# Patient Record
Sex: FEMALE | Race: WHITE | Employment: UNEMPLOYED | URBAN - METROPOLITAN AREA
[De-identification: names, ages, dates, MRNs, and addresses within clinical notes are randomized per-mention and may not be internally consistent; named-entity substitution may affect disease eponyms.]

---

## 2017-02-03 RX ORDER — FLUTICASONE PROPIONATE 50 MCG
SPRAY, SUSPENSION (ML) NASAL
Qty: 16 G | Refills: 2 | Status: SHIPPED | OUTPATIENT
Start: 2017-02-03 | End: 2017-05-05 | Stop reason: SDUPTHER

## 2017-02-03 RX ORDER — RANITIDINE 150 MG/1
TABLET ORAL
Qty: 60 TABLET | Refills: 2 | Status: SHIPPED | OUTPATIENT
Start: 2017-02-03 | End: 2017-05-07 | Stop reason: SDUPTHER

## 2017-02-03 RX ORDER — LOSARTAN POTASSIUM AND HYDROCHLOROTHIAZIDE 25; 100 MG/1; MG/1
TABLET ORAL
Qty: 30 TABLET | Refills: 2 | Status: SHIPPED | OUTPATIENT
Start: 2017-02-03 | End: 2017-05-07 | Stop reason: SDUPTHER

## 2017-02-03 RX ORDER — DULOXETIN HYDROCHLORIDE 60 MG/1
CAPSULE, DELAYED RELEASE ORAL
Qty: 30 CAPSULE | Refills: 2 | Status: SHIPPED | OUTPATIENT
Start: 2017-02-03 | End: 2017-05-07 | Stop reason: SDUPTHER

## 2017-02-06 RX ORDER — AMLODIPINE BESYLATE 5 MG/1
TABLET ORAL
Qty: 60 TABLET | Refills: 2 | Status: SHIPPED | OUTPATIENT
Start: 2017-02-06 | End: 2017-05-07 | Stop reason: SDUPTHER

## 2017-02-17 ENCOUNTER — TELEPHONE (OUTPATIENT)
Dept: FAMILY MEDICINE CLINIC | Age: 37
End: 2017-02-17

## 2017-03-23 ENCOUNTER — TELEPHONE (OUTPATIENT)
Dept: FAMILY MEDICINE CLINIC | Age: 37
End: 2017-03-23

## 2017-03-23 RX ORDER — VARENICLINE TARTRATE 25 MG
KIT ORAL
Qty: 42 TABLET | Refills: 0 | Status: SHIPPED | OUTPATIENT
Start: 2017-03-23 | End: 2017-07-07 | Stop reason: SDUPTHER

## 2017-04-06 ENCOUNTER — TELEPHONE (OUTPATIENT)
Dept: FAMILY MEDICINE CLINIC | Age: 37
End: 2017-04-06

## 2017-05-02 RX ORDER — BUPROPION HYDROCHLORIDE 150 MG/1
TABLET, EXTENDED RELEASE ORAL
Qty: 30 TABLET | Refills: 2 | Status: SHIPPED | OUTPATIENT
Start: 2017-05-02 | End: 2017-07-30 | Stop reason: SDUPTHER

## 2017-05-05 RX ORDER — FLUTICASONE PROPIONATE 50 MCG
SPRAY, SUSPENSION (ML) NASAL
Qty: 1 BOTTLE | Refills: 3 | Status: SHIPPED | OUTPATIENT
Start: 2017-05-05 | End: 2017-08-29 | Stop reason: SDUPTHER

## 2017-06-01 RX ORDER — NAPROXEN 500 MG/1
TABLET ORAL
Qty: 60 TABLET | Refills: 1 | Status: SHIPPED | OUTPATIENT
Start: 2017-06-01 | End: 2017-07-28 | Stop reason: SDUPTHER

## 2017-06-08 ENCOUNTER — TELEPHONE (OUTPATIENT)
Dept: FAMILY MEDICINE CLINIC | Age: 37
End: 2017-06-08

## 2017-07-07 RX ORDER — VARENICLINE TARTRATE 1 MG/1
1 TABLET, FILM COATED ORAL 2 TIMES DAILY
Qty: 60 TABLET | Refills: 3 | Status: SHIPPED | OUTPATIENT
Start: 2017-07-07 | End: 2017-08-01 | Stop reason: SDUPTHER

## 2017-07-07 RX ORDER — VARENICLINE TARTRATE 25 MG
KIT ORAL
Qty: 42 TABLET | Refills: 0 | Status: SHIPPED | OUTPATIENT
Start: 2017-07-07 | End: 2017-08-01

## 2017-07-31 RX ORDER — BUPROPION HYDROCHLORIDE 150 MG/1
TABLET, EXTENDED RELEASE ORAL
Qty: 30 TABLET | Refills: 1 | Status: SHIPPED | OUTPATIENT
Start: 2017-07-31 | End: 2017-09-24 | Stop reason: SDUPTHER

## 2017-08-01 RX ORDER — VARENICLINE TARTRATE 1 MG/1
1 TABLET, FILM COATED ORAL 2 TIMES DAILY
Qty: 60 TABLET | Refills: 0 | Status: SHIPPED | OUTPATIENT
Start: 2017-08-01 | End: 2017-09-18

## 2017-08-03 RX ORDER — NAPROXEN 500 MG/1
TABLET ORAL
Qty: 60 TABLET | Refills: 0 | Status: SHIPPED | OUTPATIENT
Start: 2017-08-03 | End: 2017-09-28 | Stop reason: SDUPTHER

## 2017-08-29 RX ORDER — FLUTICASONE PROPIONATE 50 MCG
SPRAY, SUSPENSION (ML) NASAL
Qty: 1 BOTTLE | Refills: 2 | Status: SHIPPED | OUTPATIENT
Start: 2017-08-29 | End: 2017-11-24 | Stop reason: SDUPTHER

## 2017-09-05 RX ORDER — RANITIDINE 150 MG/1
TABLET ORAL
Qty: 60 TABLET | Refills: 1 | Status: SHIPPED | OUTPATIENT
Start: 2017-09-05 | End: 2017-11-09 | Stop reason: SDUPTHER

## 2017-09-05 RX ORDER — AMLODIPINE BESYLATE 5 MG/1
TABLET ORAL
Qty: 60 TABLET | Refills: 1 | Status: SHIPPED | OUTPATIENT
Start: 2017-09-05 | End: 2017-11-09 | Stop reason: SDUPTHER

## 2017-09-05 RX ORDER — LOSARTAN POTASSIUM AND HYDROCHLOROTHIAZIDE 25; 100 MG/1; MG/1
TABLET ORAL
Qty: 30 TABLET | Refills: 1 | Status: SHIPPED | OUTPATIENT
Start: 2017-09-05 | End: 2017-11-09 | Stop reason: SDUPTHER

## 2017-09-05 RX ORDER — DULOXETIN HYDROCHLORIDE 60 MG/1
CAPSULE, DELAYED RELEASE ORAL
Qty: 30 CAPSULE | Refills: 1 | Status: SHIPPED | OUTPATIENT
Start: 2017-09-05 | End: 2017-11-09 | Stop reason: SDUPTHER

## 2017-09-18 ENCOUNTER — OFFICE VISIT (OUTPATIENT)
Dept: FAMILY MEDICINE CLINIC | Age: 37
End: 2017-09-18

## 2017-09-18 VITALS
WEIGHT: 233 LBS | SYSTOLIC BLOOD PRESSURE: 130 MMHG | OXYGEN SATURATION: 98 % | HEIGHT: 66 IN | BODY MASS INDEX: 37.45 KG/M2 | HEART RATE: 100 BPM | DIASTOLIC BLOOD PRESSURE: 76 MMHG

## 2017-09-18 DIAGNOSIS — E11.9 TYPE 2 DIABETES MELLITUS WITHOUT COMPLICATION, WITHOUT LONG-TERM CURRENT USE OF INSULIN (HCC): Primary | ICD-10-CM

## 2017-09-18 DIAGNOSIS — I10 ESSENTIAL HYPERTENSION: ICD-10-CM

## 2017-09-18 DIAGNOSIS — F17.200 TOBACCO DEPENDENCE: ICD-10-CM

## 2017-09-18 LAB
CREATININE URINE POCT: 200
HBA1C MFR BLD: 5.1 %
MICROALBUMIN/CREAT 24H UR: 80 MG/G{CREAT}
MICROALBUMIN/CREAT UR-RTO: <30

## 2017-09-18 PROCEDURE — 90471 IMMUNIZATION ADMIN: CPT | Performed by: FAMILY MEDICINE

## 2017-09-18 PROCEDURE — 90686 IIV4 VACC NO PRSV 0.5 ML IM: CPT | Performed by: FAMILY MEDICINE

## 2017-09-18 PROCEDURE — 99214 OFFICE O/P EST MOD 30 MIN: CPT | Performed by: FAMILY MEDICINE

## 2017-09-18 PROCEDURE — 82044 UR ALBUMIN SEMIQUANTITATIVE: CPT | Performed by: FAMILY MEDICINE

## 2017-09-18 PROCEDURE — 83036 HEMOGLOBIN GLYCOSYLATED A1C: CPT | Performed by: FAMILY MEDICINE

## 2017-09-18 ASSESSMENT — PATIENT HEALTH QUESTIONNAIRE - PHQ9
2. FEELING DOWN, DEPRESSED OR HOPELESS: 1
SUM OF ALL RESPONSES TO PHQ9 QUESTIONS 1 & 2: 2
SUM OF ALL RESPONSES TO PHQ QUESTIONS 1-9: 2
1. LITTLE INTEREST OR PLEASURE IN DOING THINGS: 1

## 2017-09-18 ASSESSMENT — ENCOUNTER SYMPTOMS
RESPIRATORY NEGATIVE: 1
GASTROINTESTINAL NEGATIVE: 1

## 2017-09-28 RX ORDER — NAPROXEN 500 MG/1
TABLET ORAL
Qty: 60 TABLET | Refills: 0 | Status: SHIPPED | OUTPATIENT
Start: 2017-09-28 | End: 2018-06-29

## 2017-11-02 ENCOUNTER — OFFICE VISIT (OUTPATIENT)
Dept: FAMILY MEDICINE CLINIC | Age: 37
End: 2017-11-02

## 2017-11-02 VITALS
HEART RATE: 83 BPM | DIASTOLIC BLOOD PRESSURE: 86 MMHG | SYSTOLIC BLOOD PRESSURE: 130 MMHG | OXYGEN SATURATION: 98 % | WEIGHT: 230 LBS | HEIGHT: 66 IN | BODY MASS INDEX: 36.96 KG/M2

## 2017-11-02 DIAGNOSIS — R10.9 RIGHT SIDED ABDOMINAL PAIN: Primary | ICD-10-CM

## 2017-11-02 LAB
BILIRUBIN, POC: NORMAL
BLOOD URINE, POC: NORMAL
CLARITY, POC: NORMAL
COLOR, POC: NORMAL
CONTROL: NORMAL
GLUCOSE URINE, POC: NORMAL
KETONES, POC: NORMAL
LEUKOCYTE EST, POC: NORMAL
NITRITE, POC: NORMAL
PH, POC: 6
PREGNANCY TEST URINE, POC: NORMAL
PROTEIN, POC: 30
SPECIFIC GRAVITY, POC: 1.02
UROBILINOGEN, POC: 0.2

## 2017-11-02 PROCEDURE — 99214 OFFICE O/P EST MOD 30 MIN: CPT | Performed by: NURSE PRACTITIONER

## 2017-11-02 PROCEDURE — G8484 FLU IMMUNIZE NO ADMIN: HCPCS | Performed by: NURSE PRACTITIONER

## 2017-11-02 PROCEDURE — 81002 URINALYSIS NONAUTO W/O SCOPE: CPT | Performed by: NURSE PRACTITIONER

## 2017-11-02 PROCEDURE — 4004F PT TOBACCO SCREEN RCVD TLK: CPT | Performed by: NURSE PRACTITIONER

## 2017-11-02 PROCEDURE — 81025 URINE PREGNANCY TEST: CPT | Performed by: NURSE PRACTITIONER

## 2017-11-02 PROCEDURE — G8417 CALC BMI ABV UP PARAM F/U: HCPCS | Performed by: NURSE PRACTITIONER

## 2017-11-02 PROCEDURE — G8427 DOCREV CUR MEDS BY ELIG CLIN: HCPCS | Performed by: NURSE PRACTITIONER

## 2017-11-02 ASSESSMENT — ENCOUNTER SYMPTOMS
NAUSEA: 1
VOMITING: 1
FLATUS: 1
DIARRHEA: 0
RESPIRATORY NEGATIVE: 1
ABDOMINAL PAIN: 1
ALLERGIC/IMMUNOLOGIC NEGATIVE: 1
CONSTIPATION: 0
BELCHING: 0
EYES NEGATIVE: 1
HEMATOCHEZIA: 0

## 2017-11-02 NOTE — PROGRESS NOTES
11/2/2017    This is a 40 y.o. female here for sudden onset right lower abdominal pain. Symptoms woke her up from sleep at 3 AM. It has been ongoing since that time. She denies fever but overall states she does not feel well. Chief Complaint   Patient presents with    Abdominal Pain     Right side pain; dizziness; nausea    . Abdominal Pain   This is a new problem. The current episode started today. The onset quality is sudden. The problem occurs constantly. The problem has been waxing and waning. The pain is located in the RLQ. The pain is at a severity of 8/10. The pain is moderate. The quality of the pain is colicky and sharp. The abdominal pain radiates to the suprapubic region. Associated symptoms include anorexia, flatus, nausea and vomiting. Pertinent negatives include no arthralgias, belching, constipation, diarrhea, dysuria, fever, frequency, headaches, hematochezia, hematuria, melena, myalgias or weight loss. The pain is aggravated by palpation and movement. The pain is relieved by nothing. She has tried nothing for the symptoms. The treatment provided no relief. Her past medical history is significant for gallstones (Status post cholecystectomy). There is no history of abdominal surgery, colon cancer, GERD, irritable bowel syndrome, pancreatitis, PUD or ulcerative colitis.         Patient Active Problem List   Diagnosis    PCOS (polycystic ovarian syndrome)    Allergic rhinitis    LAURO (obstructive sleep apnea)    Panic attacks    Acute left flank pain    Syncope    Hypertension    Migraine    Chronic back pain    Tobacco dependence    Hypokalemia    Psoriasis    ESTELA (generalized anxiety disorder)    DM (diabetes mellitus) (HCC)    Obesity (BMI 35.0-39.9 without comorbidity)    Recurrent acute tonsillitis       Current Outpatient Prescriptions   Medication Sig Dispense Refill    naproxen (NAPROSYN) 500 MG tablet TAKE ONE TABLET BY MOUTH TWICE A DAY WITH MEALS 60 tablet 0    buPROPion Cardiovascular: Negative. Gastrointestinal: Positive for abdominal pain, anorexia, flatus, nausea and vomiting. Negative for constipation, diarrhea, hematochezia and melena. Endocrine: Negative. Diabetic, stable   Genitourinary: Negative. Negative for decreased urine volume, dysuria, frequency, hematuria, urgency, vaginal bleeding, vaginal discharge and vaginal pain. History of polycystic ovarian disease, no history of ruptured cyst  History of kidney stones, patient states this does not feel the same   Musculoskeletal: Negative. Negative for arthralgias and myalgias. Skin: Negative. Allergic/Immunologic: Negative. Neurological: Negative. Negative for headaches. Hematological: Negative. Psychiatric/Behavioral: Negative. Physical Exam   Constitutional: She is oriented to person, place, and time. She appears well-developed and well-nourished. No distress. HENT:   Head: Normocephalic and atraumatic. Right Ear: External ear normal.   Left Ear: External ear normal.   Nose: Nose normal.   Mouth/Throat: Oropharynx is clear and moist. No oropharyngeal exudate. Eyes: Conjunctivae and EOM are normal. Right eye exhibits no discharge. Left eye exhibits no discharge. Neck: Normal range of motion. Neck supple. Cardiovascular: Normal rate, regular rhythm and normal heart sounds. Exam reveals no gallop and no friction rub. No murmur heard. Pulmonary/Chest: Effort normal and breath sounds normal. No respiratory distress. She has no wheezes. She has no rales. Abdominal: Soft. Normal appearance and bowel sounds are normal. She exhibits no distension. There is tenderness. There is tenderness at McBurney's point. There is no rigidity, no rebound, no guarding, no CVA tenderness and negative Bronson's sign. Positive heel strike  Mildly positive iliopsoas   Musculoskeletal: Normal range of motion. She exhibits no edema or tenderness.    Lymphadenopathy:     She has no cervical adenopathy. Neurological: She is alert and oriented to person, place, and time. She exhibits normal muscle tone. Coordination normal.   Skin: Skin is warm. No rash noted. She is diaphoretic (Flushed). No erythema. No pallor. Psychiatric: She has a normal mood and affect. Her behavior is normal. Judgment and thought content normal.   Nursing note and vitals reviewed. Diagnosis    ICD-10-CM ICD-9-CM    1. Right sided abdominal pain R10.9 789.09         Plan  Patient sent to the emergency room for evaluation of possible appendicitis  Also consider ovarian cyst, ovarian torsion  Urine pregnancy negative  UA shows no sign of infection, no blood    No orders of the defined types were placed in this encounter. No orders of the defined types were placed in this encounter. No Follow-up on file.     Follow-up with primary care after evaluation

## 2017-12-20 RX ORDER — BUPROPION HYDROCHLORIDE 150 MG/1
TABLET, EXTENDED RELEASE ORAL
Qty: 30 TABLET | Refills: 3 | Status: SHIPPED | OUTPATIENT
Start: 2017-12-20 | End: 2018-01-18 | Stop reason: SDUPTHER

## 2018-01-18 RX ORDER — DULOXETIN HYDROCHLORIDE 60 MG/1
CAPSULE, DELAYED RELEASE ORAL
Qty: 30 CAPSULE | Refills: 3 | Status: SHIPPED | OUTPATIENT
Start: 2018-01-18 | End: 2018-03-05 | Stop reason: SDUPTHER

## 2018-01-18 RX ORDER — BUPROPION HYDROCHLORIDE 150 MG/1
TABLET, EXTENDED RELEASE ORAL
Qty: 30 TABLET | Refills: 3 | Status: SHIPPED | OUTPATIENT
Start: 2018-01-18 | End: 2019-08-27

## 2018-01-18 RX ORDER — VARENICLINE TARTRATE 1 MG/1
1 TABLET, FILM COATED ORAL 2 TIMES DAILY
Qty: 60 TABLET | Refills: 0 | Status: SHIPPED | OUTPATIENT
Start: 2018-01-18 | End: 2018-03-05 | Stop reason: SDUPTHER

## 2018-01-18 RX ORDER — AMLODIPINE BESYLATE 5 MG/1
TABLET ORAL
Qty: 60 TABLET | Refills: 3 | Status: SHIPPED | OUTPATIENT
Start: 2018-01-18 | End: 2018-03-05 | Stop reason: SDUPTHER

## 2018-01-18 RX ORDER — LOSARTAN POTASSIUM AND HYDROCHLOROTHIAZIDE 25; 100 MG/1; MG/1
TABLET ORAL
Qty: 30 TABLET | Refills: 3 | Status: SHIPPED | OUTPATIENT
Start: 2018-01-18 | End: 2018-03-05 | Stop reason: SDUPTHER

## 2018-01-18 RX ORDER — METFORMIN HYDROCHLORIDE 750 MG/1
750 TABLET, EXTENDED RELEASE ORAL
Qty: 30 TABLET | Refills: 0 | Status: SHIPPED | OUTPATIENT
Start: 2018-01-18 | End: 2018-01-22 | Stop reason: SDUPTHER

## 2018-01-18 RX ORDER — RANITIDINE 150 MG/1
TABLET ORAL
Qty: 60 TABLET | Refills: 3 | Status: SHIPPED | OUTPATIENT
Start: 2018-01-18 | End: 2018-03-05 | Stop reason: SDUPTHER

## 2018-01-22 RX ORDER — METFORMIN HYDROCHLORIDE 750 MG/1
TABLET, EXTENDED RELEASE ORAL
Qty: 60 TABLET | Refills: 3 | Status: SHIPPED | OUTPATIENT
Start: 2018-01-22 | End: 2020-11-18 | Stop reason: SDUPTHER

## 2018-06-04 RX ORDER — FLUTICASONE PROPIONATE 50 MCG
SPRAY, SUSPENSION (ML) NASAL
Qty: 1 BOTTLE | Refills: 2 | Status: SHIPPED | OUTPATIENT
Start: 2018-06-04 | End: 2018-08-30

## 2018-06-04 RX ORDER — VARENICLINE TARTRATE 1 MG/1
1 TABLET, FILM COATED ORAL 2 TIMES DAILY
Qty: 60 TABLET | Refills: 0 | Status: SHIPPED | OUTPATIENT
Start: 2018-06-04 | End: 2019-12-18

## 2018-06-20 ENCOUNTER — TELEPHONE (OUTPATIENT)
Dept: FAMILY MEDICINE CLINIC | Age: 38
End: 2018-06-20

## 2018-06-21 ENCOUNTER — TELEPHONE (OUTPATIENT)
Dept: FAMILY MEDICINE CLINIC | Age: 38
End: 2018-06-21

## 2018-06-29 ENCOUNTER — OFFICE VISIT (OUTPATIENT)
Dept: FAMILY MEDICINE CLINIC | Age: 38
End: 2018-06-29

## 2018-06-29 VITALS
SYSTOLIC BLOOD PRESSURE: 150 MMHG | HEIGHT: 66 IN | BODY MASS INDEX: 40.5 KG/M2 | HEART RATE: 99 BPM | OXYGEN SATURATION: 98 % | WEIGHT: 252 LBS | DIASTOLIC BLOOD PRESSURE: 90 MMHG

## 2018-06-29 DIAGNOSIS — S16.1XXA ACUTE STRAIN OF NECK MUSCLE, INITIAL ENCOUNTER: ICD-10-CM

## 2018-06-29 DIAGNOSIS — F17.200 TOBACCO DEPENDENCE: Primary | ICD-10-CM

## 2018-06-29 PROCEDURE — 99214 OFFICE O/P EST MOD 30 MIN: CPT | Performed by: FAMILY MEDICINE

## 2018-06-29 PROCEDURE — 4004F PT TOBACCO SCREEN RCVD TLK: CPT | Performed by: FAMILY MEDICINE

## 2018-06-29 PROCEDURE — G8427 DOCREV CUR MEDS BY ELIG CLIN: HCPCS | Performed by: FAMILY MEDICINE

## 2018-06-29 PROCEDURE — G8417 CALC BMI ABV UP PARAM F/U: HCPCS | Performed by: FAMILY MEDICINE

## 2018-06-29 ASSESSMENT — ENCOUNTER SYMPTOMS
BLOOD IN STOOL: 0
SHORTNESS OF BREATH: 0
ABDOMINAL PAIN: 0
COUGH: 0

## 2018-07-02 ENCOUNTER — TELEPHONE (OUTPATIENT)
Dept: PRIMARY CARE CLINIC | Age: 38
End: 2018-07-02

## 2018-07-24 ENCOUNTER — TELEPHONE (OUTPATIENT)
Dept: FAMILY MEDICINE CLINIC | Age: 38
End: 2018-07-24

## 2018-08-02 ENCOUNTER — TELEPHONE (OUTPATIENT)
Dept: FAMILY MEDICINE CLINIC | Age: 38
End: 2018-08-02

## 2018-08-02 RX ORDER — ONDANSETRON 4 MG/1
TABLET, FILM COATED ORAL
Qty: 30 TABLET | Refills: 0 | Status: SHIPPED | OUTPATIENT
Start: 2018-08-02 | End: 2018-11-08 | Stop reason: ALTCHOICE

## 2018-11-08 ENCOUNTER — OFFICE VISIT (OUTPATIENT)
Dept: FAMILY MEDICINE CLINIC | Age: 38
End: 2018-11-08
Payer: COMMERCIAL

## 2018-11-08 DIAGNOSIS — M62.838 MUSCLE SPASM OF LEFT SHOULDER: ICD-10-CM

## 2018-11-08 DIAGNOSIS — I10 ESSENTIAL HYPERTENSION: Primary | ICD-10-CM

## 2018-11-08 DIAGNOSIS — Z13.31 POSITIVE DEPRESSION SCREENING: ICD-10-CM

## 2018-11-08 DIAGNOSIS — M77.12 LATERAL EPICONDYLITIS OF LEFT ELBOW: ICD-10-CM

## 2018-11-08 PROCEDURE — 4004F PT TOBACCO SCREEN RCVD TLK: CPT | Performed by: PHYSICIAN ASSISTANT

## 2018-11-08 PROCEDURE — 99213 OFFICE O/P EST LOW 20 MIN: CPT | Performed by: PHYSICIAN ASSISTANT

## 2018-11-08 PROCEDURE — 96160 PT-FOCUSED HLTH RISK ASSMT: CPT | Performed by: PHYSICIAN ASSISTANT

## 2018-11-08 PROCEDURE — G8431 POS CLIN DEPRES SCRN F/U DOC: HCPCS | Performed by: PHYSICIAN ASSISTANT

## 2018-11-08 PROCEDURE — G8417 CALC BMI ABV UP PARAM F/U: HCPCS | Performed by: PHYSICIAN ASSISTANT

## 2018-11-08 PROCEDURE — G8484 FLU IMMUNIZE NO ADMIN: HCPCS | Performed by: PHYSICIAN ASSISTANT

## 2018-11-08 PROCEDURE — G8427 DOCREV CUR MEDS BY ELIG CLIN: HCPCS | Performed by: PHYSICIAN ASSISTANT

## 2018-11-08 RX ORDER — CYCLOBENZAPRINE HCL 5 MG
5 TABLET ORAL 2 TIMES DAILY PRN
Qty: 30 TABLET | Refills: 0 | Status: SHIPPED | OUTPATIENT
Start: 2018-11-08 | End: 2018-11-18

## 2018-11-08 RX ORDER — METOPROLOL SUCCINATE 25 MG/1
25 TABLET, EXTENDED RELEASE ORAL DAILY
Qty: 30 TABLET | Refills: 0 | Status: SHIPPED | OUTPATIENT
Start: 2018-11-08 | End: 2018-12-13 | Stop reason: SDUPTHER

## 2018-11-08 ASSESSMENT — PATIENT HEALTH QUESTIONNAIRE - PHQ9
8. MOVING OR SPEAKING SO SLOWLY THAT OTHER PEOPLE COULD HAVE NOTICED. OR THE OPPOSITE, BEING SO FIGETY OR RESTLESS THAT YOU HAVE BEEN MOVING AROUND A LOT MORE THAN USUAL: 0
10. IF YOU CHECKED OFF ANY PROBLEMS, HOW DIFFICULT HAVE THESE PROBLEMS MADE IT FOR YOU TO DO YOUR WORK, TAKE CARE OF THINGS AT HOME, OR GET ALONG WITH OTHER PEOPLE: 2
2. FEELING DOWN, DEPRESSED OR HOPELESS: 3
9. THOUGHTS THAT YOU WOULD BE BETTER OFF DEAD, OR OF HURTING YOURSELF: 0
7. TROUBLE CONCENTRATING ON THINGS, SUCH AS READING THE NEWSPAPER OR WATCHING TELEVISION: 0
SUM OF ALL RESPONSES TO PHQ QUESTIONS 1-9: 12
3. TROUBLE FALLING OR STAYING ASLEEP: 3
6. FEELING BAD ABOUT YOURSELF - OR THAT YOU ARE A FAILURE OR HAVE LET YOURSELF OR YOUR FAMILY DOWN: 0
1. LITTLE INTEREST OR PLEASURE IN DOING THINGS: 0
SUM OF ALL RESPONSES TO PHQ9 QUESTIONS 1 & 2: 3
SUM OF ALL RESPONSES TO PHQ QUESTIONS 1-9: 12
5. POOR APPETITE OR OVEREATING: 3
4. FEELING TIRED OR HAVING LITTLE ENERGY: 3

## 2018-11-08 NOTE — PROGRESS NOTES
DAILY, Disp: 30 tablet, Rfl: 3    levonorgestrel-ethinyl estradiol (JOLESSA) 0.15-0.03 MG per tablet, Take 1 tablet by mouth, Disp: , Rfl:     triamcinolone (KENALOG) 0.1 % cream, Apply small amount 2 times daily, Disp: 15 g, Rfl: 0      Vitals:    11/08/18 1602   BP: (!) 140/100   Pulse: 94   SpO2: 99%       Review of Systems   Constitutional: Negative for activity change, appetite change, fatigue, fever and unexpected weight change. Eyes: Negative for visual disturbance. Respiratory: Negative for shortness of breath. Cardiovascular: Negative for chest pain, palpitations and leg swelling. Endocrine: Negative for cold intolerance, heat intolerance, polydipsia, polyphagia and polyuria. Musculoskeletal: Positive for myalgias and neck pain. Negative for arthralgias, back pain, gait problem, joint swelling and neck stiffness. Skin: Negative for color change, pallor and rash. Neurological: Negative for dizziness, tremors, weakness, numbness and headaches. Physical Exam   Constitutional: She is oriented to person, place, and time. She appears well-developed and well-nourished. HENT:   Head: Normocephalic and atraumatic. Mouth/Throat: Oropharynx is clear and moist.   Eyes: Pupils are equal, round, and reactive to light. Conjunctivae are normal.   Neck: Normal range of motion. Neck supple. No thyromegaly present. Cardiovascular: Normal rate, regular rhythm and normal heart sounds. Pulmonary/Chest: Effort normal and breath sounds normal.   Musculoskeletal:        Left shoulder: She exhibits spasm. She exhibits normal range of motion, no tenderness, no bony tenderness, no swelling and no effusion. Left elbow: She exhibits normal range of motion and no swelling. Tenderness found. Lateral epicondyle tenderness noted. Left wrist: She exhibits normal range of motion, no tenderness, no bony tenderness and no swelling. Cervical back: She exhibits spasm.  She exhibits normal range

## 2018-11-09 VITALS
HEART RATE: 94 BPM | DIASTOLIC BLOOD PRESSURE: 90 MMHG | WEIGHT: 268 LBS | SYSTOLIC BLOOD PRESSURE: 142 MMHG | BODY MASS INDEX: 43.26 KG/M2 | OXYGEN SATURATION: 99 %

## 2018-11-09 ASSESSMENT — ENCOUNTER SYMPTOMS
COLOR CHANGE: 0
BACK PAIN: 0
SHORTNESS OF BREATH: 0

## 2018-11-27 ENCOUNTER — TELEPHONE (OUTPATIENT)
Dept: FAMILY MEDICINE CLINIC | Age: 38
End: 2018-11-27

## 2018-11-27 RX ORDER — PREDNISONE 10 MG/1
TABLET ORAL
Qty: 30 TABLET | Refills: 0 | Status: SHIPPED | OUTPATIENT
Start: 2018-11-27 | End: 2018-12-07

## 2018-11-27 NOTE — TELEPHONE ENCOUNTER
I have sent in a steroid taper to her pharmacy. Please take as directed. If there's no improvement we could consider physical therapy at that time.  Thank you

## 2018-12-12 ENCOUNTER — TELEPHONE (OUTPATIENT)
Dept: FAMILY MEDICINE CLINIC | Age: 38
End: 2018-12-12

## 2018-12-12 DIAGNOSIS — I10 ESSENTIAL HYPERTENSION: ICD-10-CM

## 2018-12-13 RX ORDER — METOPROLOL SUCCINATE 25 MG/1
25 TABLET, EXTENDED RELEASE ORAL DAILY
Qty: 30 TABLET | Refills: 0 | Status: SHIPPED | OUTPATIENT
Start: 2018-12-13 | End: 2018-12-18 | Stop reason: SDUPTHER

## 2018-12-18 ENCOUNTER — OFFICE VISIT (OUTPATIENT)
Dept: FAMILY MEDICINE CLINIC | Age: 38
End: 2018-12-18
Payer: COMMERCIAL

## 2018-12-18 VITALS
HEART RATE: 102 BPM | DIASTOLIC BLOOD PRESSURE: 90 MMHG | WEIGHT: 274 LBS | BODY MASS INDEX: 44.22 KG/M2 | OXYGEN SATURATION: 98 % | SYSTOLIC BLOOD PRESSURE: 140 MMHG

## 2018-12-18 DIAGNOSIS — I10 ESSENTIAL HYPERTENSION: Primary | ICD-10-CM

## 2018-12-18 DIAGNOSIS — Z23 NEED FOR INFLUENZA VACCINATION: ICD-10-CM

## 2018-12-18 DIAGNOSIS — E11.9 TYPE 2 DIABETES MELLITUS WITHOUT COMPLICATION, WITHOUT LONG-TERM CURRENT USE OF INSULIN (HCC): ICD-10-CM

## 2018-12-18 LAB
A/G RATIO: 1.7 (ref 1.1–2.2)
ALBUMIN SERPL-MCNC: 4.3 G/DL (ref 3.4–5)
ALP BLD-CCNC: 81 U/L (ref 40–129)
ALT SERPL-CCNC: 26 U/L (ref 10–40)
ANION GAP SERPL CALCULATED.3IONS-SCNC: 16 MMOL/L (ref 3–16)
AST SERPL-CCNC: 18 U/L (ref 15–37)
BILIRUB SERPL-MCNC: <0.2 MG/DL (ref 0–1)
BUN BLDV-MCNC: 14 MG/DL (ref 7–20)
CALCIUM SERPL-MCNC: 9.5 MG/DL (ref 8.3–10.6)
CHLORIDE BLD-SCNC: 99 MMOL/L (ref 99–110)
CO2: 24 MMOL/L (ref 21–32)
CREAT SERPL-MCNC: 0.5 MG/DL (ref 0.6–1.1)
GFR AFRICAN AMERICAN: >60
GFR NON-AFRICAN AMERICAN: >60
GLOBULIN: 2.6 G/DL
GLUCOSE BLD-MCNC: 94 MG/DL (ref 70–99)
HBA1C MFR BLD: 5.8 %
POTASSIUM SERPL-SCNC: 4.2 MMOL/L (ref 3.5–5.1)
SODIUM BLD-SCNC: 139 MMOL/L (ref 136–145)
TOTAL PROTEIN: 6.9 G/DL (ref 6.4–8.2)
TSH REFLEX: 2.71 UIU/ML (ref 0.27–4.2)

## 2018-12-18 PROCEDURE — G8484 FLU IMMUNIZE NO ADMIN: HCPCS | Performed by: PHYSICIAN ASSISTANT

## 2018-12-18 PROCEDURE — 99213 OFFICE O/P EST LOW 20 MIN: CPT | Performed by: PHYSICIAN ASSISTANT

## 2018-12-18 PROCEDURE — 83036 HEMOGLOBIN GLYCOSYLATED A1C: CPT | Performed by: PHYSICIAN ASSISTANT

## 2018-12-18 PROCEDURE — G8417 CALC BMI ABV UP PARAM F/U: HCPCS | Performed by: PHYSICIAN ASSISTANT

## 2018-12-18 PROCEDURE — 4004F PT TOBACCO SCREEN RCVD TLK: CPT | Performed by: PHYSICIAN ASSISTANT

## 2018-12-18 PROCEDURE — 90471 IMMUNIZATION ADMIN: CPT | Performed by: PHYSICIAN ASSISTANT

## 2018-12-18 PROCEDURE — 2022F DILAT RTA XM EVC RTNOPTHY: CPT | Performed by: PHYSICIAN ASSISTANT

## 2018-12-18 PROCEDURE — 3046F HEMOGLOBIN A1C LEVEL >9.0%: CPT | Performed by: PHYSICIAN ASSISTANT

## 2018-12-18 PROCEDURE — 36415 COLL VENOUS BLD VENIPUNCTURE: CPT | Performed by: PHYSICIAN ASSISTANT

## 2018-12-18 PROCEDURE — G8427 DOCREV CUR MEDS BY ELIG CLIN: HCPCS | Performed by: PHYSICIAN ASSISTANT

## 2018-12-18 PROCEDURE — 90686 IIV4 VACC NO PRSV 0.5 ML IM: CPT | Performed by: PHYSICIAN ASSISTANT

## 2018-12-18 RX ORDER — METOPROLOL SUCCINATE 50 MG/1
50 TABLET, EXTENDED RELEASE ORAL DAILY
Qty: 30 TABLET | Refills: 3 | Status: SHIPPED | OUTPATIENT
Start: 2018-12-18 | End: 2019-01-11 | Stop reason: SDUPTHER

## 2018-12-18 ASSESSMENT — ENCOUNTER SYMPTOMS
COUGH: 0
CHEST TIGHTNESS: 0
SHORTNESS OF BREATH: 0
WHEEZING: 0

## 2018-12-19 LAB
HCT VFR BLD CALC: 42.2 % (ref 36–48)
HEMOGLOBIN: 14.4 G/DL (ref 12–16)
MCH RBC QN AUTO: 29.8 PG (ref 26–34)
MCHC RBC AUTO-ENTMCNC: 34.1 G/DL (ref 31–36)
MCV RBC AUTO: 87.4 FL (ref 80–100)
PDW BLD-RTO: 14 % (ref 12.4–15.4)
PLATELET # BLD: 367 K/UL (ref 135–450)
PMV BLD AUTO: 7.6 FL (ref 5–10.5)
RBC # BLD: 4.83 M/UL (ref 4–5.2)
WBC # BLD: 9.7 K/UL (ref 4–11)

## 2019-01-11 ENCOUNTER — TELEPHONE (OUTPATIENT)
Dept: FAMILY MEDICINE CLINIC | Age: 39
End: 2019-01-11

## 2019-01-11 DIAGNOSIS — I10 ESSENTIAL HYPERTENSION: ICD-10-CM

## 2019-01-11 RX ORDER — METOPROLOL SUCCINATE 50 MG/1
50 TABLET, EXTENDED RELEASE ORAL DAILY
Qty: 90 TABLET | Refills: 1 | Status: SHIPPED | OUTPATIENT
Start: 2019-01-11 | End: 2019-06-03

## 2019-01-11 RX ORDER — AZITHROMYCIN 250 MG/1
250 TABLET, FILM COATED ORAL SEE ADMIN INSTRUCTIONS
Qty: 6 TABLET | Refills: 0 | Status: SHIPPED | OUTPATIENT
Start: 2019-01-11 | End: 2019-01-16

## 2019-02-10 ENCOUNTER — HOSPITAL ENCOUNTER (EMERGENCY)
Age: 39
Discharge: HOME | End: 2019-02-10
Payer: COMMERCIAL

## 2019-02-10 VITALS
BODY MASS INDEX: 41.78 KG/M2 | HEART RATE: 87 BPM | WEIGHT: 260 LBS | RESPIRATION RATE: 14 BRPM | DIASTOLIC BLOOD PRESSURE: 68 MMHG | SYSTOLIC BLOOD PRESSURE: 125 MMHG | OXYGEN SATURATION: 97 % | TEMPERATURE: 98.8 F | HEIGHT: 66 IN

## 2019-02-10 DIAGNOSIS — G43.909 MIGRAINE WITHOUT STATUS MIGRAINOSUS, NOT INTRACTABLE, UNSPECIFIED MIGRAINE TYPE: Primary | ICD-10-CM

## 2019-02-10 LAB
A/G RATIO: 1.6 (ref 1.1–2.2)
ALBUMIN SERPL-MCNC: 4 G/DL (ref 3.4–5)
ALP BLD-CCNC: 52 U/L (ref 40–129)
ALT SERPL-CCNC: 22 U/L (ref 10–40)
ANION GAP SERPL CALCULATED.3IONS-SCNC: 13 MMOL/L (ref 3–16)
AST SERPL-CCNC: 15 U/L (ref 15–37)
BASOPHILS ABSOLUTE: 0.1 K/UL (ref 0–0.2)
BASOPHILS RELATIVE PERCENT: 1 %
BILIRUB SERPL-MCNC: <0.2 MG/DL (ref 0–1)
BUN BLDV-MCNC: 11 MG/DL (ref 7–20)
CALCIUM SERPL-MCNC: 9 MG/DL (ref 8.3–10.6)
CHLORIDE BLD-SCNC: 99 MMOL/L (ref 99–110)
CO2: 26 MMOL/L (ref 21–32)
CREAT SERPL-MCNC: <0.5 MG/DL (ref 0.6–1.1)
EOSINOPHILS ABSOLUTE: 0.2 K/UL (ref 0–0.6)
EOSINOPHILS RELATIVE PERCENT: 2.8 %
GFR AFRICAN AMERICAN: >60
GFR NON-AFRICAN AMERICAN: >60
GLOBULIN: 2.5 G/DL
GLUCOSE BLD-MCNC: 110 MG/DL (ref 70–99)
HCG QUALITATIVE: NEGATIVE
HCT VFR BLD CALC: 41.1 % (ref 36–48)
HEMOGLOBIN: 14.1 G/DL (ref 12–16)
LYMPHOCYTES ABSOLUTE: 2.1 K/UL (ref 1–5.1)
LYMPHOCYTES RELATIVE PERCENT: 30.2 %
MCH RBC QN AUTO: 29.9 PG (ref 26–34)
MCHC RBC AUTO-ENTMCNC: 34.2 G/DL (ref 31–36)
MCV RBC AUTO: 87.3 FL (ref 80–100)
MONOCYTES ABSOLUTE: 0.7 K/UL (ref 0–1.3)
MONOCYTES RELATIVE PERCENT: 10.3 %
NEUTROPHILS ABSOLUTE: 3.8 K/UL (ref 1.7–7.7)
NEUTROPHILS RELATIVE PERCENT: 55.7 %
PDW BLD-RTO: 14 % (ref 12.4–15.4)
PLATELET # BLD: 298 K/UL (ref 135–450)
PMV BLD AUTO: 7.2 FL (ref 5–10.5)
POTASSIUM REFLEX MAGNESIUM: 3.6 MMOL/L (ref 3.5–5.1)
RBC # BLD: 4.71 M/UL (ref 4–5.2)
SODIUM BLD-SCNC: 138 MMOL/L (ref 136–145)
TOTAL PROTEIN: 6.5 G/DL (ref 6.4–8.2)
WBC # BLD: 6.8 K/UL (ref 4–11)

## 2019-02-10 PROCEDURE — 2580000003 HC RX 258: Performed by: NURSE PRACTITIONER

## 2019-02-10 PROCEDURE — 85025 COMPLETE CBC W/AUTO DIFF WBC: CPT

## 2019-02-10 PROCEDURE — 84703 CHORIONIC GONADOTROPIN ASSAY: CPT

## 2019-02-10 PROCEDURE — 96375 TX/PRO/DX INJ NEW DRUG ADDON: CPT

## 2019-02-10 PROCEDURE — 99283 EMERGENCY DEPT VISIT LOW MDM: CPT

## 2019-02-10 PROCEDURE — 96374 THER/PROPH/DIAG INJ IV PUSH: CPT

## 2019-02-10 PROCEDURE — 96361 HYDRATE IV INFUSION ADD-ON: CPT

## 2019-02-10 PROCEDURE — 80053 COMPREHEN METABOLIC PANEL: CPT

## 2019-02-10 PROCEDURE — 6360000002 HC RX W HCPCS: Performed by: NURSE PRACTITIONER

## 2019-02-10 RX ORDER — METOCLOPRAMIDE HYDROCHLORIDE 5 MG/ML
10 INJECTION INTRAMUSCULAR; INTRAVENOUS ONCE
Status: COMPLETED | OUTPATIENT
Start: 2019-02-10 | End: 2019-02-10

## 2019-02-10 RX ORDER — DIPHENHYDRAMINE HYDROCHLORIDE 50 MG/ML
12.5 INJECTION INTRAMUSCULAR; INTRAVENOUS ONCE
Status: COMPLETED | OUTPATIENT
Start: 2019-02-10 | End: 2019-02-10

## 2019-02-10 RX ORDER — KETOROLAC TROMETHAMINE 30 MG/ML
30 INJECTION, SOLUTION INTRAMUSCULAR; INTRAVENOUS ONCE
Status: COMPLETED | OUTPATIENT
Start: 2019-02-10 | End: 2019-02-10

## 2019-02-10 RX ORDER — ONDANSETRON 4 MG/1
4 TABLET, ORALLY DISINTEGRATING ORAL EVERY 8 HOURS PRN
Qty: 20 TABLET | Refills: 0 | Status: SHIPPED | OUTPATIENT
Start: 2019-02-10 | End: 2019-06-03

## 2019-02-10 RX ORDER — 0.9 % SODIUM CHLORIDE 0.9 %
1000 INTRAVENOUS SOLUTION INTRAVENOUS ONCE
Status: COMPLETED | OUTPATIENT
Start: 2019-02-10 | End: 2019-02-10

## 2019-02-10 RX ORDER — DEXAMETHASONE SODIUM PHOSPHATE 4 MG/ML
10 INJECTION, SOLUTION INTRA-ARTICULAR; INTRALESIONAL; INTRAMUSCULAR; INTRAVENOUS; SOFT TISSUE ONCE
Status: COMPLETED | OUTPATIENT
Start: 2019-02-10 | End: 2019-02-10

## 2019-02-10 RX ORDER — BUTALBITAL, ACETAMINOPHEN AND CAFFEINE 300; 40; 50 MG/1; MG/1; MG/1
1 CAPSULE ORAL EVERY 4 HOURS PRN
Qty: 84 CAPSULE | Refills: 0 | Status: SHIPPED | OUTPATIENT
Start: 2019-02-10 | End: 2019-12-18

## 2019-02-10 RX ADMIN — DEXAMETHASONE SODIUM PHOSPHATE 10 MG: 4 INJECTION, SOLUTION INTRAMUSCULAR; INTRAVENOUS at 09:27

## 2019-02-10 RX ADMIN — DIPHENHYDRAMINE HYDROCHLORIDE 12.5 MG: 50 INJECTION INTRAMUSCULAR; INTRAVENOUS at 09:27

## 2019-02-10 RX ADMIN — METOCLOPRAMIDE 10 MG: 5 INJECTION, SOLUTION INTRAMUSCULAR; INTRAVENOUS at 09:27

## 2019-02-10 RX ADMIN — SODIUM CHLORIDE 1000 ML: 9 INJECTION, SOLUTION INTRAVENOUS at 09:26

## 2019-02-10 RX ADMIN — KETOROLAC TROMETHAMINE 30 MG: 30 INJECTION, SOLUTION INTRAMUSCULAR at 09:27

## 2019-02-10 ASSESSMENT — PAIN SCALES - GENERAL
PAINLEVEL_OUTOF10: 7
PAINLEVEL_OUTOF10: 2

## 2019-02-10 ASSESSMENT — PAIN DESCRIPTION - DESCRIPTORS: DESCRIPTORS: ACHING

## 2019-02-10 ASSESSMENT — PAIN DESCRIPTION - ONSET: ONSET: ON-GOING

## 2019-02-10 ASSESSMENT — PAIN DESCRIPTION - PROGRESSION: CLINICAL_PROGRESSION: NOT CHANGED

## 2019-02-10 ASSESSMENT — PAIN DESCRIPTION - LOCATION: LOCATION: HEAD

## 2019-02-10 ASSESSMENT — PAIN DESCRIPTION - FREQUENCY: FREQUENCY: CONTINUOUS

## 2019-02-10 ASSESSMENT — PAIN DESCRIPTION - PAIN TYPE: TYPE: ACUTE PAIN

## 2019-02-21 ENCOUNTER — OFFICE VISIT (OUTPATIENT)
Dept: FAMILY MEDICINE CLINIC | Age: 39
End: 2019-02-21
Payer: COMMERCIAL

## 2019-02-21 VITALS
TEMPERATURE: 98.5 F | SYSTOLIC BLOOD PRESSURE: 130 MMHG | WEIGHT: 279 LBS | DIASTOLIC BLOOD PRESSURE: 88 MMHG | OXYGEN SATURATION: 100 % | BODY MASS INDEX: 45.03 KG/M2 | HEART RATE: 103 BPM

## 2019-02-21 DIAGNOSIS — J06.9 VIRAL URI WITH COUGH: Primary | ICD-10-CM

## 2019-02-21 DIAGNOSIS — H60.501 ACUTE OTITIS EXTERNA OF RIGHT EAR, UNSPECIFIED TYPE: ICD-10-CM

## 2019-02-21 PROCEDURE — 99213 OFFICE O/P EST LOW 20 MIN: CPT | Performed by: PHYSICIAN ASSISTANT

## 2019-02-21 PROCEDURE — G8482 FLU IMMUNIZE ORDER/ADMIN: HCPCS | Performed by: PHYSICIAN ASSISTANT

## 2019-02-21 PROCEDURE — 4004F PT TOBACCO SCREEN RCVD TLK: CPT | Performed by: PHYSICIAN ASSISTANT

## 2019-02-21 PROCEDURE — G8417 CALC BMI ABV UP PARAM F/U: HCPCS | Performed by: PHYSICIAN ASSISTANT

## 2019-02-21 PROCEDURE — G8427 DOCREV CUR MEDS BY ELIG CLIN: HCPCS | Performed by: PHYSICIAN ASSISTANT

## 2019-02-21 PROCEDURE — 4130F TOPICAL PREP RX AOE: CPT | Performed by: PHYSICIAN ASSISTANT

## 2019-02-21 RX ORDER — BENZONATATE 100 MG/1
100 CAPSULE ORAL EVERY 6 HOURS PRN
Qty: 30 CAPSULE | Refills: 0 | Status: SHIPPED | OUTPATIENT
Start: 2019-02-21 | End: 2019-02-28

## 2019-02-21 RX ORDER — CIPROFLOXACIN AND DEXAMETHASONE 3; 1 MG/ML; MG/ML
4 SUSPENSION/ DROPS AURICULAR (OTIC) 2 TIMES DAILY
Qty: 1 BOTTLE | Refills: 0 | Status: SHIPPED | OUTPATIENT
Start: 2019-02-21 | End: 2019-02-28

## 2019-02-21 ASSESSMENT — ENCOUNTER SYMPTOMS
SORE THROAT: 0
COUGH: 1
WHEEZING: 0
SINUS PRESSURE: 1
SINUS PAIN: 1
RHINORRHEA: 1
NAUSEA: 0

## 2019-06-03 ENCOUNTER — HOSPITAL ENCOUNTER (EMERGENCY)
Age: 39
Discharge: HOME | End: 2019-06-03
Attending: EMERGENCY MEDICINE
Payer: COMMERCIAL

## 2019-06-03 ENCOUNTER — APPOINTMENT (OUTPATIENT)
Dept: GENERAL RADIOLOGY | Age: 39
End: 2019-06-03
Payer: COMMERCIAL

## 2019-06-03 VITALS
OXYGEN SATURATION: 96 % | HEART RATE: 104 BPM | BODY MASS INDEX: 42.59 KG/M2 | TEMPERATURE: 99 F | WEIGHT: 265 LBS | RESPIRATION RATE: 20 BRPM | SYSTOLIC BLOOD PRESSURE: 149 MMHG | HEIGHT: 66 IN | DIASTOLIC BLOOD PRESSURE: 84 MMHG

## 2019-06-03 DIAGNOSIS — H65.91 RIGHT NON-SUPPURATIVE OTITIS MEDIA: Primary | ICD-10-CM

## 2019-06-03 DIAGNOSIS — B34.9 VIRAL ILLNESS: ICD-10-CM

## 2019-06-03 DIAGNOSIS — R52 BODY ACHES: ICD-10-CM

## 2019-06-03 LAB
A/G RATIO: 1.5 (ref 1.1–2.2)
ALBUMIN SERPL-MCNC: 4 G/DL (ref 3.4–5)
ALP BLD-CCNC: 88 U/L (ref 40–129)
ALT SERPL-CCNC: 20 U/L (ref 10–40)
ANION GAP SERPL CALCULATED.3IONS-SCNC: 13 MMOL/L (ref 3–16)
AST SERPL-CCNC: 15 U/L (ref 15–37)
BASOPHILS ABSOLUTE: 0.1 K/UL (ref 0–0.2)
BASOPHILS RELATIVE PERCENT: 0.6 %
BILIRUB SERPL-MCNC: <0.2 MG/DL (ref 0–1)
BILIRUBIN URINE: NEGATIVE
BLOOD, URINE: NEGATIVE
BUN BLDV-MCNC: 8 MG/DL (ref 7–20)
CALCIUM SERPL-MCNC: 8.8 MG/DL (ref 8.3–10.6)
CHLORIDE BLD-SCNC: 99 MMOL/L (ref 99–110)
CLARITY: CLEAR
CO2: 25 MMOL/L (ref 21–32)
COLOR: YELLOW
CREAT SERPL-MCNC: <0.5 MG/DL (ref 0.6–1.1)
CRYSTALS, UA: ABNORMAL /HPF
EOSINOPHILS ABSOLUTE: 0 K/UL (ref 0–0.6)
EOSINOPHILS RELATIVE PERCENT: 0.4 %
EPITHELIAL CELLS, UA: ABNORMAL /HPF
GFR AFRICAN AMERICAN: >60
GFR NON-AFRICAN AMERICAN: >60
GLOBULIN: 2.7 G/DL
GLUCOSE BLD-MCNC: 137 MG/DL (ref 70–99)
GLUCOSE URINE: NEGATIVE MG/DL
HCG QUALITATIVE: NEGATIVE
HCT VFR BLD CALC: 39.3 % (ref 36–48)
HEMOGLOBIN: 13.3 G/DL (ref 12–16)
KETONES, URINE: NEGATIVE MG/DL
LACTIC ACID: 1.3 MMOL/L (ref 0.4–2)
LEUKOCYTE ESTERASE, URINE: NEGATIVE
LYMPHOCYTES ABSOLUTE: 2.1 K/UL (ref 1–5.1)
LYMPHOCYTES RELATIVE PERCENT: 17 %
MCH RBC QN AUTO: 29.4 PG (ref 26–34)
MCHC RBC AUTO-ENTMCNC: 33.9 G/DL (ref 31–36)
MCV RBC AUTO: 86.8 FL (ref 80–100)
MICROSCOPIC EXAMINATION: YES
MONOCYTES ABSOLUTE: 1.1 K/UL (ref 0–1.3)
MONOCYTES RELATIVE PERCENT: 8.6 %
NEUTROPHILS ABSOLUTE: 9.2 K/UL (ref 1.7–7.7)
NEUTROPHILS RELATIVE PERCENT: 73.4 %
NITRITE, URINE: NEGATIVE
PDW BLD-RTO: 13.5 % (ref 12.4–15.4)
PH UA: 6.5 (ref 5–8)
PLATELET # BLD: 285 K/UL (ref 135–450)
PMV BLD AUTO: 7.3 FL (ref 5–10.5)
POTASSIUM SERPL-SCNC: 3.1 MMOL/L (ref 3.5–5.1)
PROTEIN UA: ABNORMAL MG/DL
RAPID INFLUENZA  B AGN: NEGATIVE
RAPID INFLUENZA A AGN: NEGATIVE
RBC # BLD: 4.52 M/UL (ref 4–5.2)
RBC UA: ABNORMAL /HPF (ref 0–2)
S PYO AG THROAT QL: NEGATIVE
SODIUM BLD-SCNC: 137 MMOL/L (ref 136–145)
SPECIFIC GRAVITY UA: 1.02 (ref 1–1.03)
TOTAL PROTEIN: 6.7 G/DL (ref 6.4–8.2)
TROPONIN: <0.01 NG/ML
URINE TYPE: ABNORMAL
UROBILINOGEN, URINE: 0.2 E.U./DL
WBC # BLD: 12.5 K/UL (ref 4–11)
WBC UA: ABNORMAL /HPF (ref 0–5)

## 2019-06-03 PROCEDURE — 81001 URINALYSIS AUTO W/SCOPE: CPT

## 2019-06-03 PROCEDURE — 85025 COMPLETE CBC W/AUTO DIFF WBC: CPT

## 2019-06-03 PROCEDURE — 6360000002 HC RX W HCPCS: Performed by: EMERGENCY MEDICINE

## 2019-06-03 PROCEDURE — 83605 ASSAY OF LACTIC ACID: CPT

## 2019-06-03 PROCEDURE — 99283 EMERGENCY DEPT VISIT LOW MDM: CPT

## 2019-06-03 PROCEDURE — 96361 HYDRATE IV INFUSION ADD-ON: CPT

## 2019-06-03 PROCEDURE — 2580000003 HC RX 258: Performed by: EMERGENCY MEDICINE

## 2019-06-03 PROCEDURE — 87880 STREP A ASSAY W/OPTIC: CPT

## 2019-06-03 PROCEDURE — 96375 TX/PRO/DX INJ NEW DRUG ADDON: CPT

## 2019-06-03 PROCEDURE — 84484 ASSAY OF TROPONIN QUANT: CPT

## 2019-06-03 PROCEDURE — 87081 CULTURE SCREEN ONLY: CPT

## 2019-06-03 PROCEDURE — 6370000000 HC RX 637 (ALT 250 FOR IP): Performed by: EMERGENCY MEDICINE

## 2019-06-03 PROCEDURE — 71046 X-RAY EXAM CHEST 2 VIEWS: CPT

## 2019-06-03 PROCEDURE — 96374 THER/PROPH/DIAG INJ IV PUSH: CPT

## 2019-06-03 PROCEDURE — 36415 COLL VENOUS BLD VENIPUNCTURE: CPT

## 2019-06-03 PROCEDURE — 80053 COMPREHEN METABOLIC PANEL: CPT

## 2019-06-03 PROCEDURE — 84703 CHORIONIC GONADOTROPIN ASSAY: CPT

## 2019-06-03 PROCEDURE — 87804 INFLUENZA ASSAY W/OPTIC: CPT

## 2019-06-03 RX ORDER — METHOCARBAMOL 500 MG/1
750 TABLET, FILM COATED ORAL 3 TIMES DAILY PRN
Qty: 18 TABLET | Refills: 0 | Status: SHIPPED | OUTPATIENT
Start: 2019-06-03 | End: 2019-06-10

## 2019-06-03 RX ORDER — AMOXICILLIN 250 MG/1
500 CAPSULE ORAL ONCE
Status: COMPLETED | OUTPATIENT
Start: 2019-06-03 | End: 2019-06-03

## 2019-06-03 RX ORDER — POTASSIUM CHLORIDE 20 MEQ/1
40 TABLET, EXTENDED RELEASE ORAL ONCE
Status: COMPLETED | OUTPATIENT
Start: 2019-06-03 | End: 2019-06-03

## 2019-06-03 RX ORDER — ONDANSETRON 2 MG/ML
4 INJECTION INTRAMUSCULAR; INTRAVENOUS ONCE
Status: COMPLETED | OUTPATIENT
Start: 2019-06-03 | End: 2019-06-03

## 2019-06-03 RX ORDER — 0.9 % SODIUM CHLORIDE 0.9 %
1000 INTRAVENOUS SOLUTION INTRAVENOUS ONCE
Status: COMPLETED | OUTPATIENT
Start: 2019-06-03 | End: 2019-06-03

## 2019-06-03 RX ORDER — ONDANSETRON 2 MG/ML
INJECTION INTRAMUSCULAR; INTRAVENOUS
Status: DISCONTINUED
Start: 2019-06-03 | End: 2019-06-03 | Stop reason: HOSPADM

## 2019-06-03 RX ORDER — METHOCARBAMOL 750 MG/1
750 TABLET, FILM COATED ORAL ONCE
Status: COMPLETED | OUTPATIENT
Start: 2019-06-03 | End: 2019-06-03

## 2019-06-03 RX ORDER — AMOXICILLIN 500 MG/1
500 CAPSULE ORAL 2 TIMES DAILY
Qty: 14 CAPSULE | Refills: 0 | Status: SHIPPED | OUTPATIENT
Start: 2019-06-03 | End: 2019-06-10

## 2019-06-03 RX ORDER — KETOROLAC TROMETHAMINE 30 MG/ML
30 INJECTION, SOLUTION INTRAMUSCULAR; INTRAVENOUS ONCE
Status: COMPLETED | OUTPATIENT
Start: 2019-06-03 | End: 2019-06-03

## 2019-06-03 RX ADMIN — POTASSIUM CHLORIDE 40 MEQ: 20 TABLET, EXTENDED RELEASE ORAL at 05:32

## 2019-06-03 RX ADMIN — KETOROLAC TROMETHAMINE 30 MG: 30 INJECTION, SOLUTION INTRAMUSCULAR at 03:56

## 2019-06-03 RX ADMIN — METHOCARBAMOL TABLETS 750 MG: 750 TABLET, COATED ORAL at 05:19

## 2019-06-03 RX ADMIN — AMOXICILLIN 500 MG: 250 CAPSULE ORAL at 05:19

## 2019-06-03 RX ADMIN — SODIUM CHLORIDE 1000 ML: 9 INJECTION, SOLUTION INTRAVENOUS at 03:55

## 2019-06-03 RX ADMIN — ONDANSETRON 4 MG: 2 INJECTION INTRAMUSCULAR; INTRAVENOUS at 03:55

## 2019-06-03 ASSESSMENT — PAIN DESCRIPTION - LOCATION: LOCATION: GENERALIZED

## 2019-06-03 ASSESSMENT — PAIN SCALES - GENERAL
PAINLEVEL_OUTOF10: 7
PAINLEVEL_OUTOF10: 7

## 2019-06-04 ENCOUNTER — OFFICE VISIT (OUTPATIENT)
Dept: FAMILY MEDICINE CLINIC | Age: 39
End: 2019-06-04
Payer: COMMERCIAL

## 2019-06-04 VITALS
WEIGHT: 275 LBS | BODY MASS INDEX: 44.39 KG/M2 | DIASTOLIC BLOOD PRESSURE: 80 MMHG | TEMPERATURE: 98.3 F | OXYGEN SATURATION: 98 % | SYSTOLIC BLOOD PRESSURE: 130 MMHG | HEART RATE: 102 BPM

## 2019-06-04 DIAGNOSIS — M54.42 ACUTE BILATERAL LOW BACK PAIN WITH BILATERAL SCIATICA: Primary | ICD-10-CM

## 2019-06-04 DIAGNOSIS — M54.41 ACUTE BILATERAL LOW BACK PAIN WITH BILATERAL SCIATICA: Primary | ICD-10-CM

## 2019-06-04 DIAGNOSIS — F41.1 GAD (GENERALIZED ANXIETY DISORDER): ICD-10-CM

## 2019-06-04 DIAGNOSIS — H65.191 OTHER ACUTE NONSUPPURATIVE OTITIS MEDIA OF RIGHT EAR, RECURRENCE NOT SPECIFIED: ICD-10-CM

## 2019-06-04 PROCEDURE — G8427 DOCREV CUR MEDS BY ELIG CLIN: HCPCS | Performed by: PHYSICIAN ASSISTANT

## 2019-06-04 PROCEDURE — 4004F PT TOBACCO SCREEN RCVD TLK: CPT | Performed by: PHYSICIAN ASSISTANT

## 2019-06-04 PROCEDURE — 99214 OFFICE O/P EST MOD 30 MIN: CPT | Performed by: PHYSICIAN ASSISTANT

## 2019-06-04 PROCEDURE — 96160 PT-FOCUSED HLTH RISK ASSMT: CPT | Performed by: PHYSICIAN ASSISTANT

## 2019-06-04 PROCEDURE — G8417 CALC BMI ABV UP PARAM F/U: HCPCS | Performed by: PHYSICIAN ASSISTANT

## 2019-06-04 RX ORDER — CYCLOBENZAPRINE HCL 5 MG
5 TABLET ORAL 3 TIMES DAILY PRN
Qty: 30 TABLET | Refills: 0 | Status: SHIPPED | OUTPATIENT
Start: 2019-06-04 | End: 2019-06-14

## 2019-06-04 RX ORDER — METHYLPREDNISOLONE 4 MG/1
TABLET ORAL
Qty: 1 KIT | Refills: 0 | Status: SHIPPED | OUTPATIENT
Start: 2019-06-04 | End: 2019-06-10

## 2019-06-04 RX ORDER — HYDROXYZINE HYDROCHLORIDE 25 MG/1
25 TABLET, FILM COATED ORAL EVERY 8 HOURS PRN
Qty: 30 TABLET | Refills: 0 | Status: SHIPPED | OUTPATIENT
Start: 2019-06-04 | End: 2019-07-04

## 2019-06-04 RX ORDER — BUSPIRONE HYDROCHLORIDE 10 MG/1
10 TABLET ORAL 2 TIMES DAILY
Qty: 60 TABLET | Refills: 2 | Status: SHIPPED | OUTPATIENT
Start: 2019-06-04 | End: 2019-07-19 | Stop reason: SDUPTHER

## 2019-06-04 ASSESSMENT — PATIENT HEALTH QUESTIONNAIRE - PHQ9
3. TROUBLE FALLING OR STAYING ASLEEP: 2
7. TROUBLE CONCENTRATING ON THINGS, SUCH AS READING THE NEWSPAPER OR WATCHING TELEVISION: 2
2. FEELING DOWN, DEPRESSED OR HOPELESS: 2
4. FEELING TIRED OR HAVING LITTLE ENERGY: 3
9. THOUGHTS THAT YOU WOULD BE BETTER OFF DEAD, OR OF HURTING YOURSELF: 1
1. LITTLE INTEREST OR PLEASURE IN DOING THINGS: 2
SUM OF ALL RESPONSES TO PHQ9 QUESTIONS 1 & 2: 4
10. IF YOU CHECKED OFF ANY PROBLEMS, HOW DIFFICULT HAVE THESE PROBLEMS MADE IT FOR YOU TO DO YOUR WORK, TAKE CARE OF THINGS AT HOME, OR GET ALONG WITH OTHER PEOPLE: 2
SUM OF ALL RESPONSES TO PHQ QUESTIONS 1-9: 17
SUM OF ALL RESPONSES TO PHQ QUESTIONS 1-9: 17
8. MOVING OR SPEAKING SO SLOWLY THAT OTHER PEOPLE COULD HAVE NOTICED. OR THE OPPOSITE, BEING SO FIGETY OR RESTLESS THAT YOU HAVE BEEN MOVING AROUND A LOT MORE THAN USUAL: 0
5. POOR APPETITE OR OVEREATING: 3
6. FEELING BAD ABOUT YOURSELF - OR THAT YOU ARE A FAILURE OR HAVE LET YOURSELF OR YOUR FAMILY DOWN: 2

## 2019-06-04 ASSESSMENT — ENCOUNTER SYMPTOMS
BACK PAIN: 1
TROUBLE SWALLOWING: 0
SORE THROAT: 1
SINUS PRESSURE: 0
EYE ITCHING: 0
SHORTNESS OF BREATH: 0
RHINORRHEA: 1
SINUS PAIN: 0
EYE DISCHARGE: 0
WHEEZING: 0
COUGH: 0
ABDOMINAL PAIN: 0

## 2019-06-04 NOTE — PROGRESS NOTES
2019  Ashley Car (: 1980)  41 y.o. HPI  Back pain: Symptoms started on Saturday. Low back pain with occasional shooting pains in her legs, occurring hourly. Hurts bilaterally across lower back but does not extend into her abdomen. She denies any numbness between her legs, weakness or numbness in her legs, or incontinent episodes. Robaxin does help but makes her feel tired. She is also taking ibuprofen 800 mg every 6 hours while awake and resting as frequently as she is able. Right ear otitis media: symptoms started on Saturday. She was given amoxicillin 500 mg BID yesterday in the ER. Started antibiotic yesterday. No change yet to symptoms. Current symptoms include: sore throat, swollen lymph nodes, ear pain, decreased hearing. She denies any fevers, chills, difficulty swallowing, cough or trouble breathing. Anxiety: Having increased anxiety and subsequently panic attacks over the last three weeks. Currently triggered by her son's behavior. Can also be triggered by driving. Panic attack symptoms: hyperventilation, heavy breathing, chest tightness, feeling unable to move. She denies having any paranoia, SI or HI. Has been on wellbutrin and cymbalta since 2017, Wellbutrin more for cessation of smoking which she has still been unable to do. Review of Systems   Constitutional: Negative for activity change, appetite change, chills and fever. HENT: Positive for congestion, ear pain, hearing loss, postnasal drip, rhinorrhea and sore throat. Negative for ear discharge, sinus pressure, sinus pain, sneezing and trouble swallowing. Eyes: Negative for discharge and itching. Respiratory: Negative for cough, shortness of breath and wheezing. Cardiovascular: Negative for chest pain. Gastrointestinal: Negative for abdominal pain. Genitourinary: Negative for difficulty urinating and hematuria. Musculoskeletal: Positive for back pain. Skin: Negative for rash.    Neurological: Negative for weakness, numbness and headaches. Psychiatric/Behavioral: Positive for dysphoric mood and sleep disturbance. Negative for agitation, behavioral problems, confusion, decreased concentration, hallucinations, self-injury and suicidal ideas. The patient is nervous/anxious. The patient is not hyperactive. Allergies, past medical history, family history, and social history reviewed and unchanged from previous encounter. Current Outpatient Medications   Medication Sig Dispense Refill    methylPREDNISolone (MEDROL DOSEPACK) 4 MG tablet Take by mouth. 1 kit 0    cyclobenzaprine (FLEXERIL) 5 MG tablet Take 1 tablet by mouth 3 times daily as needed for Muscle spasms 30 tablet 0    busPIRone (BUSPAR) 10 MG tablet Take 1 tablet by mouth 2 times daily 60 tablet 2    hydrOXYzine (ATARAX) 25 MG tablet Take 1 tablet by mouth every 8 hours as needed for Anxiety 30 tablet 0    amoxicillin (AMOXIL) 500 MG capsule Take 1 capsule by mouth 2 times daily for 7 days 14 capsule 0    methocarbamol (ROBAXIN) 500 MG tablet Take 1.5 tablets by mouth 3 times daily as needed (muscle spasm) 18 tablet 0    fluticasone (FLONASE) 50 MCG/ACT nasal spray SPRAY 1 SPRAY IN EACH NOSTRIL DAILY 1 Bottle 2    buPROPion (ZYBAN) 150 MG extended release tablet Take 1 tablet by mouth daily. 90 tablet 1    DULoxetine (CYMBALTA) 60 MG extended release capsule Take 1 capsule by mouth daily. 30 capsule 3    butalbital-APAP-caffeine (FIORICET) -40 MG CAPS per capsule Take 1 capsule by mouth every 4 hours as needed for Headaches 84 capsule 0    amLODIPine (NORVASC) 5 MG tablet Take 2 tablets by mouth daily. 180 tablet 1    losartan-hydrochlorothiazide (HYZAAR) 100-25 MG per tablet Take 1 tablet by mouth daily for blood pressure. 90 tablet 1    ranitidine (ZANTAC) 150 MG tablet Take 1 tablet by mouth twice daily.  180 tablet 1    CHANTIX 1 MG tablet Take 1 tablet by mouth 2 times daily 60 tablet 0    metFORMIN (GLUCOPHAGE-XR) 750 MG extended release tablet 2 in AM 60 tablet 3    buPROPion (WELLBUTRIN SR) 150 MG extended release tablet TAKE ONE TABLET BY MOUTH DAILY 30 tablet 3    levonorgestrel-ethinyl estradiol (JOLESSA) 0.15-0.03 MG per tablet Take 1 tablet by mouth      triamcinolone (KENALOG) 0.1 % cream Apply small amount 2 times daily 15 g 0     No current facility-administered medications for this visit. Vitals:    06/04/19 1255   BP: 130/80   Pulse: 102   Temp: 98.3 °F (36.8 °C)   TempSrc: Oral   SpO2: 98%   Weight: 275 lb (124.7 kg)     Estimated body mass index is 44.39 kg/m² as calculated from the following:    Height as of 6/3/19: 5' 6\" (1.676 m). Weight as of this encounter: 275 lb (124.7 kg). Physical Exam   Constitutional: She is oriented to person, place, and time. She appears well-developed and well-nourished. HENT:   Head: Normocephalic and atraumatic. Right Ear: Ear canal normal. No drainage or swelling. Tympanic membrane is retracted. Tympanic membrane is not bulging. Tympanic membrane mobility is normal. A middle ear effusion is present. Decreased hearing is noted. Left Ear: Hearing, tympanic membrane and ear canal normal.   Nose: Nose normal. No mucosal edema or rhinorrhea. Mouth/Throat: Uvula is midline, oropharynx is clear and moist and mucous membranes are normal. No posterior oropharyngeal edema or posterior oropharyngeal erythema. Eyes: Conjunctivae are normal. Right eye exhibits no discharge. Left eye exhibits no discharge. Cardiovascular: Normal rate, regular rhythm and normal heart sounds. Pulmonary/Chest: Effort normal and breath sounds normal. She has no wheezes. She has no rales. Musculoskeletal:        Cervical back: She exhibits normal range of motion and no tenderness. Thoracic back: She exhibits normal range of motion and no tenderness. Lumbar back: She exhibits tenderness and spasm.  She exhibits normal range of motion, no bony tenderness, no swelling and no edema. Lymphadenopathy:        Head (right side): Submandibular adenopathy present. Head (left side): Submandibular adenopathy present. She has cervical adenopathy. Right cervical: Superficial cervical adenopathy present. Left cervical: Superficial cervical adenopathy present. Neurological: She is alert and oriented to person, place, and time. No cranial nerve deficit. Psychiatric: Her speech is normal and behavior is normal. Judgment and thought content normal. Her mood appears anxious. Cognition and memory are normal. She exhibits a depressed mood. Vitals reviewed. ASSESSMENT and PLAN:  Sae Francis was seen today for otalgia, pharyngitis, headache and generalized body aches. Diagnoses and all orders for this visit:    Acute bilateral low back pain with bilateral sciatica  -     methylPREDNISolone (MEDROL DOSEPACK) 4 MG tablet; Take by mouth. -     cyclobenzaprine (FLEXERIL) 5 MG tablet; Take 1 tablet by mouth 3 times daily as needed for Muscle spasms  -     Discussed stretches to help ease back pain. Other acute nonsuppurative otitis media of right ear, recurrence not specified        -     Ear still retracted. Continue medication. If no improvement at the end of the antibiotics please let me know. Thank you    ESTELA (generalized anxiety disorder)  -     busPIRone (BUSPAR) 10 MG tablet; Take 1 tablet by mouth 2 times daily  -     hydrOXYzine (ATARAX) 25 MG tablet; Take 1 tablet by mouth every 8 hours as needed for Anxiety  -     Discussed restarting therapy with Dr. aLtonia Causey. She will consider and call if she decides to restart. No follow-ups on file.

## 2019-06-05 LAB
ORGANISM: ABNORMAL
S PYO THROAT QL CULT: ABNORMAL
S PYO THROAT QL CULT: ABNORMAL

## 2019-06-07 NOTE — ED PROVIDER NOTES
CHIEF COMPLAINT  Generalized Body Aches (Pain everywhere, took tylenol and ibuprofen around 10 has been unable to sleep. feels miserable)      HISTORY OF PRESENT ILLNESS  Yesica Gloria is a 41 y.o. female who presents to the ED complaining of generalized body aches, took motrin and tylenol, doesn't feel better. No other complaints, modifying factors or associated symptoms. I have reviewed the following from the nursing documentation.     Past Medical History:   Diagnosis Date    Anxiety     Arthritis     Asthma     Cervical pain     Chronic back pain     Chronic kidney disease     kidney stone  March 2012    Hypertension     takes Aldomet    Infertility     took fertility medication    Mental disorder     anxiety, depression    Migraine     Miscarriage     Polycystic ovarian syndrome 1999    Psoriasis     Stomach ulcer     Unspecified sleep apnea      Past Surgical History:   Procedure Laterality Date    CHOLECYSTECTOMY  2011    COL[facility]OS[facility]  [facility]/7/2015    [facility]polyps    NE DILATION/CURETTAGE,DIAGNOSTIC  1998     [facility]/missed ab    SKIN BIOPSY      for psoriasis    TONSILLECTOMY  08/09/2016    tonsillectomy    UPPER GASTROINTESTINAL ENDOSCOPY  3/23/11    pyloretic    UPPER GASTROINTESTINAL ENDOSCOPY  10/7/2015    [facility]     Family History   Problem Relation Age of Onset    Arthritis Mother     Miscarriages / [place] Mother     High Blood Pressure Mother     Depression Mother     High Cholesterol Mother     Arthritis Father     High Blood Pressure Father     Cancer Father         colon    Hearing Loss Father     Heart Disease Father     High Cholesterol Father     High Blood Pressure Brother     High Cholesterol Brother     High Blood Pressure Maternal Aunt     High Cholesterol Maternal Aunt     Miscarriages / Stillbirths Maternal Aunt     Mental Retardation Maternal Uncle     High Blood Pressure Maternal Uncle     High Cholesterol Maternal Uncle     High Blood Pressure Paternal Aunt     High Cholesterol Paternal Aunt     Arthritis Paternal Uncle     Mental Illness Paternal Uncle     High Blood Pressure Paternal Uncle     Birth Defects Paternal Uncle     High Cholesterol Paternal Uncle     Learning Disabilities Paternal Uncle     Arthritis Maternal Grandmother     High Blood Pressure Maternal Grandmother     Diabetes Maternal Grandmother     High Cholesterol Maternal Grandmother     Miscarriages / Stillbirths Maternal Grandmother     Arthritis Maternal Grandfather     High Blood Pressure Maternal Grandfather     High Cholesterol Maternal Grandfather     Arthritis Paternal Grandmother     High Blood Pressure Paternal Grandmother     Heart Disease Paternal Grandmother     High Cholesterol Paternal Grandmother    [de-identified] / Stillbirths Paternal Grandmother     Stroke Paternal Grandmother     High Blood Pressure Paternal Grandfather     High Cholesterol Paternal Grandfather      Social History     Socioeconomic History    Marital status:      Spouse name: Not on file    Number of children: Not on file    Years of education: Not on file    Highest education level: Not on file   Occupational History    Not on file   Social Needs    Financial resource strain: Not on file    Food insecurity:     Worry: Not on file     Inability: Not on file    Transportation needs:     Medical: Not on file     Non-medical: Not on file   Tobacco Use    Smoking status: Current Some Day Smoker     Packs/day: 1.00     Years: 8.00     Pack years: 8.00     Types: Cigarettes    Smokeless tobacco: Never Used    Tobacco comment: -used chant-worked for 2 weeks   Substance and Sexual Activity    Alcohol use:  Yes     Alcohol/week: 0.0 oz     Comment: 1 drink a month    Drug use: No     Comment: hx of using marijuana    Sexual activity: Yes     Partners: Male   Lifestyle    Physical activity:     Days per week: Not on file     Minutes per session: Not on file    Stress: Not on file   Relationships    Social connections:     Talks on phone: Not on file     Gets together: Not on file     Attends Cheondoism service: Not on file     Active member of club or organization: Not on file     Attends meetings of clubs or organizations: Not on file     Relationship status: Not on file    Intimate partner violence:     Fear of current or ex partner: Not on file     Emotionally abused: Not on file     Physically abused: Not on file     Forced sexual activity: Not on file   Other Topics Concern    Not on file   Social History Narrative    Not on file     No current facility-administered medications for this encounter. Current Outpatient Medications   Medication Sig Dispense Refill    amoxicillin (AMOXIL) 500 MG capsule Take 1 capsule by mouth 2 times daily for 7 days 14 capsule 0    methocarbamol (ROBAXIN) 500 MG tablet Take 1.5 tablets by mouth 3 times daily as needed (muscle spasm) 18 tablet 0    methylPREDNISolone (MEDROL DOSEPACK) 4 MG tablet Take by mouth. 1 kit 0    cyclobenzaprine (FLEXERIL) 5 MG tablet Take 1 tablet by mouth 3 times daily as needed for Muscle spasms 30 tablet 0    busPIRone (BUSPAR) 10 MG tablet Take 1 tablet by mouth 2 times daily 60 tablet 2    hydrOXYzine (ATARAX) 25 MG tablet Take 1 tablet by mouth every 8 hours as needed for Anxiety 30 tablet 0    fluticasone (FLONASE) 50 MCG/ACT nasal spray SPRAY 1 SPRAY IN EACH NOSTRIL DAILY 1 Bottle 2    buPROPion (ZYBAN) 150 MG extended release tablet Take 1 tablet by mouth daily. 90 tablet 1    DULoxetine (CYMBALTA) 60 MG extended release capsule Take 1 capsule by mouth daily. 30 capsule 3    butalbital-APAP-caffeine (FIORICET) -40 MG CAPS per capsule Take 1 capsule by mouth every 4 hours as needed for Headaches 84 capsule 0    amLODIPine (NORVASC) 5 MG tablet Take 2 tablets by mouth daily.  180 tablet 1    losartan-hydrochlorothiazide (HYZAAR) Result Value Ref Range    Rapid Strep A Screen Negative Negative   Rapid influenza A/B antigens   Result Value Ref Range    Rapid Influenza A Ag Negative Negative    Rapid Influenza B Ag Negative Negative   Comprehensive Metabolic Panel   Result Value Ref Range    Sodium 137 136 - 145 mmol/L    Potassium 3.1 (L) 3.5 - 5.1 mmol/L    Chloride 99 99 - 110 mmol/L    CO2 25 21 - 32 mmol/L    Anion Gap 13 3 - 16    Glucose 137 (H) 70 - 99 mg/dL    BUN 8 7 - 20 mg/dL    CREATININE <0.5 (L) 0.6 - 1.1 mg/dL    GFR Non-African American >60 >60    GFR African American >60 >60    Calcium 8.8 8.3 - 10.6 mg/dL    Total Protein 6.7 6.4 - 8.2 g/dL    Alb 4.0 3.4 - 5.0 g/dL    Albumin/Globulin Ratio 1.5 1.1 - 2.2    Total Bilirubin <0.2 0.0 - 1.0 mg/dL    Alkaline Phosphatase 88 40 - 129 U/L    ALT 20 10 - 40 U/L    AST 15 15 - 37 U/L    Globulin 2.7 g/dL   CBC Auto Differential   Result Value Ref Range    WBC 12.5 (H) 4.0 - 11.0 K/uL    RBC 4.52 4.00 - 5.20 M/uL    Hemoglobin 13.3 12.0 - 16.0 g/dL    Hematocrit 39.3 36.0 - 48.0 %    MCV 86.8 80.0 - 100.0 fL    MCH 29.4 26.0 - 34.0 pg    MCHC 33.9 31.0 - 36.0 g/dL    RDW 13.5 12.4 - 15.4 %    Platelets 927 449 - 071 K/uL    MPV 7.3 5.0 - 10.5 fL    Neutrophils % 73.4 %    Lymphocytes % 17.0 %    Monocytes % 8.6 %    Eosinophils % 0.4 %    Basophils % 0.6 %    Neutrophils # 9.2 (H) 1.7 - 7.7 K/uL    Lymphocytes # 2.1 1.0 - 5.1 K/uL    Monocytes # 1.1 0.0 - 1.3 K/uL    Eosinophils # 0.0 0.0 - 0.6 K/uL    Basophils # 0.1 0.0 - 0.2 K/uL   Lactic Acid, Plasma   Result Value Ref Range    Lactic Acid 1.3 0.4 - 2.0 mmol/L   Troponin   Result Value Ref Range    Troponin <0.01 <0.01 ng/mL   Urinalysis   Result Value Ref Range    Color, UA Yellow Straw/Yellow    Clarity, UA Clear Clear    Glucose, Ur Negative Negative mg/dL    Bilirubin Urine Negative Negative    Ketones, Urine Negative Negative mg/dL    Specific Gravity, UA 1.020 1.005 - 1.030    Blood, Urine Negative Negative    pH, UA 6.5 5.0 - 8.0    Protein, UA TRACE (A) Negative mg/dL    Urobilinogen, Urine 0.2 <2.0 E.U./dL    Nitrite, Urine Negative Negative    Leukocyte Esterase, Urine Negative Negative    Microscopic Examination YES     Urine Type Not Specified    HCG Qualitative, Serum   Result Value Ref Range    hCG Qual Negative Detects HCG level >10 MIU/mL   Culture Beta Strep Confirm Plate   Result Value Ref Range    Strep A Culture (A)      No Beta Strep Group A isolated  Heavy growth normal oral kamille with      Organism Beta Strep Group G (A)     Strep A Culture       Light growth  Susceptibility testing of penicillin and other beta-lactams is  not necessary for beta hemolytic Streptococci since resistant  strains have not been identified. (CLSI M100)     Microscopic Urinalysis   Result Value Ref Range    WBC, UA 0-2 0 - 5 /HPF    RBC, UA 0-2 0 - 2 /HPF    Epi Cells 0-2 /HPF    Crystals Few Ca. Oxalate (A) /HPF       RADIOLOGY  X-RAYS:  I have reviewed radiologic plain film image(s). ALL OTHER NON-PLAIN FILM IMAGES SUCH AS CT, ULTRASOUND AND MRI HAVE BEEN READ BY THE RADIOLOGIST. XR CHEST STANDARD (2 VW)   Final Result   No acute findings. ED COURSE/MDM  Patient seen and evaluated. Strep negative ut appears to have OM, will give abx for dc home, f/u pcp    Discharge Medication List as of 6/3/2019  5:17 AM      START taking these medications    Details   amoxicillin (AMOXIL) 500 MG capsule Take 1 capsule by mouth 2 times daily for 7 days, Disp-14 capsule, R-0Print      methocarbamol (ROBAXIN) 500 MG tablet Take 1.5 tablets by mouth 3 times daily as needed (muscle spasm), Disp-18 tablet, R-0Print           CLINICAL IMPRESSION  1. Right non-suppurative otitis media    2. Body aches    3. Viral illness      Blood pressure (!) 149/84, pulse 104, temperature 99 °F (37.2 °C), temperature source Oral, resp. rate 20, height 5' 6\" (1.676 m), weight 265 lb (120.2 kg), SpO2 96 %, not currently breastfeeding.     Ross Gallagher Cristy Marquez was discharged to home in stable condition. This chart was generated in part by using Dragon Dictation system and may contain errors related to that system including errors in grammar, punctuation, and spelling, as well as words and phrases that may be inappropriate. When dictating, effort is made to correct spelling/grammar errors. If there are any questions or concerns please feel free to contact the dictating provider for clarification.      [facility], DO  EMERGENCY MEDICINE        Jackelyn Mathews DO  06/07/19 [id number]

## 2019-06-17 DIAGNOSIS — I10 ESSENTIAL HYPERTENSION: ICD-10-CM

## 2019-06-17 RX ORDER — METOPROLOL SUCCINATE 50 MG/1
50 TABLET, EXTENDED RELEASE ORAL DAILY
Qty: 90 TABLET | Refills: 1 | Status: SHIPPED | OUTPATIENT
Start: 2019-06-17 | End: 2019-08-27

## 2019-07-17 ENCOUNTER — OFFICE VISIT (OUTPATIENT)
Dept: FAMILY MEDICINE CLINIC | Age: 39
End: 2019-07-17
Payer: COMMERCIAL

## 2019-07-17 VITALS
TEMPERATURE: 100.2 F | HEIGHT: 66 IN | SYSTOLIC BLOOD PRESSURE: 138 MMHG | OXYGEN SATURATION: 98 % | DIASTOLIC BLOOD PRESSURE: 94 MMHG | WEIGHT: 273 LBS | BODY MASS INDEX: 43.87 KG/M2 | HEART RATE: 124 BPM

## 2019-07-17 DIAGNOSIS — Z91.09 ENVIRONMENTAL ALLERGIES: ICD-10-CM

## 2019-07-17 DIAGNOSIS — J02.9 ACUTE PHARYNGITIS, UNSPECIFIED ETIOLOGY: ICD-10-CM

## 2019-07-17 DIAGNOSIS — J32.9 RECURRENT SINUSITIS: Primary | ICD-10-CM

## 2019-07-17 LAB — S PYO AG THROAT QL: NORMAL

## 2019-07-17 PROCEDURE — G8427 DOCREV CUR MEDS BY ELIG CLIN: HCPCS | Performed by: FAMILY MEDICINE

## 2019-07-17 PROCEDURE — 1036F TOBACCO NON-USER: CPT | Performed by: FAMILY MEDICINE

## 2019-07-17 PROCEDURE — 87880 STREP A ASSAY W/OPTIC: CPT | Performed by: FAMILY MEDICINE

## 2019-07-17 PROCEDURE — 99213 OFFICE O/P EST LOW 20 MIN: CPT | Performed by: FAMILY MEDICINE

## 2019-07-17 PROCEDURE — G8417 CALC BMI ABV UP PARAM F/U: HCPCS | Performed by: FAMILY MEDICINE

## 2019-07-17 RX ORDER — AMOXICILLIN AND CLAVULANATE POTASSIUM 875; 125 MG/1; MG/1
1 TABLET, FILM COATED ORAL 2 TIMES DAILY
Qty: 20 TABLET | Refills: 0 | Status: SHIPPED | OUTPATIENT
Start: 2019-07-17 | End: 2019-07-27

## 2019-07-19 ENCOUNTER — TELEPHONE (OUTPATIENT)
Dept: FAMILY MEDICINE CLINIC | Age: 39
End: 2019-07-19

## 2019-07-19 DIAGNOSIS — F41.1 GAD (GENERALIZED ANXIETY DISORDER): ICD-10-CM

## 2019-07-19 RX ORDER — CIMETIDINE 400 MG/1
400 TABLET, FILM COATED ORAL 2 TIMES DAILY
Qty: 180 TABLET | Refills: 1 | Status: SHIPPED | OUTPATIENT
Start: 2019-07-19 | End: 2019-07-19 | Stop reason: SDUPTHER

## 2019-07-19 RX ORDER — CIMETIDINE 400 MG/1
400 TABLET, FILM COATED ORAL 2 TIMES DAILY
Qty: 180 TABLET | Refills: 1 | Status: SHIPPED | OUTPATIENT
Start: 2019-07-19 | End: 2019-12-30 | Stop reason: SDUPTHER

## 2019-07-19 RX ORDER — BUSPIRONE HYDROCHLORIDE 10 MG/1
10 TABLET ORAL 2 TIMES DAILY
Qty: 60 TABLET | Refills: 5 | Status: SHIPPED | OUTPATIENT
Start: 2019-07-19 | End: 2019-08-27

## 2019-07-22 RX ORDER — DULOXETIN HYDROCHLORIDE 60 MG/1
CAPSULE, DELAYED RELEASE ORAL
Qty: 30 CAPSULE | Refills: 2 | Status: SHIPPED | OUTPATIENT
Start: 2019-07-22 | End: 2019-10-13 | Stop reason: SDUPTHER

## 2019-08-14 RX ORDER — FLUTICASONE PROPIONATE 50 MCG
SPRAY, SUSPENSION (ML) NASAL
Qty: 1 BOTTLE | Refills: 1 | Status: SHIPPED | OUTPATIENT
Start: 2019-08-14 | End: 2019-11-12 | Stop reason: SDUPTHER

## 2019-08-27 ENCOUNTER — OFFICE VISIT (OUTPATIENT)
Dept: FAMILY MEDICINE CLINIC | Age: 39
End: 2019-08-27
Payer: COMMERCIAL

## 2019-08-27 VITALS
BODY MASS INDEX: 45.35 KG/M2 | DIASTOLIC BLOOD PRESSURE: 84 MMHG | SYSTOLIC BLOOD PRESSURE: 144 MMHG | RESPIRATION RATE: 16 BRPM | WEIGHT: 281 LBS | TEMPERATURE: 99 F | HEART RATE: 98 BPM | OXYGEN SATURATION: 99 %

## 2019-08-27 DIAGNOSIS — R53.83 FATIGUE, UNSPECIFIED TYPE: ICD-10-CM

## 2019-08-27 DIAGNOSIS — I10 ESSENTIAL HYPERTENSION: ICD-10-CM

## 2019-08-27 DIAGNOSIS — N93.9 ABNORMAL UTERINE BLEEDING: ICD-10-CM

## 2019-08-27 DIAGNOSIS — E55.9 VITAMIN D DEFICIENCY: ICD-10-CM

## 2019-08-27 DIAGNOSIS — E11.9 TYPE 2 DIABETES MELLITUS WITHOUT COMPLICATION, WITHOUT LONG-TERM CURRENT USE OF INSULIN (HCC): ICD-10-CM

## 2019-08-27 DIAGNOSIS — R42 DIZZINESS: ICD-10-CM

## 2019-08-27 DIAGNOSIS — F41.9 ANXIETY: ICD-10-CM

## 2019-08-27 DIAGNOSIS — J32.9 RECURRENT SINUSITIS: ICD-10-CM

## 2019-08-27 DIAGNOSIS — R51.9 NONINTRACTABLE HEADACHE, UNSPECIFIED CHRONICITY PATTERN, UNSPECIFIED HEADACHE TYPE: ICD-10-CM

## 2019-08-27 LAB
BASOPHILS ABSOLUTE: 0.1 K/UL (ref 0–0.2)
BASOPHILS RELATIVE PERCENT: 1.1 %
EOSINOPHILS ABSOLUTE: 0.3 K/UL (ref 0–0.6)
EOSINOPHILS RELATIVE PERCENT: 2.4 %
HCT VFR BLD CALC: 40.7 % (ref 36–48)
HEMOGLOBIN: 13.9 G/DL (ref 12–16)
LYMPHOCYTES ABSOLUTE: 2.7 K/UL (ref 1–5.1)
LYMPHOCYTES RELATIVE PERCENT: 26.2 %
MCH RBC QN AUTO: 30.3 PG (ref 26–34)
MCHC RBC AUTO-ENTMCNC: 34.1 G/DL (ref 31–36)
MCV RBC AUTO: 88.8 FL (ref 80–100)
MONOCYTES ABSOLUTE: 0.6 K/UL (ref 0–1.3)
MONOCYTES RELATIVE PERCENT: 5.6 %
NEUTROPHILS ABSOLUTE: 6.7 K/UL (ref 1.7–7.7)
NEUTROPHILS RELATIVE PERCENT: 64.7 %
PDW BLD-RTO: 14 % (ref 12.4–15.4)
PLATELET # BLD: 379 K/UL (ref 135–450)
PMV BLD AUTO: 7.1 FL (ref 5–10.5)
RBC # BLD: 4.58 M/UL (ref 4–5.2)
WBC # BLD: 10.4 K/UL (ref 4–11)

## 2019-08-27 PROCEDURE — 3044F HG A1C LEVEL LT 7.0%: CPT | Performed by: NURSE PRACTITIONER

## 2019-08-27 PROCEDURE — 1036F TOBACCO NON-USER: CPT | Performed by: NURSE PRACTITIONER

## 2019-08-27 PROCEDURE — 99214 OFFICE O/P EST MOD 30 MIN: CPT | Performed by: NURSE PRACTITIONER

## 2019-08-27 PROCEDURE — G8427 DOCREV CUR MEDS BY ELIG CLIN: HCPCS | Performed by: NURSE PRACTITIONER

## 2019-08-27 PROCEDURE — 2022F DILAT RTA XM EVC RTNOPTHY: CPT | Performed by: NURSE PRACTITIONER

## 2019-08-27 PROCEDURE — G8417 CALC BMI ABV UP PARAM F/U: HCPCS | Performed by: NURSE PRACTITIONER

## 2019-08-27 RX ORDER — METOPROLOL SUCCINATE 25 MG/1
25 TABLET, EXTENDED RELEASE ORAL NIGHTLY
Qty: 30 TABLET | Refills: 1 | Status: SHIPPED | OUTPATIENT
Start: 2019-08-27 | End: 2020-01-07

## 2019-08-27 RX ORDER — BUSPIRONE HYDROCHLORIDE 15 MG/1
15 TABLET ORAL 2 TIMES DAILY
Qty: 60 TABLET | Refills: 1 | Status: SHIPPED | OUTPATIENT
Start: 2019-08-27 | End: 2020-01-07

## 2019-08-28 LAB
ANION GAP SERPL CALCULATED.3IONS-SCNC: 18 MMOL/L (ref 3–16)
BUN BLDV-MCNC: 11 MG/DL (ref 7–20)
CALCIUM SERPL-MCNC: 9.7 MG/DL (ref 8.3–10.6)
CHLORIDE BLD-SCNC: 96 MMOL/L (ref 99–110)
CO2: 24 MMOL/L (ref 21–32)
CREAT SERPL-MCNC: <0.5 MG/DL (ref 0.6–1.1)
ESTIMATED AVERAGE GLUCOSE: 125.5 MG/DL
FERRITIN: 90.6 NG/ML (ref 15–150)
FOLATE: >20 NG/ML (ref 4.78–24.2)
GFR AFRICAN AMERICAN: >60
GFR NON-AFRICAN AMERICAN: >60
GLUCOSE BLD-MCNC: 157 MG/DL (ref 70–99)
HBA1C MFR BLD: 6 %
IRON SATURATION: 15 % (ref 15–50)
IRON: 71 UG/DL (ref 37–145)
POTASSIUM SERPL-SCNC: 4.1 MMOL/L (ref 3.5–5.1)
SODIUM BLD-SCNC: 138 MMOL/L (ref 136–145)
TOTAL IRON BINDING CAPACITY: 460 UG/DL (ref 260–445)
VITAMIN B-12: 803 PG/ML (ref 211–911)
VITAMIN D 25-HYDROXY: 25.8 NG/ML

## 2019-09-04 ENCOUNTER — TELEPHONE (OUTPATIENT)
Dept: FAMILY MEDICINE CLINIC | Age: 39
End: 2019-09-04

## 2019-09-05 ENCOUNTER — OFFICE VISIT (OUTPATIENT)
Dept: FAMILY MEDICINE CLINIC | Age: 39
End: 2019-09-05
Payer: COMMERCIAL

## 2019-09-05 VITALS
HEIGHT: 66 IN | HEART RATE: 99 BPM | DIASTOLIC BLOOD PRESSURE: 85 MMHG | OXYGEN SATURATION: 98 % | BODY MASS INDEX: 44.9 KG/M2 | WEIGHT: 279.4 LBS | SYSTOLIC BLOOD PRESSURE: 145 MMHG | TEMPERATURE: 98.4 F

## 2019-09-05 DIAGNOSIS — E66.01 CLASS 3 SEVERE OBESITY DUE TO EXCESS CALORIES WITH SERIOUS COMORBIDITY AND BODY MASS INDEX (BMI) OF 45.0 TO 49.9 IN ADULT (HCC): ICD-10-CM

## 2019-09-05 DIAGNOSIS — R42 VERTIGO: Primary | ICD-10-CM

## 2019-09-05 DIAGNOSIS — E11.9 TYPE 2 DIABETES MELLITUS WITHOUT COMPLICATION, WITHOUT LONG-TERM CURRENT USE OF INSULIN (HCC): ICD-10-CM

## 2019-09-05 DIAGNOSIS — I10 ESSENTIAL HYPERTENSION: ICD-10-CM

## 2019-09-05 PROCEDURE — 3044F HG A1C LEVEL LT 7.0%: CPT | Performed by: FAMILY MEDICINE

## 2019-09-05 PROCEDURE — G8417 CALC BMI ABV UP PARAM F/U: HCPCS | Performed by: FAMILY MEDICINE

## 2019-09-05 PROCEDURE — 90471 IMMUNIZATION ADMIN: CPT | Performed by: FAMILY MEDICINE

## 2019-09-05 PROCEDURE — 1036F TOBACCO NON-USER: CPT | Performed by: FAMILY MEDICINE

## 2019-09-05 PROCEDURE — 90686 IIV4 VACC NO PRSV 0.5 ML IM: CPT | Performed by: FAMILY MEDICINE

## 2019-09-05 PROCEDURE — 99214 OFFICE O/P EST MOD 30 MIN: CPT | Performed by: FAMILY MEDICINE

## 2019-09-05 PROCEDURE — G8427 DOCREV CUR MEDS BY ELIG CLIN: HCPCS | Performed by: FAMILY MEDICINE

## 2019-09-05 PROCEDURE — 2022F DILAT RTA XM EVC RTNOPTHY: CPT | Performed by: FAMILY MEDICINE

## 2019-09-05 RX ORDER — MECLIZINE HCL 12.5 MG/1
TABLET ORAL
Qty: 50 TABLET | Refills: 0 | Status: SHIPPED | OUTPATIENT
Start: 2019-09-05 | End: 2019-12-18

## 2019-09-05 ASSESSMENT — ENCOUNTER SYMPTOMS
SHORTNESS OF BREATH: 0
COUGH: 0
VOMITING: 1

## 2019-09-05 NOTE — PATIENT INSTRUCTIONS
Vertigo  Prescription for meclizine as directed. Set up an appointment with Dr. Yash Cooper in our office for next week. Essential hypertension  Continue home medications for blood pressure and we will work on weight loss and increased activity  Class 3 severe obesity due to excess calories with serious comorbidity and body mass index (BMI) of 45.0 to 49.9 in [facility])  As above. Will refer to nutrition classes.   Other orders  -     INFLUENZA, QUADV, 3 YRS AND OLDER, IM PF, PREFILL SYR OR SDV, 0.5ML (AFLURIA QUADV, PF)  -     meclizine (ANTIVERT) 12.5 MG tablet; 1 or 2 tid prn vertigo  See me 2 mos

## 2019-09-07 ASSESSMENT — ENCOUNTER SYMPTOMS
COUGH: 0
SHORTNESS OF BREATH: 0
SORE THROAT: 0
ABDOMINAL PAIN: 0

## 2019-09-11 ENCOUNTER — OFFICE VISIT (OUTPATIENT)
Dept: ENT CLINIC | Age: 39
End: 2019-09-11
Payer: COMMERCIAL

## 2019-09-11 VITALS
TEMPERATURE: 98.1 F | SYSTOLIC BLOOD PRESSURE: 142 MMHG | HEIGHT: 66 IN | DIASTOLIC BLOOD PRESSURE: 100 MMHG | WEIGHT: 274 LBS | BODY MASS INDEX: 44.03 KG/M2 | HEART RATE: 102 BPM

## 2019-09-11 DIAGNOSIS — G50.1 ATYPICAL FACIAL PAIN: Primary | ICD-10-CM

## 2019-09-11 DIAGNOSIS — G43.909 MIGRAINE WITHOUT STATUS MIGRAINOSUS, NOT INTRACTABLE, UNSPECIFIED MIGRAINE TYPE: ICD-10-CM

## 2019-09-11 DIAGNOSIS — R42 VERTIGO: ICD-10-CM

## 2019-09-11 DIAGNOSIS — J30.89 NON-SEASONAL ALLERGIC RHINITIS, UNSPECIFIED TRIGGER: ICD-10-CM

## 2019-09-11 PROCEDURE — 99204 OFFICE O/P NEW MOD 45 MIN: CPT | Performed by: OTOLARYNGOLOGY

## 2019-09-11 ASSESSMENT — ENCOUNTER SYMPTOMS
FACIAL SWELLING: 0
TROUBLE SWALLOWING: 0
APNEA: 0
VOICE CHANGE: 0
SORE THROAT: 0
SHORTNESS OF BREATH: 0
EYE ITCHING: 0
COUGH: 0
SINUS PRESSURE: 1

## 2019-09-11 NOTE — PATIENT INSTRUCTIONS
Dr. Jacques Sandoval MD - [facility] Dr. Azeb Mann MD - [address] Neck Surgery Updated May 14, 2014               Migraine - More than a Headache         by Sharon Sandoval and Azeb Mann   Introduction   Migraine is a common clinical problem characterized by episodic attacks of head pain and associated symptoms such as nausea, sensitivity to light, sound, or head movement. It is generally thought of as a headache problem, but it has become apparent in recent years that many patients suffer symptoms from migraine who do not have severe headaches as a dominant symptom. These patients may have a primary complaint of dizziness, of ear pain, of ear or head fullness, \"sinus\" pressure, and even fluctuating hearing loss. Fortunately, treatment regimens long established for the treatment of \"classic\" migraine headaches are generally effective against these \"atypical\" symptoms of migraine. How Common is Migraine? There are currently 28 million Americans with \"classic\" migraine headaches. In a room with 100 people, 13 are likely to have migraine. This is as common as diabetes and asthma combined. The number of people suffering with atypical forms of migraine is unknown. Females are 3 times more likely to have migraine than males. Although any person can have migraine at any age, migraine is most common between ages 27 and 48. The peak incidence of migraine in females occurs at 28years of age--at this age, 28% of all females have migraine headaches. The peak incidence of migraine in men occurs at 27years of age--at this age, about 10% of all males have migraine headaches. Migraine is a lifelong problem. It may start in childhood and disappear and reappear in new forms throughout an individual's life.  In general, there is a decrease in headache intensity and an increase in the incidence of atypical symptoms of migraine (vertigo, ear pain, bowel symptoms, etc) as patients vertigo along with their other migraine symptoms. In many patients seen at our balance clinics, vertigo is the predominant feature of their migraine. We typically find that they have had more classic migraine headaches at some time in the past, or have a family history of migraine. Migraine symptoms of new onset in a patient with no personal or family history of migraine can also occur. This is particularly common after head injury or whiplash with chronic neck symptoms. Neck symptoms and spasm tend to increase weeks to months after an initial whiplash injury, causing headache and associated episodes of vertigo. These symptoms are generally not associated with pressure in the ear or hearing changes and may originate in the brainstem from faulty central processing of balance information from the inner ears. These patients are often best treated with physical therapy to decrease neck muscle stiffness and pain, medications to decrease neck muscle stiffness and pain, as well as traditional migraine therapy. Migraine and Otalgia (Ear pain)   Up to 40% of migraineurs report ear pain that lasts anywhere from seconds to months on end. Ear pain has many causes, including infection and Eustachian tube problems in the ear, TMJ, and referred pain from the extensive lining of the throat. Migraineurs who present to the doctor with ear pains frequently complain that their ears are hypersensitive to touch, to wind, and to cold. When an otolaryngologist has ruled out all of these other causes of ear pain in a patient with a history of migraine, migraine treatment is often effective in eliminating the pain. Migraine and Sinus Pressure   A great deal of confusion exists among patients and their physicians regarding the source of symptoms of facial pressure.  While facial pressure is indeed a cardinal symptom of \"true\" sinusitis, up to 45% of migraine patients report attack-related \"sinus\" symptoms, including tearing, runny nose, and nasal congestion. In migraine, these symptoms may be caused by a release of peptides by the trigeminal nerve branches going to the mucous membranes in the nasal cavity and sinuses. These symptoms may last only a few minutes or hours during the migraine episode. Sinus symptoms caused by colds or sinus infections tend to last for days. Sinus pain, which feels like pressure, is also commonly associated with migraine, and may be the only \"headache\" experienced in a migraine. In migraine, symptoms tend to last minutes to hours rather than for days, as in sinus infections. Fifty percent of migraine patients report that their headaches are influenced by weather. Where can I Learn More about Migraine? Several websites provide valuable information. Dr. Edgar Rouse maintains an excellent website on vertigo and imbalance disorders at www.dizziness-and-balance.com. A website specifically devoted to migraine-associated vertigo can be found at www.mvertigo. org. In addition, we typically recommend that my patients read the book, Heal your Headache the 1-2-3 Program by Nazia Self MD. This book provides a comprehensive diet plan composed completely of foods that do not trigger migraine. It is much easier to follow this diet than to be suspicious of every food of every food you have in your cabinet at home or that you see in the supermarket. It also teaches and emphasizes the concepts of rebound and the additive character of migraine triggers. Patients who have severe migraine-related vertigo may not be able to read a whole book because of their condition. They will benefit greatly from reading the book together with a family member who can help them to stay on track and to understand all the concepts in the book. Those patients who do love to read and who have very atypical manifestations of migraine often find great comfort in the experiences of Rocky Bhatt MD, in his book, Migraine.  Dr. Michele Mason is an extremely insightful neurologist with a gift for writing and who himself had migraines beginning at age 4. He has collected an astonishing series of patient stories with both common and extremely unusual symptoms, all attributable to migraine mechanisms. Treatment Guidelines for Physicians   For treatment we first encourage a strict migraine control diet, eliminating common migraine culprits including chocolate, clarisa, caffeine, certain cheeses, monosodium glutamate (MSG) as well as less frequently recognized problem foods containing yeast (yoghurt, sourdough, freshly made bread), nuts, and nut products. Glutamate can occur in foods not only through the addition of MSG, but also by hydrolyzing (breaking down) proteins. So labels that include \"hydrolyzed casein,\" \"hydrolyzed yeast extract,\" etc., are likely to include glutamate. We also encourage a regular sleep schedule and aerobic exercise program. Patients are also counseled to avoid vasoconstrictive medications such as psuedoephedrine, and to minimize the use of triptans, which may cause rebound symptoms. When patients follow these guidelines and still have migraine-associated symptoms, we emphasize prophylactic medications in preference to the \"quick fix\" agents such as fiorinal, triptans, narcotics, or steroids. Effective prophylactic medications are chosen based on the patient's other medical problems and tolerance of side effects. Some suggested regimens follow:   Calcium channel blockers: Diltiazem  mg/d increasing as tolerated to 240-480 mg total/d, often in two divided doses. Constipation and hypotension are the most common side effects, but this is often the best-tolerated regimen. Antidepressants: Nortriptyline starting at LOW doses (10 mg/d) and slowly increasing to  mg at night. Higher doses (100-200 mg) may occasionally be needed. Levels can help guide therapy.  Dry mouth, weight gain, and sedation are the most common side monitored. Gabapentin is usually well tolerated. It is started at a low dose of 300 mg a day, with weekly escalating doses to a first target dose of 300 mg three times a day (900 mg total). Then it can be increased gradually to another target dose of 1800 mg total a day (in 3 divided doses), or until side effects (usually sedation) appear. It has the inconvenience of frequent dosing, but with a low adverse effect profile. Dosing adjustments are necessary for renal insufficiency, and the medication should not be used in children under 15years old. All patients are cautioned that migraine symptoms often do not respond quickly to these interventions. Great patience is required of the patient and physician as 6-8 weeks of diet changes or the full dose of any new medication may be needed before benefits are seen. Anxiety, depression, and even panic attacks are frequent accompanying diagnoses in these patients. These diagnoses should be recognized and discussed. The choice of a prophylactic medication may also be influenced by these other conditions. One of the best resources for migraine therapeutics currently available is Mendel Medina' Management of Headache and Headache Medications. It very clearly outlines strategies for first line, second line, and combination therapy for migraine and other headache types in an easy-to-use handbook format. BIBLIOGRAPHY   1. Fransico Li, Slim VOGT, Rony Delvalle, et al. Prevalence and burden of migraine in the [facility]: data from the American Migraine Study II. Headache. 2001;41(7):646-657. 2. [email or web address].no   3. ReportNation.uy. org   4. [email or web address] gov   5. Luanne VOGT. Migraine in the [facility]: a review of epidemiology and health care use. Neurology. 7489;08 (suppl 3):S6-S10.   6. Fany VOGT, Rachael MS, Greg DD, et al. Age- and sex-specific incidence rates of migraine with and without visual aura. Am J Epidemiology. 19.Chely KM, Jones Six, Tepley N, et al. The concept of migraine as a state of central neuronal hyperexcitability. Neurol Clin. 1990;8(4):817-828. 20. Noemi ZAMORANO, Jodie Esquivel, et al. Platelet excitatory amino acids in migraine. Cephalalgia. 1989;9 (Suppl 10):105-106. [Poster Presentation]   21. Shruti Macedo MD, Stephanie Santoyo, Renny KD, et al. Neuroexcitatory plasma amino acids are elevated in migraine. Neurology. 1990;40(10):8461-4258.   22.Saji W, Schoenen J. Interictal potentiation of passive \"oddball\" auditory event-related potentials in migraine. Cephalalgia. 1998;18(5):261-265.   23.Kathryn SK, Liliya Carver, Jessy Mckenna. The occipital cortex is hyperexcitable in migraine: experimental evidence. Headache. 1999;39(7):469-476.   24.Leonila SH, Clara D, Melany DAVILA. Visual processing in migraineurs. Brain. 7961;248 (Pt 1):25-35.   25.Jeimy SK, Finn MS, Satnam L et al. A PET study exploring the laterality of brainstem activation in migraine using glyceryl trinitrate. Brain. 6163;188:658-518. 26. Joyce KS. Patterns of cerebral integration indicated by the scotomas of migraine. Arch Neurol Psych. 1941;46:331-339. [facility] Pathophysiology of the migraine aura. The spreading depression theory. Brain. 1994;117(Pt 1):199-210.   28.Rey J, Margarito Polanco TS, et al. Timing and topography of cerebral blood flow, aura, and headache during migraine attacks. Vera Neurol. 1990;28(6):791-798.   29.Ruma Guevara RP Brief report: bilateral spreading cerebral hypoperfusion during spontaneous migraine headache. N Engl J Med. 1994;331(25):1117-0608.   30.Natacha N, Charles Randolph, Rai O, et al. Mechanisms of migraine aura revealed by functional MRI in human visual cortex. [address] [place] [address]. 2001;98(4):8655-8899. Dr. Amadeo Rinne, MD - [facility] Dr. Catarina Rodriguez MD - [address] [place] [address] Neck Surgery Updated May 14, 2014     31. Jessica ACEVES, [employer],

## 2019-09-11 NOTE — PROGRESS NOTES
Grandmother     Stroke Paternal Grandmother     High Blood Pressure Paternal Grandfather     High Cholesterol Paternal Grandfather        Social History     Social History     Tobacco Use    Smoking status: Former Smoker     Packs/day: 1.00     Years: 8.00     Pack years: 8.00     Types: Cigarettes     Last attempt to quit: 2019     Years since quittin.2    Smokeless tobacco: Never Used    Tobacco comment: -used chanhyaqu-worked for 2 weeks   Substance Use Topics    Alcohol use: Yes     Alcohol/week: 0.0 standard drinks     Comment: 1 drink a month    Drug use: No     Comment: hx of using marijuana        Allergies     Allergies   Allergen Reactions    Lisinopril Other (See Comments)     COUGH    Tramadol Other (See Comments)     severe  SEVERE HEADACHE       Medications     Current Outpatient Medications   Medication Sig Dispense Refill    meclizine (ANTIVERT) 12.5 MG tablet 1 or 2 tid prn vertigo 50 tablet 0    metoprolol succinate (TOPROL XL) 25 MG extended release tablet Take 1 tablet by mouth nightly 30 tablet 1    busPIRone (BUSPAR) 15 MG tablet Take 15 mg by mouth 2 times daily 60 tablet 1    fluticasone (FLONASE) 50 MCG/ACT nasal spray SPRAY 1 SPRAY IN EACH NOSTRIL DAILY 1 Bottle 1    DULoxetine (CYMBALTA) 60 MG extended release capsule Take 1 capsule by mouth daily. 30 capsule 2    cimetidine (TAGAMET) 400 MG tablet Take 1 tablet by mouth 2 times daily 180 tablet 1    amLODIPine (NORVASC) 5 MG tablet Take 2 tablets by mouth daily. 180 tablet 1    losartan-hydrochlorothiazide (HYZAAR) 100-25 MG per tablet Take 1 tablet by mouth daily for blood pressure. 90 tablet 1    buPROPion (ZYBAN) 150 MG extended release tablet Take 1 tablet by mouth daily.  90 tablet 1    butalbital-APAP-caffeine (FIORICET) -40 MG CAPS per capsule Take 1 capsule by mouth every 4 hours as needed for Headaches 84 capsule 0    CHANTIX 1 MG tablet Take 1 tablet by mouth 2 times daily 60 tablet 0    metFORMIN (GLUCOPHAGE-XR) 750 MG extended release tablet 2 in AM 60 tablet 3    levonorgestrel-ethinyl estradiol (JOLESSA) 0.15-0.03 MG per tablet Take 1 tablet by mouth      triamcinolone (KENALOG) 0.1 % cream Apply small amount 2 times daily 15 g 0     No current facility-administered medications for this visit. Review of Systems     Review of Systems   Constitutional: Negative for appetite change, chills, fatigue, fever and unexpected weight change. HENT: Positive for sinus pressure. Negative for congestion, ear discharge, ear pain, facial swelling, hearing loss, nosebleeds, postnasal drip, sneezing, sore throat, tinnitus, trouble swallowing and voice change. Eyes: Negative for itching. Respiratory: Negative for apnea, cough and shortness of breath. Gastrointestinal:        Negative for dysphasia   Endocrine: Negative for cold intolerance and heat intolerance. Musculoskeletal: Negative for myalgias and neck pain. Skin: Negative for rash. Allergic/Immunologic: Negative for environmental allergies. Neurological: Positive for dizziness and headaches. Psychiatric/Behavioral: Negative for confusion, decreased concentration and sleep disturbance. PhysicalExam     Vitals:    09/11/19 1054   BP: (!) 142/100   Pulse: 102   Temp: 98.1 °F (36.7 °C)   Weight: 274 lb (124.3 kg)   Height: 5' 6\" (1.676 m)       Physical Exam   Constitutional: She is oriented to person, place, and time. She appears well-developed and well-nourished. No distress. HENT:   Head: Normocephalic and atraumatic. Right Ear: Tympanic membrane, external ear and ear canal normal. No drainage. No middle ear effusion. Left Ear: Tympanic membrane, external ear and ear canal normal. No drainage. No middle ear effusion. Nose: No mucosal edema or rhinorrhea. Mouth/Throat: Uvula is midline, oropharynx is clear and moist and mucous membranes are normal.   Tuning [place] Examination: Louise is Midline at [employer].  Rinne: AC> BC bilaterally at 512 Hz     Eyes: Pupils are equal, round, and reactive to light. EOM are normal. Right eye exhibits no nystagmus. Left eye exhibits no nystagmus. Neck: Trachea normal, full passive range of motion without pain and phonation normal. No thyroid mass and no thyromegaly present. Cardiovascular: Normal pulses. Pulmonary/Chest: Effort normal. No accessory muscle usage or stridor. No apnea. No respiratory distress. Lymphadenopathy:        Head (right side): No submental and no submandibular adenopathy present. Head (left side): No submental and no submandibular adenopathy present. She has no cervical adenopathy. Right cervical: No superficial cervical, no deep cervical and no posterior cervical adenopathy present. Left cervical: No superficial cervical, no deep cervical and no posterior cervical adenopathy present. Neurological: She is alert and oriented to person, place, and time. She has normal strength. No cranial nerve deficit. She displays a negative Romberg sign. Coordination and gait normal.   Borderline positive Hathaway-Halpike right   Skin: Skin is warm and dry. Psychiatric: She has a normal mood and affect. Thought content normal.             Assessment and Plan     1. Atypical facial pain  -Relates bilateral facial pressure that does not respond to Flonase or antibiotic treatment  -Facial pain/pressure may be related to atypical migraine  -Informational handout provided to patient she will review this we will discuss this when she calls in 1 week    2. Vertigo  -Keaton-Hallpike was weakly positive on the right, discussed possibility of BPV  -Treated with Epley maneuver  -Discussed that vertigo may also be related to migraine condition  -She will call in 1 week-if she is improved we will not do any further intervention, if condition unchanged she will decide whether she wishes to proceed to migraine treatment or formal balance testing    3.  Non-seasonal allergic rhinitis, unspecified trigger  -Chronic  -Continue Flonase    4. Migraine without status migrainosus, not intractable, unspecified migraine type  -Currently using Motrin and/or Excedrin Migraine with minimal improvement  -Migraine handout provided to patient  -We will discuss with her in 1 week      Mandi Parry DO  9/11/19      Portions of this note were dictated using Dragon.  There may be linguistic errors secondary to the use of this program.

## 2019-12-18 ENCOUNTER — OFFICE VISIT (OUTPATIENT)
Dept: FAMILY MEDICINE CLINIC | Age: 39
End: 2019-12-18
Payer: COMMERCIAL

## 2019-12-18 VITALS
RESPIRATION RATE: 14 BRPM | WEIGHT: 285.6 LBS | DIASTOLIC BLOOD PRESSURE: 88 MMHG | HEIGHT: 67 IN | BODY MASS INDEX: 44.83 KG/M2 | HEART RATE: 101 BPM | SYSTOLIC BLOOD PRESSURE: 138 MMHG | TEMPERATURE: 97.8 F | OXYGEN SATURATION: 98 %

## 2019-12-18 DIAGNOSIS — R11.0 NAUSEA: ICD-10-CM

## 2019-12-18 DIAGNOSIS — B96.89 ACUTE BACTERIAL SINUSITIS: Primary | ICD-10-CM

## 2019-12-18 DIAGNOSIS — J02.9 ACUTE PHARYNGITIS, UNSPECIFIED ETIOLOGY: ICD-10-CM

## 2019-12-18 DIAGNOSIS — J01.90 ACUTE BACTERIAL SINUSITIS: Primary | ICD-10-CM

## 2019-12-18 PROCEDURE — G8417 CALC BMI ABV UP PARAM F/U: HCPCS | Performed by: NURSE PRACTITIONER

## 2019-12-18 PROCEDURE — 1036F TOBACCO NON-USER: CPT | Performed by: NURSE PRACTITIONER

## 2019-12-18 PROCEDURE — 99213 OFFICE O/P EST LOW 20 MIN: CPT | Performed by: NURSE PRACTITIONER

## 2019-12-18 PROCEDURE — G8482 FLU IMMUNIZE ORDER/ADMIN: HCPCS | Performed by: NURSE PRACTITIONER

## 2019-12-18 PROCEDURE — G8427 DOCREV CUR MEDS BY ELIG CLIN: HCPCS | Performed by: NURSE PRACTITIONER

## 2019-12-18 RX ORDER — CEFDINIR 300 MG/1
300 CAPSULE ORAL 2 TIMES DAILY
Qty: 14 CAPSULE | Refills: 0 | Status: SHIPPED | OUTPATIENT
Start: 2019-12-18 | End: 2019-12-25

## 2019-12-18 RX ORDER — ONDANSETRON HYDROCHLORIDE 8 MG/1
8 TABLET, FILM COATED ORAL EVERY 8 HOURS PRN
Qty: 10 TABLET | Refills: 0 | Status: SHIPPED | OUTPATIENT
Start: 2019-12-18 | End: 2020-01-07

## 2019-12-18 SDOH — HEALTH STABILITY: MENTAL HEALTH: HOW OFTEN DO YOU HAVE A DRINK CONTAINING ALCOHOL?: MONTHLY OR LESS

## 2019-12-18 ASSESSMENT — ENCOUNTER SYMPTOMS
SORE THROAT: 1
SINUS PRESSURE: 1
NAUSEA: 0
CHEST TIGHTNESS: 0
DIARRHEA: 0
COUGH: 0

## 2019-12-30 RX ORDER — CIMETIDINE 400 MG/1
400 TABLET, FILM COATED ORAL 2 TIMES DAILY
Qty: 180 TABLET | Refills: 1 | Status: SHIPPED | OUTPATIENT
Start: 2019-12-30 | End: 2020-07-23 | Stop reason: SDUPTHER

## 2020-01-07 ENCOUNTER — OFFICE VISIT (OUTPATIENT)
Dept: FAMILY MEDICINE CLINIC | Age: 40
End: 2020-01-07
Payer: COMMERCIAL

## 2020-01-07 VITALS
HEART RATE: 103 BPM | BODY MASS INDEX: 44.53 KG/M2 | WEIGHT: 286 LBS | DIASTOLIC BLOOD PRESSURE: 90 MMHG | OXYGEN SATURATION: 97 % | SYSTOLIC BLOOD PRESSURE: 142 MMHG

## 2020-01-07 PROCEDURE — 1036F TOBACCO NON-USER: CPT | Performed by: PHYSICIAN ASSISTANT

## 2020-01-07 PROCEDURE — G8482 FLU IMMUNIZE ORDER/ADMIN: HCPCS | Performed by: PHYSICIAN ASSISTANT

## 2020-01-07 PROCEDURE — G8417 CALC BMI ABV UP PARAM F/U: HCPCS | Performed by: PHYSICIAN ASSISTANT

## 2020-01-07 PROCEDURE — 99214 OFFICE O/P EST MOD 30 MIN: CPT | Performed by: PHYSICIAN ASSISTANT

## 2020-01-07 PROCEDURE — G0444 DEPRESSION SCREEN ANNUAL: HCPCS | Performed by: PHYSICIAN ASSISTANT

## 2020-01-07 PROCEDURE — G8427 DOCREV CUR MEDS BY ELIG CLIN: HCPCS | Performed by: PHYSICIAN ASSISTANT

## 2020-01-07 RX ORDER — BUSPIRONE HYDROCHLORIDE 15 MG/1
15 TABLET ORAL 3 TIMES DAILY
Qty: 90 TABLET | Refills: 0 | Status: SHIPPED | OUTPATIENT
Start: 2020-01-07 | End: 2020-02-06

## 2020-01-07 ASSESSMENT — PATIENT HEALTH QUESTIONNAIRE - PHQ9
1. LITTLE INTEREST OR PLEASURE IN DOING THINGS: 3
SUM OF ALL RESPONSES TO PHQ QUESTIONS 1-9: 23
SUM OF ALL RESPONSES TO PHQ QUESTIONS 1-9: 23
10. IF YOU CHECKED OFF ANY PROBLEMS, HOW DIFFICULT HAVE THESE PROBLEMS MADE IT FOR YOU TO DO YOUR WORK, TAKE CARE OF THINGS AT HOME, OR GET ALONG WITH OTHER PEOPLE: 3
7. TROUBLE CONCENTRATING ON THINGS, SUCH AS READING THE NEWSPAPER OR WATCHING TELEVISION: 3
2. FEELING DOWN, DEPRESSED OR HOPELESS: 3
4. FEELING TIRED OR HAVING LITTLE ENERGY: 3
9. THOUGHTS THAT YOU WOULD BE BETTER OFF DEAD, OR OF HURTING YOURSELF: 3
6. FEELING BAD ABOUT YOURSELF - OR THAT YOU ARE A FAILURE OR HAVE LET YOURSELF OR YOUR FAMILY DOWN: 2
3. TROUBLE FALLING OR STAYING ASLEEP: 3
8. MOVING OR SPEAKING SO SLOWLY THAT OTHER PEOPLE COULD HAVE NOTICED. OR THE OPPOSITE, BEING SO FIGETY OR RESTLESS THAT YOU HAVE BEEN MOVING AROUND A LOT MORE THAN USUAL: 0
SUM OF ALL RESPONSES TO PHQ9 QUESTIONS 1 & 2: 6
5. POOR APPETITE OR OVEREATING: 3

## 2020-01-07 ASSESSMENT — COLUMBIA-SUICIDE SEVERITY RATING SCALE - C-SSRS
1. WITHIN THE PAST MONTH, HAVE YOU WISHED YOU WERE DEAD OR WISHED YOU COULD GO TO SLEEP AND NOT WAKE UP?: YES
6. HAVE YOU EVER DONE ANYTHING, STARTED TO DO ANYTHING, OR PREPARED TO DO ANYTHING TO END YOUR LIFE?: NO
2. HAVE YOU ACTUALLY HAD ANY THOUGHTS OF KILLING YOURSELF?: NO

## 2020-01-07 NOTE — PROGRESS NOTES
tablet Take 1 tablet by mouth daily for blood pressure. Yes Sorin Santillan, DO   metFORMIN (GLUCOPHAGE-XR) 750 MG extended release tablet 2 in AM Yes Sorin Santillan, DO   levonorgestrel-ethinyl estradiol (Birda Shells) 0.15-0.03 MG per tablet Take 1 tablet by mouth Yes Historical Provider, MD        Social History     Tobacco Use    Smoking status: Former Smoker     Packs/day: 1.00     Years: 8.00     Pack years: 8.00     Types: Cigarettes     Last attempt to quit: 2019     Years since quittin.5    Smokeless tobacco: Never Used    Tobacco comment: -used chant-worked for 2 weeks   Substance Use Topics    Alcohol use: Yes     Alcohol/week: 0.0 standard drinks     Frequency: Monthly or less     Comment: 1 drink a month        Vitals:    20 1842 20 1849 20 1904   BP: (!) 142/102 (!) 142/100 (!) 142/90   Site: Right Upper Arm     Position: Sitting     Cuff Size: Large Adult     Pulse: 103     SpO2: 97%     Weight: 286 lb (129.7 kg)       Estimated body mass index is 44.53 kg/m² as calculated from the following:    Height as of 19: 5' 7.2\" (1.707 m). Weight as of this encounter: 286 lb (129.7 kg). Physical Exam  Vitals signs reviewed. Constitutional:       Appearance: Normal appearance. Cardiovascular:      Rate and Rhythm: Normal rate and regular rhythm. Heart sounds: Normal heart sounds. Pulmonary:      Effort: Pulmonary effort is normal.      Breath sounds: Normal breath sounds. Neurological:      Mental Status: She is alert and oriented to person, place, and time. Cranial Nerves: No cranial nerve deficit. Psychiatric:         Attention and Perception: Attention and perception normal.         Mood and Affect: Mood is depressed. Affect is flat. Speech: Speech is delayed. Behavior: Behavior is withdrawn. Behavior is cooperative. Thought Content:  Thought content normal.         Cognition and Memory: Cognition and memory normal. Judgment: Judgment normal.         ASSESSMENT/PLAN:  1. ESTELA (generalized anxiety disorder)  -  Increase buspar to TID.   [facility] PHP/IOP Group Therapy Program  - External Referral to Psychiatry  - busPIRone (BUSPAR) 15 MG tablet; Take 15 mg by mouth 3 times daily  Dispense: 90 tablet; Refill: 0    2. Severe episode of recurrent major depressive disorder, without psychotic features ([address]  -  I feel that this patient would be a great fit for the [facility] program. Referral for ambulatory psychiatry given in the event she is unable to tolerate group therapy, however patient is very interested in the treatment program.  [facility] PHP/IOP Group Therapy Program  - External Referral to Psychiatry    3. Elevated blood pressure reading with diagnosis of hypertension  -  Blood pressure improved with time spent sitting in the office. She has no symptoms of end organ damage. She is already on three anti-hypertensive medications, maxed on hyzaar. I believe that anxiety and stress is causing this increase in her blood pressure. May consider increasing norvasc, she did not tolerate beta blocker when trialed earlier this year. If amlodipine not effective would consider referral to cardiology. No follow-ups on file. An electronic signature was used to authenticate this note.     --ANT Walker on 1/7/2020 at 7:13 PM

## 2020-01-14 ENCOUNTER — HOSPITAL ENCOUNTER (OUTPATIENT)
Dept: PSYCHIATRY | Age: 40
Setting detail: THERAPIES SERIES
Discharge: HOME | End: 2020-01-14
Payer: COMMERCIAL

## 2020-01-14 ASSESSMENT — ANXIETY QUESTIONNAIRES
1. FEELING NERVOUS, ANXIOUS, OR ON EDGE: 3-NEARLY EVERY DAY
GAD7 TOTAL SCORE: 16
5. BEING SO RESTLESS THAT IT IS HARD TO SIT STILL: 0-NOT AT ALL
4. TROUBLE RELAXING: 3-NEARLY EVERY DAY
7. FEELING AFRAID AS IF SOMETHING AWFUL MIGHT HAPPEN: 3-NEARLY EVERY DAY
2. NOT BEING ABLE TO STOP OR CONTROL WORRYING: 3-NEARLY EVERY DAY
3. WORRYING TOO MUCH ABOUT DIFFERENT THINGS: 3-NEARLY EVERY DAY
6. BECOMING EASILY ANNOYED OR IRRITABLE: 1-SEVERAL DAYS

## 2020-01-14 ASSESSMENT — SLEEP AND FATIGUE QUESTIONNAIRES
DO YOU HAVE DIFFICULTY SLEEPING: NO
DO YOU USE A SLEEP AID: NO
AVERAGE NUMBER OF SLEEP HOURS: 8

## 2020-01-14 ASSESSMENT — LIFESTYLE VARIABLES: HISTORY_ALCOHOL_USE: NO

## 2020-01-14 NOTE — BH NOTE
Patient was referred to outpatient by her PCP. She reports being anxious most of her life and depression for at least a decade. But the past 6 months, she has had an increase in both. She believes that her medications are not working well anymore. She is unable to identify anything specific in her life that has made this worse. She reports financial stress but states \"it's no worse than it normally is. \" She also states that her  has a history of substance abuse, but he has been in recovery for years. Patient states that for years she felt that she had to [place] [the drug use] for him. \"     She has gained about 25 pounds in the last 3 months and about 40 pounds in the last 6 months. She reports that she is sleeping all the time and only gets out of bed to make sure her kids have meals and are ready for school. She has not been able to work and this has put an additional stress on her. She is tearful at times during the interview and at one point stated that her son told her \"I want my fun mom back. \"     Patient will start PHP on Friday, 1/17/2020.

## 2020-01-17 ENCOUNTER — HOSPITAL ENCOUNTER (OUTPATIENT)
Dept: PSYCHIATRY | Age: 40
Setting detail: THERAPIES SERIES
Discharge: HOME | End: 2020-01-17
Payer: COMMERCIAL

## 2020-01-17 ENCOUNTER — APPOINTMENT (OUTPATIENT)
Dept: PSYCHIATRY | Age: 40
End: 2020-01-17
Payer: COMMERCIAL

## 2020-01-17 VITALS
SYSTOLIC BLOOD PRESSURE: 148 MMHG | TEMPERATURE: 98 F | HEART RATE: 90 BPM | RESPIRATION RATE: 16 BRPM | DIASTOLIC BLOOD PRESSURE: 96 MMHG

## 2020-01-17 PROBLEM — F33.1 MODERATE EPISODE OF RECURRENT MAJOR DEPRESSIVE DISORDER (HCC): Status: ACTIVE | Noted: 2020-01-17

## 2020-01-17 PROCEDURE — G0410 GRP PSYCH PARTIAL HOSP 45-50: HCPCS | Performed by: COUNSELOR

## 2020-01-17 PROCEDURE — G0410 GRP PSYCH PARTIAL HOSP 45-50: HCPCS

## 2020-01-17 NOTE — GROUP NOTE
Group Therapy Note    Date: 1/17/2020    Group Start Time: 10:00 AM  Group End Time: 11:00 AM  Group Topic: Cognitive Skills    KAVYAZ OP ABIOLA Lujan, ATR        Group Therapy Note    Attendees: 7         Patient's Goal:  Patients were invited to participate in a Cognitive Skills / Art Therapy group on identifying and examining patient's most intrinsic values as a way of building and/or maintaining stability and a sense of self. Patients were encourage to write down their five most central values and then were asked to eliminate all but one of them to identify their core value. Patients were then encouraged to create an art piece reflecting on how they practice said value and how it may shape their life and/or identity. At the end of session, patients were encouraged to share and process their artwork with the group. Notes:  Lamberto Urrutia engaged in writing down personal values, selecting one and creating an art pieced that reflected on her central value of \"renaldo. That impacts everything I do and how I interact with my family and others. \"    Status After Intervention:  Improved    Participation Level:  Active Listener and Interactive    Participation Quality: Appropriate, Attentive, Sharing and Supportive      Speech:  normal      Thought Process/Content: Logical      Affective Functioning: Congruent      Mood: anxious      Level of consciousness:  Alert, Oriented x4 and Attentive      Response to Learning: Able to verbalize current knowledge/experience, Able to verbalize/acknowledge new learning, Able to retain information, Capable of insight, Able to change behavior and Progressing to goal      Endings: None Reported    Modes of Intervention: Education, Support, Socialization and Exploration      Discipline Responsible: Psychoeducational Specialist      Signature:  Maria Elena Walters

## 2020-01-17 NOTE — GROUP NOTE
Group Therapy Note    Date: 1/17/2020    Group Start Time:  1:00 PM  Group End Time:  2:30 PM  Group Topic: Psychoeducation    [facility]    Heidi Lujan, ATR        Group Therapy Note    Attendees: 2         Patient's Goal:  Patients were invited to participate in an Art Therapy / Psycho-Education group on art making for stress reduction and creative self-expression. Patients were encouraged to use a three dimensional art material, either rasheeda or plaster paper, as a source of grounding. At the end of session, patients were encouraged to share and process their work. Notes:  Kassy Higgins engaged actively in art making and group discussion, sharing artwork with group and stating, \"I made a small puppy out of rasheeda. My dog is my little rodo and he helps me with my anxiety. I'd like to train him as a therapy pet. \"    Status After Intervention:  Improved    Participation Level:  Active Listener and Interactive    Participation Quality: Appropriate, Attentive, Sharing and Supportive      Speech:  normal      Thought Process/Content: Logical      Affective Functioning: Congruent      Mood: anxious      Level of consciousness:  Alert, Oriented x4 and Attentive      Response to Learning: Able to verbalize current knowledge/experience, Able to verbalize/acknowledge new learning, Able to retain information, Capable of insight and Able to change behavior      Endings: None Reported    Modes of Intervention: Education, Support, Socialization and Exploration      Discipline Responsible: Psychoeducational Specialist      Signature:  Sultana Poe, [id number] [facility]

## 2020-01-18 NOTE — BH NOTE
for  people to sense it because she shows minimal affective change. PRIOR PSYCHIATRIC HISTORY:  Inpatient, none. Outpatient, saw two  therapists, one in 2019 and one in 2010 for depression and anxiety. FAMILY PSYCHIATRIC HISTORY:  Grandmother with severe anxiety and  agoraphobia. Mother with Christianity preoccupation, is on medications. LEGAL ISSUES:  None. TRAUMA HISTORY:  She denied. DRUGS AND ALCOHOL:  She uses marijuana on occasion because she stated  this is helpful for the anxiety. MEDICAL HISTORY:  She has hypertension, polycystic ovarian syndrome, and  obesity. CURRENT MEDICATIONS:  She is on BuSpar 15 mg three times a day, which  she describes not helpful; Tagamet 400 mg b.i.d.; Flonase 50 mcg daily; Cymbalta 60 mg daily for several years; bupropion 150 mg daily,  initially for smoking cessation; Norvasc 10 mg daily; Hyzaar 100/25 mg  daily; metformin 750 mg two in the morning; and birth control. SOCIAL HISTORY:  She currently is  for 19 years.  has  been in drug recovery for many years for cocaine and Percocet. She has  three kids, ages 9, 5, and 6. She was born and raised in [place] and  lives in a sheltered home. Went to high school in 2000. Works at  [employer] and has a college degree and states that she has not really  _____ using her college degree. REVIEW OF SYSTEMS:  Pertinent positives on the HPI, otherwise negative. PHYSICAL EXAMINATION:  Not performed. VITAL SIGNS:  Temperature 98.3, pulse 90, respirations 16, blood  pressure 148/96. MENTAL STATUS EXAMINATION:  The patient is a 76-year-old female, who was  very pleasant and cooperative. She appeared to be somewhat anxious. She was sweating during the interview. She denied any suicidal or  homicidal ideation, denied any auditory or visual hallucinations. Insight and judgment was impaired. She was oriented to person, place,  and time. Speech was normal rate and tone.   Thoughts were coherent and  logical.  Attention and concentration was good. Fund of knowledge and  language was intact. Able to recall three objects immediately. She  states her mood is down. Affect was anxious. Normal movements. Normal  gait. LABORATORY DATA:  Laboratories from 08/2019, glucose was 157 at that  time. No drug screen or alcohol. Most recent EKG was in 2016. QTc was  457. DIAGNOSES:  AXIS I:  1. Major depressive episode, moderate type. 2.  Generalized anxiety disorder. AXIS II:  Deferred. AXIS III:  Hypertension, obesity, polycystic ovarian syndrome. AXIS IV:  Moderate. Axis V:  60. PLAN:  1. We will increase Cymbalta to 90 mg daily. 2.  Increase Wellbutrin to 300 mg XL daily. 3.  Discontinue BuSpar. 4.  Continue with PHP for two weeks followed by step down to IOP for  three to four weeks. 5.  We will follow weekly. 6.  _____ in full program.  7.  Set up outpatient services with discharge. Spent approximately 70 minutes on this evaluation with more than 50% of  the time discussing the patient care and treatment options.         Kelechi Polanco MD    D: 01/17/2020 14:30:08       T: 01/17/2020 20:20:04     [id number]  Job#: [id number]     Doc#: [id number]    CC:

## 2020-01-20 ENCOUNTER — HOSPITAL ENCOUNTER (OUTPATIENT)
Dept: PSYCHIATRY | Age: 40
Setting detail: THERAPIES SERIES
Discharge: HOME | End: 2020-01-20
Payer: COMMERCIAL

## 2020-01-20 NOTE — BH NOTE
Patient called and stated that she forgot that it was [place] day and her kids are off school. She is not able to find childcare on such short notice so she will not be in today.

## 2020-01-21 ENCOUNTER — HOSPITAL ENCOUNTER (OUTPATIENT)
Dept: PSYCHIATRY | Age: 40
Setting detail: THERAPIES SERIES
Discharge: HOME | End: 2020-01-21
Payer: COMMERCIAL

## 2020-01-21 ENCOUNTER — TELEPHONE (OUTPATIENT)
Dept: FAMILY MEDICINE CLINIC | Age: 40
End: 2020-01-21

## 2020-01-21 VITALS
TEMPERATURE: 98.2 F | HEART RATE: 85 BPM | DIASTOLIC BLOOD PRESSURE: 95 MMHG | RESPIRATION RATE: 16 BRPM | SYSTOLIC BLOOD PRESSURE: 160 MMHG

## 2020-01-21 PROCEDURE — G0410 GRP PSYCH PARTIAL HOSP 45-50: HCPCS | Performed by: COUNSELOR

## 2020-01-21 PROCEDURE — G0410 GRP PSYCH PARTIAL HOSP 45-50: HCPCS

## 2020-01-21 NOTE — GROUP NOTE
Group Therapy Note    Date: 1/21/2020    Group Start Time: 10:00 AM  Group End Time: 11:00 AM  Group Topic: Psychotherapy    [facility]    Tiffany Lew, [facility]        Group Therapy Note    Attendees: 10         Patient's Goal:  Pt's goal was to be in the moment. Notes:  Pt was engaged in group. Pt met her goal.     Status After Intervention:  Improved    Participation Level:  Active Listener and Interactive    Participation Quality: Appropriate, Attentive, Sharing and Supportive      Speech:  normal      Thought Process/Content: Logical  Linear      Affective Functioning: Congruent      Mood: anxious      Level of consciousness:  Alert, Oriented x4 and Attentive      Response to Learning: Able to verbalize current knowledge/experience and Progressing to goal      Endings: None Reported    Modes of Intervention: Education, Support, Socialization, Exploration, Clarifying, Problem-solving, Activity, Movement, Confrontation, Limit-setting and Reality-testing      Discipline Responsible: /Counselor      Signature:  Tiffany Lew, [facility]

## 2020-01-21 NOTE — TELEPHONE ENCOUNTER
[facility] asking about Duloxetine, they have an RX for the 60 mg, but an RX from Dr. Mikey Mcmahon, who is a hospitalist psychiatrist, for 30 mg. Which is patient supposed to be on.

## 2020-01-21 NOTE — GROUP NOTE
Group Therapy Note    Date: 1/21/2020    Group Start Time: 11:15 AM  Group End Time: 12:15 PM  Group Topic: Cognitive Skills    KAVYAZ OP ABIOLA Lujan, ATR        Group Therapy Note    Attendees: 9         Patient's Goal:  Patients were invited to participate in a Cognitive Skills group creating a coping calender. Patients received a handout on copings strategies defined by category, such as grounding, self-love, thought challenging, etc. and the pros and cons of each type of coping strategy. Patients were then asked to create a calendar of coping strategies, including the when and the where that they could practice the coping techniques. Notes:  Lali Bravo appeared to listen attentively to information giving on coping strategies and engaged in creating her own coping skills calendar. Status After Intervention:  Improved    Participation Level:  Active Listener and Interactive    Participation Quality: Appropriate, Attentive, Sharing and Supportive      Speech:  normal      Thought Process/Content: Logical      Affective Functioning: Congruent      Mood: anxious      Level of consciousness:  Alert, Oriented x4 and Attentive      Response to Learning: Able to verbalize current knowledge/experience, Able to verbalize/acknowledge new learning, Able to retain information, Capable of insight and Able to change behavior      Endings: None Reported    Modes of Intervention: Education, Support, Socialization and Exploration      Discipline Responsible: Psychoeducational Specialist      Signature:  Maria Elena Pillai

## 2020-01-21 NOTE — GROUP NOTE
Group Therapy Note    Date: 1/21/2020    Group Start Time:  8:30 AM  Group End Time:  9:45 AM  Group Topic: Relapse Prevention    [facility]    Yuliya Arnold, [facility]        Group Therapy Note    Attendees: 10         Patient's Goal:  Patient will complete worksheet on acceptance. Will discuss how acceptance of others, instead of trying to change them, improves mood. Notes:  Patient engaged well in group. Completed the worksheet and discussed. She talked about her struggle to accept people as they are instead of trying to change them. Processed first day jitters. Status After Intervention:  Improved    Participation Level:  Active Listener and Interactive    Participation Quality: Appropriate, Attentive, Sharing and Supportive    Speech:  normal    Thought Process/Content: Logical Linear    Affective Functioning: Congruent    Mood: anxious and depressed    Level of consciousness:  Oriented x4    Response to Learning: Able to verbalize current knowledge/experience    Endings: None Reported    Modes of Intervention: Education, Support, Socialization and Exploration    Discipline Responsible: /Counselor    Signature:  Yuliya Arnold, [facility]

## 2020-01-21 NOTE — BH NOTE
This writer, who serves as Rachel's primary therapist, has observed that Valerie Cruz is motivated in her treatment and has been actively participating in groups. Valerie Cruz is working on advocating for herself and setting limits / establishing better boundaries with her family as she will often put the needs of others before herself. Valerie Cruz reports she continues to struggle with her anxiety and has been experiencing severe panic attacks, frequently when driving to unfamiliar places. This is a barrier to [employer] job as she works as a manager at [employer], where she has to drive to different company locations across [id number] [place] [address] for work. Rachel's attendance in group treatment is of benefit to help prevent hospitalization and to assist her in developing coping strategies for anxiety and depression and to practice better self-care techniques.

## 2020-01-21 NOTE — GROUP NOTE
Group Therapy Note    Date: 1/21/2020    Group Start Time:  1:05 PM  Group End Time:  2:15 PM  Group Topic: Psychoeducation    [facility] B[facility] Humphreys, [facility]    Group Therapy Note    Attendees: 6    Patients were engaged in completing art projects and their self care project from previous groups. At end of group, patients reviewed what they learned today from group and identified their self care activity for the afternoon/ evening. Notes:  Pt was engaged in group activity and utilized the rasheeda to complete a sculpture. Pt reported she plans on engaging in family time this evening. Status After Intervention:  Improved    Participation Level:  Active Listener and Interactive    Participation Quality: Appropriate, Attentive and Sharing      Speech:  normal      Thought Process/Content: Logical      Affective Functioning: Constricted/Restricted      Mood: anxious      Level of consciousness:  Alert and Oriented x4      Response to Learning: Able to verbalize current knowledge/experience, Able to verbalize/acknowledge new learning and Able to retain information      Endings: None Reported    Modes of Intervention: Education, Support, Socialization, Exploration, Clarifying, Problem-solving and Activity      Discipline Responsible: /Counselor      Signature:  BRAULIO Lazar, MAIDA

## 2020-01-21 NOTE — BH NOTE
This writer, who serves as Rachel's primary therapist, completed [place] and [address], Attending [phone], and a Return to Work Release which were signed by [facility] psychiatrist, Roderick Briseno MD, and faxed to [employer] at [phone] and [phone]. Therapist received a fax confirmation and filed away Rachel's completed forms to medical records and provided Vaelrie Cruz with a copy of the forms for her records.

## 2020-01-22 ENCOUNTER — HOSPITAL ENCOUNTER (OUTPATIENT)
Dept: PSYCHIATRY | Age: 40
Setting detail: THERAPIES SERIES
Discharge: HOME | End: 2020-01-22
Payer: COMMERCIAL

## 2020-01-22 VITALS
HEART RATE: 95 BPM | DIASTOLIC BLOOD PRESSURE: 102 MMHG | TEMPERATURE: 98 F | SYSTOLIC BLOOD PRESSURE: 160 MMHG | RESPIRATION RATE: 16 BRPM

## 2020-01-22 PROCEDURE — G0410 GRP PSYCH PARTIAL HOSP 45-50: HCPCS | Performed by: COUNSELOR

## 2020-01-22 PROCEDURE — G0410 GRP PSYCH PARTIAL HOSP 45-50: HCPCS

## 2020-01-22 NOTE — GROUP NOTE
Group Therapy Note    Date: 1/22/2020    Group Start Time: 11:15 AM  Group End Time: 12:15 PM  Group Topic: Music Therapy    [facility]    Cb Cheek        Group Therapy Note    Attendees: 8    Patient's Goal: to participate in gonzalez analysis and activity to identify current aspects of self, values and strengths and identify future goals. Notes: Siria Juarez actively participated in group gonzalez analysis and activity. Pt completed a worksheet identifying current aspects of self and goals for the future. Siria Juarez identified her goal is to work on getting more free time. Received feedback from peers regarding small-steps toward that goal while she is working on taking time off from work. Pt verbalized learning at conclusion of group. Status After Intervention:  Improved    Participation Level:  Active Listener and Interactive    Participation Quality: Appropriate, Attentive and Sharing      Speech:  normal      Thought Process/Content: Logical  Linear      Affective Functioning: Congruent      Mood: anxious and depressed      Level of consciousness:  Alert, Oriented x4 and Attentive      Response to Learning: Able to verbalize current knowledge/experience, Able to verbalize/acknowledge new learning, Capable of insight and Progressing to goal      Endings: None Reported    Modes of Intervention: Education, Support, Exploration, Problem-solving, Activity and Media      Discipline Responsible: Psychoeducational Specialist      Signature:  DALTON Leal

## 2020-01-22 NOTE — GROUP NOTE
Group Therapy Note    Date: 1/22/2020    Group Start Time:  1:05 PM  Group End Time:  2:25 PM  Group Topic: Psychoeducation    [facility]    Yissel Carter, [facility]    Group Therapy Note    Attendees: 4    Patients engaged in group activity identifying their healthy coping skills and choosing which coping skill to use with stressors and triggers that therapist and patients reviewed. Discussed barriers to applying coping skills and ways to cope with barriers. Patients discussed coping with anxiety, the costs of avoiding due to anxiety, and ways to cope ahead with anxiety. At the end of group patients identified self care activities that they can engage in this afternoon/ evening. Notes:  Pt was engaged in group and identified listening to music, prayer, and being in nature as her coping skills during group activity. Pt shared how she struggles with anxiety and panic attacks in the car which has impacted her daily life; discussed ways pt can cope with her anxiety and work through this with deep breathing exercises. Pt left group early reported this was due to needing to get her child to an appointment. Status After Intervention:  Improved    Participation Level:  Active Listener and Interactive    Participation Quality: Appropriate and Attentive      Speech:  normal      Thought Process/Content: Logical      Affective Functioning: Flat and Constricted/Restricted      Mood: anxious and depressed      Level of consciousness:  Alert and Oriented x4      Response to Learning: Able to verbalize current knowledge/experience, Able to verbalize/acknowledge new learning and Able to retain information      Endings: None Reported    Modes of Intervention: Education, Support, Socialization, Exploration, Clarifying, Problem-solving, Activity and Movement      Discipline Responsible: /Counselor      Signature:  BRAULIO Aguilar, MAIDA

## 2020-01-22 NOTE — GROUP NOTE
Group Therapy Note    Date: 1/22/2020    Group Start Time:  8:30 AM  Group End Time:  9:45 AM  Group Topic: Relapse Prevention    [facility]    Kelly Theodore, [facility]        Group Therapy Note    Attendees: 9            Patient's Goal:  Patient will complete worksheet on acceptance. Will discuss how acceptance in life contributes to positive mental health. Notes:  Patient attended group. Completed the worksheet and discussed. Verbalized an understanding of how acceptance of self/others leads to improved mood and outlook upon life. Status After Intervention:  Improved    Participation Level:  Active Listener and Interactive    Participation Quality: Appropriate, Attentive, Sharing and Supportive    Speech:  normal    Thought Process/Content: Logical Linear    Affective Functioning: Congruent    Mood: anxious and depressed    Level of consciousness:  Oriented x4    Response to Learning: Able to verbalize current knowledge/experience    Endings: None Reported    Modes of Intervention: Education, Support, Socialization and Exploration    Discipline Responsible: /Counselor    Signature:  Kelly Theodore, [facility]

## 2020-01-22 NOTE — BH NOTE
Treatment team met to discuss Pt. Pt is to step down to IOP on 1-31-20 but may need to step down sooner due to problems with . Pt is struggling with 's addiction.

## 2020-01-22 NOTE — GROUP NOTE
Group Therapy Note    Date: 1/22/2020    Group Start Time: 10:00 AM  Group End Time: 11:00 AM  Group Topic: Psychotherapy    [facility] OP ABIOLA Cordero, [facility]    Group Therapy Note    Attendees: 9    Patient shared and set a goal at the beginning of group to practice a new way of being in group today. Notes:  Pt set goal to \"be in the moment and actively listen\". Pt appeared to meet goal in group as she listened, provided feedback, and shared with group. Pt reported feeling distracted today by her children and things she needs to do. Status After Intervention:  Improved    Participation Level:  Active Listener and Interactive    Participation Quality: Appropriate, Attentive and Sharing      Speech:  normal      Thought Process/Content: Logical  Linear      Affective Functioning: Constricted/Restricted      Mood: anxious and depressed      Level of consciousness:  Alert and Oriented x4      Response to Learning: Able to verbalize current knowledge/experience, Able to verbalize/acknowledge new learning and Able to retain information      Endings: None Reported    Modes of Intervention: Education, Support, Socialization, Exploration and Clarifying      Discipline Responsible: /Counselor      Signature:  CLIFF Odell-S, R-CRISPIN

## 2020-01-23 ENCOUNTER — APPOINTMENT (OUTPATIENT)
Dept: PSYCHIATRY | Age: 40
End: 2020-01-23
Payer: COMMERCIAL

## 2020-01-23 ENCOUNTER — HOSPITAL ENCOUNTER (OUTPATIENT)
Dept: PSYCHIATRY | Age: 40
Setting detail: THERAPIES SERIES
End: 2020-01-23
Payer: COMMERCIAL

## 2020-01-24 ENCOUNTER — HOSPITAL ENCOUNTER (OUTPATIENT)
Dept: PSYCHIATRY | Age: 40
Setting detail: THERAPIES SERIES
Discharge: HOME | End: 2020-01-24
Payer: COMMERCIAL

## 2020-01-24 ENCOUNTER — APPOINTMENT (OUTPATIENT)
Dept: PSYCHIATRY | Age: 40
End: 2020-01-24
Payer: COMMERCIAL

## 2020-01-24 VITALS
HEART RATE: 80 BPM | RESPIRATION RATE: 16 BRPM | SYSTOLIC BLOOD PRESSURE: 164 MMHG | DIASTOLIC BLOOD PRESSURE: 98 MMHG | TEMPERATURE: 98 F

## 2020-01-24 PROCEDURE — 90853 GROUP PSYCHOTHERAPY: CPT

## 2020-01-24 PROCEDURE — 90853 GROUP PSYCHOTHERAPY: CPT | Performed by: COUNSELOR

## 2020-01-24 NOTE — GROUP NOTE
Group Therapy Note    Date: 1/24/2020    Group Start Time: 11:15 AM  Group End Time: 12:15 PM  Group Topic: Cognitive Skills    NARCISO Horner, [facility]    Group Therapy Note    Attendees: 8    Patients were provided handouts and learned about DBT [employer], discussed setting healthy boundaries in relationships, and reviewed importance of assertive communication in relationships. Notes:  Pt was actively engaged in group discussion with peers. Status After Intervention:  Improved    Participation Level:  Active Listener and Interactive    Participation Quality: Appropriate      Speech:  normal      Thought Process/Content: Logical      Affective Functioning: Constricted/Restricted      Mood: anxious      Level of consciousness:  Alert and Oriented x4      Response to Learning: Able to verbalize current knowledge/experience, Able to verbalize/acknowledge new learning and Able to retain information      Endings: None Reported    Modes of Intervention: Education, Support, Socialization, Exploration, Clarifying, Problem-solving and Activity      Discipline Responsible: /Counselor      Signature:  BRAULIO Mendez R-DMT

## 2020-01-27 ENCOUNTER — APPOINTMENT (OUTPATIENT)
Dept: PSYCHIATRY | Age: 40
End: 2020-01-27
Payer: COMMERCIAL

## 2020-01-27 ENCOUNTER — TELEPHONE (OUTPATIENT)
Dept: FAMILY MEDICINE CLINIC | Age: 40
End: 2020-01-27

## 2020-01-27 RX ORDER — AMLODIPINE BESYLATE 5 MG/1
TABLET ORAL
Qty: 180 TABLET | Refills: 1 | Status: SHIPPED | OUTPATIENT
Start: 2020-01-27 | End: 2020-07-28 | Stop reason: SDUPTHER

## 2020-01-27 RX ORDER — DULOXETIN HYDROCHLORIDE 60 MG/1
90 CAPSULE, DELAYED RELEASE ORAL DAILY
Qty: 30 CAPSULE | Refills: 3 | Status: SHIPPED | OUTPATIENT
Start: 2020-01-27 | End: 2020-05-19

## 2020-01-27 RX ORDER — LOSARTAN POTASSIUM AND HYDROCHLOROTHIAZIDE 25; 100 MG/1; MG/1
TABLET ORAL
Qty: 90 TABLET | Refills: 1 | Status: SHIPPED | OUTPATIENT
Start: 2020-01-27 | End: 2020-07-27 | Stop reason: SDUPTHER

## 2020-01-27 RX ORDER — BUPROPION HYDROCHLORIDE 300 MG/1
300 TABLET ORAL EVERY MORNING
Qty: 30 TABLET | Refills: 3 | Status: SHIPPED | OUTPATIENT
Start: 2020-01-27 | End: 2020-05-26

## 2020-01-27 NOTE — TELEPHONE ENCOUNTER
Last office visit 1/7/2020     Last written     Next office visit scheduled     Requested Prescriptions     Pending Prescriptions Disp Refills    amLODIPine (NORVASC) 5 MG tablet 180 tablet 1     Sig: Take 2 tablets by mouth daily.  losartan-hydrochlorothiazide (HYZAAR) 100-25 MG per tablet 90 tablet 1     Sig: Take 1 tablet by mouth daily for blood pressure.     DULoxetine (CYMBALTA) 60 MG extended release capsule 30 capsule 3     Sig: Take 2 capsules by mouth daily    buPROPion (WELLBUTRIN XL) 300 MG extended release tablet 30 tablet 0     Sig: Take 1 tablet by mouth every morning

## 2020-01-28 ENCOUNTER — APPOINTMENT (OUTPATIENT)
Dept: PSYCHIATRY | Age: 40
End: 2020-01-28
Payer: COMMERCIAL

## 2020-01-28 ENCOUNTER — HOSPITAL ENCOUNTER (OUTPATIENT)
Dept: PSYCHIATRY | Age: 40
Setting detail: THERAPIES SERIES
Discharge: HOME | End: 2020-01-28
Payer: COMMERCIAL

## 2020-01-28 PROCEDURE — G0410 GRP PSYCH PARTIAL HOSP 45-50: HCPCS

## 2020-01-28 PROCEDURE — 90853 GROUP PSYCHOTHERAPY: CPT

## 2020-01-28 PROCEDURE — G0410 GRP PSYCH PARTIAL HOSP 45-50: HCPCS | Performed by: COUNSELOR

## 2020-01-28 NOTE — GROUP NOTE
Group Therapy Note    Date: 1/28/2020    Group Start Time:  8:30 AM  Group End Time:  9:45 AM  Group Topic: Relapse Prevention    [facility]    Salma King [facility]        Group Therapy Note    Attendees: 9         Patient's Goal:  Patient will complete worksheet on Humility and explore the connection between humility and assertiveness/self esteem. Notes:  Patient attended group. Completed the worksheet and discussed in group. Talked about the importance of developing humility for improved assertiveness and self esteem. Status After Intervention:  Improved    Participation Level:  Active Listener    Participation Quality: Appropriate, Attentive and Sharing      Speech:  normal      Thought Process/Content: Logical      Affective Functioning: Congruent      Mood: anxious and depressed      Level of consciousness:  Alert      Response to Learning: Able to verbalize current knowledge/experience      Endings: None Reported    Modes of Intervention: Education, Support, Socialization and Exploration      Discipline Responsible: /Counselor      Signature:  Salma King [facility]

## 2020-01-28 NOTE — GROUP NOTE
Group Therapy Note    Date: 1/28/2020    Group Start Time: 11:15 AM  Group End Time: 12:15 PM  Group Topic: Cognitive Skills    [facility]    Heidi Lujan, [facility]        Group Therapy Note    Attendees: 9         Patient's Goal:  Patients were invited to participate in a Cognitive Skills / Art Therapy activity. Patients were asked to represent in rasheeda, a visual reminder of the reason they began attending outpatient program as a reminder of what they were working towards. Patients were encouraged to share and process their work with the group. Notes:  Chrissy Holden engaged in art making and shared and processed work with the group. Status After Intervention:  Improved    Participation Level:  Active Listener and Interactive    Participation Quality: Appropriate, Attentive, Sharing and Supportive      Speech:  normal      Thought Process/Content: Logical      Affective Functioning: Congruent      Mood: euthymic      Level of consciousness:  Alert, Oriented x4 and Attentive      Response to Learning: Able to verbalize current knowledge/experience, Able to verbalize/acknowledge new learning, Able to retain information, Capable of insight and Able to change behavior      Endings: None Reported    Modes of Intervention: Education, Support, Socialization and Exploration      Discipline Responsible: Psychoeducational Specialist      Signature:  Maria Elena Duque

## 2020-01-28 NOTE — GROUP NOTE
Group Therapy Note    Date: 1/28/2020    Group Start Time:  1:00 PM  Group End Time:  2:00 PM  Group Topic: Relapse Prevention    [facility]    Nael Cowart, [facility]        Group Therapy Note    Attendees: 4         Patient's Goal:  Patient will complete worksheet on Things I Cannot Control. Will discuss things one can and cannot control. Notes:  Patient engaged well in group. Completed the worksheet and discussed. Appeared to understand the topic and how it related to her life. Talked about how this understanding contributes to improved mood. Status After Intervention:  Improved    Participation Level:  Active Listener and Interactive    Participation Quality: Appropriate, Attentive, Sharing and Supportive    Speech:  normal    Thought Process/Content: Logical  Linear    Affective Functioning: Congruent    Mood: anxious and depressed    Level of consciousness:  Oriented x4    Response to Learning: Able to verbalize current knowledge/experience and Capable of insight    Endings: None Reported    Modes of Intervention: Education, Support, Socialization and Exploration    Discipline Responsible: /Counselor     Signature:  Nael Cowart, [facility]

## 2020-01-28 NOTE — GROUP NOTE
Group Therapy Note    Date: 1/28/2020    Group Start Time: 10:05 AM  Group End Time: 11:00 AM  Group Topic: Psychotherapy    [facility]    Heri Batista, [facility]    Group Therapy Note    Attendees: 9    Patient shared and set a goal at the beginning of group to practice a new way of being in group today. Notes:  Pt set goal to [place] and practice being in the moment\". Pt was engaged in group and appeared to meet goal as she engaged with peers and offered supportive feedback to peers. Status After Intervention:  Improved    Participation Level:  Active Listener and Interactive    Participation Quality: Appropriate and Attentive      Speech:  normal      Thought Process/Content: Logical  Linear      Affective Functioning: Constricted/Restricted      Mood: depressed      Level of consciousness:  Alert and Oriented x4      Response to Learning: Able to verbalize current knowledge/experience, Able to verbalize/acknowledge new learning and Able to retain information      Endings: None Reported    Modes of Intervention: Education, Support, Socialization, Exploration and Clarifying      Discipline Responsible: /Counselor      Signature:  BRAULIO Zamora, MAIDA

## 2020-01-29 ENCOUNTER — APPOINTMENT (OUTPATIENT)
Dept: PSYCHIATRY | Age: 40
End: 2020-01-29
Payer: COMMERCIAL

## 2020-01-30 ENCOUNTER — APPOINTMENT (OUTPATIENT)
Dept: PSYCHIATRY | Age: 40
End: 2020-01-30
Payer: COMMERCIAL

## 2020-01-31 ENCOUNTER — HOSPITAL ENCOUNTER (OUTPATIENT)
Dept: PSYCHIATRY | Age: 40
Setting detail: THERAPIES SERIES
Discharge: HOME | End: 2020-01-31
Payer: COMMERCIAL

## 2020-01-31 PROCEDURE — 90853 GROUP PSYCHOTHERAPY: CPT | Performed by: COUNSELOR

## 2020-01-31 PROCEDURE — 90853 GROUP PSYCHOTHERAPY: CPT

## 2020-02-03 ENCOUNTER — HOSPITAL ENCOUNTER (OUTPATIENT)
Dept: PSYCHIATRY | Age: 40
Setting detail: THERAPIES SERIES
Discharge: HOME | End: 2020-02-03
Payer: COMMERCIAL

## 2020-02-03 PROCEDURE — 90853 GROUP PSYCHOTHERAPY: CPT | Performed by: COUNSELOR

## 2020-02-03 PROCEDURE — 90853 GROUP PSYCHOTHERAPY: CPT

## 2020-02-03 NOTE — GROUP NOTE
Group Therapy Note    Date: 2/3/2020    Group Start Time: 10:05 AM  Group End Time: 11:00 AM  Group Topic: Psychotherapy    [facility]    Randell Peres, [facility]    Group Therapy Note    Attendees: 9    Patient shared and set a goal at the beginning of group to practice a new way of being in group today. Notes:  Pt was engaged in group and set goal to \"stay in the moment\". Pt was actively engaged in group discussion with peers and appeared to meet her goal.     Status After Intervention:  Improved    Participation Level:  Active Listener and Interactive    Participation Quality: Appropriate and Sharing      Speech:  normal      Thought Process/Content: Logical  Linear      Affective Functioning: Congruent      Mood: anxious      Level of consciousness:  Alert and Oriented x4      Response to Learning: Able to verbalize current knowledge/experience, Able to verbalize/acknowledge new learning and Able to retain information      Endings: None Reported    Modes of Intervention: Education, Support, Socialization, Exploration and Clarifying      Discipline Responsible: /Counselor      Signature:  ADRIÁN ArizaS, MAIDA

## 2020-02-03 NOTE — GROUP NOTE
Group Therapy Note    Date: 2/3/2020    Group Start Time:  1:00 PM  Group End Time:  2:10 PM  Group Topic: Psychoeducation    [facility] OP BHRAGHU Hall, [facility]    Group Therapy Note    Attendees: 4    Patients discussed coping strategies for managing and coping with anger; group was provided handouts and reviewed these as group. Patient were led on a mindfulness walk at the end of group and identified self care activities that they can engage in today after programming. Notes:  Pt was engaged in group discussion and activities. Status After Intervention:  Improved    Participation Level:  Active Listener and Interactive    Participation Quality: Appropriate and Attentive      Speech:  normal      Thought Process/Content: Logical  Linear      Affective Functioning: Constricted/Restricted      Mood: anxious and depressed      Level of consciousness:  Alert and Oriented x4      Response to Learning: Able to verbalize current knowledge/experience, Able to verbalize/acknowledge new learning and Able to retain information      Endings: None Reported    Modes of Intervention: Education, Support, Socialization, Exploration, Clarifying, Problem-solving, Activity and Movement      Discipline Responsible: /Counselor      Signature:  BRAULIO Eric, MAIDA

## 2020-02-03 NOTE — GROUP NOTE
Group Therapy Note    Date: 2/3/2020    Group Start Time: 11:15 AM  Group End Time: 12:15 PM  Group Topic: Psychoeducation    [facility]    HAYDEE Lai        Group Therapy Note    Attendees: 9         Patient's Goal: Pt were invited to participate in gratitude activity discussing what they were grateful for. Pt were invited to make art reflecting on what they are great ful for. Notes: Pt participated in discussion about gratitude and participated in art making activity. Status After Intervention:  Improved    Participation Level:  Active Listener and Interactive    Participation Quality: Appropriate, Attentive, Sharing and Supportive      Speech:  normal      Thought Process/Content: Logical      Affective Functioning: Congruent      Mood: euthymic      Level of consciousness:  Alert and Oriented x4      Response to Learning: Able to verbalize current knowledge/experience, Able to verbalize/acknowledge new learning and Progressing to goal      Endings: None Reported    Modes of Intervention: Education, Support, Socialization and Activity      Discipline Responsible: /Counselor      Signature:  HAYDEE Lai

## 2020-02-04 ENCOUNTER — HOSPITAL ENCOUNTER (OUTPATIENT)
Dept: PSYCHIATRY | Age: 40
Setting detail: THERAPIES SERIES
Discharge: HOME | End: 2020-02-04
Payer: COMMERCIAL

## 2020-02-04 PROCEDURE — 90853 GROUP PSYCHOTHERAPY: CPT | Performed by: COUNSELOR

## 2020-02-04 PROCEDURE — 90853 GROUP PSYCHOTHERAPY: CPT

## 2020-02-04 NOTE — GROUP NOTE
Group Therapy Note    Date: 2/4/2020    Group Start Time:  8:30 AM  Group End Time:  9:45 AM  Group Topic: Relapse Prevention    [facility]    Ryan Hooper [facility]        Group Therapy Note    Attendees: 6         Patient's Goal:  Patient will complete worksheet on People Pleasing. Will discuss how people pleasing behavior negatively impacts mental health. Notes:  Patient engaged well in group. Completed the worksheet and discussed. Processed ways to avoid taking responsibility for other people's feelings. Status After Intervention:  Improved    Participation Level:  Active Listener and Interactive    Participation Quality: Appropriate, Attentive, Sharing and Supportive    Speech:  normal    Thought Process/Content: Logical  Linear    Affective Functioning: Congruent    Mood: anxious and depressed    Level of consciousness:  Oriented x4    Response to Learning: Able to verbalize current knowledge/experience and Capable of insight    Endings: None Reported    Modes of Intervention: Education, Support, Socialization and Exploration    Discipline Responsible: /Counselor     Signature:  Ryan Hooper [facility]

## 2020-02-04 NOTE — GROUP NOTE
Group Therapy Note    Date: 2/4/2020    Group Start Time:  1:00 PM  Group End Time:  2:10 PM  Group Topic: Cognitive Skills    [facility]    Tiffany Lew, [facility]        Group Therapy Note    Attendees: 4         Patient's Goal:  Pt's goal was to learn 14 ways to add farzaneh to life by reading an article and to identify people, thinks, places and activities that bring farzaneh to Pt's life. Notes:  Pt participated in group activity and discussion. Pt met goal.     Status After Intervention:  Improved    Participation Level:  Active Listener and Interactive    Participation Quality: Appropriate, Attentive and Sharing      Speech:  normal      Thought Process/Content: Logical  Linear      Affective Functioning: Congruent      Mood: anxious and depressed      Level of consciousness:  Alert, Oriented x4 and Attentive      Response to Learning: Able to verbalize current knowledge/experience and Progressing to goal      Endings: None Reported    Modes of Intervention: Education, Support, Socialization, Exploration, Clarifying, Problem-solving, Activity, Movement, Confrontation, Limit-setting and Reality-testing      Discipline Responsible: /Counselor      Signature:  Tiffany Lew, [facility]

## 2020-02-04 NOTE — GROUP NOTE
Group Therapy Note    Date: 2/4/2020    Group Start Time: 11:15 AM  Group End Time: 12:15 PM  Group Topic: Cognitive Skills    [facility]    Heidi Lujan, [facility]        Group Therapy Note    Attendees: 5         Patient's Goal:  Patients were invited to participate in a Cognitive Skills / Art Therapy group on empathy. Patients were asked to listen to a video by Mary Garrido, entitled Empathy, invited to discuss the topic of empathy, and then to create an art piece using rasheeda to reflect on how they can give and / or receive empathy in their lives. Lastly, patients were asked to share and process their artwork with the group. Notes:  Mekhi Lawler appeared to listen to the video on empathy, engaged in discussion and appropriate conversation with peers, and participated in art making with rasheeda. Status After Intervention:  Improved    Participation Level:  Active Listener and Interactive    Participation Quality: Appropriate, Attentive, Sharing and Supportive      Speech:  normal      Thought Process/Content: Logical      Affective Functioning: Blunted      Mood: anxious      Level of consciousness:  Alert, Oriented x4 and Attentive      Response to Learning: Able to verbalize current knowledge/experience, Able to verbalize/acknowledge new learning, Able to retain information, Capable of insight and Able to change behavior      Endings: None Reported    Modes of Intervention: Education, Support, Socialization and Exploration      Discipline Responsible: Psychoeducational Specialist      Signature:  Maria Elena Leyva

## 2020-02-04 NOTE — GROUP NOTE
Group Therapy Note    Date: 2/4/2020    Group Start Time: 10:00 AM  Group End Time: 11:00 AM  Group Topic: Psychotherapy    [facility]    Tiffany Lew, [facility]        Group Therapy Note    Attendees: 5         Patient's Goal:  Pt's goal was to allow herself to feel. Notes:  Pt was engaged in group. Pt shared what she is afraid will happen if she expresses her anger. Pt met her goal.     Status After Intervention:  Improved    Participation Level:  Active Listener and Interactive    Participation Quality: Appropriate, Attentive, Sharing and Supportive      Speech:  normal      Thought Process/Content: Logical  Linear      Affective Functioning: Congruent      Mood: anxious and depressed      Level of consciousness:  Alert, Oriented x4 and Attentive      Response to Learning: Able to verbalize current knowledge/experience and Progressing to goal      Endings: None Reported    Modes of Intervention: Education, Support, Socialization, Exploration, Clarifying, Problem-solving, Activity, Movement, Confrontation, Limit-setting and Reality-testing      Discipline Responsible: /Counselor      Signature:  Tiffany Lew, [facility]

## 2020-02-06 RX ORDER — DULOXETIN HYDROCHLORIDE 30 MG/1
30 CAPSULE, DELAYED RELEASE ORAL DAILY
Qty: 30 CAPSULE | Refills: 2 | Status: SHIPPED | OUTPATIENT
Start: 2020-02-06 | End: 2020-05-06

## 2020-02-06 NOTE — TELEPHONE ENCOUNTER
Patient said the IOP program was suppose to send in script for 30 mg of the cymbalta so she would be taking 90 mg. She has never received the script for 30 mg so still trying to straighten that out. Patient said the cymbalta that was sent in was for 60 mg 2 x a day. Said she was only taking 60 mg once a day when IOP wanted to add another 30 mg a day. Has a good amount of the 60 mg left and is requesting a script for 30 mg to take with the 60 mg she has.

## 2020-02-10 ENCOUNTER — TELEPHONE (OUTPATIENT)
Dept: FAMILY MEDICINE CLINIC | Age: 40
End: 2020-02-10

## 2020-02-11 ENCOUNTER — E-VISIT (OUTPATIENT)
Dept: FAMILY MEDICINE CLINIC | Age: 40
End: 2020-02-11
Payer: COMMERCIAL

## 2020-02-11 ENCOUNTER — HOSPITAL ENCOUNTER (OUTPATIENT)
Dept: PSYCHIATRY | Age: 40
Setting detail: THERAPIES SERIES
Discharge: HOME | End: 2020-02-11
Payer: COMMERCIAL

## 2020-02-11 PROCEDURE — 90853 GROUP PSYCHOTHERAPY: CPT | Performed by: COUNSELOR

## 2020-02-11 PROCEDURE — 99421 OL DIG E/M SVC 5-10 MIN: CPT | Performed by: FAMILY MEDICINE

## 2020-02-11 PROCEDURE — 90853 GROUP PSYCHOTHERAPY: CPT

## 2020-02-11 NOTE — GROUP NOTE
Group Therapy Note    Date: 2/11/2020    Group Start Time: 11:15 AM  Group End Time: 12:15 PM  Group Topic: Cognitive Skills    [facility] ABIOLA Lujan, ATR        Group Therapy Note    Attendees: 10         Patient's Goal:  Patients were invited to participate in a Cognitive Skills / Art Therapy group. Patients were asked to utilize art making to reflect on the topic of rebirth and / or regrowth to reflect the changes they have experienced or were experiencing in their lives. Patients were encourage to share and process their work with the group at the end of session. Notes:  Zhanelesa Guardado engaged in art making, selecting to work with a planter and potted soil to reflect on the topic of rebirth/regrowth, participated in appropriate and supportive conversation with group, and processed artwork at the end of session. Status After Intervention:  Improved    Participation Level:  Active Listener and Interactive    Participation Quality: Appropriate, Attentive, Sharing and Supportive      Speech:  normal      Thought Process/Content: Logical      Affective Functioning: Congruent      Mood: euthymic      Level of consciousness:  Alert, Oriented x4 and Attentive      Response to Learning: Able to verbalize current knowledge/experience, Able to verbalize/acknowledge new learning, Able to retain information, Capable of insight and Able to change behavior      Endings: None Reported    Modes of Intervention: Education, Support, Socialization and Exploration      Discipline Responsible: Psychoeducational Specialist      Signature:  Maria Elena Ortiz

## 2020-02-11 NOTE — GROUP NOTE
Group Therapy Note    Date: 2/11/2020    Group Start Time:  1:05 PM  Group End Time:  2:20 PM  Group Topic: Psychoeducation    NARCISO Hoskins, [facility]    Dance/Movement Therapy Group Note    Attendees: 7    Patients explored therapeutic themes of acceptance, letting go, and holding on using movement props to create expressive movements. Group discussed the therapeutic themes and processed how they felt after engaging in the movement activities. Overall themes that emerged from group discussion were being vulnerable, opening up, expressing emotions, and trust. At end of group, patients identified self care activities that they were going to engage in today after programming. Notes:  Pt was engaged in group activities and shared feeling light hearted and goofy today with peers. Pt had bright affect as she engaged with peers. Status After Intervention:  Improved    Participation Level:  Active Listener and Interactive    Participation Quality: Appropriate and Attentive      Speech:  normal      Thought Process/Content: Logical  Linear      Affective Functioning: Congruent      Mood: euthymic      Level of consciousness:  Alert and Oriented x4      Response to Learning: Able to verbalize current knowledge/experience, Able to verbalize/acknowledge new learning and Able to retain information      Endings: None Reported    Modes of Intervention: Education, Support, Socialization, Exploration and Clarifying      Discipline Responsible: /Counselor      Signature:  Trey Hoskins, ADRIÁNS, MAIDA

## 2020-02-11 NOTE — GROUP NOTE
Group Therapy Note    Date: 2/11/2020    Group Start Time:  8:30 AM  Group End Time:  9:45 AM  Group Topic: Relapse Prevention    [facility]    Godwin Ellis Zango        Group Therapy Note    Attendees: 10           Patient's Goal:  Patient will complete worksheet on People Pleasing. Will discuss how people pleasing behavior negatively impacts mental health. Notes:  Patient attended group. Completed the worksheet and discussed. Gave examples of how these behaviors effected mood and relationships. She could relate to the topic in general.    Status After Intervention:  Improved    Participation Level:  Active Listener and Interactive    Participation Quality: Appropriate, Attentive, Sharing and Supportive    Speech:  normal    Thought Process/Content: Logical  Linear    Affective Functioning: Congruent    Mood: anxious and depressed    Level of consciousness:  Oriented x4    Response to Learning: Able to verbalize current knowledge/experience and Capable of insight    Endings: None Reported    Modes of Intervention: Education, Support, Socialization and Exploration    Discipline Responsible: /Counselor      Signature:  BOWEN Jiang IMVU

## 2020-02-14 ENCOUNTER — HOSPITAL ENCOUNTER (OUTPATIENT)
Dept: PSYCHIATRY | Age: 40
Setting detail: THERAPIES SERIES
Discharge: HOME | End: 2020-02-14
Payer: COMMERCIAL

## 2020-02-14 PROCEDURE — 90853 GROUP PSYCHOTHERAPY: CPT | Performed by: COUNSELOR

## 2020-02-14 PROCEDURE — 90853 GROUP PSYCHOTHERAPY: CPT

## 2020-02-14 NOTE — GROUP NOTE
Group Therapy Note    Date: 2/14/2020    Group Start Time: 11:15 AM  Group End Time: 12:15 PM  Group Topic: Relapse Prevention    [facility]    Yuliya Arnold, [facility]        Group Therapy Note    Attendees: 6          Patient's Goal:  Patient will complete worksheet on Unresolved Dependency and will discuss how symptoms effect mood and mental health. Notes:  Patient engaged well in group. Completed the worksheet and discussed in group. Gave examples of how she felt responsible for others feelings. Looked at ways in which she holds others accountable as well. Received feedback from peers. Status After Intervention:  Improved    Participation Level:  Active Listener and Interactive    Participation Quality: Appropriate and Attentive    Speech:  normal    Thought Process/Content: Logical    Affective Functioning: Congruent    Mood: anxious, depressed     Level of consciousness:  Oriented x4    Response to Learning: Able to verbalize current knowledge/experience and Able to verbalize/acknowledge new learning    Endings: None Reported    Modes of Intervention: Education, Support, Socialization and Exploration    Discipline Responsible: /Counselor    Signature:  Yuliya Arnold, [facility]

## 2020-02-20 ENCOUNTER — OFFICE VISIT (OUTPATIENT)
Dept: FAMILY MEDICINE CLINIC | Age: 40
End: 2020-02-20
Payer: COMMERCIAL

## 2020-02-20 VITALS
TEMPERATURE: 98 F | HEIGHT: 66 IN | HEART RATE: 93 BPM | WEIGHT: 290.4 LBS | SYSTOLIC BLOOD PRESSURE: 170 MMHG | DIASTOLIC BLOOD PRESSURE: 95 MMHG | OXYGEN SATURATION: 99 % | BODY MASS INDEX: 46.67 KG/M2

## 2020-02-20 LAB — TSH SERPL DL<=0.05 MIU/L-ACNC: 2.11 UIU/ML (ref 0.27–4.2)

## 2020-02-20 PROCEDURE — 99214 OFFICE O/P EST MOD 30 MIN: CPT | Performed by: FAMILY MEDICINE

## 2020-02-20 PROCEDURE — G8482 FLU IMMUNIZE ORDER/ADMIN: HCPCS | Performed by: FAMILY MEDICINE

## 2020-02-20 PROCEDURE — G8417 CALC BMI ABV UP PARAM F/U: HCPCS | Performed by: FAMILY MEDICINE

## 2020-02-20 PROCEDURE — G8427 DOCREV CUR MEDS BY ELIG CLIN: HCPCS | Performed by: FAMILY MEDICINE

## 2020-02-20 PROCEDURE — 1036F TOBACCO NON-USER: CPT | Performed by: FAMILY MEDICINE

## 2020-02-20 RX ORDER — HYDRALAZINE HYDROCHLORIDE 10 MG/1
TABLET, FILM COATED ORAL
Qty: 60 TABLET | Refills: 0 | Status: SHIPPED | OUTPATIENT
Start: 2020-02-20 | End: 2020-05-04

## 2020-02-20 ASSESSMENT — ENCOUNTER SYMPTOMS
SHORTNESS OF BREATH: 0
BLOOD IN STOOL: 0
COUGH: 0
CHEST TIGHTNESS: 0
ABDOMINAL PAIN: 0

## 2020-02-20 NOTE — PROGRESS NOTES
tablet, Sig 2 in AM, Taking? Yes, Authorizing Provider Maday Has Heindl, DO    Medication levonorgestrel-ethinyl estradiol (Janice Camel) 0.15-0.03 MG per tablet, Sig Take 1 tablet by mouth, Taking? Yes, Authorizing Provider Historical Provider, MD      Past Medical History:  No date: Anxiety  No date: Arthritis  No date: Cervical pain  No date: Chronic back pain  No date: Chronic kidney disease      Comment:  kidney stone  March 2012  No date: Hypertension      Comment:  takes Aldomet  No date: Infertility      Comment:  took fertility medication  No date: Mental disorder      Comment:  anxiety, depression  No date: Migraine  No date: Miscarriage  1999: Polycystic ovarian syndrome  No date: Psoriasis  No date: Stomach ulcer  No date: Unspecified sleep apnea        Review of Systems    Review of Systems   Constitutional: Positive for diaphoresis. Negative for chills and fever. HENT: Negative for congestion and postnasal drip. Eyes: Negative for visual disturbance. Respiratory: Negative for cough, chest tightness and shortness of breath. Cardiovascular: Negative for palpitations and leg swelling. Gastrointestinal: Negative for abdominal pain and blood in stool. Genitourinary: Positive for frequency. Negative for dysuria and hematuria. Musculoskeletal: Positive for arthralgias. Neurological: Negative for tremors and headaches. Psychiatric/Behavioral: Positive for dysphoric mood. Negative for sleep disturbance. The patient is nervous/anxious. Objective:   Physical Exam      Physical Exam  Constitutional:       Appearance: Normal appearance. She is well-developed. She is obese. HENT:      Head: Normocephalic. Mouth/Throat:      Mouth: Mucous membranes are moist.      Pharynx: Oropharynx is clear. Eyes:      General: No scleral icterus. Conjunctiva/sclera: Conjunctivae normal.   Neck:      Musculoskeletal: Neck supple. Thyroid: No thyromegaly. Vascular: No carotid bruit.

## 2020-02-21 ENCOUNTER — HOSPITAL ENCOUNTER (OUTPATIENT)
Dept: PSYCHIATRY | Age: 40
Setting detail: THERAPIES SERIES
Discharge: HOME | End: 2020-02-21
Payer: COMMERCIAL

## 2020-02-21 PROCEDURE — 90853 GROUP PSYCHOTHERAPY: CPT | Performed by: COUNSELOR

## 2020-02-21 NOTE — GROUP NOTE
Group Therapy Note    Date: 2/21/2020    Group Start Time:  1:00 PM  Group End Time:  2:00 PM  Group Topic: Relapse Prevention    [facility]    Yceenia Reyes [employer]        Group Therapy Note    Attendees: 4         Patient's Goal:  Patient will complete worksheet on gratitude. Will discuss how and why gratitude creates happiness in life. Notes:  Patient engaged well in group. Completed the worksheet and discussed. She talked about the things she was grateful for and committed to finding more things this weekend to be happy about. Status After Intervention:  Improved    Participation Level:  Active Listener and Interactive    Participation Quality: Appropriate, Attentive, Sharing and Supportive    Speech:  normal    Thought Process/Content: Logical Linear    Affective Functioning: Congruent    Mood: anxious and depressed    Level of consciousness:  Oriented x4    Response to Learning: Able to verbalize current knowledge/experience    Endings: None Reported    Modes of Intervention: Education, Support, Socialization and Exploration    Discipline Responsible: /Counselor    Signature:  BOWEN King Mems-ID

## 2020-02-21 NOTE — GROUP NOTE
Group Therapy Note    Date: 2/21/2020    Group Start Time: 11:15 AM  Group End Time: 12:15 PM  Group Topic: Cognitive Skills    [facility]    Tiffany Lew, [facility]        Group Therapy Note    Attendees: 8         Patient's Goal: Pt's goal was to learn the benefits of gratitude and make a list of things Pt is grateful for. Notes:  Pt participated in group discussion and activity. Pt met goal.     Status After Intervention:  Unchanged    Participation Level:  Active Listener and Interactive    Participation Quality: Appropriate, Attentive, Sharing and Supportive      Speech:  normal      Thought Process/Content: Logical  Linear      Affective Functioning: Congruent      Mood: anxious and depressed      Level of consciousness:  Alert, Oriented x4 and Attentive      Response to Learning: Able to verbalize current knowledge/experience and Progressing to goal      Endings: None Reported    Modes of Intervention: Education, Support, Socialization, Exploration, Clarifying, Problem-solving, Activity, Movement, Confrontation, Limit-setting and Reality-testing      Discipline Responsible: /Counselor      Signature:  Tiffany Lew, [facility]

## 2020-02-25 ENCOUNTER — HOSPITAL ENCOUNTER (OUTPATIENT)
Dept: PSYCHIATRY | Age: 40
Setting detail: THERAPIES SERIES
Discharge: HOME | End: 2020-02-25
Payer: COMMERCIAL

## 2020-02-25 PROCEDURE — 90853 GROUP PSYCHOTHERAPY: CPT

## 2020-02-25 PROCEDURE — 90853 GROUP PSYCHOTHERAPY: CPT | Performed by: COUNSELOR

## 2020-02-25 NOTE — GROUP NOTE
Group Therapy Note    Date: 2/25/2020    Group Start Time:  1:05 PM  Group End Time:  2:30 PM  Group Topic: Psychoeducation    [facility], [facility]    Dance/Movement Therapy Group Note    Attendees: 5    Patients discussed the mind body connection and using body as a way to experience and release emotions through movement. Each patient chose one song that was uplifting for the group to listen to mindfully and patients created expressive movements and used art supplies to express emotions that they noticed in the songs. At end of group, patients were guided through a progressive muscle relaxation. Notes:  Pt was engaged in group and participated in group activities. Status After Intervention:  Improved    Participation Level:  Active Listener and Interactive    Participation Quality: Appropriate and Attentive      Speech:  normal      Thought Process/Content: Logical  Linear      Affective Functioning: Constricted/Restricted      Mood: anxious      Level of consciousness:  Alert and Oriented x4      Response to Learning: Able to verbalize current knowledge/experience, Able to verbalize/acknowledge new learning and Able to retain information      Endings: None Reported    Modes of Intervention: Education, Support, Socialization, Exploration, Clarifying, Problem-solving, Activity and Movement      Discipline Responsible: /Counselor      Signature:  Hanh Lloyd, CLIFF-S, R-CRISPIN

## 2020-02-25 NOTE — GROUP NOTE
Group Therapy Note    Date: 2/25/2020    Group Start Time: 10:00 AM  Group End Time: 11:00 AM  Group Topic: Cognitive Skills    [facility] KRISTOPHERI    Heidi Lujan, ATR        Group Therapy Note    Attendees: 6         Patient's Goal:  Patients were invited to participate in a Cognitive Skills group on grounding techniques. Patients received a handout on 30 grounding techniques for anxiety and stress management. Patients were asked to share, demonstrate, and practice grounding techniques that worked for them, as well as sharing community resources to help tap into for additional support and mental health wellness. At the end of group, patients were asked to share a grounding or self-care technique they were going to use in the evening. Notes:  Caryl Halsted appeared receptive to the information given on grounding techniques and actively listened to and participated in group discussion, giving supportive feedback to peers and asking for suggestion from others to help manage the stress of \"having children with ADHD whom I love dearly, but who take a lot out of me. \"    Status After Intervention:  Improved    Participation Level:  Active Listener and Interactive    Participation Quality: Appropriate, Attentive, Sharing and Supportive      Speech:  normal      Thought Process/Content: Logical      Affective Functioning: Incongruent      Mood: euthymic      Level of consciousness:  Alert, Oriented x4 and Attentive      Response to Learning: Able to verbalize current knowledge/experience, Able to verbalize/acknowledge new learning, Able to retain information, Capable of insight, Able to change behavior and Progressing to goal      Endings: None Reported    Modes of Intervention: Education, Support, Socialization, Exploration, Problem-solving and Activity      Discipline Responsible: Psychoeducational Specialist      Signature:  Maria Elena Tamez

## 2020-02-28 ENCOUNTER — HOSPITAL ENCOUNTER (OUTPATIENT)
Dept: PSYCHIATRY | Age: 40
Setting detail: THERAPIES SERIES
Discharge: HOME | End: 2020-02-28
Payer: COMMERCIAL

## 2020-02-28 VITALS
HEART RATE: 85 BPM | SYSTOLIC BLOOD PRESSURE: 140 MMHG | RESPIRATION RATE: 16 BRPM | DIASTOLIC BLOOD PRESSURE: 92 MMHG | TEMPERATURE: 98 F

## 2020-02-28 PROCEDURE — 90853 GROUP PSYCHOTHERAPY: CPT

## 2020-02-28 NOTE — GROUP NOTE
Group Therapy Note    Date: 2/28/2020    Group Start Time:  8:35 AM  Group End Time:  9:45 AM  Group Topic: Psychotherapy    [facility] ABIOLA Aguilar [facility]    Group Therapy Note    Attendees: 6    Patient shared and set a goal at the beginning of group to practice a new way of being in group today. Notes:  Pt set goal to \"stay in the moment\". Pt was engaged in group with peers offering supportive feedback, relating, and connecting with peers. Status After Intervention:  Improved    Participation Level:  Active Listener and Interactive    Participation Quality: Appropriate, Attentive and Sharing      Speech:  normal      Thought Process/Content: Logical  Linear      Affective Functioning: Constricted/Restricted      Mood: anxious and depressed      Level of consciousness:  Alert and Oriented x4      Response to Learning: Able to verbalize current knowledge/experience, Able to verbalize/acknowledge new learning and Able to retain information      Endings: None Reported    Modes of Intervention: Education, Support, Socialization, Exploration and Clarifying      Discipline Responsible: /Counselor      Signature:  CLIFF Urbina-S, MAIDA

## 2020-02-28 NOTE — GROUP NOTE
Group Therapy Note    Date: 2/28/2020    Group Start Time: 10:00 AM  Group End Time: 11:00 AM  Group Topic: Cognitive Skills    [facility] ABIOLA Lujan, ATR        Group Therapy Note    Attendees: 6         Patient's Goal:  Patients were invited to participate in a Cognitive Skills / Art Therapy group. Patients were encouraged to reflect on a topic they had discussed in psycho-therapy - such as boundaries, anger management, replacing unhealthy habits, etc. - and explore the topic further through art making. At the end of group, patients were encouraged to process and share their artwork with the group. Notes:  Gabe Guillermo engaged in art making, participated in appropriate conversation with peers, and processed artwork with the group, demonstrating personal insight and awareness. Status After Intervention:  Improved    Participation Level:  Active Listener and Interactive    Participation Quality: Appropriate, Attentive, Sharing and Supportive      Speech:  normal      Thought Process/Content: Logical      Affective Functioning: Congruent      Mood: euthymic      Level of consciousness:  Alert, Oriented x4 and Attentive      Response to Learning: Able to verbalize current knowledge/experience, Able to verbalize/acknowledge new learning, Able to retain information, Capable of insight and Able to change behavior      Endings: None Reported    Modes of Intervention: Education, Support, Socialization and Exploration      Discipline Responsible: Psychoeducational Specialist      Signature:  Maria Elena Gordon

## 2020-04-02 RX ORDER — FLUTICASONE PROPIONATE 50 MCG
SPRAY, SUSPENSION (ML) NASAL
Qty: 1 BOTTLE | Refills: 2 | Status: SHIPPED | OUTPATIENT
Start: 2020-04-02 | End: 2020-07-07

## 2020-04-07 ENCOUNTER — TELEPHONE (OUTPATIENT)
Dept: FAMILY MEDICINE CLINIC | Age: 40
End: 2020-04-07

## 2020-04-28 RX ORDER — FLUTICASONE PROPIONATE 50 MCG
SPRAY, SUSPENSION (ML) NASAL
Qty: 1 BOTTLE | Refills: 1 | OUTPATIENT
Start: 2020-04-28

## 2020-05-05 ENCOUNTER — VIRTUAL VISIT (OUTPATIENT)
Dept: FAMILY MEDICINE CLINIC | Age: 40
End: 2020-05-05
Payer: COMMERCIAL

## 2020-05-05 VITALS — WEIGHT: 280 LBS | HEIGHT: 66 IN | BODY MASS INDEX: 45 KG/M2

## 2020-05-05 PROCEDURE — 99214 OFFICE O/P EST MOD 30 MIN: CPT | Performed by: NURSE PRACTITIONER

## 2020-05-05 RX ORDER — ONDANSETRON 4 MG/1
4 TABLET, ORALLY DISINTEGRATING ORAL 3 TIMES DAILY PRN
Qty: 21 TABLET | Refills: 0 | Status: ON HOLD | OUTPATIENT
Start: 2020-05-05 | End: 2020-06-18 | Stop reason: SDUPTHER

## 2020-05-05 RX ORDER — CLINDAMYCIN HYDROCHLORIDE 300 MG/1
300 CAPSULE ORAL 2 TIMES DAILY
Qty: 14 CAPSULE | Refills: 0 | Status: SHIPPED | OUTPATIENT
Start: 2020-05-05 | End: 2020-05-12

## 2020-05-05 ASSESSMENT — ENCOUNTER SYMPTOMS
RECTAL PAIN: 0
ABDOMINAL PAIN: 0
BLOOD IN STOOL: 0
RESPIRATORY NEGATIVE: 1
ABDOMINAL DISTENTION: 0
VOMITING: 0
ANAL BLEEDING: 0
NAUSEA: 1
DIARRHEA: 1
CONSTIPATION: 0

## 2020-05-05 NOTE — PATIENT INSTRUCTIONS
have symptoms of dehydration, such as:  ? Dry eyes and a dry mouth. ? Passing only a little dark urine. ? Feeling thirstier than usual.     · You have a new or higher fever.    Watch closely for changes in your health, and be sure to contact your doctor if:    · Your diarrhea is getting worse.     · You see pus in the diarrhea.     · You are not getting better after 2 days (48 hours). Where can you learn more? Go to https://[employer] Internationalpepiceweb.[email or web address] org and sign in to your [employer] account. Enter Z613 in the B[employer]HQ box to learn more about \"Diarrhea: Care Instructions. \"     If you do not have an account, please click on the \"Sign Up Now\" link. Current as of: June 26, 2019Content Version: 12.4  © 9458-8340 Healthwise, Incorporated. Care instructions adapted under license by [facility] ([facility]). If you have questions about a medical condition or this instruction, always ask your healthcare professional. [place] [address] any warranty or liability for your use of this information.

## 2020-05-05 NOTE — PROGRESS NOTES
for Nausea or Vomiting Yes DIANA Atkins CNP   clindamycin (CLEOCIN) 300 MG capsule Take 1 capsule by mouth 2 times daily for 7 days Yes DIANA Atkisn CNP   hydrALAZINE (APRESOLINE) 10 MG tablet TAKE 1 TABLET BY MOUTH EVERY DAY FOR 5 DAYS THEN INCREASE TO TAKE 1 TABLET BY MOUTH TWICE A DAY Yes Sorin Santillan, DO   fluticasone (FLONASE) 50 MCG/ACT nasal spray SPRAY 1 SPRAY IN EACH NOSTRIL DAILY Yes Sorin Santillan, DO   HYDROXYZINE HCL PO Take 50 mg by mouth Yes Historical Provider, MD   amLODIPine (NORVASC) 5 MG tablet Take 2 tablets by mouth daily. Yes Sorin Santillan, DO   losartan-hydrochlorothiazide (HYZAAR) 100-25 MG per tablet Take 1 tablet by mouth daily for blood pressure.  Yes Sorin Santillan, DO   DULoxetine (CYMBALTA) 60 MG extended release capsule Take 2 capsules by mouth daily Yes Sorin Santillan DO   buPROPion (WELLBUTRIN XL) 300 MG extended release tablet Take 1 tablet by mouth every morning Yes Sorin Santillan DO   cimetidine (TAGAMET) 400 MG tablet Take 1 tablet by mouth 2 times daily Yes Sorin Santillan DO   metFORMIN (GLUCOPHAGE-XR) 750 MG extended release tablet 2 in AM Yes Sorin Santillan DO   levonorgestrel-ethinyl estradiol (JOLESSA) 0.15-0.03 MG per tablet Take 1 tablet by mouth Yes Historical Provider, MD   DULoxetine (CYMBALTA) 30 MG extended release capsule Take 3 capsules by mouth daily  Patient not taking: Reported on 5/5/2020  Eric Vázquez MD   buPROPion (WELLBUTRIN XL) 300 MG extended release tablet Take 1 tablet by mouth every morning  Patient not taking: Reported on 5/5/2020  Eric Vázquez MD   nystatin (MYCOSTATIN) 958919 UNIT/ML suspension Take 5 mLs by mouth 4 times daily Swish & swallow  Patient not taking: Reported on 5/5/2020  Sorin Santillan DO   DULoxetine (CYMBALTA) 30 MG extended release capsule Take 1 capsule by mouth daily  Patient not taking: Reported on 5/5/2020  Sorin Santillan DO   buPROPion (ZYBAN) 150 MG extended release tablet Take 1 tablet by mouth the diarrhea, she will have about 5 episodes, only in the morning. Recommended that she take OTC antidiarrheal until she f/u with GI. States that her father  of colon cancer. She had a colonoscopy 5 years ago, but I am not able to view the results. 3. Boil, breast  Pt's  attempted to help show me the two boils under the pt's right breast.  We used the program, Doxy for this VV, and the picture quality is very poor. I am able to visualize 2 lumps, the pt states that they are erythematous and painful to touch. D/t the pt's h/o cellulitis and her stating that it started the same way, I have agreed to treat her with Clindamycin. I advised the pt to also apply warm, wet compresses several times a day. Monitor the area for increased swelling and redness and f/u as needed. - clindamycin (CLEOCIN) 300 MG capsule; Take 1 capsule by mouth 2 times daily for 7 days  Dispense: 14 capsule; Refill: 0      No follow-ups on file. Dilip Boone is a 46 y.o. female being evaluated by a Virtual Visit (video visit) encounter to address concerns as mentioned above. A caregiver was present when appropriate. Due to this being a TeleHealth encounter (During - public health emergency), evaluation of the following organ systems was limited: Vitals/Constitutional/EENT/Resp/CV/GI//MS/Neuro/Skin/Heme-Lymph-Imm. Pursuant to the emergency declaration under the [address] [place] [address], [address] [place] [address] authority and the [employer] Act, this Virtual Visit was conducted with patient's (and/or legal guardian's) consent, to reduce the patient's risk of exposure to COVID-19 and provide necessary medical care. The patient (and/or legal guardian) has also been advised to contact this office for worsening conditions or problems, and seek emergency medical treatment and/or call 911 if deemed necessary.      Patient identification was verified at the start of the visit: Yes    Total time spent on this encounter: 28    Services were provided through a video synchronous discussion virtually to substitute for in-person clinic visit. Patient and provider were located at their individual homes. --DIANA Stinson CNP on 5/5/2020 at 4:17 PM    An electronic signature was used to authenticate this note.

## 2020-05-06 ENCOUNTER — HOSPITAL ENCOUNTER (OUTPATIENT)
Age: 40
Discharge: HOME | End: 2020-05-06
Payer: COMMERCIAL

## 2020-05-06 ENCOUNTER — VIRTUAL VISIT (OUTPATIENT)
Dept: ENDOCRINOLOGY | Age: 40
End: 2020-05-06
Payer: COMMERCIAL

## 2020-05-06 LAB
T3 FREE: 3.6 PG/ML (ref 2.3–4.2)
T4 FREE: 1.1 NG/DL (ref 0.9–1.8)
TSH REFLEX: 1.36 UIU/ML (ref 0.27–4.2)

## 2020-05-06 PROCEDURE — 82627 DEHYDROEPIANDROSTERONE: CPT

## 2020-05-06 PROCEDURE — 84443 ASSAY THYROID STIM HORMONE: CPT

## 2020-05-06 PROCEDURE — 84439 ASSAY OF FREE THYROXINE: CPT

## 2020-05-06 PROCEDURE — 83036 HEMOGLOBIN GLYCOSYLATED A1C: CPT

## 2020-05-06 PROCEDURE — 84244 ASSAY OF RENIN: CPT

## 2020-05-06 PROCEDURE — 83835 ASSAY OF METANEPHRINES: CPT

## 2020-05-06 PROCEDURE — 82088 ASSAY OF ALDOSTERONE: CPT

## 2020-05-06 PROCEDURE — 84270 ASSAY OF SEX HORMONE GLOBUL: CPT

## 2020-05-06 PROCEDURE — 84403 ASSAY OF TOTAL TESTOSTERONE: CPT

## 2020-05-06 PROCEDURE — 36415 COLL VENOUS BLD VENIPUNCTURE: CPT

## 2020-05-06 PROCEDURE — 99204 OFFICE O/P NEW MOD 45 MIN: CPT | Performed by: INTERNAL MEDICINE

## 2020-05-06 PROCEDURE — 84481 FREE ASSAY (FT-3): CPT

## 2020-05-06 NOTE — PROGRESS NOTES
Seen as new patient for sweats, hot temperature        Pursuant to the emergency declaration under the [facility] waiver authority and the [employer] Act, this Virtual  Visit was conducted, with patient's consent, to reduce the patient's risk of exposure to COVID-19 and provide continuity of care for an established patient. Patient was at home. Provider was at home. No one else was involved  Services were provided through a video synchronous discussion virtually to substitute for in-person clinic visit. Reports heat intolerance and sweats for years. Severe, uncontrolled, no worsening factors. More in the face, head. No ppt factors    Persistent heat intolerance  Sweats are in episodes. Last for an hour  No palpitations or tremors during the episode  Occ diarrhea or wheezing    Diagnosed with Type 2 diabetes mellitus in  2015  Controlled    Current diabetic medications     Metformin    Last A1c  6% <--- 5.8 <--- 5.1    She has a h/o PCOS. Dx by Lauri Mercado. Reports h/o high testosterone, cysts in ovaries and fertility issues. Cycles are irregular , on Jolessa per Gyn  Follows at [place] [address]. She has three children    Hypertension for years  Uncontrolled per patient.   Norvasc 5mg  2 tab  Losartan HCTZ 100/25  Hydralazine 10mg BID    2/20 TSH 2.11    FH: Dad heat intolerance    SH:No smoking    Quit a year ago    Past Medical History:   Diagnosis Date    Anxiety     Arthritis     Cervical pain     Chronic back pain     Chronic kidney disease     kidney stone  March 2012    Hypertension     takes Aldomet    Infertility     took fertility medication    Mental disorder     anxiety, depression    Migraine     Miscarriage     Polycystic ovarian syndrome 1999    Psoriasis     Stomach ulcer     Unspecified sleep apnea      Past Surgical History:   Procedure Laterality Date    CHOLECYSTECTOMY  2011    COLONOSCOPY  10/7/2015    [facility]-colitis/polyps    MN DILATION/CURETTAGE,DIAGNOSTIC  1998     D & C/missed ab    SKIN BIOPSY      for psoriasis    TONSILLECTOMY  08/09/2016    tonsillectomy    UPPER GASTROINTESTINAL ENDOSCOPY  3/23/11    pyloretic    UPPER GASTROINTESTINAL ENDOSCOPY  10/7/2015    [facility]ast[facility]     Current Outpatient Medications   Medication Sig Dispense Refill    ondansetron (ZOFRAN-ODT) 4 MG disintegrating tablet Take 1 tablet by mouth 3 times daily as needed for Nausea or Vomiting 21 tablet 0    clindamycin (CLEOCIN) 300 MG capsule Take 1 capsule by mouth 2 times daily for 7 days 14 capsule 0    hydrALAZINE (APRESOLINE) 10 MG tablet TAKE 1 TABLET BY MOUTH EVERY DAY FOR 5 DAYS THEN INCREASE TO TAKE 1 TABLET BY MOUTH TWICE A DAY 60 tablet 5    fluticasone (FLONASE) 50 MCG/ACT nasal spray SPRAY 1 SPRAY IN EACH NOSTRIL DAILY 1 Bottle 2    HYDROXYZINE HCL PO Take 50 mg by mouth      amLODIPine (NORVASC) 5 MG tablet Take 2 tablets by mouth daily. 180 tablet 1    losartan-hydrochlorothiazide (HYZAAR) 100-25 MG per tablet Take 1 tablet by mouth daily for blood pressure. 90 tablet 1    DULoxetine (CYMBALTA) 60 MG extended release capsule Take 2 capsules by mouth daily 30 capsule 3    buPROPion (WELLBUTRIN XL) 300 MG extended release tablet Take 1 tablet by mouth every morning 30 tablet 3    cimetidine (TAGAMET) 400 MG tablet Take 1 tablet by mouth 2 times daily 180 tablet 1    metFORMIN (GLUCOPHAGE-XR) 750 MG extended release tablet 2 in AM 60 tablet 3    levonorgestrel-ethinyl estradiol (JOLESSA) 0.15-0.03 MG per tablet Take 1 tablet by mouth       No current facility-administered medications for this visit. Review of Systems  Constitutional: Negative for weight loss and malaise/fatigue. Negative for fever and chills. +60 lb gain     Eyes: Negative for blurred vision. Negative for double vision, photophobia and pain.    Respiratory: Negative for cough and sputum

## 2020-05-07 LAB
ESTIMATED AVERAGE GLUCOSE: 139.9 MG/DL
HBA1C MFR BLD: 6.5 %

## 2020-05-08 ENCOUNTER — HOSPITAL ENCOUNTER (OUTPATIENT)
Age: 40
Setting detail: SPECIMEN
Discharge: HOME | End: 2020-05-08
Payer: COMMERCIAL

## 2020-05-08 LAB
ALDOSTERONE: 8.8 NG/DL
DHEAS (DHEA SULFATE): 111 UG/DL (ref 45–270)
SEX HORMONE BINDING GLOBULIN: 41 NMOL/L (ref 30–135)
TESTOSTERONE FREE-NONMALE: 7.9 PG/ML (ref 1.3–9.2)
TESTOSTERONE TOTAL: 50 NG/DL (ref 20–70)

## 2020-05-08 PROCEDURE — 83497 ASSAY OF 5-HIAA: CPT

## 2020-05-08 PROCEDURE — 83835 ASSAY OF METANEPHRINES: CPT

## 2020-05-09 LAB
METANEPH/PLASMA INTERP: NORMAL
METANEPHRINE FREE PLASMA: <0.1 NMOL/L (ref 0–0.49)
NORMETANEPHRINE FREE PLASMA: 0.47 NMOL/L (ref 0–0.89)
RENIN ACTIVITY: 4.5 NG/ML/HR

## 2020-05-11 LAB
5 HIAA URINE (PER GM CREAT): 3 MG/GCR (ref 0–14)
5-HIAA 24 HOUR URINE: NORMAL MG/D (ref 0–15)
5-HIAA INTERPRETATION: NORMAL
5-HIAA URINE: 5.8 MG/L

## 2020-05-12 ENCOUNTER — PATIENT MESSAGE (OUTPATIENT)
Dept: ENDOCRINOLOGY | Age: 40
End: 2020-05-12

## 2020-05-13 LAB
CREATININE 24 HOUR URINE: ABNORMAL MG/D (ref 700–1600)
CREATININE URINE: 209 MG/DL
HOURS COLLECTED: 24
METANEPHRINE INTREP URINE: ABNORMAL
METANEPHRINE UG/G CRE: 63 UG/G CRT (ref 0–300)
METANEPHRINE, UR-PER VOL: 132 UG/L
METANEPHRINES URINE: ABNORMAL UG/D (ref 36–229)
NORMETANEPHRINE 24 HOUR URINE: ABNORMAL UG/D (ref 95–650)
NORMETANEPHRINE, (G/CRT): 465 UG/G CRT (ref 0–400)
NORMETANEPHRINES, NMOL/L: 972 UG/L
URINE TOTAL VOLUME: 375

## 2020-05-19 RX ORDER — DULOXETIN HYDROCHLORIDE 30 MG/1
CAPSULE, DELAYED RELEASE ORAL
Qty: 30 CAPSULE | Refills: 2 | Status: SHIPPED | OUTPATIENT
Start: 2020-05-19 | End: 2020-06-08

## 2020-05-19 NOTE — TELEPHONE ENCOUNTER
.  Last office visit 2/20/2020     Last written 1/27/2020 30 with 3      Next office visit scheduled Visit date not found    Requested Prescriptions     Pending Prescriptions Disp Refills    DULoxetine (CYMBALTA) 30 MG extended release capsule [Pharmacy Med Name: DULOXETINE HCL DR 30 MG CAP] 30 capsule 2     Sig: TAKE 1 CAPSULE BY MOUTH EVERY DAY

## 2020-05-26 ENCOUNTER — NURSE TRIAGE (OUTPATIENT)
Dept: OTHER | Facility: CLINIC | Age: 40
End: 2020-05-26

## 2020-05-27 ENCOUNTER — VIRTUAL VISIT (OUTPATIENT)
Dept: FAMILY MEDICINE CLINIC | Age: 40
End: 2020-05-27
Payer: COMMERCIAL

## 2020-05-27 PROBLEM — R68.89 INTOLERANT OF HEAT: Status: ACTIVE | Noted: 2020-05-27

## 2020-05-27 PROBLEM — R03.0 FINDING OF ABOVE NORMAL BLOOD PRESSURE: Status: ACTIVE | Noted: 2020-05-27

## 2020-05-27 PROBLEM — R20.8: Status: ACTIVE | Noted: 2020-05-27

## 2020-05-27 PROBLEM — M79.642 PAIN IN BOTH HANDS: Status: ACTIVE | Noted: 2020-05-27

## 2020-05-27 PROBLEM — F17.200 TOBACCO USE DISORDER: Status: ACTIVE | Noted: 2020-05-27

## 2020-05-27 PROBLEM — E78.6 LOW HDL (UNDER 40): Status: ACTIVE | Noted: 2020-05-27

## 2020-05-27 PROBLEM — R61 EXCESSIVE SWEATING: Status: ACTIVE | Noted: 2020-05-27

## 2020-05-27 PROBLEM — G62.9 POLYNEUROPATHY: Status: ACTIVE | Noted: 2020-05-27

## 2020-05-27 PROBLEM — M79.641 PAIN IN BOTH HANDS: Status: ACTIVE | Noted: 2020-05-27

## 2020-05-27 PROBLEM — K21.00 GASTRO-ESOPHAGEAL REFLUX DISEASE WITH ESOPHAGITIS: Status: ACTIVE | Noted: 2020-05-27

## 2020-05-27 PROBLEM — L74.9 DISORDER OF SWEAT GLANDS: Status: ACTIVE | Noted: 2020-05-27

## 2020-05-27 PROBLEM — F33.2 SEVERE RECURRENT MAJOR DEPRESSION WITHOUT PSYCHOTIC FEATURES (HCC): Status: ACTIVE | Noted: 2020-01-17

## 2020-05-27 PROBLEM — N20.0 KIDNEY STONE ON LEFT SIDE: Status: ACTIVE | Noted: 2020-05-27

## 2020-05-27 PROBLEM — M19.90 ARTHRITIS: Status: ACTIVE | Noted: 2020-05-27

## 2020-05-27 PROCEDURE — 99214 OFFICE O/P EST MOD 30 MIN: CPT | Performed by: FAMILY MEDICINE

## 2020-05-27 PROCEDURE — 1036F TOBACCO NON-USER: CPT | Performed by: FAMILY MEDICINE

## 2020-05-27 PROCEDURE — G8427 DOCREV CUR MEDS BY ELIG CLIN: HCPCS | Performed by: FAMILY MEDICINE

## 2020-05-27 PROCEDURE — G8417 CALC BMI ABV UP PARAM F/U: HCPCS | Performed by: FAMILY MEDICINE

## 2020-05-27 RX ORDER — OXYBUTYNIN CHLORIDE 5 MG/1
5 TABLET, EXTENDED RELEASE ORAL DAILY
Qty: 30 TABLET | Refills: 3 | Status: SHIPPED | OUTPATIENT
Start: 2020-05-27 | End: 2020-12-17

## 2020-05-27 RX ORDER — DOXYCYCLINE HYCLATE 100 MG
100 TABLET ORAL 2 TIMES DAILY
Qty: 14 TABLET | Refills: 0 | Status: SHIPPED | OUTPATIENT
Start: 2020-05-27 | End: 2020-06-03

## 2020-05-27 RX ORDER — CEPHALEXIN 500 MG/1
500 CAPSULE ORAL 3 TIMES DAILY
Qty: 21 CAPSULE | Refills: 0 | Status: SHIPPED | OUTPATIENT
Start: 2020-05-27 | End: 2020-06-03

## 2020-05-27 ASSESSMENT — ENCOUNTER SYMPTOMS
BACK PAIN: 1
NAUSEA: 0
BLOOD IN STOOL: 0
DIARRHEA: 0
COLOR CHANGE: 1
CONSTIPATION: 0
CHEST TIGHTNESS: 0
VOMITING: 0
ABDOMINAL PAIN: 0
SHORTNESS OF BREATH: 0

## 2020-05-27 NOTE — PROGRESS NOTES
fever and unexpected weight change. Eyes: Negative for visual disturbance. Respiratory: Negative for chest tightness and shortness of breath. Cardiovascular: Negative for chest pain, palpitations and leg swelling. Gastrointestinal: Negative for abdominal pain, blood in stool, constipation, diarrhea, nausea and vomiting. Genitourinary: Negative for decreased urine volume and difficulty urinating. Musculoskeletal: Positive for arthralgias, back pain, joint swelling and myalgias. Negative for gait problem, neck pain and neck stiffness. Skin: Positive for color change and wound. Negative for pallor and rash. Neurological: Positive for numbness. Negative for dizziness, tremors, seizures, syncope, speech difficulty, weakness, light-headedness and headaches. Hematological: Negative for adenopathy. Psychiatric/Behavioral: Positive for sleep disturbance. Negative for dysphoric mood. The patient is not nervous/anxious. Prior to Visit Medications    Medication Sig Taking? Authorizing Provider   doxycycline hyclate (VIBRA-TABS) 100 MG tablet Take 1 tablet by mouth 2 times daily for 7 days Yes Yosef Bernard MD   cephALEXin (KEFLEX) 500 MG capsule Take 1 capsule by mouth 3 times daily for 7 days Yes Yosef Bernard MD   mupirocin (BACTROBAN) 2 % ointment Apply 3 times daily.  Yes Yosef Bernard MD   DULoxetine (CYMBALTA) 30 MG extended release capsule TAKE 1 CAPSULE BY MOUTH EVERY DAY Yes Sorin Santillan,    ondansetron (ZOFRAN-ODT) 4 MG disintegrating tablet Take 1 tablet by mouth 3 times daily as needed for Nausea or Vomiting Yes DIANA Allison CNP   hydrALAZINE (APRESOLINE) 10 MG tablet TAKE 1 TABLET BY MOUTH EVERY DAY FOR 5 DAYS THEN INCREASE TO TAKE 1 TABLET BY MOUTH TWICE A DAY Yes Sorin Santillan DO   fluticasone (FLONASE) 50 MCG/ACT nasal spray SPRAY 1 SPRAY IN EACH NOSTRIL DAILY Yes Sorin Santillan DO   HYDROXYZINE HCL PO Take 50 mg by mouth Yes Historical Provider, MD   amLODIPine Blood Pressure Maternal Grandfather     High Cholesterol Maternal Grandfather     Arthritis Paternal Grandmother     High Blood Pressure Paternal Grandmother     Heart Disease Paternal Grandmother     High Cholesterol Paternal Grandmother     Miscarriages / Stillbirths Paternal Grandmother     Stroke Paternal Grandmother     High Blood Pressure Paternal Grandfather     High Cholesterol Paternal Grandfather    ,   Immunization History   Administered Date(s) Administered    DTP 1981, 02/20/1982, 04/24/1982, 11/23/1984    Influenza 11/29/2012    Influenza Virus Vaccine 10/28/2014, 10/14/2015    Influenza, Quadv, IM, PF (6 mo and older Fluzone, Flulaval, Fluarix, and 3 yrs and older Afluria) 10/28/2014, 09/18/2017, 12/18/2018, 09/05/2019    MMR 06/18/1983, 05/09/1994    Pneumococcal Conjugate 13-valent ([facility]) 10/14/2015    Polio OPV 1981, 02/20/1982, 04/24/1982, 11/23/1984    Rho (D) Immune Globulin 07/10/2012, 09/09/2012    Td (Adult), 5 Lf Tetanus Toxoid, Pf (Tenivac, Decavac) 05/09/1994    Td, unspecified formulation 06/24/2004    Tdap (Boostrix, Adacel) 1981, 02/20/1982, 04/24/1982, 11/23/1984, 11/29/2012   ,   Health Maintenance   Topic Date Due    Varicella vaccine (1 of 2 - 2-dose childhood series) 10/19/1982    Diabetic retinal exam  10/19/1991    Hepatitis B vaccine (1 of 3 - Risk 3-dose series) 10/19/2000    Pneumococcal 0-64 years Vaccine (1 of 1 - PPSV23) 12/09/2015    Diabetic foot exam  09/02/2016    Lipid screen  07/20/2017    Cervical cancer screen  05/13/2018    Diabetic microalbuminuria test  09/18/2018    Potassium monitoring  08/27/2020    Creatinine monitoring  08/27/2020    A1C test (Diabetic or Prediabetic)  05/06/2021    DTaP/Tdap/Td vaccine (7 - Td) 11/29/2022    Flu vaccine  Completed    HIV screen  Completed    Hepatitis A vaccine  Aged Out    Hib vaccine  Aged Out    Meningococcal (ACWY) vaccine  Aged Out       PHYSICAL Comprehensive Metabolic Panel; Future  - Sedimentation Rate; Future  - JEN; Future  - RHEUMATOID FACTOR; Future  3. Lower extremity edema  Discussed edema reduction techniques in detail:  Elevated legs above the level of the heart, daily compression socks/hose daily, and increased muscle contraction of calf muscles. - C-Reactive Protein; Future  - Comprehensive Metabolic Panel; Future  - Sedimentation Rate; Future  - JEN; Future  - RHEUMATOID FACTOR; Future    4. Arthritis  ? RA, autoimmune vs OA. - XR HAND LEFT (MIN 3 VIEWS); Future  - [facility]  - XR HAND RIGHT (MIN 3 VIEWS); Future  - C-Reactive Protein; Future  - Sedimentation Rate; Future  - JEN; Future  - RHEUMATOID FACTOR; Future    5. Polyneuropathy  Strong family Hx of what appears to be idiopathic polyneuropathy. We discussed the different influencing factors. If labs/XRs reassuring, would consider EMG. This was discussed. - C-Reactive Protein; Future  - Comprehensive Metabolic Panel; Future  - Sedimentation Rate; Future  - Vitamin D 25 Hydroxy; Future  - Vitamin B12; Future  - Folate; Future  - JEN; Future  - RHEUMATOID FACTOR; Future    6. Class 3 severe obesity due to excess calories with serious comorbidity and body mass index (BMI) of 45.0 to 49.9 in [facility])  Pt requested a referral to  Cammie Martinez [facility]    7. Cellulitis of trunk, unspecified site of trunk  Improved on a 10 day course of Clindamycin but did not resolve and is starting to worsen again. Will cover for MRSA (given her family's hx) and add Keflex. Mupirocin given for treatment and prevention of future infections. ?hydradenitis suppurativa?  - doxycycline hyclate (VIBRA-TABS) 100 MG tablet; Take 1 tablet by mouth 2 times daily for 7 days  Dispense: 14 tablet; Refill: 0  - cephALEXin (KEFLEX) 500 MG capsule;  Take 1 capsule by mouth 3 times daily for 7 days  Dispense: 21

## 2020-05-27 NOTE — Clinical Note
Just FARIDEH. They put her on my schedule per RN triage, but she discussed her chronic recurrent cellulitis and chronic arthritic pain/neuropathy/ edema. Thanks.

## 2020-05-27 NOTE — TELEPHONE ENCOUNTER
Medication:   Requested Prescriptions     Pending Prescriptions Disp Refills    oxybutynin (DITROPAN XL) 5 MG extended release tablet 30 tablet 3     Sig: Take 1 tablet by mouth daily     Spoke with patient and informed of labwork results. Patient would like to do trial of ditropan. Please check dosing in pended medication. Last appt: 05/06/2020  Next appt: Visit date not found    Last OARRS: No flowsheet data found.

## 2020-05-29 ENCOUNTER — HOSPITAL ENCOUNTER (OUTPATIENT)
Age: 40
Discharge: HOME | End: 2020-05-29
Payer: COMMERCIAL

## 2020-05-29 ENCOUNTER — HOSPITAL ENCOUNTER (OUTPATIENT)
Dept: GENERAL RADIOLOGY | Age: 40
Discharge: HOME | End: 2020-05-29
Payer: COMMERCIAL

## 2020-05-29 ENCOUNTER — INITIAL CONSULT (OUTPATIENT)
Dept: GASTROENTEROLOGY | Age: 40
End: 2020-05-29
Payer: COMMERCIAL

## 2020-05-29 VITALS
HEIGHT: 66 IN | BODY MASS INDEX: 45.19 KG/M2 | DIASTOLIC BLOOD PRESSURE: 88 MMHG | SYSTOLIC BLOOD PRESSURE: 148 MMHG | TEMPERATURE: 98 F

## 2020-05-29 DIAGNOSIS — R60.0 LOWER EXTREMITY EDEMA: ICD-10-CM

## 2020-05-29 DIAGNOSIS — G62.9 POLYNEUROPATHY: ICD-10-CM

## 2020-05-29 DIAGNOSIS — M19.90 ARTHRITIS: ICD-10-CM

## 2020-05-29 DIAGNOSIS — R60.0 HAND EDEMA: ICD-10-CM

## 2020-05-29 LAB
A/G RATIO: 1.9 (ref 1.1–2.2)
ALBUMIN SERPL-MCNC: 4.4 G/DL (ref 3.4–5)
ALP BLD-CCNC: 81 U/L (ref 40–129)
ALT SERPL-CCNC: 34 U/L (ref 10–40)
ANION GAP SERPL CALCULATED.3IONS-SCNC: 14 MMOL/L (ref 3–16)
AST SERPL-CCNC: 32 U/L (ref 15–37)
BILIRUB SERPL-MCNC: <0.2 MG/DL (ref 0–1)
BUN BLDV-MCNC: 12 MG/DL (ref 7–20)
C-REACTIVE PROTEIN: 6.2 MG/L (ref 0–5.1)
CALCIUM SERPL-MCNC: 10 MG/DL (ref 8.3–10.6)
CHLORIDE BLD-SCNC: 99 MMOL/L (ref 99–110)
CO2: 24 MMOL/L (ref 21–32)
CREAT SERPL-MCNC: 0.5 MG/DL (ref 0.6–1.1)
FOLATE: >20 NG/ML (ref 4.78–24.2)
GFR AFRICAN AMERICAN: >60
GFR NON-AFRICAN AMERICAN: >60
GLOBULIN: 2.3 G/DL
GLUCOSE BLD-MCNC: 148 MG/DL (ref 70–99)
POTASSIUM SERPL-SCNC: 3.8 MMOL/L (ref 3.5–5.1)
RHEUMATOID FACTOR: <10 IU/ML
SEDIMENTATION RATE, ERYTHROCYTE: 8 MM/HR (ref 0–20)
SODIUM BLD-SCNC: 137 MMOL/L (ref 136–145)
TOTAL PROTEIN: 6.7 G/DL (ref 6.4–8.2)
VITAMIN B-12: 927 PG/ML (ref 211–911)
VITAMIN D 25-HYDROXY: 33.8 NG/ML

## 2020-05-29 PROCEDURE — 99204 OFFICE O/P NEW MOD 45 MIN: CPT | Performed by: INTERNAL MEDICINE

## 2020-05-29 PROCEDURE — G8417 CALC BMI ABV UP PARAM F/U: HCPCS | Performed by: INTERNAL MEDICINE

## 2020-05-29 PROCEDURE — 1036F TOBACCO NON-USER: CPT | Performed by: INTERNAL MEDICINE

## 2020-05-29 PROCEDURE — 73130 X-RAY EXAM OF HAND: CPT

## 2020-05-29 PROCEDURE — G8427 DOCREV CUR MEDS BY ELIG CLIN: HCPCS | Performed by: INTERNAL MEDICINE

## 2020-05-29 RX ORDER — BISACODYL 5 MG
TABLET, DELAYED RELEASE (ENTERIC COATED) ORAL
Qty: 4 TABLET | Refills: 0 | Status: ON HOLD | OUTPATIENT
Start: 2020-05-29 | End: 2020-06-18 | Stop reason: HOSPADM

## 2020-05-29 RX ORDER — POLYETHYLENE GLYCOL 3350 17 G/17G
238 POWDER ORAL ONCE
Qty: 238 G | Refills: 0 | Status: SHIPPED | OUTPATIENT
Start: 2020-05-29 | End: 2020-05-29

## 2020-05-29 NOTE — PROGRESS NOTES
[place] [address] ,  [address] [facility]  Phone: [phone]    CHIEF COMPLAINT     Chief Complaint   Patient presents with    New Patient     Nausea and diarrhea along with GERD. Pt states nausea has gotten better since being seen by pcp. States both issues come and go. Pt states she is due for another colonoscopy, stated her father passed of colon cancer and she is requried to get them every 5 years. Has had one in the past with [facility]        HPI     Mando Thomason is a 46 y.o. female who presents for nausea, diarrhea. She has had 'GI issues all her life'. The main issue recently was nausea, would get nauseated frequently for a few weeks, like 'morning sickness'. No emesis but would have severe nausea. Sometimes this would be in the AM and other times after eating. She has 3-4 loose daily bms, generally 'has to have a bm right after eating'. Denies blood in the stools. Last few days her nausea has improved. Yesterday night she ate some broccoli and felt more nauseated today AM. Zofran helps but she uses it rarely. Denies alcohol except infrequently. Smokes a little THC per patient. At this time she does not report any abdominal pain. She has had a prior cholecystectomy and notes her diarrhea has been present since then. She has seen multiple GI physicians for GI issues in the past - including Dr Kelyb Chavarria and Dr Scott Martines at [place]. She has had an extensive prior workup. She says her father had colon cancer and ulcerative colitis and  of this at age 64. Patient gets colonoscopies every 5 years and last one was  and is now due for this. She is obese with BMI 45 and is looking into weight loss surgery. EGD 10/14/2015 with Dr Eric Alberto of [facility]   Findings:   1.  Esophagus: Mucosa was normal. GEJ was normal at 40 cm.  2. Stomach: Mucosa in fundus was normal; Mucosa in the body was normal; In the Antrum mild erythema seen; Biopsy

## 2020-05-29 NOTE — LETTER
DATE HOUR PHYSICIAN:  RECORD DATE, HOUR AND SIGN EACH ENTRY   6/18/20 8:45 1)  Admit for:   [x]Colonoscopy  [x]EGD     [x]Anesthesia/MAC        []ERCP     []Upper EUS     []Lower EUS                             2)  Diagnosis: Z80.0; R11.0; R19.7     3)  Establish IV access     Solution:  []0.9 Normal Saline   [x]Lactated Ringers    []Other:                            Rate:   [x]KVO      []Other:     4)  Check blood sugar on all diabetic patients       Urine Pregnancy test on all menstruating females     5)  Labs:       []PT/INR         []Platelet       []Other:____________     6)  Procedure Medications:     []Fentanyl ______micrograms IV   []Demerol ________mg IV     []Versed _______mg IV                          []Other:     7) [x]Dinemap      [x]Pulse Oximetry    [x]Cardiac Monitor     8)  Post Procedure:       [x]Titrate O2 to keep O2 Sat.  > 90%     [x]Discharge instructions per                                                                     Endoscopy Protocol     [x]Discharge home when awake and vital signs stable                                                                          Signature:          Date:5/29/20               Time: 2:20 PM   PHYSICIANS ORDERS

## 2020-05-30 LAB — ANTI-NUCLEAR ANTIBODY (ANA): NEGATIVE

## 2020-06-03 ENCOUNTER — TELEPHONE (OUTPATIENT)
Dept: BARIATRICS/WEIGHT MGMT | Age: 40
End: 2020-06-03

## 2020-06-03 ENCOUNTER — TELEPHONE (OUTPATIENT)
Dept: FAMILY MEDICINE CLINIC | Age: 40
End: 2020-06-03

## 2020-06-03 RX ORDER — PREDNISONE 20 MG/1
20 TABLET ORAL DAILY
Qty: 4 TABLET | Refills: 0 | Status: SHIPPED | OUTPATIENT
Start: 2020-06-03 | End: 2020-06-07

## 2020-06-03 RX ORDER — PREDNISONE 1 MG/1
5 TABLET ORAL DAILY
Qty: 4 TABLET | Refills: 0 | Status: SHIPPED | OUTPATIENT
Start: 2020-06-03 | End: 2020-06-07

## 2020-06-03 RX ORDER — PREDNISONE 10 MG/1
10 TABLET ORAL DAILY
Qty: 4 TABLET | Refills: 0 | Status: SHIPPED | OUTPATIENT
Start: 2020-06-03 | End: 2020-06-07

## 2020-06-03 RX ORDER — PREDNISONE 10 MG/1
15 TABLET ORAL DAILY
Qty: 6 TABLET | Refills: 0 | Status: SHIPPED | OUTPATIENT
Start: 2020-06-03 | End: 2020-06-07

## 2020-06-03 NOTE — TELEPHONE ENCOUNTER
Pt is wondering if there's anything else to do about the swelling. I gave her the results in her chart. Is there further testing or something she can do about the swelling? She is still taking the abx.

## 2020-06-08 RX ORDER — DULOXETIN HYDROCHLORIDE 60 MG/1
CAPSULE, DELAYED RELEASE ORAL
Qty: 90 CAPSULE | Refills: 1 | Status: SHIPPED | OUTPATIENT
Start: 2020-06-08 | End: 2020-12-17

## 2020-06-10 ENCOUNTER — PATIENT MESSAGE (OUTPATIENT)
Dept: FAMILY MEDICINE CLINIC | Age: 40
End: 2020-06-10

## 2020-06-11 ENCOUNTER — OFFICE VISIT (OUTPATIENT)
Dept: RHEUMATOLOGY | Age: 40
End: 2020-06-11
Payer: COMMERCIAL

## 2020-06-11 ENCOUNTER — OFFICE VISIT (OUTPATIENT)
Dept: BARIATRICS/WEIGHT MGMT | Age: 40
End: 2020-06-11
Payer: COMMERCIAL

## 2020-06-11 VITALS
HEIGHT: 66 IN | DIASTOLIC BLOOD PRESSURE: 80 MMHG | BODY MASS INDEX: 45 KG/M2 | OXYGEN SATURATION: 97 % | WEIGHT: 280 LBS | TEMPERATURE: 96.7 F | HEART RATE: 68 BPM | SYSTOLIC BLOOD PRESSURE: 120 MMHG

## 2020-06-11 VITALS
DIASTOLIC BLOOD PRESSURE: 57 MMHG | HEART RATE: 87 BPM | RESPIRATION RATE: 18 BRPM | BODY MASS INDEX: 44.45 KG/M2 | SYSTOLIC BLOOD PRESSURE: 90 MMHG | HEIGHT: 67 IN | OXYGEN SATURATION: 100 % | WEIGHT: 283.2 LBS

## 2020-06-11 PROBLEM — K21.9 CHRONIC GERD: Status: ACTIVE | Noted: 2020-06-11

## 2020-06-11 PROBLEM — E11.69 DIABETES MELLITUS TYPE 2 IN OBESE (HCC): Status: ACTIVE | Noted: 2020-06-11

## 2020-06-11 PROBLEM — E66.9 DIABETES MELLITUS TYPE 2 IN OBESE (HCC): Status: ACTIVE | Noted: 2020-06-11

## 2020-06-11 PROBLEM — E66.01 MORBID OBESITY WITH BMI OF 45.0-49.9, ADULT (HCC): Status: ACTIVE | Noted: 2020-06-11

## 2020-06-11 PROCEDURE — G8427 DOCREV CUR MEDS BY ELIG CLIN: HCPCS | Performed by: SURGERY

## 2020-06-11 PROCEDURE — G8427 DOCREV CUR MEDS BY ELIG CLIN: HCPCS | Performed by: INTERNAL MEDICINE

## 2020-06-11 PROCEDURE — 2022F DILAT RTA XM EVC RTNOPTHY: CPT | Performed by: SURGERY

## 2020-06-11 PROCEDURE — 99205 OFFICE O/P NEW HI 60 MIN: CPT | Performed by: SURGERY

## 2020-06-11 PROCEDURE — 1036F TOBACCO NON-USER: CPT | Performed by: SURGERY

## 2020-06-11 PROCEDURE — 99244 OFF/OP CNSLTJ NEW/EST MOD 40: CPT | Performed by: INTERNAL MEDICINE

## 2020-06-11 PROCEDURE — G8417 CALC BMI ABV UP PARAM F/U: HCPCS | Performed by: SURGERY

## 2020-06-11 PROCEDURE — G8417 CALC BMI ABV UP PARAM F/U: HCPCS | Performed by: INTERNAL MEDICINE

## 2020-06-11 PROCEDURE — 3044F HG A1C LEVEL LT 7.0%: CPT | Performed by: SURGERY

## 2020-06-11 RX ORDER — FLUCONAZOLE 150 MG/1
150 TABLET ORAL ONCE
Qty: 1 TABLET | Refills: 0 | Status: SHIPPED | OUTPATIENT
Start: 2020-06-11 | End: 2020-06-11

## 2020-06-11 NOTE — PROGRESS NOTES
Douglas Alberto is a 46 y.o. female with a date of birth of 1980. Vitals:    06/11/20 1102   BP: (!) 90/57   Pulse: 87   Resp: 18   SpO2: 100%    BMI: Body mass index is 45.02 kg/m². Obesity Classification: Class III    Weight History: Wt Readings from Last 3 Encounters:   06/11/20 283 lb 3.2 oz (128.5 kg)   06/11/20 280 lb (127 kg)   05/05/20 280 lb (127 kg)       Patient's lowest adult weight was 200 lbs at age 22. Patient's highest adult weight was 283.2 lbs at age 46. Patient has participated in the following weight loss programs: Atkins Diet, Weight Watcher Anonymous, Cabbage Soup Diet, [place] Weight Loss, diet workshop, low fat and calorie restriction and low carb. Patient has participated in meal replacement/liquid diets. Patient has participated in weight loss medications - Adipex 1 year ago. Patient is not lactose intolerant. Patient does not have Rastafarian/cultural food concerns. Patient does not have food allergies. Patient does tolerate artificial sweeteners. Patient does have a regular sleep/wake schedule; she does not sleep well - states she can't handle cpap  24 hour recall/food frequency chart:  Breakfast: yes. Nauseous in the morning  - 2 eggs, 2 toast, coffee - 1 cup stevia, sf creamer  Snack: no.  Lunch: no.   Snack: no.   Dinner: yes. Lasagna, salad OR grilled chix or pork chops, starch, veg  Snack: yes. A lot of snacking - cheese and crax OR popcorn OR ice cream sundae OR popsicle  Drinks throughout the day: water, koolaid, occasional soda  Do you drink alcohol? Yes. How often/how much alcohol do you drink: 1 Mixed drinks per month. Patient does not meet the criteria for binge eating disorder. Patient does have grazing. Patient does not have night eating. Patient does have a history of emotional eating or eating out of boredom. Surgery  Patient does feel confident in her ability to make these changes.   The patient's expectations of post-surgical eating habits are realistic. Patient states she does understand the consequences of not complying with post-op food guidelines. Patient states she does understands the long term changes in food intake that will be necessary for all occasions after surgery for the rest of her life. Patient is deemed nutritionally appropriate to proceed. Goals  Weight: 175  Health Improvement: PCOS, HTN, diabetes, sleep apnea, gastroparesis, acid reflux    Assessment  Nutritional Needs: RMR=(9.99 x 128.5) + (6.25 x 169) - (4.92 x 39 y.o.) -161  = 1992 kcal x 1.4 (sedentary activity factor)= 2789 kcal - 1000 (for 2 lb weight loss/week)= 1789 kcal.    Plan  Plan/Recommendations: Start presurgical guidelines. Goals:   -Eat 4-5 times daily  -Avoid high fat and high sugar foods  -Include protein with all meals and snacks  -Avoid carbonation and caffeine  -Avoid calorie containing beverages  -Increase physical activity as tolerated    PES Statement:  Overweight/Obesity related to lack of exercise, sedentary lifestyle, unhealthy eating habits, and unsuccessful diet attempts as evidenced by BMI. Body mass index is 45.02 kg/m². Will follow up as necessary.     Mikhail Castrejon

## 2020-06-11 NOTE — PATIENT INSTRUCTIONS
[email or web address] and sign in to your PEAK-IT account. Enter U676 in the [place] box to learn more about \"Carpal Tunnel Syndrome: Exercises. \"     If you do not have an account, please click on the \"Sign Up Now\" link. Current as of: March 2, 2020               Content Version: 12.5  © 2006-2020 [employer]. Care instructions adapted under license by [facility] ([facility]). If you have questions about a medical condition or this instruction, always ask your healthcare professional. [address] any warranty or liability for your use of this information. Patient Education        Fibromyalgia: Care Instructions  Overview     Fibromyalgia is a painful condition that is not completely understood by medical experts. The cause of fibromyalgia is not known. It can make you feel tired and ache all over. It causes tender spots at specific points of the body that hurt only when you press on them. You may have trouble sleeping, as well as other symptoms. These problems can upset your work and home life. Symptoms tend to come and go, although they may never go away completely. Fibromyalgia does not harm your muscles, joints, or organs. Follow-up care is a key part of your treatment and safety. Be sure to make and go to all appointments, and call your doctor if you are having problems. It's also a good idea to know your test results and keep a list of the medicines you take. How can you care for yourself at home? · Exercise often. Walk, swim, or bike to help with pain and sleep problems and to make you feel better. · Try to get a good night's sleep. Go to bed and get up at the same time each day, whether you feel rested or not. Make sure you have a good mattress and pillow. · Reduce stress. Avoid things that cause you stress, if you can. If not, work at making them less stressful.  Learn to use biofeedback, guided imagery, meditation, or other methods to relax. · Make healthy changes. Eat a balanced diet, quit smoking, and limit alcohol and caffeine. · Use a heating pad set on low or take warm baths or showers for pain. Using cold packs for up to 20 minutes at a time can also relieve pain. Put a thin cloth between the cold pack and your skin. A gentle massage might help too. · Be safe with medicines. Take your medicines exactly as prescribed. Call your doctor if you think you are having a problem with your medicine. Your doctor may talk to you about taking antidepressant medicines. These medicines may improve sleep, relieve pain, and in some cases treat depression. · Learn about fibromyalgia. This makes coping easier. Then, take an active role in your treatment. · Think about joining a support group with others who have fibromyalgia to learn more and get support. When should you call for help? Watch closely for changes in your health, and be sure to contact your doctor if:  · You feel sad, helpless, or hopeless; lose interest in things you used to enjoy; or have other symptoms of depression. · Your fibromyalgia symptoms get worse. Where can you learn more? Go to [email or web address] and sign in to your M[employer] account. Enter V003 in the FriendFit box to learn more about \"Fibromyalgia: Care Instructions. \"     If you do not have an account, please click on the \"Sign Up Now\" link. Current as of: November 20, 2019               Content Version: 12.5  © 1951-7276 Healthwise[employer] Incorporated. Care instructions adapted under license by [facility] ([facility]). If you have questions about a medical condition or this instruction, always ask your healthcare professional. [address] any warranty or liability for your use of this information.

## 2020-06-11 NOTE — PROGRESS NOTES
of education: 12    Highest education level: Not on file   Occupational History    Not on file   Social Needs    Financial resource strain: Not on file    Food insecurity     Worry: Not on file     Inability: Not on file    Transportation needs     Medical: Not on file     Non-medical: Not on file   Tobacco Use    Smoking status: Former Smoker     Packs/day: 1.00     Years: 8.00     Pack years: 8.00     Types: Cigarettes     Last attempt to quit: 2019     Years since quittin.9    Smokeless tobacco: Never Used    Tobacco comment: -used chant-worked for 2 weeks   Substance and Sexual Activity    Alcohol use:  Yes     Alcohol/week: 0.0 standard drinks     Frequency: Monthly or less     Comment: 1 drink a month    Drug use: Yes     Frequency: 4.0 times per week     Types: Marijuana     Comment: marijuana    Sexual activity: Yes     Partners: Male   Lifestyle    Physical activity     Days per week: Not on file     Minutes per session: Not on file    Stress: Not on file   Relationships    Social connections     Talks on phone: Not on file     Gets together: Not on file     Attends Islam service: Not on file     Active member of club or organization: Not on file     Attends meetings of clubs or organizations: Not on file     Relationship status: Not on file    Intimate partner violence     Fear of current or ex partner: Not on file     Emotionally abused: Not on file     Physically abused: Not on file     Forced sexual activity: Not on file   Other Topics Concern    Not on file   Social History Narrative    Not on file        Family History   Problem Relation Age of Onset    Arthritis Mother    Alveda Leer / [place] Mother     High Blood Pressure Mother     Depression Mother     High Cholesterol Mother     Arthritis Father     High Blood Pressure Father     Cancer Father         colon    Hearing Loss Father     Heart Disease Father     High Cholesterol Father     High Blood Rfl: 3    ondansetron (ZOFRAN-ODT) 4 MG disintegrating tablet, Take 1 tablet by mouth 3 times daily as needed for Nausea or Vomiting, Disp: 21 tablet, Rfl: 0    hydrALAZINE (APRESOLINE) 10 MG tablet, TAKE 1 TABLET BY MOUTH EVERY DAY FOR 5 DAYS THEN INCREASE TO TAKE 1 TABLET BY MOUTH TWICE A DAY (Patient taking differently: Take by mouth daily TAKE 1 TABLET BY MOUTH EVERY DAY FOR 5 DAYS THEN INCREASE TO TAKE 1 TABLET BY MOUTH TWICE A DAY), Disp: 60 tablet, Rfl: 5    fluticasone (FLONASE) 50 MCG/ACT nasal spray, SPRAY 1 SPRAY IN EACH NOSTRIL DAILY, Disp: 1 Bottle, Rfl: 2    amLODIPine (NORVASC) 5 MG tablet, Take 2 tablets by mouth daily. , Disp: 180 tablet, Rfl: 1    losartan-hydrochlorothiazide (HYZAAR) 100-25 MG per tablet, Take 1 tablet by mouth daily for blood pressure., Disp: 90 tablet, Rfl: 1    cimetidine (TAGAMET) 400 MG tablet, Take 1 tablet by mouth 2 times daily, Disp: 180 tablet, Rfl: 1    metFORMIN (GLUCOPHAGE-XR) 750 MG extended release tablet, 2 in AM, Disp: 60 tablet, Rfl: 3    levonorgestrel-ethinyl estradiol (JOLESSA) 0.15-0.03 MG per tablet, Take 1 tablet by mouth, Disp: , Rfl:     ALLERGIES:  Lisinopril and Tramadol    PHYSICAL EXAM:    Vitals:    /80 (Site: Right Upper Arm, Position: Sitting, Cuff Size: Large Adult)   Pulse 68   Temp 96.7 °F (35.9 °C)   Ht 5' 6\" (1.676 m)   Wt 280 lb (127 kg)   LMP 06/09/2020   SpO2 97%   BMI 45.19 kg/m²     GEN: AAOx3, in NAD, well-appearing  HEAD: normocephalic, atraumatic  EYES: EOMI, PERRLA, no injection or icterus  NOSE: no nasal ulcers or nasal drainage  ORAL CAVITY: moist oral mucosa w/ good saliva pooling, no oral lesions, no evidence of thrush, no evidence of parotid gland enlargement  NECK: supple w/ FROM, of evidence of lymphadenopathy  CVS: RRR, no murmurs rubs or gallops, no JVD, 2+ distal pulses b/l  LUNGS: in no acute respiratory distress, CTAB  ABDOMEN: +BS, soft, NT/ND, no evidence of organomegaly  LYMPH: no cervical, supraclavicular or axillary LAD  MSK:  Spine: no kyphosis or lordosis, axial spine w/ FROM, no paraspinal muscle or vertebral tenderness, SI joints NTTP  Upper extremities:   Hands: There is pain to palpation on the MCP as well as PIP and wrist with some swelling of the MCP joints bilaterally but there is no tin synovitis. Positive Tinel maneuver especially on the left wrist.   Shoulders: no pain or swelling or warmth on palpation, FROM  Lower extremities:   Hip: normal log roll, negative PETE test b/l, trochanteric bursa NTTP b/l   Knees: no warmth or effusion present, FROM   Pain to palpation in the MCP joints bilaterally but there is no tin synovitis. 16 out of 18 tender points of fibromyalgia. NEURO: CN II-XII grossly intact intact, face appears symmetrical, normal DTR, no evidence of muscle atrophy, 5/5 strength proximally and distally throughout in both upper and lower extremities, touch sensation grossly intact  INTEGUMENTARY: no rash or psoriatic lesions, no petechiae, bruises, or palpable purpura, no patchy alopecia, no nail or periungual changes, no clubbing or digital ulcers  PSYCH: normal mood    Labs:   I personally reviewed prior labs including:    Lab Results   Component Value Date    WBC 10.4 08/27/2019    HGB 13.9 08/27/2019    HCT 40.7 08/27/2019    MCV 88.8 08/27/2019     08/27/2019    LYMPHOPCT 26.2 08/27/2019    RBC 4.58 08/27/2019    MCH 30.3 08/27/2019    MCHC 34.1 08/27/2019    RDW 14.0 08/27/2019     Lab Results   Component Value Date     05/29/2020    K 3.8 05/29/2020    CL 99 05/29/2020    CO2 24 05/29/2020    BUN 12 05/29/2020    CREATININE 0.5 (L) 05/29/2020    GLUCOSE 148 (H) 05/29/2020    CALCIUM 10.0 05/29/2020    PROT 6.7 05/29/2020    LABALBU 4.4 05/29/2020    BILITOT <0.2 05/29/2020    ALKPHOS 81 05/29/2020    AST 32 05/29/2020    ALT 34 05/29/2020    LABGLOM >60 05/29/2020    GFRAA >60 05/29/2020    AGRATIO 1.9 05/29/2020    GLOB 2.3 05/29/2020 ASSESSMENT/PLAN:       1. Fibromyalgia  Physical exam compatible with fibromyalgia  Reevaluate in the next appointment  Continue with Cymbalta 60 mg daily  At this time, patient would not like to try any further therapy  We spoke about healthy diet such as gluten-free/vegan as well as a low impact exercise program  Information about this condition was given    2. Multiple joint pain  Physical exam with no tin synovitis  Pain may be related to bilateral carpal tunnel syndrome and fibromyalgia  Unlikely to have seronegative rheumatoid arthritis  Recommended to try a short course of prednisone  Requested the following labs  - C-Reactive Protein; Future  - Sedimentation Rate; Future  - Miscellaneous lab test #2; Future    3. Elevated C-reactive protein (CRP)  Unlikely to be related to an inflammatory arthritis (minimal increased)  Elevation may be related to increase of the body mass index  Repeat new CRP    4.  Bilateral carpal tunnel syndrome  Symptomatic  Bilateral cock-up splints were recommended  Information about exercises was provided  If there is no significant response, steroid injection?or nerve conduction studies     Orders Placed This Encounter   Procedures    C-Reactive Protein     Standing Status:   Future     Standing Expiration Date:   6/11/2021    Sedimentation Rate     Standing Status:   Future     Standing Expiration Date:   6/11/2021    Miscellaneous lab test #2     AVISE CTD     Standing Status:   Future     Standing Expiration Date:   6/11/2021     Outpatient Encounter Medications as of 6/11/2020   Medication Sig Dispense Refill    Cholecalciferol (VITAMIN D3) 25 MCG (1000 UT) CAPS Take by mouth daily      BIOTIN PO Take by mouth daily      COLLAGEN PO Take by mouth daily      Prenatal Vit-Fe Fumarate-FA (PRENATAL VITAMIN PO) Take by mouth daily      DULoxetine (CYMBALTA) 60 MG extended release capsule TAKE 1 CAPSULE BY MOUTH EVERY DAY 90 capsule 1    bisacodyl (BISACODYL) 5 MG EC

## 2020-06-11 NOTE — PROGRESS NOTES
Relation Age of Onset    Arthritis Mother    [de-identified] / [place] Mother     High Blood Pressure Mother     Depression Mother     High Cholesterol Mother     Arthritis Father     High Blood Pressure Father     Cancer Father         colon    Hearing Loss Father     Heart Disease Father     High Cholesterol Father     High Blood Pressure Brother     High Cholesterol Brother     High Blood Pressure Maternal Aunt     High Cholesterol Maternal Aunt     Miscarriages / Stillbirths Maternal Aunt     Mental Retardation Maternal Uncle     High Blood Pressure Maternal Uncle     High Cholesterol Maternal Uncle     High Blood Pressure Paternal Aunt     High Cholesterol Paternal Aunt     Arthritis Paternal Uncle     Mental Illness Paternal Uncle     High Blood Pressure Paternal Uncle     Birth Defects Paternal Uncle     High Cholesterol Paternal Uncle     Learning Disabilities Paternal Uncle     Arthritis Maternal Grandmother     High Blood Pressure Maternal Grandmother     Diabetes Maternal Grandmother     High Cholesterol Maternal Grandmother     Miscarriages / Stillbirths Maternal Grandmother     Arthritis Maternal Grandfather     High Blood Pressure Maternal Grandfather     High Cholesterol Maternal Grandfather     Arthritis Paternal Grandmother     High Blood Pressure Paternal Grandmother     Heart Disease Paternal Grandmother     High Cholesterol Paternal Grandmother    [de-identified] / Stillbirths Paternal Grandmother     Stroke Paternal Grandmother     High Blood Pressure Paternal Grandfather     High Cholesterol Paternal Grandfather      Social History     Tobacco Use    Smoking status: Former Smoker     Packs/day: 1.00     Years: 8.00     Pack years: 8.00     Types: Cigarettes     Last attempt to quit: 2019     Years since quittin.9    Smokeless tobacco: Never Used    Tobacco comment: -used chant-worked for 2 weeks   Substance Use Topics    Alcohol use:  Yes Alcohol/week: 0.0 standard drinks     Frequency: Monthly or less     Comment: 1 drink a month     I counseled the patient on the importance of not smoking and risks of ETOH. Allergies   Allergen Reactions    Lisinopril Other (See Comments)     COUGH    Tramadol Other (See Comments)     severe  SEVERE HEADACHE     Vitals:    06/11/20 1102   BP: (!) 90/57   Pulse: 87   Resp: 18   SpO2: 100%   Weight: 283 lb 3.2 oz (128.5 kg)   Height: 5' 6.5\" (1.689 m)       Body mass index is 45.02 kg/m². Current Outpatient Medications:     Cholecalciferol (VITAMIN D3) 25 MCG (1000 UT) CAPS, Take by mouth daily, Disp: , Rfl:     BIOTIN PO, Take by mouth daily, Disp: , Rfl:     COLLAGEN PO, Take by mouth daily, Disp: , Rfl:     Prenatal Vit-Fe Fumarate-FA (PRENATAL VITAMIN PO), Take by mouth daily, Disp: , Rfl:     DULoxetine (CYMBALTA) 60 MG extended release capsule, TAKE 1 CAPSULE BY MOUTH EVERY DAY, Disp: 90 capsule, Rfl: 1    ondansetron (ZOFRAN-ODT) 4 MG disintegrating tablet, Take 1 tablet by mouth 3 times daily as needed for Nausea or Vomiting, Disp: 21 tablet, Rfl: 0    fluticasone (FLONASE) 50 MCG/ACT nasal spray, SPRAY 1 SPRAY IN EACH NOSTRIL DAILY, Disp: 1 Bottle, Rfl: 2    amLODIPine (NORVASC) 5 MG tablet, Take 2 tablets by mouth daily. , Disp: 180 tablet, Rfl: 1    losartan-hydrochlorothiazide (HYZAAR) 100-25 MG per tablet, Take 1 tablet by mouth daily for blood pressure., Disp: 90 tablet, Rfl: 1    cimetidine (TAGAMET) 400 MG tablet, Take 1 tablet by mouth 2 times daily, Disp: 180 tablet, Rfl: 1    metFORMIN (GLUCOPHAGE-XR) 750 MG extended release tablet, 2 in AM, Disp: 60 tablet, Rfl: 3    levonorgestrel-ethinyl estradiol (JOLESSA) 0.15-0.03 MG per tablet, Take 1 tablet by mouth, Disp: , Rfl:     bisacodyl (BISACODYL) 5 MG EC tablet, Take 4 tablets of Bisacodyl for colonoscopy prep as directed.  (Patient not taking: Reported on 6/11/2020), Disp: 4 tablet, Rfl: 0    oxybutynin (DITROPAN XL) 5 MG Lipid Panel; Future  -     Vitamin A; Future  -     Vitamin B1, Whole Blood; Future  -     Vitamin E; Future  -     Protime-INR; Future    Chronic GERD  -     Ambulatory referral to Cardiology  -     Ambulatory referral to Sleep Medicine  -     CBC Auto Differential; Future  -     Iron and TIBC; Future  -     Lipid Panel; Future  -     Vitamin A; Future  -     Vitamin B1, Whole Blood; Future  -     Vitamin E; Future  -     Protime-INR; Future    Diabetes mellitus type 2 in obese [facility])  -     Ambulatory referral to Cardiology  -     Ambulatory referral to Sleep Medicine  -     CBC Auto Differential; Future  -     Iron and TIBC; Future  -     Lipid Panel; Future  -     Vitamin A; Future  -     Vitamin B1, Whole Blood; Future  -     Vitamin E; Future  -     Protime-INR; Future    Obstructive sleep apnea  -     Ambulatory referral to Cardiology  -     Ambulatory referral to Sleep Medicine  -     CBC Auto Differential; Future  -     Iron and TIBC; Future  -     Lipid Panel; Future  -     Vitamin A; Future  -     Vitamin B1, Whole Blood; Future  -     Vitamin E; Future  -     Protime-INR; Future    Essential hypertension  -     Ambulatory referral to Cardiology  -     Ambulatory referral to Sleep Medicine  -     CBC Auto Differential; Future  -     Iron and TIBC; Future  -     Lipid Panel; Future  -     Vitamin A; Future  -     Vitamin B1, Whole Blood; Future  -     Vitamin E; Future  -     Protime-INR; Future          A/P  Austin Cartwright is a very pleasant 46 y.o. female with Obesity,  Body mass index is 45.02 kg/m². and multiple obesity related co-morbidities. Austin Cartwright is very motivated to lose weight and being more healthy.    We discussed how her weight affects her overall health including:  Patient Active Problem List   Diagnosis    PCOS (polycystic ovarian syndrome)    Allergic rhinitis    Obstructive sleep apnea    Panic attacks    Acute left flank pain    Syncope    Essential hypertension   

## 2020-06-11 NOTE — Clinical Note
Greatly appreciate the referral.  Excellent candidate for weight loss. We will keep you posted on Deatra Dadds progress.

## 2020-06-12 ENCOUNTER — TELEPHONE (OUTPATIENT)
Dept: PULMONOLOGY | Age: 40
End: 2020-06-12

## 2020-06-12 ENCOUNTER — OFFICE VISIT (OUTPATIENT)
Dept: PRIMARY CARE CLINIC | Age: 40
End: 2020-06-12

## 2020-06-14 LAB
SARS-COV-2: NOT DETECTED
SOURCE: NORMAL

## 2020-06-16 ENCOUNTER — ANESTHESIA EVENT (OUTPATIENT)
Dept: ENDOSCOPY | Age: 40
End: 2020-06-16
Payer: COMMERCIAL

## 2020-06-16 ENCOUNTER — HOSPITAL ENCOUNTER (OUTPATIENT)
Dept: SLEEP CENTER | Age: 40
Discharge: HOME | End: 2020-06-18
Payer: COMMERCIAL

## 2020-06-16 ENCOUNTER — TELEPHONE (OUTPATIENT)
Dept: FAMILY MEDICINE CLINIC | Age: 40
End: 2020-06-16

## 2020-06-16 ENCOUNTER — VIRTUAL VISIT (OUTPATIENT)
Dept: PULMONOLOGY | Age: 40
End: 2020-06-16
Payer: COMMERCIAL

## 2020-06-16 DIAGNOSIS — M25.50 MULTIPLE JOINT PAIN: ICD-10-CM

## 2020-06-16 LAB
C-REACTIVE PROTEIN: 6.9 MG/L (ref 0–5.1)
SEDIMENTATION RATE, ERYTHROCYTE: 10 MM/HR (ref 0–20)

## 2020-06-16 PROCEDURE — 99204 OFFICE O/P NEW MOD 45 MIN: CPT | Performed by: INTERNAL MEDICINE

## 2020-06-16 PROCEDURE — 95806 SLEEP STUDY UNATT&RESP EFFT: CPT

## 2020-06-16 ASSESSMENT — SLEEP AND FATIGUE QUESTIONNAIRES
ESS TOTAL SCORE: 8
HOW LIKELY ARE YOU TO NOD OFF OR FALL ASLEEP IN A CAR, WHILE STOPPED FOR A FEW MINUTES IN TRAFFIC: 0
HOW LIKELY ARE YOU TO NOD OFF OR FALL ASLEEP WHILE SITTING AND READING: 3
HOW LIKELY ARE YOU TO NOD OFF OR FALL ASLEEP WHEN YOU ARE A PASSENGER IN A CAR FOR AN HOUR WITHOUT A BREAK: 0
HOW LIKELY ARE YOU TO NOD OFF OR FALL ASLEEP WHILE LYING DOWN TO REST IN THE AFTERNOON WHEN CIRCUMSTANCES PERMIT: 2
HOW LIKELY ARE YOU TO NOD OFF OR FALL ASLEEP WHILE SITTING AND TALKING TO SOMEONE: 0
HOW LIKELY ARE YOU TO NOD OFF OR FALL ASLEEP WHILE WATCHING TV: 2
HOW LIKELY ARE YOU TO NOD OFF OR FALL ASLEEP WHILE SITTING QUIETLY AFTER LUNCH WITHOUT ALCOHOL: 1
HOW LIKELY ARE YOU TO NOD OFF OR FALL ASLEEP WHILE SITTING INACTIVE IN A PUBLIC PLACE: 0

## 2020-06-16 NOTE — TELEPHONE ENCOUNTER
Pt needs a letter for Suhail Mendez stating the pt needs a letter of necessity for the gastric sleeve surgery. Pt dropped off a sample of the letter she needs and the requirements. Notify pt when the letter has been faxed to Dr. Óscar Friend.

## 2020-06-16 NOTE — PROGRESS NOTES
10/7/2015    [facility]       Allergies:  is allergic to lisinopril and tramadol. Social History:    TOBACCO:   reports that she quit smoking about 14 months ago. Her smoking use included cigarettes. She has a 20.00 pack-year smoking history. She has never used smokeless tobacco.  ETOH:   reports current alcohol use.       Family History:       Problem Relation Age of Onset    Arthritis Mother    [de-identified] / [place] Mother     High Blood Pressure Mother     Depression Mother     High Cholesterol Mother     Arthritis Father     High Blood Pressure Father     Cancer Father         colon    Hearing Loss Father     Heart Disease Father     High Cholesterol Father     High Blood Pressure Brother     High Cholesterol Brother     High Blood Pressure Maternal Aunt     High Cholesterol Maternal Aunt     Miscarriages / Stillbirths Maternal Aunt     Mental Retardation Maternal Uncle     High Blood Pressure Maternal Uncle     High Cholesterol Maternal Uncle     High Blood Pressure Paternal Aunt     High Cholesterol Paternal Aunt     Arthritis Paternal Uncle     Mental Illness Paternal Uncle     High Blood Pressure Paternal Uncle     Birth Defects Paternal Uncle     High Cholesterol Paternal Uncle     Learning Disabilities Paternal Uncle     Arthritis Maternal Grandmother     High Blood Pressure Maternal Grandmother     Diabetes Maternal Grandmother     High Cholesterol Maternal Grandmother     Miscarriages / Stillbirths Maternal Grandmother     Arthritis Maternal Grandfather     High Blood Pressure Maternal Grandfather     High Cholesterol Maternal Grandfather     Arthritis Paternal Grandmother     High Blood Pressure Paternal Grandmother     Heart Disease Paternal Grandmother     High Cholesterol Paternal Grandmother    [facility] Miscarriages / Stillbirths Paternal Grandmother     Stroke Paternal Grandmother     High Blood Pressure Paternal Grandfather     High

## 2020-06-17 ENCOUNTER — OFFICE VISIT (OUTPATIENT)
Dept: FAMILY MEDICINE CLINIC | Age: 40
End: 2020-06-17
Payer: COMMERCIAL

## 2020-06-17 VITALS
SYSTOLIC BLOOD PRESSURE: 140 MMHG | HEART RATE: 90 BPM | TEMPERATURE: 98 F | DIASTOLIC BLOOD PRESSURE: 90 MMHG | WEIGHT: 281.8 LBS | BODY MASS INDEX: 44.8 KG/M2 | OXYGEN SATURATION: 98 %

## 2020-06-17 PROBLEM — L73.2 HIDRADENITIS SUPPURATIVA: Status: ACTIVE | Noted: 2020-06-17

## 2020-06-17 PROCEDURE — 1036F TOBACCO NON-USER: CPT | Performed by: FAMILY MEDICINE

## 2020-06-17 PROCEDURE — G8417 CALC BMI ABV UP PARAM F/U: HCPCS | Performed by: FAMILY MEDICINE

## 2020-06-17 PROCEDURE — G8427 DOCREV CUR MEDS BY ELIG CLIN: HCPCS | Performed by: FAMILY MEDICINE

## 2020-06-17 PROCEDURE — 99214 OFFICE O/P EST MOD 30 MIN: CPT | Performed by: FAMILY MEDICINE

## 2020-06-17 ASSESSMENT — ENCOUNTER SYMPTOMS
COLOR CHANGE: 1
SHORTNESS OF BREATH: 0
NAUSEA: 0

## 2020-06-17 NOTE — PROGRESS NOTES
6/17/2020    This is a 46 y.o. female who presents for  Chief Complaint   Patient presents with    Breast Mass     right two lumps       HPI:     Hx of cellulitis and abscess of her breast  Improved but did not resolve with 10 day course of Clindamycin  Was given Bactrim/keflex at her last visit with me via VV  There is still mild erythema of the area that hasn't resolved and she is worried  No pain or drainage  No fevers  No other skin changes    ESTELA: on Cymbalta, 60 mg  Has tried 90 mg in the past  Does not want to add medications  Smokes marijuana to cope, but wants to stop and has tried  She inquires about a weaning medication  She feels the WL surgery will help with multiple problems, including anxiety, but she needs to stop using marijuana to go through with the bariatric surgery      Past Medical History:   Diagnosis Date    Anxiety     Arthritis     Cervical pain     Chronic back pain     Depression     Diabetes mellitus ([address]     Hypertension     Infertility     took fertility medication    Kidney stone 03/2012    Migraine     Miscarriage     Polycystic ovarian syndrome 1999    Psoriasis     Stomach ulcer     Unspecified sleep apnea     can't tolerate CPAP       Past Surgical History:   Procedure Laterality Date    BREAST SURGERY Right     for cellulitis     CHOLECYSTECTOMY  2011    [facility]7/[facility]ps    TN DILATION/CURETTAGE,DIAGNOSTIC  1998     [facility]/missed ab    SKIN BIOPSY      for psoriasis    TONSILLECTOMY  08/09/2016    tonsillectomy    UPPER GASTROINTESTINAL ENDOSCOPY  3/23/11    pyloretic    UPPER GASTROINTESTINAL ENDOSCOPY  10/7/2015    [facility]       Social History     Socioeconomic History    Marital status:      Spouse name: eNva Odom    Number of children: 3    Years of education: 12    Highest education level: Not on file   Occupational History    Not on file   Social Needs    Financial resource Miscarriages / Stillbirths Maternal Aunt     Mental Retardation Maternal Uncle     High Blood Pressure Maternal Uncle     High Cholesterol Maternal Uncle     High Blood Pressure Paternal Aunt     High Cholesterol Paternal Aunt     Arthritis Paternal Uncle     Mental Illness Paternal Uncle     High Blood Pressure Paternal Uncle     Birth Defects Paternal Uncle     High Cholesterol Paternal Uncle     Learning Disabilities Paternal Uncle     Arthritis Maternal Grandmother     High Blood Pressure Maternal Grandmother     Diabetes Maternal Grandmother     High Cholesterol Maternal Grandmother     Miscarriages / Stillbirths Maternal Grandmother     Arthritis Maternal Grandfather     High Blood Pressure Maternal Grandfather     High Cholesterol Maternal Grandfather     Arthritis Paternal Grandmother     High Blood Pressure Paternal Grandmother     Heart Disease Paternal Grandmother     High Cholesterol Paternal Grandmother    Leeanna Dials / Stillbirths Paternal Grandmother     Stroke Paternal Grandmother     High Blood Pressure Paternal Grandfather     High Cholesterol Paternal Grandfather        Current Outpatient Medications   Medication Sig Dispense Refill    Cholecalciferol (VITAMIN D3) 25 MCG (1000 UT) CAPS Take by mouth daily      BIOTIN PO Take by mouth daily      COLLAGEN PO Take by mouth daily      Prenatal Vit-Fe Fumarate-FA (PRENATAL VITAMIN PO) Take by mouth daily      miconazole (MICOTIN) 2 % cream Apply topically 2 times daily for 7 days Apply topically 2 times daily.  30 g 1    DULoxetine (CYMBALTA) 60 MG extended release capsule TAKE 1 CAPSULE BY MOUTH EVERY DAY 90 capsule 1    oxybutynin (DITROPAN XL) 5 MG extended release tablet Take 1 tablet by mouth daily 30 tablet 3    ondansetron (ZOFRAN-ODT) 4 MG disintegrating tablet Take 1 tablet by mouth 3 times daily as needed for Nausea or Vomiting 21 tablet 0    hydrALAZINE (APRESOLINE) 10 MG tablet TAKE 1 TABLET BY MOUTH EVERY DAY FOR 5 DAYS THEN INCREASE TO TAKE 1 TABLET BY MOUTH TWICE A DAY 60 tablet 5    fluticasone (FLONASE) 50 MCG/ACT nasal spray SPRAY 1 SPRAY IN EACH NOSTRIL DAILY 1 Bottle 2    amLODIPine (NORVASC) 5 MG tablet Take 2 tablets by mouth daily. 180 tablet 1    losartan-hydrochlorothiazide (HYZAAR) 100-25 MG per tablet Take 1 tablet by mouth daily for blood pressure. 90 tablet 1    cimetidine (TAGAMET) 400 MG tablet Take 1 tablet by mouth 2 times daily 180 tablet 1    metFORMIN (GLUCOPHAGE-XR) 750 MG extended release tablet 2 in AM 60 tablet 3    levonorgestrel-ethinyl estradiol (JOLESSA) 0.15-0.03 MG per tablet Take 1 tablet by mouth      bisacodyl (BISACODYL) 5 MG EC tablet Take 4 tablets of Bisacodyl for colonoscopy prep as directed. 4 tablet 0     No current facility-administered medications for this visit. Immunization History   Administered Date(s) Administered    DTP 1981, 02/20/1982, 04/24/1982, 11/23/1984    Influenza 11/29/2012    Influenza Virus Vaccine 10/28/2014, 10/14/2015    Influenza, Quadv, IM, PF (6 mo and older Fluzone, Flulaval, Fluarix, and 3 yrs and older Afluria) 10/28/2014, 09/18/2017, 12/18/2018, 09/05/2019    MMR 06/18/1983, 05/09/1994    Pneumococcal Conjugate 13-valent (Meera Forman) 10/14/2015    Polio OPV 1981, 02/20/1982, 04/24/1982, 11/23/1984    Rho (D) Immune Globulin 07/10/2012, 09/09/2012    Td (Adult), 5 Lf Tetanus Toxoid, Pf (Tenivac, Decavac) 05/09/1994    Td, unspecified formulation 06/24/2004    Tdap (Boostrix, Adacel) 1981, 02/20/1982, 04/24/1982, 11/23/1984, 11/29/2012       Allergies   Allergen Reactions    Lisinopril Other (See Comments)     COUGH    Tramadol Other (See Comments)     severe  SEVERE HEADACHE       Review of Systems   Constitutional: Negative for activity change, fever and unexpected weight change. Respiratory: Negative for shortness of breath. Cardiovascular: Negative for chest pain.    Gastrointestinal:

## 2020-06-18 ENCOUNTER — ANESTHESIA (OUTPATIENT)
Dept: ENDOSCOPY | Age: 40
End: 2020-06-18
Payer: COMMERCIAL

## 2020-06-18 ENCOUNTER — TELEPHONE (OUTPATIENT)
Dept: GASTROENTEROLOGY | Age: 40
End: 2020-06-18

## 2020-06-18 ENCOUNTER — HOSPITAL ENCOUNTER (OUTPATIENT)
Age: 40
Setting detail: OUTPATIENT SURGERY
Discharge: HOME | End: 2020-06-18
Attending: INTERNAL MEDICINE | Admitting: INTERNAL MEDICINE
Payer: COMMERCIAL

## 2020-06-18 VITALS
BODY MASS INDEX: 45.48 KG/M2 | HEIGHT: 66 IN | TEMPERATURE: 98 F | DIASTOLIC BLOOD PRESSURE: 94 MMHG | OXYGEN SATURATION: 100 % | SYSTOLIC BLOOD PRESSURE: 144 MMHG | WEIGHT: 283 LBS | HEART RATE: 88 BPM | RESPIRATION RATE: 18 BRPM

## 2020-06-18 VITALS — OXYGEN SATURATION: 100 % | SYSTOLIC BLOOD PRESSURE: 153 MMHG | DIASTOLIC BLOOD PRESSURE: 119 MMHG

## 2020-06-18 LAB
GLUCOSE BLD-MCNC: 151 MG/DL (ref 70–99)
PERFORMED ON: ABNORMAL
PREGNANCY, URINE: NEGATIVE

## 2020-06-18 PROCEDURE — 3700000000 HC ANESTHESIA ATTENDED CARE: Performed by: INTERNAL MEDICINE

## 2020-06-18 PROCEDURE — 84703 CHORIONIC GONADOTROPIN ASSAY: CPT

## 2020-06-18 PROCEDURE — 3609010300 HC COLONOSCOPY W/BIOPSY SINGLE/MULTIPLE: Performed by: INTERNAL MEDICINE

## 2020-06-18 PROCEDURE — 3700000001 HC ADD 15 MINUTES (ANESTHESIA): Performed by: INTERNAL MEDICINE

## 2020-06-18 PROCEDURE — 3609017100 HC EGD: Performed by: INTERNAL MEDICINE

## 2020-06-18 PROCEDURE — 2709999900 HC NON-CHARGEABLE SUPPLY: Performed by: INTERNAL MEDICINE

## 2020-06-18 PROCEDURE — 7100000011 HC PHASE II RECOVERY - ADDTL 15 MIN: Performed by: INTERNAL MEDICINE

## 2020-06-18 PROCEDURE — 2500000003 HC RX 250 WO HCPCS: Performed by: NURSE ANESTHETIST, CERTIFIED REGISTERED

## 2020-06-18 PROCEDURE — 45385 COLONOSCOPY W/LESION REMOVAL: CPT | Performed by: INTERNAL MEDICINE

## 2020-06-18 PROCEDURE — 6360000002 HC RX W HCPCS: Performed by: NURSE ANESTHETIST, CERTIFIED REGISTERED

## 2020-06-18 PROCEDURE — 2580000003 HC RX 258: Performed by: INTERNAL MEDICINE

## 2020-06-18 PROCEDURE — 88305 TISSUE EXAM BY PATHOLOGIST: CPT

## 2020-06-18 PROCEDURE — 43235 EGD DIAGNOSTIC BRUSH WASH: CPT | Performed by: INTERNAL MEDICINE

## 2020-06-18 PROCEDURE — 7100000010 HC PHASE II RECOVERY - FIRST 15 MIN: Performed by: INTERNAL MEDICINE

## 2020-06-18 PROCEDURE — 3609010600 HC COLONOSCOPY POLYPECTOMY SNARE/COLD BIOPSY: Performed by: INTERNAL MEDICINE

## 2020-06-18 PROCEDURE — 2500000003 HC RX 250 WO HCPCS: Performed by: ANESTHESIOLOGY

## 2020-06-18 RX ORDER — LIDOCAINE HYDROCHLORIDE 20 MG/ML
INJECTION, SOLUTION INFILTRATION; PERINEURAL PRN
Status: DISCONTINUED | OUTPATIENT
Start: 2020-06-18 | End: 2020-06-18 | Stop reason: SDUPTHER

## 2020-06-18 RX ORDER — PROPOFOL 10 MG/ML
INJECTION, EMULSION INTRAVENOUS PRN
Status: DISCONTINUED | OUTPATIENT
Start: 2020-06-18 | End: 2020-06-18 | Stop reason: SDUPTHER

## 2020-06-18 RX ORDER — ONDANSETRON 2 MG/ML
INJECTION INTRAMUSCULAR; INTRAVENOUS PRN
Status: DISCONTINUED | OUTPATIENT
Start: 2020-06-18 | End: 2020-06-18 | Stop reason: SDUPTHER

## 2020-06-18 RX ORDER — SODIUM CHLORIDE 0.9 % (FLUSH) 0.9 %
10 SYRINGE (ML) INJECTION PRN
Status: DISCONTINUED | OUTPATIENT
Start: 2020-06-18 | End: 2020-06-18 | Stop reason: HOSPADM

## 2020-06-18 RX ORDER — SODIUM CHLORIDE, SODIUM LACTATE, POTASSIUM CHLORIDE, CALCIUM CHLORIDE 600; 310; 30; 20 MG/100ML; MG/100ML; MG/100ML; MG/100ML
INJECTION, SOLUTION INTRAVENOUS CONTINUOUS
Status: DISCONTINUED | OUTPATIENT
Start: 2020-06-18 | End: 2020-06-18 | Stop reason: HOSPADM

## 2020-06-18 RX ORDER — ONDANSETRON 4 MG/1
4 TABLET, ORALLY DISINTEGRATING ORAL 3 TIMES DAILY PRN
Qty: 40 TABLET | Refills: 1 | Status: SHIPPED | OUTPATIENT
Start: 2020-06-18 | End: 2020-12-17 | Stop reason: SDUPTHER

## 2020-06-18 RX ORDER — SODIUM CHLORIDE 0.9 % (FLUSH) 0.9 %
10 SYRINGE (ML) INJECTION EVERY 12 HOURS SCHEDULED
Status: DISCONTINUED | OUTPATIENT
Start: 2020-06-18 | End: 2020-06-18 | Stop reason: HOSPADM

## 2020-06-18 RX ADMIN — PROPOFOL 50 MG: 10 INJECTION, EMULSION INTRAVENOUS at 09:09

## 2020-06-18 RX ADMIN — PROPOFOL 50 MG: 10 INJECTION, EMULSION INTRAVENOUS at 08:54

## 2020-06-18 RX ADMIN — PROPOFOL 50 MG: 10 INJECTION, EMULSION INTRAVENOUS at 08:59

## 2020-06-18 RX ADMIN — PROPOFOL 50 MG: 10 INJECTION, EMULSION INTRAVENOUS at 08:50

## 2020-06-18 RX ADMIN — PROPOFOL 20 MG: 10 INJECTION, EMULSION INTRAVENOUS at 09:14

## 2020-06-18 RX ADMIN — ONDANSETRON 4 MG: 2 INJECTION INTRAMUSCULAR; INTRAVENOUS at 08:49

## 2020-06-18 RX ADMIN — PROPOFOL 60 MG: 10 INJECTION, EMULSION INTRAVENOUS at 08:52

## 2020-06-18 RX ADMIN — PROPOFOL 100 MG: 10 INJECTION, EMULSION INTRAVENOUS at 09:00

## 2020-06-18 RX ADMIN — PROPOFOL 20 MG: 10 INJECTION, EMULSION INTRAVENOUS at 09:17

## 2020-06-18 RX ADMIN — PROPOFOL 50 MG: 10 INJECTION, EMULSION INTRAVENOUS at 08:58

## 2020-06-18 RX ADMIN — PROPOFOL 30 MG: 10 INJECTION, EMULSION INTRAVENOUS at 09:13

## 2020-06-18 RX ADMIN — SODIUM CHLORIDE, POTASSIUM CHLORIDE, SODIUM LACTATE AND CALCIUM CHLORIDE: 600; 310; 30; 20 INJECTION, SOLUTION INTRAVENOUS at 08:23

## 2020-06-18 RX ADMIN — PROPOFOL 50 MG: 10 INJECTION, EMULSION INTRAVENOUS at 09:03

## 2020-06-18 RX ADMIN — PROPOFOL 50 MG: 10 INJECTION, EMULSION INTRAVENOUS at 09:06

## 2020-06-18 RX ADMIN — PROPOFOL 30 MG: 10 INJECTION, EMULSION INTRAVENOUS at 09:15

## 2020-06-18 RX ADMIN — FAMOTIDINE 20 MG: 10 INJECTION, SOLUTION INTRAVENOUS at 08:24

## 2020-06-18 RX ADMIN — PROPOFOL 50 MG: 10 INJECTION, EMULSION INTRAVENOUS at 09:11

## 2020-06-18 RX ADMIN — PROPOFOL 40 MG: 10 INJECTION, EMULSION INTRAVENOUS at 08:53

## 2020-06-18 RX ADMIN — LIDOCAINE HYDROCHLORIDE 100 MG: 20 INJECTION, SOLUTION INFILTRATION; PERINEURAL at 08:49

## 2020-06-18 RX ADMIN — PROPOFOL 50 MG: 10 INJECTION, EMULSION INTRAVENOUS at 08:56

## 2020-06-18 ASSESSMENT — PAIN - FUNCTIONAL ASSESSMENT: PAIN_FUNCTIONAL_ASSESSMENT: 0-10

## 2020-06-18 ASSESSMENT — PAIN SCALES - GENERAL
PAINLEVEL_OUTOF10: 0
PAINLEVEL_OUTOF10: 0

## 2020-06-18 ASSESSMENT — PAIN DESCRIPTION - DESCRIPTORS: DESCRIPTORS: CRAMPING

## 2020-06-18 NOTE — ANESTHESIA PRE PROCEDURE
blood pressure. 1/27/20  Yes Sorin Santillan DO   cimetidine (TAGAMET) 400 MG tablet Take 1 tablet by mouth 2 times daily 12/30/19  Yes Lm Santillan DO   metFORMIN (GLUCOPHAGE-XR) 750 MG extended release tablet 2 in AM 1/22/18  Yes Sorin Santillan DO   levonorgestrel-ethinyl estradiol (Francee Finer) 0.15-0.03 MG per tablet Take 1 tablet by mouth   Yes Historical Provider, MD       Current medications:    Current Facility-Administered Medications   Medication Dose Route Frequency Provider Last Rate Last Dose    lactated ringers infusion   Intravenous Continuous Elinor Marte MD 30 mL/hr at 06/18/20 5103      lactated ringers infusion   Intravenous Continuous Rudy Chew MD        sodium chloride flush 0.9 % injection 10 mL  10 mL Intravenous 2 times per day Rudy Chew MD        sodium chloride flush 0.9 % injection 10 mL  10 mL Intravenous PRN Rudy Chew MD           Allergies:     Allergies   Allergen Reactions    Lisinopril Other (See Comments)     COUGH    Tramadol Other (See Comments)     severe  SEVERE HEADACHE       Problem List:    Patient Active Problem List   Diagnosis Code    PCOS (polycystic ovarian syndrome) E28.2    Allergic rhinitis J30.9    Obstructive sleep apnea G47.33    Panic attacks F41.0    Acute left flank pain R10.9    Syncope R55    Essential hypertension I10    Migraine G43.909    Chronic back pain M54.9, G89.29    Tobacco dependence F17.200    Hypokalemia E87.6    Psoriasis L40.9    ESTELA (generalized anxiety disorder) F41.1    DM (diabetes mellitus) (HCC) E11.9    Obesity (BMI 35.0-39.9 without comorbidity) E66.9    Recurrent acute tonsillitis J03.91    Moderate episode of recurrent major depressive disorder (HCC) F33.1    Acute hyperglycemia R73.9    Cystitis, acute N30.00    Arthropathy of multiple sites M12.9    Blood in stool K92.1    Cellulitis of breast N61.0    Depressive disorder, not elsewhere classified F32.9 05/29/2020    PROT 6.0 09/07/2012    CALCIUM 10.0 05/29/2020    BILITOT <0.2 05/29/2020    ALKPHOS 81 05/29/2020    AST 32 05/29/2020    ALT 34 05/29/2020       POC Tests:   Recent Labs     06/18/20  0817   POCGLU 151*       Coags:   Lab Results   Component Value Date    PROTIME 10.2 09/07/2012    INR 0.89 09/07/2012    APTT 27.2 09/07/2012       HCG (If Applicable):   Lab Results   Component Value Date    PREGTESTUR neg 11/02/2017        ABGs: No results found for: PHART, PO2ART, SEY7RJZ, NYC6AIQ, BEART, D1UBIGOV     Type & Screen (If Applicable):  Lab Results   Component Value Date    LABABO O 09/09/2012    LABRH Negative 09/09/2012       Drug/Infectious Status (If Applicable):  No results found for: HIV, HEPCAB    COVID-19 Screening (If Applicable):   Lab Results   Component Value Date    COVID19 Not Detected 06/12/2020         Anesthesia Evaluation  Patient summary reviewed and Nursing notes reviewed no history of anesthetic complications:   Airway: Mallampati: III     Neck ROM: full   Dental:          Pulmonary:   (+) sleep apnea:                             Cardiovascular:    (+) hypertension:, pacemaker:,                   Neuro/Psych:   (+) neuromuscular disease:, headaches:, psychiatric history:            GI/Hepatic/Renal:   (+) GERD:, PUD, morbid obesity          Endo/Other:    (+) Diabetes, . Abdominal:           Vascular:                                        Anesthesia Plan      MAC     ASA 3     (Medications & allergies reviewed  All available lab & EKG data reviewed)  Induction: intravenous. Anesthetic plan and risks discussed with patient. Plan discussed with CRNA.                   Svetlana Greenwood MD   6/18/2020

## 2020-06-18 NOTE — ANESTHESIA POSTPROCEDURE EVALUATION
Department of Anesthesiology  Postprocedure Note    Patient: Carlos A Gorman  MRN: [id number]  YOB: 1980  Date of evaluation: 6/18/2020  Time:  11:09 AM     Procedure Summary     Date:  06/18/20 Room / Location:  [address] [place] [address] Je[address]    Anesthesia Start:  0561 Anesthesia Stop:  Arthor Heidi    Procedures:       EGD DIAGNOSTIC ONLY (N/A )      COLONOSCOPY WITH BIOPSY (N/A )      COLONOSCOPY POLYPECTOMY SNARE (N/A ) Diagnosis:  (Nausea and Family history of colon cancer)    Surgeon:  Elinor Matthews MD Responsible Provider:  Fransico Corey MD    Anesthesia Type:  MAC ASA Status:  3          Anesthesia Type: MAC    Aly Phase I: Aly Score: 10    Aly Phase II: Aly Score: 8    Last vitals: Reviewed and per EMR flowsheets.        Anesthesia Post Evaluation    Comments: Postoperative Anesthesia Note    Name:    Carlos A Gorman  MRN:      [id number]    Patient Vitals in the past 12 hrs:  06/18/20 0955, BP:(!) 144/94, Pulse:88, Resp:18, SpO2:100 %  06/18/20 0942, BP:119/65, Pulse:86, Resp:18, SpO2:100 %  06/18/20 0926, BP:116/61, Pulse:82, Resp:16, SpO2:95 %  06/18/20 0808, BP:122/86, Temp:98 °F (36.7 °C), Temp src:Temporal, Pulse:88, Resp:18, SpO2:96 %, Height:5' 6\" (1.676 m), Weight:283 lb (128.4 kg)     LABS:    CBC  Lab Results       Component                Value               Date/Time                  WBC                      10.4                08/27/2019 03:00 PM        HGB                      13.9                08/27/2019 03:00 PM        HCT                      40.7                08/27/2019 03:00 PM        PLT                      379                 08/27/2019 03:00 PM   RENAL  Lab Results       Component                Value               Date/Time                  NA                       137                 05/29/2020 11:35 AM        K                        3.8                 05/29/2020 11:35 AM        K                        3.6 02/10/2019 09:15 AM        CL                       99                  05/29/2020 11:35 AM        CO2                      24                  05/29/2020 11:35 AM        BUN                      12                  05/29/2020 11:35 AM        CREATININE               0.5 (L)             05/29/2020 11:35 AM        GLUCOSE                  148 (H)             05/29/2020 11:35 AM   COAGS  Lab Results       Component                Value               Date/Time                  PROTIME                  10.2                09/07/2012 09:30 AM        INR                      0.89                09/07/2012 09:30 AM        APTT                     27.2                09/07/2012 09:30 AM     Intake & Output: In: 1050 (I.V.:1050)  Out: -     Nausea & Vomiting:  No    Level of Consciousness:  Awake    Pain Assessment:  Adequate analgesia    Anesthesia Complications:  No apparent anesthetic complications    SUMMARY      Vital signs stable  OK to discharge from Stage I post anesthesia care.   Care transferred from Anesthesiology department on discharge from perioperative area

## 2020-06-18 NOTE — PROGRESS NOTES
Preoperative Screening for Elective Surgery/Invasive Procedures While COVID-19 present in the community     Have you tested positive or have been told to self-isolate for COVID-19 like symptoms within the past 28 days? NO   Do you currently have any of the following symptoms? No  o Fever >100.0 F or 99.9 F in immunocompromised patients? No  o New onset cough, shortness of breath or difficulty breathing? No  o New onset sore throat, myalgia (muscle aches and pains), headache, loss of taste/smell or diarrhea? No   Have you had a potential exposure to COVID-19 within the past 14 days by:  o Close contact with a confirmed case? No  o Close contact with a healthcare worker,  or essential infrastructure worker (grocery store, [employer], gas station, public utilities or transportation)? No  o Do you reside in a congregate setting such as; skilled nursing facility, adult home, correctional facility, homeless shelter or other institutional setting? No  o Have you had recent travel to a known COVID-19 hotspot? No    Indicate if the patient has a positive screen by answering yes to one or more of the above questions. Patients who test positive or screen positive prior to surgery or on the day of surgery should be evaluated in conjunction with the surgeon/proceduralist/anesthesiologist to determine the urgency of the procedure.

## 2020-06-18 NOTE — H&P
Chief Complaint   Patient presents with    New Patient       Nausea and diarrhea along with GERD. Pt states nausea has gotten better since being seen by pcp. States both issues come and go. Pt states she is due for another colonoscopy, stated her father passed of colon cancer and she is requried to get them every 5 years. Has had one in the past with [facility]         HPI      Octavia Alberts is a 46 y.o. female who presents for nausea, diarrhea. She has had 'GI issues all her life'. The main issue recently was nausea, would get nauseated frequently for a few weeks, like 'morning sickness'. No emesis but would have severe nausea. Sometimes this would be in the AM and other times after eating. She has 3-4 loose daily bms, generally 'has to have a bm right after eating'. Denies blood in the stools. Last few days her nausea has improved. Yesterday night she ate some broccoli and felt more nauseated today AM. Zofran helps but she uses it rarely. Denies alcohol except infrequently. Smokes a little THC per patient. At this time she does not report any abdominal pain. She has had a prior cholecystectomy and notes her diarrhea has been present since then.     She has seen multiple GI physicians for GI issues in the past - including Dr Germaine Hay and Dr Dean Montes at [facility]. She has had an extensive prior workup. She says her father had colon cancer and ulcerative colitis and  of this at age 64. Patient gets colonoscopies every 5 years and last one was  and is now due for this. She is obese with BMI 45 and is looking into weight loss surgery.     EGD 10/14/2015 with Dr Juan Gibbons of [facility]   Findings:   1. Esophagus: Mucosa was normal. GEJ was normal at 40 cm.  2. Stomach: Mucosa in fundus was normal; Mucosa in the body was normal; In the Antrum mild erythema seen; Biopsy obtained from Antrum r/o H pylori.   3. Duodenum: First , Second and Third part of the duodenum was normal.biopsies obtained from the third part of the duodenum to rule out celiac sprue      Path negative for H pylori  Duodenal biopsies negative for celiac     Colonoscopy 10/14/2015  Impression:   1. A 3 mm polyp in the sigmoid colon was removed by cold biopsy forceps. 2. A 7 mm polyp in the rectum was removed by hot snare polypectomy. 3. Medium-sized internal hemorrhoids, most likely the cause for intermittent rectal bleeding  4. Otherwise normal colon mucosa throughout the exam upto TI, random biopsies obtained     Path: Polyps were hyperplastic  Random colon biopsies showed some acute inflammation.     EGD and EUS 3/26/2018 with Dr Too Bull     linear EUS scope from [employer] scope #75 aces over tongue in her esophagus her anesthesia esophagus abnormalities slightly irregular Z line with a small hiatal range and stomach. Found that she had a moderate amount of retained food went down to the antrum and found some antral gastritis p.m.  She has a lymph node at the [facility] her common bile duct measures 2 mm in size and no stones in the common bile duct was limited due to the fact that she had food in her stomach        PAST MEDICAL HISTORY      Past Medical History        Past Medical History:   Diagnosis Date    Anxiety      Arthritis      Cervical pain      Chronic back pain      Chronic kidney disease       kidney stone  March 2012    Hypertension       takes Aldomet    Infertility       took fertility medication    Mental disorder       anxiety, depression    Migraine      Miscarriage      Polycystic ovarian syndrome 1999    Psoriasis      Stomach ulcer      Unspecified sleep apnea           FAMILY HISTORY      Family History         Family History   Problem Relation Age of Onset    Arthritis Mother      Miscarriages / Aidan Rickers Mother      High Blood Pressure Mother      Depression Mother      High Cholesterol Mother      Arthritis Father      High Blood Pressure Father      Cancer Father           colon    Hearing Loss Father      Heart Disease Father      High Cholesterol Father      High Blood Pressure Brother      High Cholesterol Brother      High Blood Pressure Maternal Aunt      High Cholesterol Maternal Aunt      Miscarriages / Stillbirths Maternal Aunt      Mental Retardation Maternal Uncle      High Blood Pressure Maternal Uncle      High Cholesterol Maternal Uncle      High Blood Pressure Paternal Aunt      High Cholesterol Paternal Aunt      Arthritis Paternal Uncle      Mental Illness Paternal Uncle      High Blood Pressure Paternal Uncle      Birth Defects Paternal Uncle      High Cholesterol Paternal Uncle      Learning Disabilities Paternal Uncle      Arthritis Maternal Grandmother      High Blood Pressure Maternal Grandmother      Diabetes Maternal Grandmother      High Cholesterol Maternal Grandmother      Miscarriages / Stillbirths Maternal Grandmother      Arthritis Maternal Grandfather      High Blood Pressure Maternal Grandfather      High Cholesterol Maternal Grandfather      Arthritis Paternal Grandmother      High Blood Pressure Paternal Grandmother      Heart Disease Paternal Grandmother      High Cholesterol Paternal Grandmother      Miscarriages / Stillbirths Paternal Grandmother      Stroke Paternal Grandmother      High Blood Pressure Paternal Grandfather      High Cholesterol Paternal Grandfather           SOCIAL HISTORY      Social History               Socioeconomic History    Marital status:        Spouse name: Louie Luis Alberto    Number of children: 3    Years of education: [employer] education level: Not on file   Occupational History    Not on file   Social Needs    Financial resource strain: Not on file    Food insecurity       Worry: Not on file       Inability: Not on file    Transportation needs       Medical: Not on file       Non-medical: Not on file   Tobacco Use    Smoking status: Former Smoker       Packs/day: 1.00       doxycycline hyclate (VIBRA-TABS) 100 MG tablet Take 1 tablet by mouth 2 times daily for 7 days 14 tablet 0    cephALEXin (KEFLEX) 500 MG capsule Take 1 capsule by mouth 3 times daily for 7 days 21 capsule 0    mupirocin (BACTROBAN) 2 % ointment Apply 3 times daily. 30 g 1    oxybutynin (DITROPAN XL) 5 MG extended release tablet Take 1 tablet by mouth daily 30 tablet 3    DULoxetine (CYMBALTA) 30 MG extended release capsule TAKE 1 CAPSULE BY MOUTH EVERY DAY 30 capsule 2    ondansetron (ZOFRAN-ODT) 4 MG disintegrating tablet Take 1 tablet by mouth 3 times daily as needed for Nausea or Vomiting 21 tablet 0    hydrALAZINE (APRESOLINE) 10 MG tablet TAKE 1 TABLET BY MOUTH EVERY DAY FOR 5 DAYS THEN INCREASE TO TAKE 1 TABLET BY MOUTH TWICE A DAY 60 tablet 5    fluticasone (FLONASE) 50 MCG/ACT nasal spray SPRAY 1 SPRAY IN EACH NOSTRIL DAILY 1 Bottle 2    HYDROXYZINE HCL PO Take 50 mg by mouth        amLODIPine (NORVASC) 5 MG tablet Take 2 tablets by mouth daily. 180 tablet 1    losartan-hydrochlorothiazide (HYZAAR) 100-25 MG per tablet Take 1 tablet by mouth daily for blood pressure.  90 tablet 1    cimetidine (TAGAMET) 400 MG tablet Take 1 tablet by mouth 2 times daily 180 tablet 1    metFORMIN (GLUCOPHAGE-XR) 750 MG extended release tablet 2 in AM 60 tablet 3    levonorgestrel-ethinyl estradiol (JOLESSA) 0.15-0.03 MG per tablet Take 1 tablet by mouth                ALLERGIES            Allergies   Allergen Reactions    Lisinopril Other (See Comments)       COUGH    Tramadol Other (See Comments)       severe  SEVERE HEADACHE         IMMUNIZATIONS           Immunization History   Administered Date(s) Administered    DTP 1980, 02/20/1982, 04/24/1982, 11/23/1984    Influenza 11/29/2012    Influenza Virus Vaccine 10/28/2014, 10/14/2015    Influenza, Shelby Keller, IM, PF (6 mo and older Fluzone, Flulaval, Fluarix, and 3 yrs and older Afluria) 10/28/2014, 09/18/2017, 12/18/2018, 09/05/2019    MMR 06/18/1983, diarrhea. Suspect she could have motility issues, gastroparesis causing her nausea. We discussed a gastroparesis diet, detailed handout given and can try Zofran PRN  Her diarrhea is likely functional/irritable bowel syndrome but could also have a component of post cholecystectomy diarrhea. Discussed Colestid. She does not want to use this currently. Will try the diet changes first.  She is due for a 5 year colonoscopy for screening due to a family history of colon cancer in her father. Will set this up. Will also plan a EGD same day as the colonoscopy to rule out any UGI pathology. She needs MAC for the procedures.

## 2020-06-23 ENCOUNTER — TELEPHONE (OUTPATIENT)
Dept: PULMONOLOGY | Age: 40
End: 2020-06-23

## 2020-06-25 ENCOUNTER — OFFICE VISIT (OUTPATIENT)
Dept: RHEUMATOLOGY | Age: 40
End: 2020-06-25
Payer: COMMERCIAL

## 2020-06-25 VITALS
SYSTOLIC BLOOD PRESSURE: 130 MMHG | OXYGEN SATURATION: 98 % | BODY MASS INDEX: 43.79 KG/M2 | TEMPERATURE: 98.1 F | HEART RATE: 69 BPM | HEIGHT: 67 IN | DIASTOLIC BLOOD PRESSURE: 80 MMHG | WEIGHT: 279 LBS

## 2020-06-25 PROCEDURE — 96372 THER/PROPH/DIAG INJ SC/IM: CPT | Performed by: INTERNAL MEDICINE

## 2020-06-25 PROCEDURE — 1036F TOBACCO NON-USER: CPT | Performed by: INTERNAL MEDICINE

## 2020-06-25 PROCEDURE — G8417 CALC BMI ABV UP PARAM F/U: HCPCS | Performed by: INTERNAL MEDICINE

## 2020-06-25 PROCEDURE — 99213 OFFICE O/P EST LOW 20 MIN: CPT | Performed by: INTERNAL MEDICINE

## 2020-06-25 PROCEDURE — G8427 DOCREV CUR MEDS BY ELIG CLIN: HCPCS | Performed by: INTERNAL MEDICINE

## 2020-06-25 RX ORDER — METHYLPREDNISOLONE ACETATE 40 MG/ML
40 INJECTION, SUSPENSION INTRA-ARTICULAR; INTRALESIONAL; INTRAMUSCULAR; SOFT TISSUE ONCE
Status: COMPLETED | OUTPATIENT
Start: 2020-06-25 | End: 2020-06-25

## 2020-06-25 RX ORDER — METHYLPREDNISOLONE ACETATE 40 MG/ML
40 INJECTION, SUSPENSION INTRA-ARTICULAR; INTRALESIONAL; INTRAMUSCULAR; SOFT TISSUE ONCE
Qty: 1 ML | Refills: 0
Start: 2020-06-25 | End: 2020-06-25 | Stop reason: CLARIF

## 2020-06-25 RX ORDER — LIDOCAINE HYDROCHLORIDE 10 MG/ML
1 INJECTION, SOLUTION EPIDURAL; INFILTRATION; INTRACAUDAL; PERINEURAL ONCE
Qty: 1 ML | Refills: 0
Start: 2020-06-25 | End: 2020-06-25 | Stop reason: CLARIF

## 2020-06-25 RX ORDER — LIDOCAINE HYDROCHLORIDE 10 MG/ML
0.5 INJECTION, SOLUTION INFILTRATION; PERINEURAL ONCE
Status: COMPLETED | OUTPATIENT
Start: 2020-06-25 | End: 2020-06-25

## 2020-06-25 RX ADMIN — METHYLPREDNISOLONE ACETATE 40 MG: 40 INJECTION, SUSPENSION INTRA-ARTICULAR; INTRALESIONAL; INTRAMUSCULAR; SOFT TISSUE at 17:06

## 2020-06-25 RX ADMIN — LIDOCAINE HYDROCHLORIDE 0.5 ML: 10 INJECTION, SOLUTION INFILTRATION; PERINEURAL at 17:04

## 2020-06-25 NOTE — PROGRESS NOTES
loss, tinnitus, vertigo, or recurrent ear infections  Nose: denies nasal ulcers or recurrent sinusitis  Oral cavity: denies dry mouth or oral ulcers  Cardiovascular: denies CP, palpitations, Hx of pericardial effusion or pericarditis  Respiratory: denies SOB, cough, hemoptysis, or pleurisy  Gastrointestinal: denies heart burn, dysphagia or esophageal dysmotility, denies change in bowel habits or Sx of IBD  Genitourinary: denies change in urine amount or urine appearance, denies frothy urine or Hx of nephrolithiasis  Hematologic/Lymphatic: denies abnormal bruising or bleeding, denies Hx of blood clots or recurrent miscarriages, denies swollen LNs  Musculoskeletal:  refer to above HPI   Neurological: denies Hx of seizure, denies change in gait, balance, or memory  Psychiatric: denies Hx of depression or anxiety  Endocrine: denies cold or heat intolerance  Allergic/Immunologic: denies nasal congestion, chronic asthma, or hives    Past Medical History:   Diagnosis Date    Anxiety     Arthritis     Cervical pain     Chronic back pain     Depression     Diabetes mellitus ([facility] [address]     Hypertension     Infertility     took fertility medication    Kidney stone 03/2012    Migraine     Miscarriage     Polycystic ovarian syndrome 1999    Psoriasis     Stomach ulcer     Unspecified sleep apnea     can't tolerate CPAP        Past Surgical History:   Procedure Laterality Date    BREAST SURGERY Right     for cellulitis     CHOLECYSTECTOMY  2011    [facility]    COLONOSCOPY N/A 6/18/2020    COLONOSCOPY WITH BIOPSY performed by Yoana Oconnor MD at [facility] COLONOSCOPY N/A 6/18/2020    COLONOSCOPY POLYPECTOMY SNARE performed by Yoana Oconnor MD at [facility]     D & C/missed ab    SKIN BIOPSY      for psoriasis    TONSILLECTOMY  08/09/2016    tonsillectomy    UPPER GASTROINTESTINAL file        Family History   Problem Relation Age of Onset    Arthritis Mother     Miscarriages / [place] Mother     High Blood Pressure Mother     Depression Mother     High Cholesterol Mother     Arthritis Father     High Blood Pressure Father     Cancer Father         colon    Hearing Loss Father     Heart Disease Father     High Cholesterol Father     High Blood Pressure Brother     High Cholesterol Brother     High Blood Pressure Maternal Aunt     High Cholesterol Maternal Aunt     Miscarriages / Stillbirths Maternal Aunt     Mental Retardation Maternal Uncle     High Blood Pressure Maternal Uncle     High Cholesterol Maternal Uncle     High Blood Pressure Paternal Aunt     High Cholesterol Paternal Aunt     Arthritis Paternal Uncle     Mental Illness Paternal Uncle     High Blood Pressure Paternal Uncle     Birth Defects Paternal Uncle     High Cholesterol Paternal Uncle     Learning Disabilities Paternal Uncle     Arthritis Maternal Grandmother     High Blood Pressure Maternal Grandmother     Diabetes Maternal Grandmother     High Cholesterol Maternal Grandmother     Miscarriages / Stillbirths Maternal Grandmother     Arthritis Maternal Grandfather     High Blood Pressure Maternal Grandfather     High Cholesterol Maternal Grandfather     Arthritis Paternal Grandmother     High Blood Pressure Paternal Grandmother     Heart Disease Paternal Grandmother     High Cholesterol Paternal Grandmother    [de-identified] / Stillbirths Paternal Grandmother     Stroke Paternal Grandmother     High Blood Pressure Paternal Grandfather     High Cholesterol Paternal Grandfather        MEDICATIONS:    Current Outpatient Medications:     ondansetron (ZOFRAN-ODT) 4 MG disintegrating tablet, Take 1 tablet by mouth 3 times daily as needed for Nausea or Vomiting, Disp: 40 tablet, Rfl: 1    Cholecalciferol (VITAMIN D3) 25 MCG (1000 UT) CAPS, Take by mouth daily, Disp: , Rfl:     BIOTIN respiratory distress, CTAB  ABDOMEN: +BS, soft, NT/ND, no evidence of organomegaly  LYMPH: no cervical, supraclavicular or axillary LAD  MSK:  Spine: no kyphosis or lordosis, axial spine w/ FROM, no paraspinal muscle or vertebral tenderness, SI joints NTTP  Upper extremities:   Hands: There is pain to palpation on the MCP as well as PIP and wrist with some swelling of the MCP joints bilaterally but there is no tin synovitis. Positive Tinel maneuver especially on the left wrist.   Shoulders: no pain or swelling or warmth on palpation, FROM  Lower extremities:   Hip: normal log roll, negative PETE test b/l, trochanteric bursa NTTP b/l   Knees: no warmth or effusion present, FROM   Pain to palpation in the MCP joints bilaterally but there is no tin synovitis. 16 out of 18 tender points of fibromyalgia. NEURO: CN II-XII grossly intact intact, face appears symmetrical, normal DTR, no evidence of muscle atrophy, 5/5 strength proximally and distally throughout in both upper and lower extremities, touch sensation grossly intact  INTEGUMENTARY: no rash or psoriatic lesions, no petechiae, bruises, or palpable purpura, no patchy alopecia, no nail or periungual changes, no clubbing or digital ulcers  PSYCH: normal mood    Labs:   I personally reviewed prior labs including:    Lab Results   Component Value Date    WBC 10.4 08/27/2019    HGB 13.9 08/27/2019    HCT 40.7 08/27/2019    MCV 88.8 08/27/2019     08/27/2019    LYMPHOPCT 26.2 08/27/2019    RBC 4.58 08/27/2019    MCH 30.3 08/27/2019    MCHC 34.1 08/27/2019    RDW 14.0 08/27/2019     Lab Results   Component Value Date     05/29/2020    K 3.8 05/29/2020    CL 99 05/29/2020    CO2 24 05/29/2020    BUN 12 05/29/2020    CREATININE 0.5 (L) 05/29/2020    GLUCOSE 148 (H) 05/29/2020    CALCIUM 10.0 05/29/2020    PROT 6.7 05/29/2020    LABALBU 4.4 05/29/2020    BILITOT <0.2 05/29/2020    ALKPHOS 81 05/29/2020    AST 32 05/29/2020    ALT 34 05/29/2020

## 2020-06-26 ENCOUNTER — TELEPHONE (OUTPATIENT)
Dept: INTERNAL MEDICINE CLINIC | Age: 40
End: 2020-06-26

## 2020-06-26 NOTE — TELEPHONE ENCOUNTER
Patient saw Dr Mary Lamb yesterday and he wanted to put her on prednisone. She thought she had prednisone at home but she does not. [facility]

## 2020-06-29 NOTE — TELEPHONE ENCOUNTER
Can we please, call the pharmacy and prescribe 20 mg of prednisone daily for 10 days (10 pills). I am having problems sending this prescription to the pharmacy. Please, let the patient know that we send the prescription and ask how she feels about the steroid injection on the wrist, it may be too soon to see some improvement.     Thank you very much

## 2020-07-06 ENCOUNTER — OFFICE VISIT (OUTPATIENT)
Dept: PRIMARY CARE CLINIC | Age: 40
End: 2020-07-06
Payer: COMMERCIAL

## 2020-07-06 PROBLEM — Z01.810 PRE-OPERATIVE CARDIOVASCULAR EXAMINATION: Status: ACTIVE | Noted: 2020-07-06

## 2020-07-06 PROCEDURE — 99211 OFF/OP EST MAY X REQ PHY/QHP: CPT | Performed by: NURSE PRACTITIONER

## 2020-07-06 PROCEDURE — G8417 CALC BMI ABV UP PARAM F/U: HCPCS | Performed by: NURSE PRACTITIONER

## 2020-07-06 PROCEDURE — G8428 CUR MEDS NOT DOCUMENT: HCPCS | Performed by: NURSE PRACTITIONER

## 2020-07-06 NOTE — TELEPHONE ENCOUNTER
Last office visit: 6/17/2020    Last written: 4/2/2020    Future office visit: Visit date not found    Requested Prescriptions     Pending Prescriptions Disp Refills    fluticasone (FLONASE) 50 MCG/ACT nasal spray [Pharmacy Med Name: FLUTICASONE PROP 50 MCG SPRAY] 1 Bottle 2     Sig: SPRAY 1 SPRAY INTO EACH NOSTRIL EVERY DAY

## 2020-07-06 NOTE — PROGRESS NOTES
Lashonda Sanchez received a viral test for COVID-19. They were educated on isolation and quarantine as appropriate. For any symptoms, they were directed to seek care from their PCP, given contact information to establish with a doctor, directed to an urgent care or the emergency room.

## 2020-07-06 NOTE — PROGRESS NOTES
[address]   CARDIAC EVALUATION NOTE  [phone]      PCP:  Renae Muro DO    Reason for Consultation/Chief Complaint: new patient for cardiac clearance    Subjective   History of Present Illness:  Mak Ramirez is a 46 y.o. patient with a history of HTN and DM who presents with pre-operative cardiac risk assessment for bariatric surgery. Her stress test from 03/30/15 showed a moderate sized defect in the mid and distal anterior wall as well as the apex, consistent with ischemia in the LAD. EF 67%. Her cardiac cath from 04/08/15 showed normal coronaries. She reports she is taking DM and BP medications. She states her SBP is typically in 150s. She has chronic fatigue. She has had panic attacks previously, has had CP/SOB. She denies  dizziness or syncope. She has had cortisone shots for carpal tunnel. She stopped smoking 04/2019. She smokes marijuana for anxiety. Past Medical History:   has a past medical history of Anxiety, Arthritis, Cervical pain, Chronic back pain, Depression, Diabetes mellitus ([address] Hypertension, Infertility, Kidney stone, Migraine, Miscarriage, Polycystic ovarian syndrome, Psoriasis, Stomach ulcer, and Unspecified sleep apnea. Surgical History:   has a past surgical history that includes pr dilation/curettage,diagnostic (1998 ); skin biopsy; Cholecystectomy (2011); Colonoscopy (10/7/2015); Upper gastrointestinal endoscopy (3/23/11); Upper gastrointestinal endoscopy (10/7/2015); Tonsillectomy (08/09/2016); Breast surgery (Right); Upper gastrointestinal endoscopy (N/A, 6/18/2020); Colonoscopy (N/A, 6/18/2020); and Colonoscopy (N/A, 6/18/2020). Social History:   reports that she quit smoking about 14 months ago. Her smoking use included cigarettes. She has a 20.00 pack-year smoking history. She has never used smokeless tobacco. She reports current alcohol use. She reports current drug use. Frequency: 4.00 times per week. Drug: Marijuana.      Family History:  family history includes Arthritis in her father, maternal grandfather, maternal grandmother, mother, paternal grandmother, and paternal uncle; Birth Defects in her paternal uncle; Cancer in her father; Depression in her mother; Diabetes in her maternal grandmother; Hearing Loss in her father; Heart Disease in her father and paternal grandmother; High Blood Pressure in her brother, father, maternal aunt, maternal grandfather, maternal grandmother, maternal uncle, mother, paternal aunt, paternal grandfather, paternal grandmother, and paternal uncle; High Cholesterol in her brother, father, maternal aunt, maternal grandfather, maternal grandmother, maternal uncle, mother, paternal aunt, paternal grandfather, paternal grandmother, and paternal uncle; Learning Disabilities in her paternal uncle; Mental Illness in her paternal uncle; Mental Retardation in her maternal uncle; [place] Cantu / [place] in her maternal aunt, maternal grandmother, mother, and paternal grandmother; Stroke in her paternal grandmother. Father had multiple MIs starting at age 39. Home Medications:  Were reviewed and are listed in nursing record and/or below  Prior to Admission medications    Medication Sig Start Date End Date Taking?  Authorizing Provider   fluticasone (FLONASE) 50 MCG/ACT nasal spray SPRAY 1 SPRAY INTO EACH NOSTRIL EVERY DAY 7/7/20  Yes Sorin Santillan DO   ondansetron (ZOFRAN-ODT) 4 MG disintegrating tablet Take 1 tablet by mouth 3 times daily as needed for Nausea or Vomiting 6/18/20  Yes Matthew Schmid MD   Cholecalciferol (VITAMIN D3) 25 MCG (1000 UT) CAPS Take by mouth daily   Yes Historical Provider, MD   BIOTIN PO Take by mouth daily   Yes Historical Provider, MD   COLLAGEN PO Take by mouth daily   Yes Historical Provider, MD   Prenatal Vit-Fe Fumarate-FA (PRENATAL VITAMIN PO) Take by mouth daily   Yes Historical Provider, MD   DULoxetine (CYMBALTA) 60 MG extended release capsule TAKE 1 CAPSULE BY 55%      Normal coronaries          Studies:       I have reviewed labs and imaging/xray/diagnostic testing in this note. Assessment      1. Pre-operative cardiovascular examination    2. Other fatigue    3. SOB (shortness of breath)                 Plan   1. Echo to evaluate for fatigue, abnl ekg   2. Lexiscan myoview to evaluate for fatigue/cp, abnl ekg   3. CT calcium score to evaluate for fatigue, dm  4. TSH, liver and lipids  5. Discussed possible need for cardiac cath/angiogram if testing abnormal.   6. Cardiac clearance dependent on testing results  7. Follow up with NP in 2 months         Scribe's attestation: This note was scribed in the presence of Dr. Tali Soria by Martín Casillas RN      Thank you for allowing us to participate in the care of Marge March. Please call me with any questions [phone]. Tali Soria MD, [facility]   Interventional Cardiologist  [place]   [phone] [facility]  [phone] [address] [place] [address]  7/7/2020 4:19 PM    I will address the patient's cardiac risk factors and adjusted pharmacologic treatment as needed. In addition, I have reinforced the need for patient directed risk factor modification. Tobacco use was discussed with the patient and educated on the negative effects and was asked not to use. All questions and concerns were addressed to the patient/family. Alternatives to my treatment were discussed. I, Dr Tali Soria, personally performed the services described in this documentation, as scribed by the above signed scribe in my presence. It is both accurate and complete to my knowledge. I agree with the details independently gathered by the clinical support staff and the scribed note accurately describes my personal service to the patient.

## 2020-07-07 ENCOUNTER — OFFICE VISIT (OUTPATIENT)
Dept: CARDIOLOGY CLINIC | Age: 40
End: 2020-07-07
Payer: COMMERCIAL

## 2020-07-07 VITALS
DIASTOLIC BLOOD PRESSURE: 64 MMHG | OXYGEN SATURATION: 98 % | HEART RATE: 87 BPM | SYSTOLIC BLOOD PRESSURE: 136 MMHG | BODY MASS INDEX: 44.34 KG/M2 | HEIGHT: 67 IN | WEIGHT: 282.5 LBS

## 2020-07-07 PROBLEM — R06.02 SOB (SHORTNESS OF BREATH): Status: ACTIVE | Noted: 2020-07-07

## 2020-07-07 PROBLEM — R53.83 OTHER FATIGUE: Status: ACTIVE | Noted: 2020-07-07

## 2020-07-07 PROCEDURE — 93000 ELECTROCARDIOGRAM COMPLETE: CPT | Performed by: INTERNAL MEDICINE

## 2020-07-07 PROCEDURE — 1036F TOBACCO NON-USER: CPT | Performed by: INTERNAL MEDICINE

## 2020-07-07 PROCEDURE — 99204 OFFICE O/P NEW MOD 45 MIN: CPT | Performed by: INTERNAL MEDICINE

## 2020-07-07 PROCEDURE — G8417 CALC BMI ABV UP PARAM F/U: HCPCS | Performed by: INTERNAL MEDICINE

## 2020-07-07 PROCEDURE — G8427 DOCREV CUR MEDS BY ELIG CLIN: HCPCS | Performed by: INTERNAL MEDICINE

## 2020-07-07 RX ORDER — FLUTICASONE PROPIONATE 50 MCG
SPRAY, SUSPENSION (ML) NASAL
Qty: 1 BOTTLE | Refills: 2 | Status: SHIPPED | OUTPATIENT
Start: 2020-07-07 | End: 2020-09-18 | Stop reason: SDUPTHER

## 2020-07-07 NOTE — LETTER
[address]   CARDIAC EVALUATION NOTE  [phone]      PCP:  Carmen Palmer DO    Reason for Consultation/Chief Complaint: new patient for cardiac clearance    Subjective   History of Present Illness:  Emily Mcgee is a 46 y.o. patient with a history of HTN and DM who presents with pre-operative cardiac risk assessment for bariatric surgery. Her stress test from 03/30/15 showed a moderate sized defect in the mid and distal anterior wall as well as the apex, consistent with ischemia in the LAD. EF 67%. Her cardiac cath from 04/08/15 showed normal coronaries. She reports she is taking DM and BP medications. She states her SBP is typically in 150s. She has chronic fatigue. She has had panic attacks previously, has had CP/SOB. She denies  dizziness or syncope. She has had cortisone shots for carpal tunnel. She stopped smoking 04/2019. She smokes marijuana for anxiety. Past Medical History:   has a past medical history of Anxiety, Arthritis, Cervical pain, Chronic back pain, Depression, Diabetes mellitus ([address] Hypertension, Infertility, Kidney stone, Migraine, Miscarriage, Polycystic ovarian syndrome, Psoriasis, Stomach ulcer, and Unspecified sleep apnea. Surgical History:   has a past surgical history that includes pr dilation/curettage,diagnostic (1998 ); skin biopsy; Cholecystectomy (2011); Colonoscopy (10/7/2015); Upper gastrointestinal endoscopy (3/23/11); Upper gastrointestinal endoscopy (10/7/2015); Tonsillectomy (08/09/2016); Breast surgery (Right); Upper gastrointestinal endoscopy (N/A, 6/18/2020); Colonoscopy (N/A, 6/18/2020); and Colonoscopy (N/A, 6/18/2020). Social History:   reports that she quit smoking about 14 months ago. Her smoking use included cigarettes. She has a 20.00 pack-year smoking history. She has never used smokeless tobacco. She reports current alcohol use. She reports current drug use. Frequency: 4.00 times per week. Drug: Marijuana. Family History:  family history includes Arthritis in her father, maternal grandfather, maternal grandmother, mother, paternal grandmother, and paternal uncle; Birth Defects in her paternal uncle; Cancer in her father; Depression in her mother; Diabetes in her maternal grandmother; Hearing Loss in her father; Heart Disease in her father and paternal grandmother; High Blood Pressure in her brother, father, maternal aunt, maternal grandfather, maternal grandmother, maternal uncle, mother, paternal aunt, paternal grandfather, paternal grandmother, and paternal uncle; High Cholesterol in her brother, father, maternal aunt, maternal grandfather, maternal grandmother, maternal uncle, mother, paternal aunt, paternal grandfather, paternal grandmother, and paternal uncle; Learning Disabilities in her paternal uncle; Mental Illness in her paternal uncle; Mental Retardation in her maternal uncle; [place] Lizeth / [place] in her maternal aunt, maternal grandmother, mother, and paternal grandmother; Stroke in her paternal grandmother. Father had multiple MIs starting at age 39. Home Medications:  Were reviewed and are listed in nursing record and/or below  Prior to Admission medications    Medication Sig Start Date End Date Taking?  Authorizing Provider   fluticasone (FLONASE) 50 MCG/ACT nasal spray SPRAY 1 SPRAY INTO EACH NOSTRIL EVERY DAY 7/7/20  Yes Sorin Santillan,    ondansetron (ZOFRAN-ODT) 4 MG disintegrating tablet Take 1 tablet by mouth 3 times daily as needed for Nausea or Vomiting 6/18/20  Yes Paula Nuñez MD   Cholecalciferol (VITAMIN D3) 25 MCG (1000 UT) CAPS Take by mouth daily   Yes Historical Provider, MD   BIOTIN PO Take by mouth daily   Yes Historical Provider, MD   COLLAGEN PO Take by mouth daily   Yes Historical Provider, MD   Prenatal Vit-Fe Fumarate-FA (PRENATAL VITAMIN PO) Take by mouth daily   Yes Historical Provider, MD DULoxetine (CYMBALTA) 60 MG extended release capsule TAKE 1 CAPSULE BY MOUTH EVERY DAY 6/8/20  Yes Sorin Santillan DO   oxybutynin (DITROPAN XL) 5 MG extended release tablet Take 1 tablet by mouth daily 5/27/20  Yes Janene Yanez MD   hydrALAZINE (APRESOLINE) 10 MG tablet TAKE 1 TABLET BY MOUTH EVERY DAY FOR 5 DAYS THEN INCREASE TO TAKE 1 TABLET BY MOUTH TWICE A DAY 5/4/20  Yes Sorin Santillan DO   amLODIPine (NORVASC) 5 MG tablet Take 2 tablets by mouth daily. 1/27/20  Yes Sorin Santillan DO   losartan-hydrochlorothiazide (HYZAAR) 100-25 MG per tablet Take 1 tablet by mouth daily for blood pressure. 1/27/20  Yes Sorin Santillan DO   cimetidine (TAGAMET) 400 MG tablet Take 1 tablet by mouth 2 times daily 12/30/19  Yes Tita Santillan DO   metFORMIN (GLUCOPHAGE-XR) 750 MG extended release tablet 2 in AM 1/22/18  Yes Sorin Santillan DO   levonorgestrel-ethinyl estradiol (Ai [place]) 0.15-0.03 MG per tablet Take 1 tablet by mouth   Yes Historical Provider, MD          Allergies:  Lisinopril and Tramadol     Review of Systems:   A 14 point review of symptoms completed. Pertinent positives identified in the HPI, all other review of symptoms negative as below.       Objective   PHYSICAL EXAM:    Vitals:    07/07/20 1600   BP: 136/64   Pulse: 87   SpO2: 98%    Weight: 282 lb 8 oz (128.1 kg)         General Appearance:  Alert, cooperative, no distress, appears stated age   Head:  Normocephalic, without obvious abnormality, atraumatic   Eyes:  PERRL, conjunctiva/corneas clear   Nose: Nares normal, no drainage or sinus tenderness   Throat: Lips, mucosa, and tongue normal   Neck: Supple, symmetrical, trachea midline, no adenopathy, thyroid: not enlarged, symmetric, no tenderness/mass/nodules, no carotid bruit or JVD   Lungs:   Clear to auscultation bilaterally, respirations unlabored   Chest Wall:  No deformity or tenderness   Heart:  Regular rate and rhythm, S1, S2 normal, no murmur, rub or gallop There is no significant MR.  There is no significant LV-Ao   gradient.  LVEDP of 13 mmHg. Impression:   EF 55%      Normal coronaries          Studies:       I have reviewed labs and imaging/xray/diagnostic testing in this note. Assessment      1. Pre-operative cardiovascular examination    2. Other fatigue    3. SOB (shortness of breath)                 Plan   1. Echo to evaluate for fatigue, abnl ekg   2. Lexiscan myoview to evaluate for fatigue/cp, abnl ekg   3. CT calcium score to evaluate for fatigue, dm  4. TSH, liver and lipids  5. Discussed possible need for cardiac cath/angiogram if testing abnormal.   6. Cardiac clearance dependent on testing results  7. Follow up with NP in 2 months         Scribe's attestation: This note was scribed in the presence of Dr. Jennifer Simons by Larry Ambrocio RN      Thank you for allowing us to participate in the care of Jennifer Hills. Please call me with any questions [phone]. Jennifer Simons MD, [facility]   Interventional Cardiologist  [place] [id number]  [phone] [facility]  [phone] [address]  7/7/2020 4:19 PM    I will address the patient's cardiac risk factors and adjusted pharmacologic treatment as needed. In addition, I have reinforced the need for patient directed risk factor modification. Tobacco use was discussed with the patient and educated on the negative effects and was asked not to use. All questions and concerns were addressed to the patient/family. Alternatives to my treatment were discussed. I, Dr Jennifer Simons, personally performed the services described in this documentation, as scribed by the above signed scribe in my presence. It is both accurate and complete to my knowledge. I agree with the details independently gathered by the clinical support staff and the scribed note accurately describes my personal service to the patient.

## 2020-07-07 NOTE — PATIENT INSTRUCTIONS
1. Echo to evaluate for cardiac clearance  2. Lexiscan myoview to evaluate for cardiac clearance  3. CT calcium score to evaluate for cardiac clearance  4. TSH, liver and lipids  5. Discussed possible need for cardiac cath/angiogram if testing abnormal.   6. Cardiac clearance dependent on testing results  7. Follow up with NP in 2 months         Your provider has ordered testing for further evaluation. An order/prescription has been included in your paper work.  To schedule outpatient testing, contact Central Scheduling by calling [employer] ([phone]).

## 2020-07-08 ENCOUNTER — TELEMEDICINE (OUTPATIENT)
Dept: BARIATRICS/WEIGHT MGMT | Age: 40
End: 2020-07-08
Payer: COMMERCIAL

## 2020-07-08 PROCEDURE — 99213 OFFICE O/P EST LOW 20 MIN: CPT | Performed by: NURSE PRACTITIONER

## 2020-07-08 PROCEDURE — G8427 DOCREV CUR MEDS BY ELIG CLIN: HCPCS | Performed by: NURSE PRACTITIONER

## 2020-07-08 PROCEDURE — 2022F DILAT RTA XM EVC RTNOPTHY: CPT | Performed by: NURSE PRACTITIONER

## 2020-07-08 PROCEDURE — 3044F HG A1C LEVEL LT 7.0%: CPT | Performed by: NURSE PRACTITIONER

## 2020-07-08 ASSESSMENT — ENCOUNTER SYMPTOMS
RESPIRATORY NEGATIVE: 1
GASTROINTESTINAL NEGATIVE: 1
EYES NEGATIVE: 1

## 2020-07-08 NOTE — PROGRESS NOTES
Kristofer Wright lost 1 lbs over 2 months. Cut out morning coffee and now eating 4x/day. Breakfast: 2 eggs and 2 pieces toast    Snack: None    Lunch: salad with a lot of veggies + some cheese    Snack: None    Dinner: 4pm hamburgers, canteloupe + mac n cheese    Snack: 7pm leftover chicken and potatoes    Is pt consuming smaller portions? no    Is pt consuming at least 64 oz of fluids per day? yes , she is    Is pt consuming carbonated, caffeinated, or sugary beverages? yes , some pop over the holiday but cut out morning coffee    Has pt sampled Unjury and/or Nectar protein? Not yet    Exercise: Some swimming with kids    Plan/Recommendations:   Protein with meals and snacks  Work on portion control  Add in walking as she can in the morning or evening    Due to the COVID-19 restrictions on close contact interactions the patient's visit was conducted via telephone in carmenza of a face to face visit. The patient is here through telemedicine for their 2nd medical visit.     Handouts: 9 inch plate    Piotr Dillon

## 2020-07-08 NOTE — PROGRESS NOTES
[facility]) Physicians   [employer]    7/8/2020    TELEHEALTH EVALUATION -- Audio/Visual (During YVAGS-06 public health emergency)    Subjective:      Patient ID: Jones Christine is a 46 y.o. female has requested an audio/video evaluation. HPI    Due to the COVID-19 restrictions on close contact interactions the patient's monthly presurgical visit was conducted via audio/video in carmenza of a face to face visit. Patient has consented to have this visit conducted via audio/video. The patient is here through telemedicine for their bariatric surgery presurgical visit for future weight loss. She has made several attempts at weight loss in the past without success and now wishes to pursue bariatric surgery. She is working to change her dietary behaviors and lose weight to improve comorbid conditions. Jones Christine is a 46 y.o. female with current BMI of 44.8 and weight of 282 pounds.     Past Medical History:   Diagnosis Date    Anxiety     Arthritis     Cervical pain     Chronic back pain     Depression     Diabetes mellitus ([facility] [address]     Hypertension     Infertility     took fertility medication    Kidney stone 03/2012    Migraine     Miscarriage     Polycystic ovarian syndrome 1999    Psoriasis     Stomach ulcer     Unspecified sleep apnea     can't tolerate CPAP     Past Surgical History:   Procedure Laterality Date    BREAST SURGERY Right     for cellulitis     CHOLECYSTECTOMY  2011    COLONOSCOPY  10/7/[facility]    [facility]ps    COLONOSCOPY N/A 6/18/2020    COLONOSCOPY WITH BIOPSY performed by Nicole Johnson MD at [address]  COLONOSCOPY N/A 6/18/2020    COLONOSCOPY POLYPECTOMY SNARE performed by Nicole Johnson MD at [address] [facility] C/missed ab    SKIN BIOPSY      for psoriasis    TONSILLECTOMY  08/09/2016    tonsillectomy    UPPER GASTROINTESTINAL ENDOSCOPY  3/23/11 pyloretic    UPPER GASTROINTESTINAL ENDOSCOPY  10/7/2015    [address]    UPPER GASTROINTESTINAL ENDOSCOPY N/A 6/18/2020    EGD DIAGNOSTIC ONLY performed by Clara Vuong MD at [address] History   Problem Relation Age of Onset    Arthritis Mother    Flordia Kawasaki / [place] Mother     High Blood Pressure Mother     Depression Mother     High Cholesterol Mother     Arthritis Father     High Blood Pressure Father     Cancer Father         colon    Hearing Loss Father     Heart Disease Father     High Cholesterol Father     High Blood Pressure Brother     High Cholesterol Brother     High Blood Pressure Maternal Aunt     High Cholesterol Maternal Aunt     Miscarriages / Stillbirths Maternal Aunt     Mental Retardation Maternal Uncle     High Blood Pressure Maternal Uncle     High Cholesterol Maternal Uncle     High Blood Pressure Paternal Aunt     High Cholesterol Paternal Aunt     Arthritis Paternal Uncle     Mental Illness Paternal Uncle     High Blood Pressure Paternal Uncle     Birth Defects Paternal Uncle     High Cholesterol Paternal Uncle     Learning Disabilities Paternal Uncle     Arthritis Maternal Grandmother     High Blood Pressure Maternal Grandmother     Diabetes Maternal Grandmother     High Cholesterol Maternal Grandmother     Miscarriages / Stillbirths Maternal Grandmother     Arthritis Maternal Grandfather     High Blood Pressure Maternal Grandfather     High Cholesterol Maternal Grandfather     Arthritis Paternal Grandmother     High Blood Pressure Paternal Grandmother     Heart Disease Paternal Grandmother     High Cholesterol Paternal Grandmother    Tiera Kawasaki / Stillbirths Paternal Grandmother     Stroke Paternal Grandmother     High Blood Pressure Paternal Grandfather     High Cholesterol Paternal Grandfather      Social History     Tobacco Use    Smoking status: Former Smoker     Packs/day: 1.00 Years: 20.00     Pack years: 20.00     Types: Cigarettes     Last attempt to quit: 2019     Years since quittin.2    Smokeless tobacco: Never Used    Tobacco comment: -used chant-worked for 2 weeks   Substance Use Topics    Alcohol use: Yes     Alcohol/week: 0.0 standard drinks     Frequency: Monthly or less     Comment: 1 drink a month     I counseled the patient on the importance of not smoking and risks of ETOH. Allergies   Allergen Reactions    Lisinopril Other (See Comments)     COUGH    Tramadol Other (See Comments)     severe  SEVERE HEADACHE     There were no vitals filed for this visit. There is no height or weight on file to calculate BMI. Current Outpatient Medications:     fluticasone (FLONASE) 50 MCG/ACT nasal spray, SPRAY 1 SPRAY INTO EACH NOSTRIL EVERY DAY, Disp: 1 Bottle, Rfl: 2    ondansetron (ZOFRAN-ODT) 4 MG disintegrating tablet, Take 1 tablet by mouth 3 times daily as needed for Nausea or Vomiting, Disp: 40 tablet, Rfl: 1    Cholecalciferol (VITAMIN D3) 25 MCG (1000 UT) CAPS, Take by mouth daily, Disp: , Rfl:     BIOTIN PO, Take by mouth daily, Disp: , Rfl:     COLLAGEN PO, Take by mouth daily, Disp: , Rfl:     Prenatal Vit-Fe Fumarate-FA (PRENATAL VITAMIN PO), Take by mouth daily, Disp: , Rfl:     DULoxetine (CYMBALTA) 60 MG extended release capsule, TAKE 1 CAPSULE BY MOUTH EVERY DAY, Disp: 90 capsule, Rfl: 1    oxybutynin (DITROPAN XL) 5 MG extended release tablet, Take 1 tablet by mouth daily, Disp: 30 tablet, Rfl: 3    hydrALAZINE (APRESOLINE) 10 MG tablet, TAKE 1 TABLET BY MOUTH EVERY DAY FOR 5 DAYS THEN INCREASE TO TAKE 1 TABLET BY MOUTH TWICE A DAY, Disp: 60 tablet, Rfl: 5    amLODIPine (NORVASC) 5 MG tablet, Take 2 tablets by mouth daily. , Disp: 180 tablet, Rfl: 1    losartan-hydrochlorothiazide (HYZAAR) 100-25 MG per tablet, Take 1 tablet by mouth daily for blood pressure., Disp: 90 tablet, Rfl: 1    cimetidine (TAGAMET) 400 MG tablet, Take 1 tablet by mouth 2 times daily, Disp: 180 tablet, Rfl: 1    metFORMIN (GLUCOPHAGE-XR) 750 MG extended release tablet, 2 in AM, Disp: 60 tablet, Rfl: 3    levonorgestrel-ethinyl estradiol (JOLESSA) 0.15-0.03 MG per tablet, Take 1 tablet by mouth, Disp: , Rfl:     Lab Results   Component Value Date    WBC 10.4 08/27/2019    RBC 4.58 08/27/2019    HGB 13.9 08/27/2019    HCT 40.7 08/27/2019    MCV 88.8 08/27/2019    MCH 30.3 08/27/2019    MCHC 34.1 08/27/2019    MPV 7.1 08/27/2019    NEUTOPHILPCT 64.7 08/27/2019    LYMPHOPCT 26.2 08/27/2019    MONOPCT 5.6 08/27/2019    EOSRELPCT 2.4 08/27/2019    BASOPCT 1.1 08/27/2019    NEUTROABS 6.7 08/27/2019    LYMPHSABS 2.7 08/27/2019    MONOSABS 0.6 08/27/2019    EOSABS 0.3 08/27/2019     Lab Results   Component Value Date     05/29/2020    K 3.8 05/29/2020    K 3.6 02/10/2019    CL 99 05/29/2020    CO2 24 05/29/2020    ANIONGAP 14 05/29/2020    GLUCOSE 148 05/29/2020    BUN 12 05/29/2020    CREATININE 0.5 05/29/2020    LABGLOM >60 05/29/2020    GFRAA >60 05/29/2020    GFRAA >60 09/07/2012    CALCIUM 10.0 05/29/2020    PROT 6.7 05/29/2020    PROT 6.0 09/07/2012    LABALBU 4.4 05/29/2020    AGRATIO 1.9 05/29/2020    BILITOT <0.2 05/29/2020    ALKPHOS 81 05/29/2020    ALT 34 05/29/2020    AST 32 05/29/2020    GLOB 2.3 05/29/2020     Lab Results   Component Value Date    CHOL 192 07/20/2016    TRIG 305 07/20/2016    HDL 35 07/20/2016    HDL 38 07/20/2011    LDLCALC see below 07/20/2016    LABVLDL see below 07/20/2016     Lab Results   Component Value Date    TSHREFLEX 1.36 05/06/2020     Lab Results   Component Value Date    IRON 71 08/27/2019    TIBC 460 08/27/2019    LABIRON 15 08/27/2019     Lab Results   Component Value Date    INFHHFQR43 927 05/29/2020    FOLATE >20.00 05/29/2020     Lab Results   Component Value Date    VITD25 33.8 05/29/2020     Lab Results   Component Value Date    LABA1C 6.5 05/06/2020    .9 05/06/2020       Review of Systems   Constitutional: Negative. HENT: Negative. Eyes: Negative. Respiratory: Negative. Cardiovascular: Negative. Gastrointestinal: Negative. Skin: Negative. Neurological: Negative. PHYSICAL EXAMINATION:    Constitutional: [x] Appears well-developed and well-nourished [x] No apparent distress      [] Abnormal-   Mental status  [x] Alert and awake  [x] Oriented to person/place/time [x]Able to follow commands      Eyes:  EOM    [x]  Normal  [] Abnormal-  Sclera  [x]  Normal  [] Abnormal -         Discharge [x]  None visible  [] Abnormal -    HENT:   [x] Normocephalic, atraumatic. [] Abnormal   [x] Mouth/Throat: Mucous membranes are moist.     External Ears [x] Normal  [] Abnormal-     Neck: [x] No visualized mass     Pulmonary/Chest: [x] Respiratory effort normal.  [x] No visualized signs of difficulty breathing or respiratory distress        [] Abnormal-      Musculoskeletal:   [] Normal gait with no signs of ataxia         [x] Normal range of motion of neck        [] Abnormal-     Neurological:        [x] No Facial Asymmetry (Cranial nerve 7 motor function) (limited exam to video visit)          [x] No gaze palsy        [] Abnormal-         Skin:        [x] No significant exanthematous lesions or discoloration noted on facial skin         [] Abnormal-            Psychiatric:       [x] Normal Affect [x] No Hallucinations        [] Abnormal-     Other pertinent observable physical exam findings-     Due to this being a TeleHealth encounter, evaluation of the following organ systems is limited: Vitals/Constitutional/EENT/Resp/CV/GI//MS/Neuro/Skin/Heme-Lymph-Imm. Assessment and Plan:   Patient is here for their 2nd presurgery visit for sleeve via telemedicine, down 1.2lbs. She is making dietary and behavior modifications. She talked with the registered dietitian for continued follow up. I agree with recommendations and plan. She will work on portion control and including protein in [address]  She is exercising with some swimming. Encouraged physical activity. Discussed preop work up which still needs labs, EGD, psych evaluation, cardiac clearance,  sleep clearance, support group, PCP letter,  and protein sample ( we will mail her a sample to try). She reports using marijuana to cope with anxiety. I encouraged her to speak with her PCP about other treatment options for her anxiety so she can wean off the marijuana. I did explain she needs to be off of it for at least 8 weeks before we schedule surgery. We will see her back in 1 month for continued follow up or through telemedicine. A total of 15 minutes was spent conversing with the patient and over half of that time was spent counseling the patient on proper dietary behaviors, exercise and preoperative work-up. An electronic signature was used to authenticate this note. Pursuant to the emergency declaration under the [facility], 1135 waiver authority and the [employer] and [employer] Act, this Virtual  Visit was conducted, with patient's consent, to reduce the patient's risk of exposure to COVID-19 and provide continuity of care for an established patient. Services were provided through a video synchronous discussion virtually to substitute for in-person clinic visit.

## 2020-07-09 ENCOUNTER — TELEPHONE (OUTPATIENT)
Dept: INTERNAL MEDICINE CLINIC | Age: 40
End: 2020-07-09

## 2020-07-10 LAB
SARS-COV-2: NOT DETECTED
SOURCE: NORMAL

## 2020-07-13 ENCOUNTER — HOSPITAL ENCOUNTER (OUTPATIENT)
Dept: PULMONOLOGY | Age: 40
Discharge: HOME | End: 2020-07-13
Payer: COMMERCIAL

## 2020-07-13 LAB
DLCO %PRED: 120 %
DLCO PRED: NORMAL
DLCO/VA %PRED: NORMAL
DLCO/VA PRED: NORMAL
DLCO/VA: NORMAL
DLCO: NORMAL
EXPIRATORY TIME-POST: NORMAL
EXPIRATORY TIME: NORMAL
FEF 25-75% %CHNG: NORMAL
FEF 25-75% %PRED-POST: NORMAL
FEF 25-75% %PRED-PRE: NORMAL
FEF 25-75% PRED: NORMAL
FEF 25-75%-POST: NORMAL
FEF 25-75%-PRE: NORMAL
FEV1 %PRED-POST: 115 %
FEV1 %PRED-PRE: 114 %
FEV1 PRED: NORMAL
FEV1-POST: NORMAL
FEV1-PRE: NORMAL
FEV1/FVC %PRED-POST: NORMAL
FEV1/FVC %PRED-PRE: NORMAL
FEV1/FVC PRED: NORMAL
FEV1/FVC-POST: 81 %
FEV1/FVC-PRE: 78 %
FVC %PRED-POST: NORMAL
FVC %PRED-PRE: NORMAL
FVC PRED: NORMAL
FVC-POST: NORMAL
FVC-PRE: NORMAL
GAW %PRED: NORMAL
GAW PRED: NORMAL
GAW: NORMAL
IC %PRED: NORMAL
IC PRED: NORMAL
IC: NORMAL
MEP: NORMAL
MIP: NORMAL
MVV %PRED-PRE: NORMAL
MVV PRED: NORMAL
MVV-PRE: NORMAL
PEF %PRED-POST: NORMAL
PEF %PRED-PRE: NORMAL
PEF PRED: NORMAL
PEF%CHNG: NORMAL
PEF-POST: NORMAL
PEF-PRE: NORMAL
RAW %PRED: NORMAL
RAW PRED: NORMAL
RAW: NORMAL
RV %PRED: NORMAL
RV PRED: NORMAL
RV: NORMAL
SVC %PRED: NORMAL
SVC PRED: NORMAL
SVC: NORMAL
TLC %PRED: 108 %
TLC PRED: NORMAL
TLC: NORMAL
VA %PRED: NORMAL
VA PRED: NORMAL
VA: NORMAL
VTG %PRED: NORMAL
VTG PRED: NORMAL
VTG: NORMAL

## 2020-07-13 PROCEDURE — 6370000000 HC RX 637 (ALT 250 FOR IP): Performed by: INTERNAL MEDICINE

## 2020-07-13 PROCEDURE — 94618 PULMONARY STRESS TESTING: CPT

## 2020-07-13 PROCEDURE — 94726 PLETHYSMOGRAPHY LUNG VOLUMES: CPT

## 2020-07-13 PROCEDURE — 94640 AIRWAY INHALATION TREATMENT: CPT

## 2020-07-13 PROCEDURE — 94729 DIFFUSING CAPACITY: CPT

## 2020-07-13 PROCEDURE — 94060 EVALUATION OF WHEEZING: CPT

## 2020-07-13 RX ORDER — ALBUTEROL SULFATE 90 UG/1
2 AEROSOL, METERED RESPIRATORY (INHALATION) ONCE
Status: COMPLETED | OUTPATIENT
Start: 2020-07-13 | End: 2020-07-13

## 2020-07-13 RX ADMIN — Medication 2 PUFF: at 09:02

## 2020-07-13 ASSESSMENT — PULMONARY FUNCTION TESTS
FEV1_PERCENT_PREDICTED_POST: 115
FEV1/FVC_POST: 81
FEV1_PERCENT_PREDICTED_PRE: 114
FEV1/FVC_PRE: 78

## 2020-07-14 NOTE — PROCEDURES
[address]                 [address]                               PULMONARY FUNCTION    PATIENT NAME: Yosef Dee                  :        1980  MED REC NO:   [id number]                          ROOM:  ACCOUNT NO:   [de-identified]                           ADMIT DATE: 2020  PROVIDER:     Emilia Fine MD    DATE OF PROCEDURE:  2020    INDICATION:  Preoperative clearance. FINDINGS:  1. Spirometry revealed no evidence of obstructive defect. FEV1 is 3.75  liters, which is 114% of predicted. FEV1/FVC ratio of 78%. FVC is 4.82  liters, which is 120% of predicted. 2.  Lung volumes revealed no restrictive defect. Total lung capacity is  6.15 liters, which is 108% of predicted. 3.  Diffusion capacity is normal at 32.49, which is 120% of predicted. 4.  Flow volume loops appeared to be normal.    CONCLUSION:  1. No evidence of obstructive defect or restrictive defect. 2.  No bronchodilator response. 3.  Normal diffusion capacity. .    Six-minute walk was done per [facility] protocol. The  patient was able to walk 1400 feet. Saturation on room air at rest was  97% with the heart rate of 89. No significant desaturation on exertion. Max heart rate of 109. CONCLUSION FOR SIX-MINUTE WALK:  No significant desaturation on  exertion.         Zoey Koehler MD    D: 2020 16:03:31       T: 2020 20:21:29     [email or web address]  Job#: [id number]     Doc#: [id number]    CC:

## 2020-07-21 ENCOUNTER — HOSPITAL ENCOUNTER (OUTPATIENT)
Dept: CT IMAGING | Age: 40
Discharge: HOME | End: 2020-07-21
Payer: COMMERCIAL

## 2020-07-21 ENCOUNTER — HOSPITAL ENCOUNTER (OUTPATIENT)
Dept: CARDIOLOGY | Age: 40
Discharge: HOME | End: 2020-07-21
Payer: COMMERCIAL

## 2020-07-21 ENCOUNTER — HOSPITAL ENCOUNTER (OUTPATIENT)
Age: 40
Discharge: HOME | End: 2020-07-21
Payer: COMMERCIAL

## 2020-07-21 ENCOUNTER — TELEPHONE (OUTPATIENT)
Dept: CARDIOLOGY CLINIC | Age: 40
End: 2020-07-21

## 2020-07-21 LAB
ALBUMIN SERPL-MCNC: 4.2 G/DL (ref 3.4–5)
ALP BLD-CCNC: 70 U/L (ref 40–129)
ALT SERPL-CCNC: 47 U/L (ref 10–40)
AST SERPL-CCNC: 35 U/L (ref 15–37)
BASOPHILS ABSOLUTE: 0.1 K/UL (ref 0–0.2)
BASOPHILS RELATIVE PERCENT: 0.9 %
BILIRUB SERPL-MCNC: 0.3 MG/DL (ref 0–1)
BILIRUBIN DIRECT: <0.2 MG/DL (ref 0–0.3)
BILIRUBIN, INDIRECT: ABNORMAL MG/DL (ref 0–1)
CHOLESTEROL, TOTAL: 154 MG/DL (ref 0–199)
EOSINOPHILS ABSOLUTE: 0.1 K/UL (ref 0–0.6)
EOSINOPHILS RELATIVE PERCENT: 1.3 %
HCT VFR BLD CALC: 40.5 % (ref 36–48)
HDLC SERPL-MCNC: 41 MG/DL (ref 40–60)
HEMOGLOBIN: 13.8 G/DL (ref 12–16)
INR BLD: 0.94 (ref 0.86–1.14)
IRON SATURATION: 18 % (ref 15–50)
IRON: 71 UG/DL (ref 37–145)
LDL CHOLESTEROL CALCULATED: 64 MG/DL
LV EF: 55 %
LV EF: 60 %
LVEF MODALITY: NORMAL
LVEF MODALITY: NORMAL
LYMPHOCYTES ABSOLUTE: 2.5 K/UL (ref 1–5.1)
LYMPHOCYTES RELATIVE PERCENT: 26.6 %
MCH RBC QN AUTO: 29.3 PG (ref 26–34)
MCHC RBC AUTO-ENTMCNC: 34 G/DL (ref 31–36)
MCV RBC AUTO: 86.3 FL (ref 80–100)
MONOCYTES ABSOLUTE: 0.6 K/UL (ref 0–1.3)
MONOCYTES RELATIVE PERCENT: 6.3 %
NEUTROPHILS ABSOLUTE: 6 K/UL (ref 1.7–7.7)
NEUTROPHILS RELATIVE PERCENT: 64.9 %
PDW BLD-RTO: 13.5 % (ref 12.4–15.4)
PLATELET # BLD: 296 K/UL (ref 135–450)
PMV BLD AUTO: 8 FL (ref 5–10.5)
PROTHROMBIN TIME: 10.9 SEC (ref 10–13.2)
RBC # BLD: 4.69 M/UL (ref 4–5.2)
TOTAL IRON BINDING CAPACITY: 405 UG/DL (ref 260–445)
TOTAL PROTEIN: 6.8 G/DL (ref 6.4–8.2)
TRIGL SERPL-MCNC: 246 MG/DL (ref 0–150)
TSH REFLEX: 1.59 UIU/ML (ref 0.27–4.2)
VLDLC SERPL CALC-MCNC: 49 MG/DL
WBC # BLD: 9.2 K/UL (ref 4–11)

## 2020-07-21 PROCEDURE — 75571 CT HRT W/O DYE W/CA TEST: CPT

## 2020-07-21 PROCEDURE — A9502 TC99M TETROFOSMIN: HCPCS | Performed by: INTERNAL MEDICINE

## 2020-07-21 PROCEDURE — 83540 ASSAY OF IRON: CPT

## 2020-07-21 PROCEDURE — 80061 LIPID PANEL: CPT

## 2020-07-21 PROCEDURE — 3430000000 HC RX DIAGNOSTIC RADIOPHARMACEUTICAL: Performed by: INTERNAL MEDICINE

## 2020-07-21 PROCEDURE — 84446 ASSAY OF VITAMIN E: CPT

## 2020-07-21 PROCEDURE — 93017 CV STRESS TEST TRACING ONLY: CPT

## 2020-07-21 PROCEDURE — 84590 ASSAY OF VITAMIN A: CPT

## 2020-07-21 PROCEDURE — 84425 ASSAY OF VITAMIN B-1: CPT

## 2020-07-21 PROCEDURE — 6360000002 HC RX W HCPCS: Performed by: INTERNAL MEDICINE

## 2020-07-21 PROCEDURE — 85025 COMPLETE CBC W/AUTO DIFF WBC: CPT

## 2020-07-21 PROCEDURE — 84443 ASSAY THYROID STIM HORMONE: CPT

## 2020-07-21 PROCEDURE — 78452 HT MUSCLE IMAGE SPECT MULT: CPT

## 2020-07-21 PROCEDURE — 83550 IRON BINDING TEST: CPT

## 2020-07-21 PROCEDURE — 36415 COLL VENOUS BLD VENIPUNCTURE: CPT

## 2020-07-21 PROCEDURE — 93306 TTE W/DOPPLER COMPLETE: CPT

## 2020-07-21 PROCEDURE — 85610 PROTHROMBIN TIME: CPT

## 2020-07-21 PROCEDURE — 80076 HEPATIC FUNCTION PANEL: CPT

## 2020-07-21 RX ADMIN — TETROFOSMIN 31.3 MILLICURIE: 1.38 INJECTION, POWDER, LYOPHILIZED, FOR SOLUTION INTRAVENOUS at 11:01

## 2020-07-21 RX ADMIN — TETROFOSMIN 11.8 MILLICURIE: 1.38 INJECTION, POWDER, LYOPHILIZED, FOR SOLUTION INTRAVENOUS at 08:10

## 2020-07-21 RX ADMIN — REGADENOSON 0.4 MG: 0.08 INJECTION, SOLUTION INTRAVENOUS at 11:01

## 2020-07-21 NOTE — TELEPHONE ENCOUNTER
----- Message from Rosa Reyes MD sent at 7/21/2020  1:46 PM EDT -----  Let patient know echo test shows normal heart function, no new orders or changes at this time. Thanks.

## 2020-07-21 NOTE — TELEPHONE ENCOUNTER
Let patient know their ct calcium test shows mild plaque, suggest starting lipitor 10mg po qhs and get lipid/lfts in 1mo.  Let pt know there are incidental benign calcified, healed granuloma/scars noted.        Let patient know their stress test is normal, continue current meds, no new orders or changes at this time. Let patient know their lipid test is high, rec: fish oil 1g po daily. Thanks. Created telephone encounter. [facility] requesting a call back to the office. Will relay echo and ct calcium score test once we get in contact with pt.

## 2020-07-22 ENCOUNTER — TELEPHONE (OUTPATIENT)
Dept: CARDIOLOGY CLINIC | Age: 40
End: 2020-07-22

## 2020-07-23 RX ORDER — ATORVASTATIN CALCIUM 10 MG/1
10 TABLET, FILM COATED ORAL DAILY
Qty: 30 TABLET | Refills: 5 | Status: SHIPPED | OUTPATIENT
Start: 2020-07-23 | End: 2021-04-21

## 2020-07-23 RX ORDER — CIMETIDINE 400 MG/1
400 TABLET, FILM COATED ORAL 2 TIMES DAILY
Qty: 180 TABLET | Refills: 1 | Status: SHIPPED | OUTPATIENT
Start: 2020-07-23 | End: 2020-07-28 | Stop reason: SDUPTHER

## 2020-07-23 NOTE — TELEPHONE ENCOUNTER
Last office visit 6/17/2020     Last written 12/30/2019, 180 tablets with 1 refill. Next office visit scheduled None scheduled.      Requested Prescriptions     Pending Prescriptions Disp Refills    cimetidine (TAGAMET) 400 MG tablet 180 tablet 1     Sig: Take 1 tablet by mouth 2 times daily

## 2020-07-24 LAB
ALPHA-TOCOPHEROL: 10 MG/L (ref 5.5–18)
GAMMA-TOCOPHEROL: 1.2 MG/L (ref 0–6)
RETINYL PALMITATE: 0.03 MG/L (ref 0–0.1)
VITAMIN A LEVEL: 0.68 MG/L (ref 0.3–1.2)
VITAMIN A, INTERP: NORMAL
VITAMIN B1 WHOLE BLOOD: 147 NMOL/L (ref 70–180)

## 2020-07-27 RX ORDER — LOSARTAN POTASSIUM AND HYDROCHLOROTHIAZIDE 25; 100 MG/1; MG/1
TABLET ORAL
Qty: 90 TABLET | Refills: 1 | Status: SHIPPED | OUTPATIENT
Start: 2020-07-27 | End: 2021-02-18

## 2020-07-27 NOTE — TELEPHONE ENCOUNTER
Last office visit 6/17/2020     Last written 01- #90 x 1 refill    Next office visit scheduled  Not scheduled    Requested Prescriptions     Pending Prescriptions Disp Refills    losartan-hydrochlorothiazide (HYZAAR) 100-25 MG per tablet 90 tablet 1     Sig: Take 1 tablet by mouth daily for blood pressure.

## 2020-07-28 RX ORDER — AMLODIPINE BESYLATE 5 MG/1
TABLET ORAL
Qty: 180 TABLET | Refills: 1 | Status: SHIPPED | OUTPATIENT
Start: 2020-07-28 | End: 2021-02-18

## 2020-07-28 RX ORDER — CIMETIDINE 400 MG/1
400 TABLET, FILM COATED ORAL 2 TIMES DAILY
Qty: 180 TABLET | Refills: 1 | Status: SHIPPED | OUTPATIENT
Start: 2020-07-28 | End: 2022-01-02

## 2020-07-28 NOTE — TELEPHONE ENCOUNTER
Last office visit 6/17/2020       Next office visit scheduled None scheduled. Requested Prescriptions     Pending Prescriptions Disp Refills    amLODIPine (NORVASC) 5 MG tablet 180 tablet 1     Sig: Take 2 tablets by mouth daily.

## 2020-08-05 PROBLEM — Z01.810 PRE-OPERATIVE CARDIOVASCULAR EXAMINATION: Status: RESOLVED | Noted: 2020-07-06 | Resolved: 2020-08-05

## 2020-08-11 ENCOUNTER — TELEMEDICINE (OUTPATIENT)
Dept: BARIATRICS/WEIGHT MGMT | Age: 40
End: 2020-08-11
Payer: COMMERCIAL

## 2020-08-11 PROBLEM — R74.8 ELEVATED LIVER ENZYMES: Status: ACTIVE | Noted: 2020-08-11

## 2020-08-11 PROCEDURE — 99213 OFFICE O/P EST LOW 20 MIN: CPT | Performed by: NURSE PRACTITIONER

## 2020-08-11 PROCEDURE — 3044F HG A1C LEVEL LT 7.0%: CPT | Performed by: NURSE PRACTITIONER

## 2020-08-11 PROCEDURE — G8427 DOCREV CUR MEDS BY ELIG CLIN: HCPCS | Performed by: NURSE PRACTITIONER

## 2020-08-11 PROCEDURE — 2022F DILAT RTA XM EVC RTNOPTHY: CPT | Performed by: NURSE PRACTITIONER

## 2020-08-11 ASSESSMENT — ENCOUNTER SYMPTOMS
EYES NEGATIVE: 1
GASTROINTESTINAL NEGATIVE: 1
RESPIRATORY NEGATIVE: 1

## 2020-08-11 NOTE — PROGRESS NOTES
 UPPER GASTROINTESTINAL ENDOSCOPY  10/7/2015    [facility]    UPPER GASTROINTESTINAL ENDOSCOPY N/A 6/18/2020    EGD DIAGNOSTIC ONLY performed by Yolanda Zamarripa MD at [address] History   Problem Relation Age of Onset    Arthritis Mother    [de-identified] / [place] Mother     High Blood Pressure Mother     Depression Mother     High Cholesterol Mother     Arthritis Father     High Blood Pressure Father     Cancer Father         colon    Hearing Loss Father     Heart Disease Father     High Cholesterol Father     High Blood Pressure Brother     High Cholesterol Brother     High Blood Pressure Maternal Aunt     High Cholesterol Maternal Aunt     Miscarriages / Stillbirths Maternal Aunt     Mental Retardation Maternal Uncle     High Blood Pressure Maternal Uncle     High Cholesterol Maternal Uncle     High Blood Pressure Paternal Aunt     High Cholesterol Paternal Aunt     Arthritis Paternal Uncle     Mental Illness Paternal Uncle     High Blood Pressure Paternal Uncle     Birth Defects Paternal Uncle     High Cholesterol Paternal Uncle     Learning Disabilities Paternal Uncle     Arthritis Maternal Grandmother     High Blood Pressure Maternal Grandmother     Diabetes Maternal Grandmother     High Cholesterol Maternal Grandmother     Miscarriages / Stillbirths Maternal Grandmother     Arthritis Maternal Grandfather     High Blood Pressure Maternal Grandfather     High Cholesterol Maternal Grandfather     Arthritis Paternal Grandmother     High Blood Pressure Paternal Grandmother     Heart Disease Paternal Grandmother     High Cholesterol Paternal Grandmother    Randol Bracket / Stillbirths Paternal Grandmother     Stroke Paternal Grandmother     High Blood Pressure Paternal Grandfather     High Cholesterol Paternal Grandfather      Social History     Tobacco Use    Smoking status: Former Smoker     Packs/day: 1.00     Years: (APRESOLINE) 10 MG tablet, TAKE 1 TABLET BY MOUTH EVERY DAY FOR 5 DAYS THEN INCREASE TO TAKE 1 TABLET BY MOUTH TWICE A DAY, Disp: 60 tablet, Rfl: 5    metFORMIN (GLUCOPHAGE-XR) 750 MG extended release tablet, 2 in AM, Disp: 60 tablet, Rfl: 3    levonorgestrel-ethinyl estradiol (JOLESSA) 0.15-0.03 MG per tablet, Take 1 tablet by mouth, Disp: , Rfl:     Lab Results   Component Value Date    WBC 9.2 07/21/2020    RBC 4.69 07/21/2020    HGB 13.8 07/21/2020    HCT 40.5 07/21/2020    MCV 86.3 07/21/2020    MCH 29.3 07/21/2020    MCHC 34.0 07/21/2020    MPV 8.0 07/21/2020    NEUTOPHILPCT 64.9 07/21/2020    LYMPHOPCT 26.6 07/21/2020    MONOPCT 6.3 07/21/2020    EOSRELPCT 1.3 07/21/2020    BASOPCT 0.9 07/21/2020    NEUTROABS 6.0 07/21/2020    LYMPHSABS 2.5 07/21/2020    MONOSABS 0.6 07/21/2020    EOSABS 0.1 07/21/2020     Lab Results   Component Value Date     05/29/2020    K 3.8 05/29/2020    K 3.6 02/10/2019    CL 99 05/29/2020    CO2 24 05/29/2020    ANIONGAP 14 05/29/2020    GLUCOSE 148 05/29/2020    BUN 12 05/29/2020    CREATININE 0.5 05/29/2020    LABGLOM >60 05/29/2020    GFRAA >60 05/29/2020    GFRAA >60 09/07/2012    CALCIUM 10.0 05/29/2020    PROT 6.8 07/21/2020    PROT 6.0 09/07/2012    LABALBU 4.2 07/21/2020    AGRATIO 1.9 05/29/2020    BILITOT 0.3 07/21/2020    ALKPHOS 70 07/21/2020    ALT 47 07/21/2020    AST 35 07/21/2020    GLOB 2.3 05/29/2020     Lab Results   Component Value Date    CHOL 154 07/21/2020    TRIG 246 07/21/2020    HDL 41 07/21/2020    HDL 38 07/20/2011    LDLCALC 64 07/21/2020    LABVLDL 49 07/21/2020     Lab Results   Component Value Date    TSHREFLEX 1.59 07/21/2020     Lab Results   Component Value Date    IRON 71 07/21/2020    TIBC 405 07/21/2020    LABIRON 18 07/21/2020     Lab Results   Component Value Date    XFSCLRQS30 927 05/29/2020    FOLATE >20.00 05/29/2020     Lab Results   Component Value Date    VITD25 33.8 05/29/2020     Lab Results   Component Value Date    LABA1C 6.5 05/06/2020    .9 05/06/2020       Review of Systems   Constitutional: Negative. HENT: Negative. Eyes: Negative. Respiratory: Negative. Cardiovascular: Negative. Gastrointestinal: Negative. Skin: Negative. Neurological: Negative. PHYSICAL EXAMINATION:    Constitutional: [x] Appears well-developed and well-nourished [x] No apparent distress      [] Abnormal-   Mental status  [x] Alert and awake  [x] Oriented to person/place/time [x]Able to follow commands      Eyes:  EOM    [x]  Normal  [] Abnormal-  Sclera  [x]  Normal  [] Abnormal -         Discharge [x]  None visible  [] Abnormal -    HENT:   [x] Normocephalic, atraumatic. [] Abnormal   [x] Mouth/Throat: Mucous membranes are moist.     External Ears [x] Normal  [] Abnormal-     Neck: [x] No visualized mass     Pulmonary/Chest: [x] Respiratory effort normal.  [x] No visualized signs of difficulty breathing or respiratory distress        [] Abnormal-      Musculoskeletal:   [] Normal gait with no signs of ataxia         [x] Normal range of motion of neck        [] Abnormal-     Neurological:        [x] No Facial Asymmetry (Cranial nerve 7 motor function) (limited exam to video visit)          [x] No gaze palsy        [] Abnormal-         Skin:        [x] No significant exanthematous lesions or discoloration noted on facial skin         [] Abnormal-            Psychiatric:       [x] Normal Affect [x] No Hallucinations        [] Abnormal-     Other pertinent observable physical exam findings-     Due to this being a TeleHealth encounter, evaluation of the following organ systems is limited: Vitals/Constitutional/EENT/Resp/CV/GI//MS/Neuro/Skin/Heme-Lymph-Imm. Assessment and Plan:   Patient is here for their 3rd presurgery visit for sleeve via telemedicine, down 1.8lbs. She is making dietary and behavior modifications. She talked with the registered dietitian for continued follow up. I agree with recommendations and plan.  She is exercising with swimming with her kids. Encouraged physical activity. Discussed preop work up which still needs EGD, psych evaluation, cardiac clearance, sleep clearance (using CPAP),support group,  Marijuana cessation (explained to patient that they must be marijuana free for at least 8 weeks prior to scheduling surgery. She is working with her PCP to find another option for anxiety management) and protein sample. We reviewed her recent labs which showed elevated triglycerides and elevated liver enzyme. We discussed that she should see an improvement in triglyceride with weight loss. We discussed potential reasons behind elevated liver enzymes, we will recheck at 6 months post-op. We will see her back in 1 month for continued follow up or through telemedicine. A total of 15 minutes was spent conversing with the patient and over half of that time was spent counseling the patient on proper dietary behaviors, exercise and preoperative work-up. An electronic signature was used to authenticate this note. Pursuant to the emergency declaration under the [facility], 1135 waiver authority and the [employer] Act, this Virtual  Visit was conducted, with patient's consent, to reduce the patient's risk of exposure to COVID-19 and provide continuity of care for an established patient. Services were provided through a video synchronous discussion virtually to substitute for in-person clinic visit.

## 2020-08-11 NOTE — PATIENT INSTRUCTIONS
Goals in preparing for bariatric surgery    You should be giving up all beverages that have carbonation, sugar, and caffeine. (Refer to the approved liquids list provided at initial visit)   You should be drinking 64 ounces of calorie free fluids per day. Suggestions include:  o Water (you may add fresh lemon or lime)  o Crystal Light  o [place] Liquid Water Enhancer  o Propel Zero  o Powerade Zero  o Isopure  o Blcdg0F  o SOBE Lifewater Zero  o Vitamin Water Zero  o Sugar Free Doug-Aid      You should be eating 4-6 times per day.  Three small meals plus 1-2 snacks per day is your goal. This balances your calories and nutrients evenly throughout the day and helps to boost your metabolism. Snacking is a good thing if you are choosing healthful options. Refer to the snack list provided at your initial visit. Aim for a protein at every snack, plus a fruit, vegetable or starch. You should be eating protein at every meal and snack.  Protein is typically found in animal sources, i.e. chicken, beef, pork, fish and seafood, eggs. It is also found in low-fat dairy sources such as skim or 1% milk, low-fat yogurt, low-fat cheese, and low-fat cottage cheese. Plant based sources of protein include peanut butter, beans, and soy. You should be utilizing the 9-inch plate method.  Eating on a smaller plate will help you control portion size. But what you put on your plate counts also. Make ¼ of your plate protein, ¼ starch and ½ the plate non-starchy vegetables. You should be eliminating caffeine.  Caffeine is dehydrating. After surgery, its very important to stay hydrated. Giving up caffeine before surgery will help you focus on the changes necessary to be successful after surgery. There are many decaffeinated coffee and tea products available in grocery stores. You should be reducing added fat and sugar in your diet.  Frying foods adds too much fat and calories.  Baking, broiling, or grilling meats add flavor

## 2020-08-19 NOTE — PROGRESS NOTES
Name_______________________________________Printed:____________________  Date and time of surgery___8/28 0800_____________________Arrival Time:__0600______________   1. The instructions given regarding when and if a patient needs to stop oral intake prior to surgery varies. Follow the specific instructions you were given                  ___Nothing to eat or to drink after Midnight the night before. _x___Endoscopy patient follow your DRS instructions-generally you will be doing a part of the prep after Midnight                   ____Carbo loading or ERAS instructions will be given to select patients-if you have been given those instructions -please do the following                           The evening before your surgery after dinner before midnight drink 40 ounces of gatorade. If you are diabetic use sugar free. The morning of surgery drink 40 ounces of water. This needs to be finished 3 hours prior to your surgery start time. 2. Take the following pills with a small sip of water on the morning of surgery__patient states takes all med in awilda around 7pm_________________________________________________                  Do not take blood pressure medications ending in pril or sartan the awilda prior to surgery or the morning of surgery_   3. Aspirin, Ibuprofen, Advil, Naproxen, Vitamin E and other Anti-inflammatory products and supplements should be stopped for 5 -7days before surgery or as directed by your physician. 4. Check with your Doctor regarding stopping Plavix, Coumadin,Eliquis, Lovenox,Effient,Pradaxa,Xarelto, Fragmin or other blood thinners and follow their instructions. 5. Do not smoke, and do not drink any alcoholic beverages 24 hours prior to surgery. This includes NA Beer. Refrain from the usage of any recreational drugs. 6. You may brush your teeth and gargle the morning of surgery. DO NOT SWALLOW WATER   7.  You MUST make arrangements for a responsible adult to stay on visitors will be limited to one in the room at any given time. 18.  Please bring picture ID and insurance card. 19.  Visit our web site for additional information:  [email or web address]              20.During flu season no children under the age of 15 are permitted in the hospital for the safety of all patients. 21. If you take a long acting insulin in the evening only  take half of your usual  dose the night  before your procedure              22. If you use a c-pap please bring DOS if staying overnight,             23.For your convenience [facility] has a pharmacy on site to fill your prescriptions. 24. If you use oxygen and have a portable tank please bring it  with you the DOS             25. Bring a complete list of all your medications with name and dose include any supplements. 26. Other__Patient instructed to get their COVID-19 test done as directed by their doctor (5-7 days prior to procedure)  or patient states will get on _8/24 scheduled_________. Patient was notified that they need to have an appointment,number to call provided. The day the COVID test is done is considered day one. Instructed to self quarantine after test until DOS. __There is a one visitor policy at [facility] for all surgeries and endoscopies. Whether the visitor can stay or will be asked to wait in the car will depend on the current policy and if social distancing can be maintained. The policy is subject to change at any time. Please make sure the visitor has a cell phone that is on,charged and able to accept calls, as this may be the way that the staff communicates with them. Pain management is NO VISITOR policyThe patients ride is expected to remain in the car with a cell phone for communication. If the ride is leaving the hospital grounds please make sure they are back in time for pickup.  Have the patient inform the staff on arrival what their rides plans are while the patient is in the facility. At the MAIN there is one visitor allowed. Please note that the visitor policy is subject to change.______________________________________   *Please call pre admission testing if you any further questions   Jerry Corbett [address]    [phone]  [phone]   [address] [place] [address]       All above information reviewed with patient in person or by phone. Patient verbalizes understanding. All questions and concerns addressed.                                                                                                  Patient/Rep____________________                                                                                                                      Per phone/pt              PRE OP INSTRUCTIONS

## 2020-08-24 ENCOUNTER — OFFICE VISIT (OUTPATIENT)
Dept: PRIMARY CARE CLINIC | Age: 40
End: 2020-08-24
Payer: COMMERCIAL

## 2020-08-24 PROCEDURE — 99211 OFF/OP EST MAY X REQ PHY/QHP: CPT | Performed by: NURSE PRACTITIONER

## 2020-08-24 PROCEDURE — G8417 CALC BMI ABV UP PARAM F/U: HCPCS | Performed by: NURSE PRACTITIONER

## 2020-08-24 PROCEDURE — G8428 CUR MEDS NOT DOCUMENT: HCPCS | Performed by: NURSE PRACTITIONER

## 2020-08-24 NOTE — PROGRESS NOTES
Jonas Brown received a viral test for COVID-19. They were educated on isolation and quarantine as appropriate. For any symptoms, they were directed to seek care from their PCP, given contact information to establish with a doctor, directed to an urgent care or the emergency room.

## 2020-08-24 NOTE — PATIENT INSTRUCTIONS
Advance Care Planning  People with COVID-19 may have no symptoms, mild symptoms, such as fever, cough, and shortness of breath or they may have more severe illness, developing severe and fatal pneumonia. As a result, Advance Care Planning with attention to naming a health care decision maker (someone you trust to make healthcare decisions for you if you could not speak for yourself) and sharing other health care preferences is important BEFORE a possible health crisis. Please contact your Primary Care Provider to discuss Advance Care Planning. Preventing the Spread of Coronavirus Disease 2019 in Homes and Residential Communities  For the most recent information go to [email or web address]    Prevention steps for People with confirmed or suspected COVID-19 (including persons under investigation) who do not need to be hospitalized  and   People with confirmed COVID-19 who were hospitalized and determined to be medically stable to go home    Your healthcare provider and public health staff will evaluate whether you can be cared for at home. If it is determined that you do not need to be hospitalized and can be isolated at home, you will be monitored by staff from your local or state health department. You should follow the prevention steps below until a healthcare provider or local or state health department says you can return to your normal activities. Stay home except to get medical care  People who are mildly ill with COVID-19 are able to isolate at home during their illness. You should restrict activities outside your home, except for getting medical care. Do not go to work, school, or public areas. Avoid using public transportation, ride-sharing, or taxis. Separate yourself from other people and animals in your home  People: As much as possible, you should stay in a specific room and away from other people in your home.  Also, you should use a separate before eating or preparing food. If soap and water are not readily available, use an alcohol-based hand  with at least 60% alcohol, covering all surfaces of your hands and rubbing them together until they feel dry. Soap and water are the best option if hands are visibly dirty. Avoid touching your eyes, nose, and mouth with unwashed hands. Avoid sharing personal household items  You should not share dishes, drinking glasses, cups, eating utensils, towels, or bedding with other people or pets in your home. After using these items, they should be washed thoroughly with soap and water. Clean all high-touch surfaces everyday  High touch surfaces include counters, tabletops, doorknobs, bathroom fixtures, toilets, phones, keyboards, tablets, and bedside tables. Also, clean any surfaces that may have blood, stool, or body fluids on them. Use a household cleaning spray or wipe, according to the label instructions. Labels contain instructions for safe and effective use of the cleaning product including precautions you should take when applying the product, such as wearing gloves and making sure you have good ventilation during use of the product. Monitor your symptoms  Seek prompt medical attention if your illness is worsening (e.g., difficulty breathing). Before seeking care, call your healthcare provider and tell them that you have, or are being evaluated for, COVID-19. Put on a facemask before you enter the facility. These steps will help the healthcare providers office to keep other people in the office or waiting room from getting infected or exposed. Ask your healthcare provider to call the local or state health department. Persons who are placed under active monitoring or facilitated self-monitoring should follow instructions provided by their local health department or occupational health professionals, as appropriate. When working with your local health department check their available hours.   If you have a medical emergency and need to call 911, notify the dispatch personnel that you have, or are being evaluated for COVID-19. If possible, put on a facemask before emergency medical services arrive. Discontinuing home isolation  Patients with confirmed COVID-19 should remain under home isolation precautions until the risk of secondary transmission to others is thought to be low. The decision to discontinue home isolation precautions should be made on a case-by-case basis, in consultation with healthcare providers and state and local health departments.

## 2020-08-25 LAB — SARS-COV-2, NAA: NOT DETECTED

## 2020-08-28 ENCOUNTER — ANESTHESIA EVENT (OUTPATIENT)
Dept: ENDOSCOPY | Age: 40
End: 2020-08-28
Payer: COMMERCIAL

## 2020-08-28 ENCOUNTER — HOSPITAL ENCOUNTER (OUTPATIENT)
Age: 40
Setting detail: OUTPATIENT SURGERY
Discharge: HOME | End: 2020-08-28
Attending: SURGERY | Admitting: SURGERY
Payer: COMMERCIAL

## 2020-08-28 ENCOUNTER — ANESTHESIA (OUTPATIENT)
Dept: ENDOSCOPY | Age: 40
End: 2020-08-28
Payer: COMMERCIAL

## 2020-08-28 VITALS
OXYGEN SATURATION: 98 % | DIASTOLIC BLOOD PRESSURE: 90 MMHG | SYSTOLIC BLOOD PRESSURE: 144 MMHG | RESPIRATION RATE: 1 BRPM

## 2020-08-28 VITALS
SYSTOLIC BLOOD PRESSURE: 134 MMHG | HEART RATE: 90 BPM | HEIGHT: 67 IN | TEMPERATURE: 98.5 F | DIASTOLIC BLOOD PRESSURE: 97 MMHG | RESPIRATION RATE: 18 BRPM | BODY MASS INDEX: 44.57 KG/M2 | OXYGEN SATURATION: 97 % | WEIGHT: 284 LBS

## 2020-08-28 LAB
GLUCOSE BLD-MCNC: 135 MG/DL (ref 70–99)
GLUCOSE BLD-MCNC: 157 MG/DL (ref 70–99)
HCG(URINE) PREGNANCY TEST: NEGATIVE
PERFORMED ON: ABNORMAL
PERFORMED ON: ABNORMAL

## 2020-08-28 PROCEDURE — 7100000010 HC PHASE II RECOVERY - FIRST 15 MIN: Performed by: SURGERY

## 2020-08-28 PROCEDURE — 3700000000 HC ANESTHESIA ATTENDED CARE: Performed by: SURGERY

## 2020-08-28 PROCEDURE — 84703 CHORIONIC GONADOTROPIN ASSAY: CPT

## 2020-08-28 PROCEDURE — 3609012400 HC EGD TRANSORAL BIOPSY SINGLE/MULTIPLE: Performed by: SURGERY

## 2020-08-28 PROCEDURE — 2709999900 HC NON-CHARGEABLE SUPPLY: Performed by: SURGERY

## 2020-08-28 PROCEDURE — 2580000003 HC RX 258: Performed by: SURGERY

## 2020-08-28 PROCEDURE — 6360000002 HC RX W HCPCS: Performed by: NURSE ANESTHETIST, CERTIFIED REGISTERED

## 2020-08-28 PROCEDURE — 7100000011 HC PHASE II RECOVERY - ADDTL 15 MIN: Performed by: SURGERY

## 2020-08-28 PROCEDURE — 43239 EGD BIOPSY SINGLE/MULTIPLE: CPT | Performed by: SURGERY

## 2020-08-28 PROCEDURE — 2580000003 HC RX 258: Performed by: NURSE ANESTHETIST, CERTIFIED REGISTERED

## 2020-08-28 PROCEDURE — 88305 TISSUE EXAM BY PATHOLOGIST: CPT

## 2020-08-28 PROCEDURE — 2500000003 HC RX 250 WO HCPCS: Performed by: NURSE ANESTHETIST, CERTIFIED REGISTERED

## 2020-08-28 RX ORDER — SODIUM CHLORIDE 9 MG/ML
1000 INJECTION, SOLUTION INTRAVENOUS ONCE
Status: COMPLETED | OUTPATIENT
Start: 2020-08-28 | End: 2020-08-28

## 2020-08-28 RX ORDER — SODIUM CHLORIDE 9 MG/ML
INJECTION, SOLUTION INTRAVENOUS CONTINUOUS PRN
Status: DISCONTINUED | OUTPATIENT
Start: 2020-08-28 | End: 2020-08-28 | Stop reason: SDUPTHER

## 2020-08-28 RX ORDER — PROPOFOL 10 MG/ML
INJECTION, EMULSION INTRAVENOUS PRN
Status: DISCONTINUED | OUTPATIENT
Start: 2020-08-28 | End: 2020-08-28 | Stop reason: SDUPTHER

## 2020-08-28 RX ORDER — LIDOCAINE HYDROCHLORIDE 20 MG/ML
INJECTION, SOLUTION INFILTRATION; PERINEURAL PRN
Status: DISCONTINUED | OUTPATIENT
Start: 2020-08-28 | End: 2020-08-28 | Stop reason: SDUPTHER

## 2020-08-28 RX ADMIN — PROPOFOL 50 MG: 10 INJECTION, EMULSION INTRAVENOUS at 08:21

## 2020-08-28 RX ADMIN — PROPOFOL 50 MG: 10 INJECTION, EMULSION INTRAVENOUS at 08:17

## 2020-08-28 RX ADMIN — PROPOFOL 50 MG: 10 INJECTION, EMULSION INTRAVENOUS at 08:18

## 2020-08-28 RX ADMIN — PROPOFOL 50 MG: 10 INJECTION, EMULSION INTRAVENOUS at 08:19

## 2020-08-28 RX ADMIN — SODIUM CHLORIDE: 9 INJECTION, SOLUTION INTRAVENOUS at 08:13

## 2020-08-28 RX ADMIN — LIDOCAINE HYDROCHLORIDE 100 MG: 20 INJECTION, SOLUTION INFILTRATION; PERINEURAL at 08:14

## 2020-08-28 RX ADMIN — PROPOFOL 50 MG: 10 INJECTION, EMULSION INTRAVENOUS at 08:15

## 2020-08-28 RX ADMIN — PROPOFOL 50 MG: 10 INJECTION, EMULSION INTRAVENOUS at 08:16

## 2020-08-28 RX ADMIN — PROPOFOL 50 MG: 10 INJECTION, EMULSION INTRAVENOUS at 08:14

## 2020-08-28 RX ADMIN — PROPOFOL 50 MG: 10 INJECTION, EMULSION INTRAVENOUS at 08:20

## 2020-08-28 RX ADMIN — SODIUM CHLORIDE 1000 ML: 9 INJECTION, SOLUTION INTRAVENOUS at 06:56

## 2020-08-28 ASSESSMENT — PULMONARY FUNCTION TESTS
PIF_VALUE: 1

## 2020-08-28 NOTE — ANESTHESIA POSTPROCEDURE EVALUATION
Department of Anesthesiology  Postprocedure Note    Patient: Aissatou Sheridan  MRN: [id number]  YOB: 1980  Date of evaluation: 8/28/2020  Time:  9:04 AM     Procedure Summary     Date:  08/28/20 Room / Location:  [address] [place] [address]    Anesthesia Start:  3699 Anesthesia Stop:  8691    Procedure:  EGD BIOPSY (N/A ) Diagnosis:  (GERD K21.9)    Surgeon:  Mary Gloria MD Responsible Provider:  Isabella Aguiar MD    Anesthesia Type:  MAC ASA Status:  3          Anesthesia Type: MAC    Aly Phase I: Aly Score: 10    Aly Phase II: Aly Score: 9    Last vitals: Reviewed and per EMR flowsheets.        Anesthesia Post Evaluation    Level of consciousness: awake  Complications: no

## 2020-08-28 NOTE — ANESTHESIA PRE PROCEDURE
Department of Anesthesiology  Preprocedure Note       Name:  Mansi Garces   Age:  46 y.o.  :  1980                                          MRN:  [id number]         Date:  2020      Surgeon: Yissel Vargas): Mamadou Quiñonez MD    Procedure: Procedure(s):  EGD ESOPHAGOGASTRODUODENOSCOPY    Medications prior to admission:   Prior to Admission medications    Medication Sig Start Date End Date Taking? Authorizing Provider   amLODIPine (NORVASC) 5 MG tablet Take 2 tablets by mouth daily. Patient taking differently: Take 2 tablets by mouth daily. At 7pm 20   Sorin Santillan DO   cimetidine (TAGAMET) 400 MG tablet Take 1 tablet by mouth 2 times daily  Patient taking differently: Take 400 mg by mouth 2 times daily Patient states takes once a day at 7pm 20   Sorin Santillan DO   losartan-hydrochlorothiazide (HYZAAR) 100-25 MG per tablet Take 1 tablet by mouth daily for high blood pressure.  l10 20   Sorin Santillan DO   atorvastatin (LIPITOR) 10 MG tablet Take 1 tablet by mouth daily  Patient taking differently: Take 10 mg by mouth nightly  20   Parisa Reaves MD   fluticasone [facility]) 50 MCG/ACT nasal spray SPRAY 1 SPRAY INTO EACH NOSTRIL EVERY DAY 20   Sorin Santillan DO   ondansetron (ZOFRAN-ODT) 4 MG disintegrating tablet Take 1 tablet by mouth 3 times daily as needed for Nausea or Vomiting 20   Delorise Barthel, MD   Cholecalciferol (VITAMIN D3) 25 MCG (1000 UT) CAPS Take by mouth daily    Historical Provider, MD   BIOTIN PO Take by mouth daily    Historical Provider, MD   COLLAGEN PO Take by mouth daily    Historical Provider, MD   Prenatal Vit-Fe Fumarate-FA (PRENATAL VITAMIN PO) Take by mouth daily    Historical Provider, MD   DULoxetine (CYMBALTA) 60 MG extended release capsule TAKE 1 CAPSULE BY MOUTH EVERY DAY  Patient taking differently: nightly  20   Sorin Santillan DO   oxybutynin (DITROPAN XL) 5 MG extended release tablet Take 1 tablet by mouth daily 20 Z82.49    Family hx of colon cancer Z80.0    Feels hot [id number]    Finding of above normal blood pressure R03.0    Gastro-esophageal reflux disease with esophagitis K21.0    Hypomagnesemia E83.42    Intolerant of heat R68.89    Kidney stone on left side N20.0    Left lumbar radiculitis M54.16    Low HDL (under 40) E78.6    Lumbosacral spondylosis without myelopathy M47.817    Myofascial muscle pain M79.18    Nausea R11.0    Pain in joint involving ankle and foot M25.579    Plantar fascial fibromatosis M72.2    Sebaceous cyst L72.3    Severe recurrent major depression without psychotic features ([facility]) F33.2    Tobacco use disorder F17.200    Polyneuropathy G62.9    Arthritis M19.90    Pain in both hands M79.641, M79.642    Morbid obesity with BMI of 45.0-49.9, adult ([facility]) E66.01, Z68.42    Chronic GERD K21.9    Diabetes mellitus type 2 in obese ([facility]) E11.69, E66.9    Hidradenitis suppurativa L73.2    Polyp of ascending colon D12.2    Other fatigue R53.83    SOB (shortness of breath) R06.02    Elevated liver enzymes R74.8       Past Medical History:        Diagnosis Date    Anxiety     Arthritis     Cervical pain     Chronic back pain     Depression     Diabetes mellitus ([facility] [address]     Hypertension     Infertility     took fertility medication    Kidney stone 03/2012    Migraine     Miscarriage     Polycystic ovarian syndrome 1999    PONV (postoperative nausea and vomiting)     Psoriasis     Stomach ulcer     Unspecified sleep apnea     can't tolerate CPAP       Past Surgical History:        Procedure Laterality Date    BREAST SURGERY Right     for cellulitis     CHOLECYSTECTOMY  2011    COLONOSCOPY  10/7/2015    [facility]col[facility]polyps    COLONOSCOPY N/A 6/18/2020    COLONOSCOPY WITH BIOPSY performed by Larissa Mahan MD at [address]  COLONOSCOPY N/A 6/18/2020    COLONOSCOPY POLYPECTOMY SNARE performed by Larissa Mahan MD at [facility] ENDOSCOPY    ME DILATION/CURETTAGE,DIAGNOSTIC  0     D & C/missed ab    SKIN BIOPSY      for psoriasis    TONSILLECTOMY  2016    tonsillectomy    UPPER GASTROINTESTINAL ENDOSCOPY  3/23/11    pyloretic    UPPER GASTROINTESTINAL ENDOSCOPY  10/7/2015    [address]    UPPER GASTROINTESTINAL ENDOSCOPY N/A 2020    EGD DIAGNOSTIC ONLY performed by Brenden Ross MD at [facility]. [address] History:    Social History     Tobacco Use    Smoking status: Former Smoker     Packs/day: 1.00     Years: 20.00     Pack years: 20.00     Types: Cigarettes     Last attempt to quit: 2019     Years since quittin.3    Smokeless tobacco: Never Used    Tobacco comment: -used chant-worked for 2 weeks   Substance Use Topics    Alcohol use: Not Currently     Alcohol/week: 0.0 standard drinks     Frequency: Monthly or less                                Counseling given: Not Answered  Comment: -used chant-worked for 2 weeks      Vital Signs (Current):   Vitals:    20 1433   Weight: 280 lb (127 kg)   Height: 5' 6.5\" (1.689 m)                                              BP Readings from Last 3 Encounters:   20 136/64   20 130/80   20 (!) 153/119       NPO Status:                                                                                 BMI:   Wt Readings from Last 3 Encounters:   20 280 lb (127 kg)   20 282 lb 8 oz (128.1 kg)   20 279 lb (126.6 kg)     Body mass index is 44.52 kg/m².     CBC:   Lab Results   Component Value Date    WBC 9.2 2020    RBC 4.69 2020    HGB 13.8 2020    HCT 40.5 2020    MCV 86.3 2020    RDW 13.5 2020     2020       CMP:   Lab Results   Component Value Date     2020    K 3.8 2020    K 3.6 02/10/2019    CL 99 2020    CO2 24 2020    BUN 12 2020    CREATININE 0.5 2020    GFRAA >60 2020    GFRAA >60 2012 AGRATIO 1.9 05/29/2020    LABGLOM >60 05/29/2020    GLUCOSE 148 05/29/2020    PROT 6.8 07/21/2020    PROT 6.0 09/07/2012    CALCIUM 10.0 05/29/2020    BILITOT 0.3 07/21/2020    ALKPHOS 70 07/21/2020    AST 35 07/21/2020    ALT 47 07/21/2020       POC Tests: No results for input(s): POCGLU, POCNA, POCK, POCCL, POCBUN, POCHEMO, POCHCT in the last 72 hours. Coags:   Lab Results   Component Value Date    PROTIME 10.9 07/21/2020    INR 0.94 07/21/2020    APTT 27.2 09/07/2012       HCG (If Applicable):   Lab Results   Component Value Date    PREGTESTUR Negative 08/28/2020        ABGs: No results found for: PHART, PO2ART, UGQ2GTV, HFS0ZUP, BEART, H5UNOLZZ     Type & Screen (If Applicable):  Lab Results   Component Value Date    LABABO O 09/09/2012    LABRH Negative 09/09/2012       Drug/Infectious Status (If Applicable):  No results found for: HIV, HEPCAB    COVID-19 Screening (If Applicable):   Lab Results   Component Value Date    COVID19 NOT DETECTED 08/24/2020         Anesthesia Evaluation   history of anesthetic complications:   Airway: Mallampati: II  TM distance: >3 FB   Neck ROM: full  Mouth opening: > = 3 FB Dental:          Pulmonary:   (+) sleep apnea:                             Cardiovascular:    (+) hypertension:,         Rhythm: regular  Rate: normal                    Neuro/Psych:   (+) neuromuscular disease:, headaches:, psychiatric history:            GI/Hepatic/Renal:   (+) GERD:, PUD,           Endo/Other:    (+) Diabetes, . Abdominal:   (+) obese,         Vascular:                                        Anesthesia Plan      MAC     ASA 3       Induction: intravenous. Anesthetic plan and risks discussed with patient. Plan discussed with CRNA.                   Samara Arana MD   8/28/2020

## 2020-08-28 NOTE — H&P
Department of General Surgery - Adult [address] [facility]   [employer]  Attending Pre-operative History and Physical      DIAGNOSIS:  Obesity    INDICATION:  Pre-op    PROCEDURE:  EGD    CHIEF COMPLAINT:  Obesity    History Obtained From:  patient    HISTORY OF PRESENT ILLNESS:    The patient is a 46 y.o. female with significant past medical history of   Patient Active Problem List   Diagnosis    PCOS (polycystic ovarian syndrome)    Allergic rhinitis    Obstructive sleep apnea    Panic attacks    Acute left flank pain    Syncope    Essential hypertension    Migraine    Chronic back pain    Tobacco dependence    Hypokalemia    Psoriasis    ESTELA (generalized anxiety disorder)    DM (diabetes mellitus) ([facility])    Obesity (BMI 35.0-39.9 without comorbidity)    Recurrent acute tonsillitis    Moderate episode of recurrent major depressive disorder ([facility])    Acute hyperglycemia    Cystitis, acute    Arthropathy of multiple sites    Blood in stool    Cellulitis of breast    Depressive disorder, not elsewhere classified    Diarrhea    Disorder of sweat glands    Excessive sweating    Family history of premature coronary heart disease    Family hx of colon cancer    Feels hot    Finding of above normal blood pressure    Gastro-esophageal reflux disease with esophagitis    Hypomagnesemia    Intolerant of heat    Kidney stone on left side    Left lumbar radiculitis    Low HDL (under 40)    Lumbosacral spondylosis without myelopathy    Myofascial muscle pain    Nausea    Pain in joint involving ankle and foot    Plantar fascial fibromatosis    Sebaceous cyst    Severe recurrent major depression without psychotic features ([facility])    Tobacco use disorder    Polyneuropathy    Arthritis    Pain in both hands    Morbid obesity with BMI of 45.0-49.9, adult ([facility])    Chronic GERD    Diabetes mellitus type 2 in obese ([facility])    Hidradenitis suppurativa  Polyp of ascending colon    Other fatigue    SOB (shortness of breath)    Elevated liver enzymes      who presents for pre-op EGD    Past Medical History:        Diagnosis Date    Anxiety     Arthritis     Cervical pain     Chronic back pain     Depression     Diabetes mellitus ([address]     Hypertension     Infertility     took fertility medication    Kidney stone 03/2012    Migraine     Miscarriage     Polycystic ovarian syndrome 1999    PONV (postoperative nausea and vomiting)     Psoriasis     Stomach ulcer     Unspecified sleep apnea     can't tolerate CPAP     Past Surgical History:        Procedure Laterality Date    BREAST SURGERY Right     for cellulitis     CHOLECYSTECTOMY  2011    COLONOS[facility]  [facility]7/[facility]ps    COLONOSCOPY N/A 6/18/2020    COLONOSCOPY WITH BIOPSY performed by Placido Ponce MD at [facility] [address] N/A 6/18/2020    COLONOSCOPY POLYPECTOMY [address] performed by Placido Ponce MD at [address]     D & C/missed ab   [facility]      for psoriasis    TONSILLECTOMY  08/09/2016    tonsillectomy    UPPER GASTROINTESTINAL ENDOSCOPY  3/23/11    pyloretic    UPPER GASTROINTESTINAL ENDOSCOPY  10/7/2015    [facility]astritis[facility]    UPPER GASTROINTESTINAL ENDOSCOPY N/A 6/18/2020    EGD DIAGNOSTIC ONLY performed by Placido Ponce MD at [address]     Medications Prior to Admission:   Medications Prior to Admission: amLODIPine (NORVASC) 5 MG tablet, Take 2 tablets by mouth daily. (Patient taking differently: Take 2 tablets by mouth daily. At 7pm)  cimetidine (TAGAMET) 400 MG tablet, Take 1 tablet by mouth 2 times daily (Patient taking differently: Take 400 mg by mouth 2 times daily Patient states takes once a day at 7pm)  losartan-hydrochlorothiazide (HYZAAR) 100-25 MG per tablet, Take 1 tablet by mouth daily for high blood pressure. l10  atorvastatin (LIPITOR) 10 MG tablet, Take 1 tablet by mouth daily (Patient taking differently: Take 10 mg by mouth nightly )  fluticasone (FLONASE) 50 MCG/ACT nasal spray, SPRAY 1 SPRAY INTO EACH NOSTRIL EVERY DAY  Cholecalciferol (VITAMIN D3) 25 MCG (1000 UT) CAPS, Take by mouth daily  BIOTIN PO, Take by mouth daily  COLLAGEN PO, Take by mouth daily  Prenatal Vit-Fe Fumarate-FA (PRENATAL VITAMIN PO), Take by mouth daily  DULoxetine (CYMBALTA) 60 MG extended release capsule, TAKE 1 CAPSULE BY MOUTH EVERY DAY (Patient taking differently: nightly )  oxybutynin (DITROPAN XL) 5 MG extended release tablet, Take 1 tablet by mouth daily  hydrALAZINE (APRESOLINE) 10 MG tablet, TAKE 1 TABLET BY MOUTH EVERY DAY FOR 5 DAYS THEN INCREASE TO TAKE 1 TABLET BY MOUTH TWICE A DAY (Patient taking differently: TAKE 1 TABLET BY MOUTH EVERY DAY FOR 5 DAYS THEN INCREASE TO TAKE 1 TABLET BY MOUTH TWICE A DAY  Patient states only takes omce a day)  metFORMIN (GLUCOPHAGE-XR) 750 MG extended release tablet, 2 in AM  levonorgestrel-ethinyl estradiol (JOLESSA) 0.15-0.03 MG per tablet, Take 1 tablet by mouth  ondansetron (ZOFRAN-ODT) 4 MG disintegrating tablet, Take 1 tablet by mouth 3 times daily as needed for Nausea or Vomiting    Allergies:  Lisinopril and Tramadol    Social History:   TOBACCO:   reports that she quit smoking about 16 months ago. Her smoking use included cigarettes. She has a 20.00 pack-year smoking history. She has never used smokeless tobacco.  ETOH:   reports previous alcohol use.   Family History:       Problem Relation Age of Onset    Arthritis Mother     [de-identified] / [place] Mother     High Blood Pressure Mother     Depression Mother     High Cholesterol Mother     Arthritis Father     High Blood Pressure Father     Cancer Father         colon    Hearing Loss Father     Heart Disease Father     High Cholesterol Father     High Blood Pressure Brother     High Cholesterol Brother     High Blood Pressure Maternal Aunt     High Cholesterol Maternal Aunt     Miscarriages / Stillbirths Maternal Aunt     Mental Retardation Maternal Uncle     High Blood Pressure Maternal Uncle     High Cholesterol Maternal Uncle     High Blood Pressure Paternal Aunt     High Cholesterol Paternal Aunt     Arthritis Paternal Uncle     Mental Illness Paternal Uncle     High Blood Pressure Paternal Uncle     Birth Defects Paternal Uncle     High Cholesterol Paternal Uncle     Learning Disabilities Paternal Uncle     Arthritis Maternal Grandmother     High Blood Pressure Maternal Grandmother     Diabetes Maternal Grandmother     High Cholesterol Maternal Grandmother     Miscarriages / Stillbirths Maternal Grandmother     Arthritis Maternal Grandfather     High Blood Pressure Maternal Grandfather     High Cholesterol Maternal Grandfather     Arthritis Paternal Grandmother     High Blood Pressure Paternal Grandmother     Heart Disease Paternal Grandmother     High Cholesterol Paternal Grandmother    [de-identified] / Stillbirths Paternal Grandmother     Stroke Paternal Grandmother     High Blood Pressure Paternal Grandfather     High Cholesterol Paternal Grandfather          REVIEW OF SYSTEMS:    Review of Systems - History obtained from the patient  General ROS: negative  Psychological ROS: negative  Ophthalmic ROS: negative  Neurological ROS: negative  ENT ROS: negative  Allergy and Immunology ROS: negative  Hematological and Lymphatic ROS: negative  Endocrine ROS: negative  Breast ROS: negative  Respiratory ROS: negative  Cardiovascular ROS: negative  Gastrointestinal ROS:negative  Genito-Urinary ROS: negative  Musculoskeletal ROS: negative   Skin ROS: negative      PHYSICAL EXAM:      /84   Pulse 85   Temp 97.5 °F (36.4 °C)   Resp 16   Ht 5' 6.5\" (1.689 m)   Wt 284 lb (128.8 kg)   LMP 07/04/2020   SpO2 97%   BMI 45.15 kg/m²  I      Physical Exam   Vitals Reviewed   Constitutional: Patient is oriented to person, place, and time. Patient appears well-developed and well-nourished. Patient is active and cooperative. Non-toxic appearance. No distress. HENT:   Head: Normocephalic and atraumatic. Head is without abrasion and without laceration. Hair is normal.   Right Ear: External ear normal. No lacerations. No drainage, swelling . Left Ear: External ear normal. No lacerations. No drainage, swelling. Nose: Nose normal. No nose lacerations or nasal deformity. Eyes: Conjunctivae, EOM and lids are normal. Right eye exhibits no discharge. No foreign body present in the right eye. Left eye exhibits no discharge. No foreign body present in the left eye. No scleral icterus. Neck: Trachea normal and normal range of motion. No JVD present. Pulmonary/Chest: Effort normal. No accessory muscle usage or stridor. No apnea. No respiratory distress. Cardiovascular: Normal rate and no JVD. Abdominal: Normal appearance. Patient exhibits no distension. Musculoskeletal: Normal range of motion. Patient exhibits no edema. Neurological: Patient is alert and oriented to person, place, and time. Patient has normal strength. GCS eye subscore is 4. GCS verbal subscore is 5. GCS motor subscore is 6. Skin: Skin is warm and dry. No abrasion and no rash noted. Patient is not diaphoretic. No cyanosis or erythema. Psychiatric: Patient has a normal mood and affect.  Speech is normal and behavior is normal. Cognition and memory are normal.       DATA:  CBC:   Lab Results   Component Value Date    WBC 9.2 07/21/2020    RBC 4.69 07/21/2020    HGB 13.8 07/21/2020    HCT 40.5 07/21/2020    MCV 86.3 07/21/2020    MCH 29.3 07/21/2020    MCHC 34.0 07/21/2020    RDW 13.5 07/21/2020     07/21/2020    MPV 8.0 07/21/2020     CMP:    Lab Results   Component Value Date     05/29/2020    K 3.8 05/29/2020    K 3.6 02/10/2019    CL 99 05/29/2020    CO2 24 05/29/2020    BUN 12 05/29/2020    CREATININE 0.5 05/29/2020

## 2020-09-08 RX ORDER — HYDROXYZINE 50 MG/1
TABLET, FILM COATED ORAL
Qty: 30 TABLET | Refills: 1 | Status: SHIPPED | OUTPATIENT
Start: 2020-09-08 | End: 2020-12-17 | Stop reason: SDUPTHER

## 2020-09-14 ENCOUNTER — OFFICE VISIT (OUTPATIENT)
Dept: CARDIOLOGY CLINIC | Age: 40
End: 2020-09-14
Payer: COMMERCIAL

## 2020-09-14 VITALS
WEIGHT: 281.5 LBS | DIASTOLIC BLOOD PRESSURE: 74 MMHG | HEART RATE: 94 BPM | SYSTOLIC BLOOD PRESSURE: 128 MMHG | BODY MASS INDEX: 44.75 KG/M2 | OXYGEN SATURATION: 98 %

## 2020-09-14 PROCEDURE — G8427 DOCREV CUR MEDS BY ELIG CLIN: HCPCS | Performed by: NURSE PRACTITIONER

## 2020-09-14 PROCEDURE — G8417 CALC BMI ABV UP PARAM F/U: HCPCS | Performed by: NURSE PRACTITIONER

## 2020-09-14 PROCEDURE — 1036F TOBACCO NON-USER: CPT | Performed by: NURSE PRACTITIONER

## 2020-09-14 PROCEDURE — 99214 OFFICE O/P EST MOD 30 MIN: CPT | Performed by: NURSE PRACTITIONER

## 2020-09-14 ASSESSMENT — ENCOUNTER SYMPTOMS
GASTROINTESTINAL NEGATIVE: 1
RESPIRATORY NEGATIVE: 1

## 2020-09-14 NOTE — PATIENT INSTRUCTIONS
Ok to proceed from cardiac perspective for surgery  Stay active along with a healthy diet  Continue current medications  Follow up in 8 months

## 2020-09-14 NOTE — PROGRESS NOTES
[address]   Cardiology Note              Date:  September 14, 2020  Patientname: Koko Siddiqi  YOB: 1980    Primary Care physician: Laya De Jesus DO    HISTORY OF PRESENT ILLNESS: Koko Siddiqi is a 46 y.o. female with a history of CAD, HTN, HLD, DM. She was referred to cardiology for pre op evaluation for bariatric surgery. CT calcium score 7/21/2020 was 54. Echo showed EF 55% in 7/2020. Stress test normal in 7/2020. Today she presents for follow up for CAD, HTN, HLD. Denies chest pain, shortness of breath, palpitations and dizziness. She is increasing activity and is up to walking 2 miles a day. She is waiting to schedule bariatric surgery date. Past Medical History:   has a past medical history of Anxiety, Arthritis, Cervical pain, Chronic back pain, Depression, Diabetes mellitus ([address] Hypertension, Infertility, Kidney stone, Migraine, Miscarriage, Polycystic ovarian syndrome, PONV (postoperative nausea and vomiting), Psoriasis, Stomach ulcer, and Unspecified sleep apnea. Past Surgical History:   has a past surgical history that includes pr dilation/curettage,diagnostic (1998 ); skin biopsy; Cholecystectomy (2011); Colonoscopy (10/7/2015); Upper gastrointestinal endoscopy (3/23/11); Upper gastrointestinal endoscopy (10/7/2015); Tonsillectomy (08/09/2016); Breast surgery (Right); Upper gastrointestinal endoscopy (N/A, 6/18/2020); Colonoscopy (N/A, 6/18/2020); Colonoscopy (N/A, 6/18/2020); and Upper gastrointestinal endoscopy (N/A, 8/28/2020). Home Medications:    Prior to Admission medications    Medication Sig Start Date End Date Taking? Authorizing Provider   hydrOXYzine (ATARAX) 50 MG tablet 1 twice daily as needed anxiety. 9/8/20   Sorin Santillan, DO   amLODIPine (NORVASC) 5 MG tablet Take 2 tablets by mouth daily. Patient taking differently: Take 2 tablets by mouth daily.  At 7pm 7/28/20   Sorin Santillan, DO   cimetidine (TAGAMET) 400 MG tablet Take 1 tablet by mouth 2 times daily  Patient taking differently: Take 400 mg by mouth 2 times daily Patient states takes once a day at 7pm 7/28/20   Sorin Santillan DO   losartan-hydrochlorothiazide (HYZAAR) 100-25 MG per tablet Take 1 tablet by mouth daily for high blood pressure.  l10 7/27/20   Sorin Santillan DO   atorvastatin (LIPITOR) 10 MG tablet Take 1 tablet by mouth daily  Patient taking differently: Take 10 mg by mouth nightly  7/23/20   Gayla Mccormack MD   fluticasone Bretta Lambert) 50 MCG/ACT nasal spray SPRAY 1 SPRAY INTO EACH NOSTRIL EVERY DAY 7/7/20   Sorin Santillan DO   ondansetron (ZOFRAN-ODT) 4 MG disintegrating tablet Take 1 tablet by mouth 3 times daily as needed for Nausea or Vomiting 6/18/20   Lauri Billings MD   Cholecalciferol (VITAMIN D3) 25 MCG (1000 UT) CAPS Take by mouth daily    Historical Provider, MD   BIOTIN PO Take by mouth daily    Historical Provider, MD   COLLAGEN PO Take by mouth daily    Historical Provider, MD   Prenatal Vit-Fe Fumarate-FA (PRENATAL VITAMIN PO) Take by mouth daily    Historical Provider, MD   DULoxetine (CYMBALTA) 60 MG extended release capsule TAKE 1 CAPSULE BY MOUTH EVERY DAY  Patient taking differently: nightly  6/8/20   Sorin Santillan DO   oxybutynin (DITROPAN XL) 5 MG extended release tablet Take 1 tablet by mouth daily 5/27/20   Cornealexis Rater, MD   hydrALAZINE (APRESOLINE) 10 MG tablet TAKE 1 TABLET BY MOUTH EVERY DAY FOR 5 DAYS THEN INCREASE TO TAKE 1 TABLET BY MOUTH TWICE A DAY  Patient taking differently: TAKE 1 TABLET BY MOUTH EVERY DAY FOR 5 DAYS THEN INCREASE TO TAKE 1 TABLET BY MOUTH TWICE A DAY    Patient states only takes omce a day 5/4/20   Sorin Santillan DO   metFORMIN (GLUCOPHAGE-XR) 750 MG extended release tablet 2 in AM 1/22/18   Sorin Santillan DO   levonorgestrel-ethinyl estradiol (Antonyelene Ficks) 0.15-0.03 MG per tablet Take 1 tablet by mouth    Historical Provider, MD     Allergies:  Lisinopril and Tramadol    Social History:   reports that she quit smoking about 16 months ago. Her smoking use included cigarettes. She has a 20.00 pack-year smoking history. She has never used smokeless tobacco. She reports previous alcohol use. She reports current drug use. Frequency: 4.00 times per week. Drug: Marijuana. Family History: family history includes Arthritis in her father, maternal grandfather, maternal grandmother, mother, paternal grandmother, and paternal uncle; Birth Defects in her paternal uncle; Cancer in her father; Depression in her mother; Diabetes in her maternal grandmother; Hearing Loss in her father; Heart Disease in her father and paternal grandmother; High Blood Pressure in her brother, father, maternal aunt, maternal grandfather, maternal grandmother, maternal uncle, mother, paternal aunt, paternal grandfather, paternal grandmother, and paternal uncle; High Cholesterol in her brother, father, maternal aunt, maternal grandfather, maternal grandmother, maternal uncle, mother, paternal aunt, paternal grandfather, paternal grandmother, and paternal uncle; Learning Disabilities in her paternal uncle; Mental Illness in her paternal uncle; Mental Retardation in her maternal uncle; Linh Juniper / Merilynn Gu in her maternal aunt, maternal grandmother, mother, and paternal grandmother; Stroke in her paternal grandmother. Review of Systems   Review of Systems   Constitutional: Negative. Respiratory: Negative. Cardiovascular: Negative. Gastrointestinal: Negative. Neurological: Negative.       OBJECTIVE:    Vital signs:    /74   Pulse 94   Wt 281 lb 8 oz (127.7 kg)   SpO2 98%   BMI 44.75 kg/m²      Physical Exam:  Constitutional:  Comfortable and alert, NAD, appears stated age, obesity  Eyes: PERRL, sclera nonicteric  Neck:  Supple, no masses, no thyroidmegaly, no JVD  Skin:  Warm and dry; no rash or lesions  Heart: Regular, normal apex, S1 and S2 normal, no M/G/R  Lungs:  Normal respiratory effort; clear; no

## 2020-09-15 ENCOUNTER — INITIAL CONSULT (OUTPATIENT)
Dept: BARIATRICS/WEIGHT MGMT | Age: 40
End: 2020-09-15
Payer: COMMERCIAL

## 2020-09-15 ENCOUNTER — VIRTUAL VISIT (OUTPATIENT)
Dept: BARIATRICS/WEIGHT MGMT | Age: 40
End: 2020-09-15
Payer: COMMERCIAL

## 2020-09-15 PROCEDURE — 90791 PSYCH DIAGNOSTIC EVALUATION: CPT | Performed by: PSYCHOLOGIST

## 2020-09-15 PROCEDURE — 99213 OFFICE O/P EST LOW 20 MIN: CPT | Performed by: NURSE PRACTITIONER

## 2020-09-15 PROCEDURE — G8427 DOCREV CUR MEDS BY ELIG CLIN: HCPCS | Performed by: NURSE PRACTITIONER

## 2020-09-15 PROCEDURE — 2022F DILAT RTA XM EVC RTNOPTHY: CPT | Performed by: NURSE PRACTITIONER

## 2020-09-15 PROCEDURE — 3044F HG A1C LEVEL LT 7.0%: CPT | Performed by: NURSE PRACTITIONER

## 2020-09-15 ASSESSMENT — ENCOUNTER SYMPTOMS
EYES NEGATIVE: 1
RESPIRATORY NEGATIVE: 1
GASTROINTESTINAL NEGATIVE: 1

## 2020-09-15 NOTE — PROGRESS NOTES
[facility]) Physicians   [employer] [facility] [employer]    9/15/2020    TELEHEALTH EVALUATION -- Audio/Visual (During WAOGY-05 public health emergency)    Subjective:      Patient ID: Koko Siddiqi is a 46 y.o. female has requested an audio/video evaluation. HPI    Due to the COVID-19 restrictions on close contact interactions the patient's monthly presurgical visit was conducted via audio/video in carmenza of a face to face visit. Patient has consented to have this visit conducted via audio/video. The patient is here through telemedicine for their bariatric surgery presurgical visit for future weight loss. She has made several attempts at weight loss in the past without success and now wishes to pursue bariatric surgery. She is working to change her dietary behaviors and lose weight to improve comorbid conditions. Koko Siddiqi is a 46 y.o. female with current BMI of 44.7 and weight of 281 pounds.     Past Medical History:   Diagnosis Date    Anxiety     Arthritis     Cervical pain     Chronic back pain     Depression     Diabetes mellitus ([address]     Hypertension     Infertility     took fertility medication    Kidney stone 03/2012    Migraine     Miscarriage     Polycystic ovarian syndrome 1999    PONV (postoperative nausea and vomiting)     Psoriasis     Stomach ulcer     Unspecified sleep apnea     can't tolerate CPAP     Past Surgical History:   Procedure Laterality Date    BREAST SURGERY Right     for cellulitis     CHOLECYSTECTOMY  2011    [facility]    COLONOSCOPY N/A 6/18/2020    COLONOSCOPY WITH BIOPSY performed by Lauren Bolaños MD at [address] [facility] N/A 6/18/2020    COLONOSCOPY POLYPECTOMY SNARE performed by Lauren Bolaños MD at [address] [facility] C/missed ab    SKIN BIOPSY      for psoriasis    TONSILLECTOMY  08/09/2016    tonsillectomy    UPPER GASTROINTESTINAL ENDOSCOPY  3/23/11    pyloretic    UPPER GASTROINTESTINAL ENDOSCOPY  10/7/2015    [facility]    UPPER GASTROINTESTINAL ENDOSCOPY N/A 6/18/2020    EGD DIAGNOSTIC ONLY performed by Matthew Schmid MD at [address] N/A 8/28/2020    EGD BIOPSY performed by Halima Reynolds MD at [address] [facility] History   Problem Relation Age of Onset    Arthritis Mother     Miscarriages / [place] Mother     High Blood Pressure Mother     Depression Mother     High Cholesterol Mother     Arthritis Father     High Blood Pressure Father     Cancer Father         colon    Hearing Loss Father     Heart Disease Father     High Cholesterol Father     High Blood Pressure Brother     High Cholesterol Brother     High Blood Pressure Maternal Aunt     High Cholesterol Maternal Aunt     Miscarriages / Stillbirths Maternal Aunt     Mental Retardation Maternal Uncle     High Blood Pressure Maternal Uncle     High Cholesterol Maternal Uncle     High Blood Pressure Paternal Aunt     High Cholesterol Paternal Aunt     Arthritis Paternal Uncle     Mental Illness Paternal Uncle     High Blood Pressure Paternal Uncle     Birth Defects Paternal Uncle     High Cholesterol Paternal Uncle     Learning Disabilities Paternal Uncle     Arthritis Maternal Grandmother     High Blood Pressure Maternal Grandmother     Diabetes Maternal Grandmother     High Cholesterol Maternal Grandmother     Miscarriages / Stillbirths Maternal Grandmother     Arthritis Maternal Grandfather     High Blood Pressure Maternal Grandfather     High Cholesterol Maternal Grandfather     Arthritis Paternal Grandmother     High Blood Pressure Paternal Grandmother     Heart Disease Paternal Grandmother     High Cholesterol Paternal Grandmother     Miscarriages / Stillbirths Paternal Grandmother     Stroke Paternal Grandmother     High Blood Pressure Paternal Grandfather     High Cholesterol Paternal Grandfather      Social History     Tobacco Use    Smoking status: Former Smoker     Packs/day: 1.00     Years: 20.00     Pack years: 20.00     Types: Cigarettes     Last attempt to quit: 2019     Years since quittin.4    Smokeless tobacco: Never Used    Tobacco comment: -used chant-worked for 2 weeks   Substance Use Topics    Alcohol use: Not Currently     Alcohol/week: 0.0 standard drinks     Frequency: Monthly or less     I counseled the patient on the importance of not smoking and risks of ETOH. Allergies   Allergen Reactions    Lisinopril Other (See Comments)     COUGH    Tramadol Other (See Comments)     severe  SEVERE HEADACHE     There were no vitals filed for this visit. There is no height or weight on file to calculate BMI. Current Outpatient Medications:     hydrOXYzine (ATARAX) 50 MG tablet, 1 twice daily as needed anxiety. , Disp: 30 tablet, Rfl: 1    amLODIPine (NORVASC) 5 MG tablet, Take 2 tablets by mouth daily. (Patient taking differently: Take 2 tablets by mouth daily. At 7pm), Disp: 180 tablet, Rfl: 1    cimetidine (TAGAMET) 400 MG tablet, Take 1 tablet by mouth 2 times daily (Patient taking differently: Take 400 mg by mouth 2 times daily Patient states takes once a day at 7pm), Disp: 180 tablet, Rfl: 1    losartan-hydrochlorothiazide (HYZAAR) 100-25 MG per tablet, Take 1 tablet by mouth daily for high blood pressure.  l10, Disp: 90 tablet, Rfl: 1    atorvastatin (LIPITOR) 10 MG tablet, Take 1 tablet by mouth daily (Patient taking differently: Take 10 mg by mouth nightly ), Disp: 30 tablet, Rfl: 5    fluticasone (FLONASE) 50 MCG/ACT nasal spray, SPRAY 1 SPRAY INTO EACH NOSTRIL EVERY DAY, Disp: 1 Bottle, Rfl: 2    ondansetron (ZOFRAN-ODT) 4 MG disintegrating tablet, Take 1 tablet by mouth 3 times daily as needed for Nausea or Vomiting, Disp: 40 tablet, Rfl: 1    Cholecalciferol (VITAMIN D3) 25 MCG (1000 UT) CAPS, Take by mouth daily, Disp: , Rfl:     BIOTIN PO, Take by mouth daily, Disp: , Rfl:     COLLAGEN PO, Take by mouth daily, Disp: , Rfl:     Prenatal Vit-Fe Fumarate-FA (PRENATAL VITAMIN PO), Take by mouth daily, Disp: , Rfl:     DULoxetine (CYMBALTA) 60 MG extended release capsule, TAKE 1 CAPSULE BY MOUTH EVERY DAY, Disp: 90 capsule, Rfl: 1    oxybutynin (DITROPAN XL) 5 MG extended release tablet, Take 1 tablet by mouth daily, Disp: 30 tablet, Rfl: 3    hydrALAZINE (APRESOLINE) 10 MG tablet, TAKE 1 TABLET BY MOUTH EVERY DAY FOR 5 DAYS THEN INCREASE TO TAKE 1 TABLET BY MOUTH TWICE A DAY (Patient taking differently: TAKE 1 TABLET BY MOUTH EVERY DAY FOR 5 DAYS THEN INCREASE TO TAKE 1 TABLET BY MOUTH TWICE A DAY  Patient states only takes omce a day), Disp: 60 tablet, Rfl: 5    metFORMIN (GLUCOPHAGE-XR) 750 MG extended release tablet, 2 in AM, Disp: 60 tablet, Rfl: 3    levonorgestrel-ethinyl estradiol (JOLESSA) 0.15-0.03 MG per tablet, Take 1 tablet by mouth, Disp: , Rfl:     Lab Results   Component Value Date    WBC 9.2 07/21/2020    RBC 4.69 07/21/2020    HGB 13.8 07/21/2020    HCT 40.5 07/21/2020    MCV 86.3 07/21/2020    MCH 29.3 07/21/2020    MCHC 34.0 07/21/2020    MPV 8.0 07/21/2020    NEUTOPHILPCT 64.9 07/21/2020    LYMPHOPCT 26.6 07/21/2020    MONOPCT 6.3 07/21/2020    EOSRELPCT 1.3 07/21/2020    BASOPCT 0.9 07/21/2020    NEUTROABS 6.0 07/21/2020    LYMPHSABS 2.5 07/21/2020    MONOSABS 0.6 07/21/2020    EOSABS 0.1 07/21/2020     Lab Results   Component Value Date     05/29/2020    K 3.8 05/29/2020    K 3.6 02/10/2019    CL 99 05/29/2020    CO2 24 05/29/2020    ANIONGAP 14 05/29/2020    GLUCOSE 148 05/29/2020    BUN 12 05/29/2020    CREATININE 0.5 05/29/2020    LABGLOM >60 05/29/2020    GFRAA >60 05/29/2020    GFRAA >60 09/07/2012    CALCIUM 10.0 05/29/2020    PROT 6.8 07/21/2020    PROT 6.0 09/07/2012    LABALBU 4.2 07/21/2020    AGRATIO 1.9 05/29/2020    BILITOT 0.3 07/21/2020    ALKPHOS 70 07/21/2020    ALT 47 07/21/2020    AST 35 07/21/2020    GLOB 2.3 05/29/2020     Lab Results   Component Value Date    CHOL 154 07/21/2020    TRIG 246 07/21/2020    HDL 41 07/21/2020    HDL 38 07/20/2011    LDLCALC 64 07/21/2020    LABVLDL 49 07/21/2020     Lab Results   Component Value Date    TSHREFLEX 1.59 07/21/2020     Lab Results   Component Value Date    IRON 71 07/21/2020    TIBC 405 07/21/2020    LABIRON 18 07/21/2020     Lab Results   Component Value Date    WNQWBKXY24 690 05/29/2020    FOLATE >20.00 05/29/2020     Lab Results   Component Value Date    VITD25 33.8 05/29/2020     Lab Results   Component Value Date    LABA1C 6.5 05/06/2020    .9 05/06/2020       Review of Systems   Constitutional: Negative. HENT: Negative. Eyes: Negative. Respiratory: Negative. Cardiovascular: Negative. Gastrointestinal: Negative. Skin: Negative. Neurological: Negative. PHYSICAL EXAMINATION:    Constitutional: [x] Appears well-developed and well-nourished [x] No apparent distress      [] Abnormal-   Mental status  [x] Alert and awake  [x] Oriented to person/place/time [x]Able to follow commands      Eyes:  EOM    [x]  Normal  [] Abnormal-  Sclera  [x]  Normal  [] Abnormal -         Discharge [x]  None visible  [] Abnormal -    HENT:   [x] Normocephalic, atraumatic.   [] Abnormal   [x] Mouth/Throat: Mucous membranes are moist.     External Ears [x] Normal  [] Abnormal-     Neck: [x] No visualized mass     Pulmonary/Chest: [x] Respiratory effort normal.  [x] No visualized signs of difficulty breathing or respiratory distress        [] Abnormal-      Musculoskeletal:   [] Normal gait with no signs of ataxia         [x] Normal range of motion of neck        [] Abnormal-     Neurological:        [x] No Facial Asymmetry (Cranial nerve 7 motor function) (limited exam to video visit)          [x] No gaze palsy        [] Abnormal-         Skin:        [x] No significant exanthematous lesions or discoloration noted on facial skin         [] Abnormal-            Psychiatric:       [x] Normal Affect [x] No Hallucinations        [] Abnormal-     Other pertinent observable physical exam findings-     Due to this being a TeleHealth encounter, evaluation of the following organ systems is limited: Vitals/Constitutional/EENT/Resp/CV/GI//MS/Neuro/Skin/Heme-Lymph-Imm. Assessment and Plan:   Patient is here for their 4th presurgery visit for sleeve via telemedicine, up 0.8 lbs. She is making dietary and behavior modifications. She will work on increasing her fluids. She talked with the registered dietitian for continued follow up. I agree with recommendations and plan. She is exercising with walking. Encouraged physical activity. Discussed preop work up which still needs psych evaluation (done today), sleep clearanc (using CPAP), marijuana cessation (explained to patient that they must be marijuana free for at least 8 weeks prior to scheduling surgery) and protein sample. We reviewed her EGD pathology which was unremarkable. We will see her back in 1 month for continued follow up or through telemedicine. A total of 15 minutes was spent conversing with the patient and over half of that time was spent counseling the patient on proper dietary behaviors, exercise and preoperative work-up. An electronic signature was used to authenticate this note. Pursuant to the emergency declaration under the [address]5 waiver authority and the [employer] Act, this Virtual  Visit was conducted, with patient's consent, to reduce the patient's risk of exposure to COVID-19 and provide continuity of care for an established patient. Services were provided through a video synchronous discussion virtually to substitute for in-person clinic visit.

## 2020-09-15 NOTE — PATIENT INSTRUCTIONS
Goals in preparing for bariatric surgery    You should be giving up all beverages that have carbonation, sugar, and caffeine. (Refer to the approved liquids list provided at initial visit)   You should be drinking 64 ounces of calorie free fluids per day. Suggestions include:  o Water (you may add fresh lemon or lime)  o Crystal Light  o [place] Liquid Water Enhancer  o Propel Zero  o Powerade Zero  o Isopure  o Gumgl0H  o SOBE Lifewater Zero  o Vitamin Water Zero  o Sugar Free Doug-Aid      You should be eating 4-6 times per day.  Three small meals plus 1-2 snacks per day is your goal. This balances your calories and nutrients evenly throughout the day and helps to boost your metabolism. Snacking is a good thing if you are choosing healthful options. Refer to the snack list provided at your initial visit. Aim for a protein at every snack, plus a fruit, vegetable or starch. You should be eating protein at every meal and snack.  Protein is typically found in animal sources, i.e. chicken, beef, pork, fish and seafood, eggs. It is also found in low-fat dairy sources such as skim or 1% milk, low-fat yogurt, low-fat cheese, and low-fat cottage cheese. Plant based sources of protein include peanut butter, beans, and soy. You should be utilizing the 9-inch plate method.  Eating on a smaller plate will help you control portion size. But what you put on your plate counts also. Make ¼ of your plate protein, ¼ starch and ½ the plate non-starchy vegetables. You should be eliminating caffeine.  Caffeine is dehydrating. After surgery, its very important to stay hydrated. Giving up caffeine before surgery will help you focus on the changes necessary to be successful after surgery. There are many decaffeinated coffee and tea products available in grocery stores. You should be reducing added fat and sugar in your diet.  Frying foods adds too much fat and calories.  Baking, broiling, or grilling meats add flavor without unhealthy fats. Using cooking oil spray and spray butter products are also healthful changes that will aid in your weight loss. Foods high in sugar are often also high in calories and low in nutrients. Eating habits after surgery will have to be a permanent and long-term change. Eating habits are so ingrained that it can be difficult to change. It is important to practice new eating habits prior to surgery to mentally prepare yourself for the challenge ahead. Also remember that overall health, age, and genetics make each persons weight loss progress different. Do not compare your progress (pre or post-operatively), the amount you eat, or exercise to other patients. In addition, it is the responsibility of the patient to schedule and follow up on labs and tests completed during the pre-operative period. Results will be reviewed at each visit.

## 2020-09-15 NOTE — PROGRESS NOTES
Nkechi Akins presented for her presurgical psychological evaluation on 09/15/2020. The evaluation consisted of a clinical interview, the Eating Habits Checklist, the Binge Eating Disorder Screener - 7 (BEDS-&), and the [address] (MBMD) administered by the provider. Based on the evaluation, Nkechi Akins is considered to be an appropriate candidate for bariatric surgery from a psychological standpoint, provided she maintains adequate management of her anxiety and depression, and demonstrates the ability to effectively implement and sustain presurgical dietary and medical recommendations. She acknowledges a longstanding history of anxiety with panic attacks dating back to childhood. She endorsed the onset of depression in early adulthood that she describes as fairly chronic, with intermittent flare-ups. She is currently prescribed Cymbalta 60mg and Atarax 50mg prn by her family physician. She notes the Cymbalta is fairly effective in managing her depression; however, the medication is not as effective in reducing her anxiety. She states she has been managing her anxiety by smoking cannabis over the past five years, though she discontinued her use earlier this month in preparation for surgery. She has participated in psychotherapy in the past with limited benefit, but then participated in a partial hospitalization program through [facility] earlier this year that she found very helpful. She denies a history of active suicidal ideation or suicide attempts, and has never been hospitalized psychiatrically. There is no indication of current chemical abuse or dependence. She is a former smoker, but states she quit in  2019, with no reported history of relapse. As noted previously, she has been smoking cannabis over the past five years to manage her anxiety, but discontinued her use earlier this month.  She verbalized understanding that it is in her best interests to not resume smoking cannabis post-surgically, particularly as she acknowledges it tends to increase her appetite. She denies a history of trauma. Lester Mccrary has never been diagnosed with an eating disorder, and her responses in the interview and on the Eating Habits Checklist and the BEDS-7 do not warrant a clinical diagnosis. She does, however, acknowledge eating in response to emotional stress and boredom. She reports a history of skipping breakfast, but notes she is currently eating at least 4-5 times per day. She reports routinely eating past the point of satiety. She reports minimal use of caffeine and carbonated beverages, and articulated awareness of the recommendation to permanently eliminate carbonated beverages from her diet. She endorsed self-punitive thoughts and feelings related to her weight and/or eating behaviors that may serve to heighten her preoccupation with food/eating. She acknowledges binge eating in the evenings when she smokes cannabis, but denies any history of purging behavior. Lester Mccrary maintains a high level of functional activity caring for her home and family. She was employed as a  at [employer] for 18 years until March 2020. She currently resides with her  of 20 years, their three children, ages 6 5 and 6years old, and her mother. Lester Mccrary completed the MBMD as part of the evaluation. Her profile is valid. Results indicate eating, inactivity, and drug use as possible problem areas at this time. She endorsed more anxiety and depression than the typical medical patient. Her profile is characterized by moodiness, pessimism, and oversensitivity to the attitudes and behaviors of others. She tends to feel misunderstood and can be easily provoked into being anxious, angry, or tearful.  As a medical patient, she may struggle with the conflict between anxiety about her health versus accepting help from those whom she does not fully trust. She is likely to complain or find fault with others as her level of tension or discomfort increases. A straightforward, businesslike approach in which the reasons for specific treatment directives are clearly explained is likely to be more effective than a punitive or disciplinary stance. Ms. Marixa Perkins endorsed more somatic preoccupation than the typical medical patient. Her profile indicates she may exhibit a strong emotional reaction to stressful medical procedures. The coping assets reflected in her profile include spiritual renaldo and optimal compliance. Viola Griffith exhibited good awareness of the risks of bariatric surgery; however, she believes the benefits will outweigh the potential risks, as she hopes to minimize or reverse the effects of several medical concerns, including sleep apnea, hypertension, diabetes mellitus, GERD, polycystic ovarian syndrome (PCOS), migraines, and chronic back pain. She reports realistic expectations for the procedure, as her overall goals include improved health, increased energy and strength, reduced medication usage, and a weight loss of 100 lbs. She understands the need for permanent lifestyle change, including dietary modifications and a regular exercise program, and expressed willingness to implement the necessary changes. She expressed a commitment to comply with treatment recommendations through this office. She identified her  and her mother as a good support system in her efforts to meet her weight management goals. In summary, Viola Griffith is considered to be an appropriate candidate for bariatric surgery from a psychological standpoint, provided she maintains adequate management of her anxiety and depression, and demonstrates the ability to effectively implement and sustain presurgical dietary and medical recommendations.  She was encouraged to participate in support group activities through [employer], and to consult with our staff and her PCP should she experience any significant post-surgical mood changes or have difficulty modifying her eating behaviors. Feel free to consult with me as needed with any further questions regarding this evaluation. Patient spent 39 minutes completing the psychological testing. Provider spent 55 minutes in test evaluation services on 09/15/2020, and an additional 20 minutes on 10/01/2020.

## 2020-09-15 NOTE — PROGRESS NOTES
Emily Mcgee stable / unchanged. Is pt eating at least 4 times everyday? yes eating 5x daily    Is pt eating a lean protein source with all meals and snacks? yes she is    Has pt decreased their portions using the plate method? yes but sometimes still feels hungry after eating meal on smaller plate. Pt states she drinks a lot when she eats. Encouraged  liquids and solids to help eat slower and feel hassan. Is pt choosing low fat/sugar free options? yes trying to    Is pt drinking at least 64 oz of clear liquids everyday? yes she is    Has pt stopped drinking carbonation, caffeinated, and sugar sweetened beverages? still occasionally drinking soda or juice    Has pt sampled Unjury and/or Nectar protein? not yet; will  grab n gos this week    Participating in intentional exercise? yes walking 1.5-2 miles daily    Plan/Recommendations: separate liquids and solids; eliminate soda and juice.     Handouts: none    Patrice Liriano

## 2020-09-18 RX ORDER — FLUTICASONE PROPIONATE 50 MCG
SPRAY, SUSPENSION (ML) NASAL
Qty: 1 BOTTLE | Refills: 2 | Status: SHIPPED | OUTPATIENT
Start: 2020-09-18 | End: 2021-01-18

## 2020-09-18 NOTE — TELEPHONE ENCOUNTER
Last office visit 6/17/2020     Last written 7-7-2020 x 2 bottle    Next office visit scheduled not scheduled    Requested Prescriptions     Pending Prescriptions Disp Refills    fluticasone (FLONASE) 50 MCG/ACT nasal spray 1 Bottle 2     Sig: SPRAY 1 SPRAY INTO EACH NOSTRIL EVERY DAY

## 2020-09-21 ENCOUNTER — TELEPHONE (OUTPATIENT)
Dept: PULMONOLOGY | Age: 40
End: 2020-09-21

## 2020-10-07 ENCOUNTER — VIRTUAL VISIT (OUTPATIENT)
Dept: PULMONOLOGY | Age: 40
End: 2020-10-07
Payer: COMMERCIAL

## 2020-10-07 ENCOUNTER — TELEPHONE (OUTPATIENT)
Dept: PULMONOLOGY | Age: 40
End: 2020-10-07

## 2020-10-07 PROCEDURE — 99214 OFFICE O/P EST MOD 30 MIN: CPT | Performed by: INTERNAL MEDICINE

## 2020-10-07 ASSESSMENT — SLEEP AND FATIGUE QUESTIONNAIRES
ESS TOTAL SCORE: 4
HOW LIKELY ARE YOU TO NOD OFF OR FALL ASLEEP WHILE WATCHING TV: 1
HOW LIKELY ARE YOU TO NOD OFF OR FALL ASLEEP WHILE SITTING AND TALKING TO SOMEONE: 0
HOW LIKELY ARE YOU TO NOD OFF OR FALL ASLEEP WHILE LYING DOWN TO REST IN THE AFTERNOON WHEN CIRCUMSTANCES PERMIT: 2
HOW LIKELY ARE YOU TO NOD OFF OR FALL ASLEEP WHILE SITTING AND READING: 1
HOW LIKELY ARE YOU TO NOD OFF OR FALL ASLEEP WHEN YOU ARE A PASSENGER IN A CAR FOR AN HOUR WITHOUT A BREAK: 0
HOW LIKELY ARE YOU TO NOD OFF OR FALL ASLEEP WHILE SITTING INACTIVE IN A PUBLIC PLACE: 0
HOW LIKELY ARE YOU TO NOD OFF OR FALL ASLEEP IN A CAR, WHILE STOPPED FOR A FEW MINUTES IN TRAFFIC: 0
HOW LIKELY ARE YOU TO NOD OFF OR FALL ASLEEP WHILE SITTING QUIETLY AFTER LUNCH WITHOUT ALCOHOL: 0

## 2020-10-07 NOTE — PROGRESS NOTES
[facility]                                                            TELEHEALTH EVALUATION: Service performed was Audio/Visual (During QFSYE-19 public health emergency) and not a face-to-face visit         CHIEF COMPLAINT: Follow up tests results           HPI:   HST and PFTs was reviewed by me and noted below. Results were dicussed with patient and multiple good questions were answered. Started CPAP. Trying to get used to it. Better sleep when able to leave it on. Patient is using CPAP 4-5  hrs/night. Using humidifier. No snoring on CPAP. The pressure is well tolerated. The mask is comfortable. No mask leak. No significant daytime sleepiness. No nodding off when driving. Bedtime is 9 pm and rise time is 6 am. Sleep onset is 20 minutes. ESS is 4. Denies any SOB cough or wheezes  No cough or sputum       From prior visit:   Patient was diagnosed with severe LAURO 10 years ago. Associated with mild snoring. Not sure what makes better or worse. Denies waking up choking and gasping for air. No restorative sleep. + dry mouth upon awakening. Patient is complaining of daytime sleepiness, fatigue and tiredness. Bedtime 10 pm and rise time is 8:30 am. Sleep onset few minutes. No nodding off while driving. Old records were reviewed and summarized by me. Patient is currently being evaluated for gastric sleeve. No date is given yet for the surgery. No known lung disease. No recurrent respiratory infection. No IBD or O2 use. Denies any SOB, cough or wheezes. Smoked for 21 years - 1-1.5 ppd, quit 4/2019.          Past Medical History:   Diagnosis Date    Anxiety     Arthritis     Cervical pain     Chronic back pain     Depression     Diabetes mellitus ([facility] [address]     Hypertension     Infertility     took fertility medication    Kidney stone 03/2012    Migraine     Miscarriage     Polycystic ovarian syndrome 1999    PONV (postoperative nausea and vomiting)     Psoriasis     Stomach ulcer     Unspecified sleep apnea     can't tolerate CPAP       Past Surgical History:        Procedure Laterality Date    BREAST SURGERY Right     for cellulitis     CHOLECYSTECTOMY  2011    COLONOS[facility]  [facility]7/2015    [facility]ps    COLONOSCOPY N/A 6/18/2020    COLONOSCOPY WITH BIOPSY performed by Shawna Johnson MD at [facility] COLONOSCOPY N/A 6/18/2020    COLONOSCOPY POLYPECTOMY SNARE performed by Shawna Johnson MD at [facility] C/missed ab   [facility]      for psoriasis    TONSILLECTOMY  08/09/2016    tonsillectomy    UPPER GASTROINTESTINAL ENDOSCOPY  3/23/11    pyloretic    UPPER GASTROINTESTINAL ENDOSCOPY  10/7/2015    [facility]    UPPER GASTROINTESTINAL ENDOSCOPY N/A 6/18/2020    EGD DIAGNOSTIC ONLY performed by Shawna Johnson MD at [facility] 8/28/2020    EGD BIOPSY performed by Marty De Los Santos MD at [address] [facility] ENDOSCOPY       Allergies:  is allergic to lisinopril and tramadol. Social History:    TOBACCO:   reports that she quit smoking about 17 months ago. Her smoking use included cigarettes. She has a 20.00 pack-year smoking history. She has never used smokeless tobacco.  ETOH:   reports previous alcohol use.       Family History:       Problem Relation Age of Onset    Arthritis Mother    Bobbetta Hausen / [place] Mother     High Blood Pressure Mother     Depression Mother     High Cholesterol Mother     Arthritis Father     High Blood Pressure Father     Cancer Father         colon    Hearing Loss Father     Heart Disease Father     High Cholesterol Father     High Blood Pressure Brother     High Cholesterol Brother     High Blood Pressure Maternal Aunt     High Cholesterol Maternal Aunt     Miscarriages / Stillbirths Maternal Aunt     Mental Retardation Maternal Uncle     High Blood Pressure Maternal Uncle     High Cholesterol Maternal Uncle     High Blood Pressure Paternal Aunt     High Cholesterol Paternal Aunt     Arthritis Paternal Uncle     Mental Illness Paternal Uncle     High Blood Pressure Paternal Uncle     Birth Defects Paternal Uncle     High Cholesterol Paternal Uncle     Learning Disabilities Paternal Uncle     Arthritis Maternal Grandmother     High Blood Pressure Maternal Grandmother     Diabetes Maternal Grandmother     High Cholesterol Maternal Grandmother     Miscarriages / Stillbirths Maternal Grandmother     Arthritis Maternal Grandfather     High Blood Pressure Maternal Grandfather     High Cholesterol Maternal Grandfather     Arthritis Paternal Grandmother     High Blood Pressure Paternal Grandmother     Heart Disease Paternal Grandmother     High Cholesterol Paternal Grandmother    [de-identified] / Stillbirths Paternal Grandmother     Stroke Paternal Grandmother     High Blood Pressure Paternal Grandfather     High Cholesterol Paternal Grandfather        Current Medications:    Current Outpatient Medications:     fluticasone (FLONASE) 50 MCG/ACT nasal spray, SPRAY 1 SPRAY INTO EACH NOSTRIL EVERY DAY, Disp: 1 Bottle, Rfl: 2    hydrOXYzine (ATARAX) 50 MG tablet, 1 twice daily as needed anxiety. , Disp: 30 tablet, Rfl: 1    amLODIPine (NORVASC) 5 MG tablet, Take 2 tablets by mouth daily. (Patient taking differently: Take 2 tablets by mouth daily. At 7pm), Disp: 180 tablet, Rfl: 1    cimetidine (TAGAMET) 400 MG tablet, Take 1 tablet by mouth 2 times daily (Patient taking differently: Take 400 mg by mouth 2 times daily Patient states takes once a day at 7pm), Disp: 180 tablet, Rfl: 1    losartan-hydrochlorothiazide (HYZAAR) 100-25 MG per tablet, Take 1 tablet by mouth daily for high blood pressure.  l10, Disp: 90 tablet, Rfl: 1    atorvastatin (LIPITOR) 10 MG tablet, Take 1 tablet by mouth daily (Patient taking differently: Take 10 mg by mouth nightly ), Disp: 30 tablet, Rfl: 5    ondansetron (ZOFRAN-ODT) 4 MG disintegrating tablet, Take 1 tablet by mouth 3 times daily as needed for Nausea or Vomiting, Disp: 40 tablet, Rfl: 1    Cholecalciferol (VITAMIN D3) 25 MCG (1000 UT) CAPS, Take by mouth daily, Disp: , Rfl:     BIOTIN PO, Take by mouth daily, Disp: , Rfl:     COLLAGEN PO, Take by mouth daily, Disp: , Rfl:     Prenatal Vit-Fe Fumarate-FA (PRENATAL VITAMIN PO), Take by mouth daily, Disp: , Rfl:     DULoxetine (CYMBALTA) 60 MG extended release capsule, TAKE 1 CAPSULE BY MOUTH EVERY DAY, Disp: 90 capsule, Rfl: 1    oxybutynin (DITROPAN XL) 5 MG extended release tablet, Take 1 tablet by mouth daily, Disp: 30 tablet, Rfl: 3    hydrALAZINE (APRESOLINE) 10 MG tablet, TAKE 1 TABLET BY MOUTH EVERY DAY FOR 5 DAYS THEN INCREASE TO TAKE 1 TABLET BY MOUTH TWICE A DAY (Patient taking differently: TAKE 1 TABLET BY MOUTH EVERY DAY FOR 5 DAYS THEN INCREASE TO TAKE 1 TABLET BY MOUTH TWICE A DAY  Patient states only takes omce a day), Disp: 60 tablet, Rfl: 5    metFORMIN (GLUCOPHAGE-XR) 750 MG extended release tablet, 2 in AM, Disp: 60 tablet, Rfl: 3    levonorgestrel-ethinyl estradiol (Lakeisha Amass) 0.15-0.03 MG per tablet, Take 1 tablet by mouth, Disp: , Rfl:         Objective:   PHYSICAL EXAM:    not currently breastfeeding.' on RA  Neck size: Not able to obtain   Mallampati class IV. Constitutional:  No acute distress. Appears well developed and nourished. Eyes: No sclera icterus. EOM intact. No visible discharge. HENT: Head is normocephalic and atraumatic. Mucus membranes are moist and the tongue appears normal. Normal appearing nose. External Ears normal.   Neck: No visualized mass. Ariadne Meager is midline   Resp: No accessory muscle use. Respiratory effort normal. No visualized signs of difficulty breathing or respiratory distress.   Cardiovascular: No LE edema per report    Musculoskeletal: Normal gait with no signs of ataxia. Normal range of motion of the neck. Skin: No significant exanthematous lesions or discoloration noted on facial skin    Neuro: Awake. Alert. Able to follow commands. No facial asymmetry. No gaze palsy. Psych: No agitation. Normal affect. No hallucinations. Oriented to person/time/place. No anxiety. Normal judgement and insight. DATA reviewed by me:   PFTs 07/14/2020 FVC 4.82 (120%) FEV1 3.75 (114%) FEV1/FVC 78% TLC 8.15 (108%) DLCO 32.49 (120%) 6MW 1400 F low O2 of 95%    Chest x-ray 6/3/19 imaging reviewed by me and showed  No acute cardiopulmonary disease    HST 6/17/2020 AHI 20 with desaturation to 82%      CPAP data 09/07-10/06 2020 reviewed by me. Uses 4-5  hrs/night with 87% compliance and AHI of  1.3. 95th pressure of 9.9 cmH2O. APAP 8-16      Assessment:       · Moderate LAURO. APAP 8 to 16 cm H2O. Optimal compliance and efficacy upon review today. · Smoked for 21 years - 1-1.5 ppd, quit 4/2019. · Fibromyalgia, joint pain, elevated CRP followed by rheumatology       Plan:       Change APAP 8-12 cm H2O. Advised to use CPAP 6-8 hrs at night and during naps. Replacement of mask, tubing, head straps every 3-6 months or sooner if damaged. Follow up CPAP compliance and pressure adjustment if needed  Sleep hygiene  Avoid sedatives, alcohol and caffeinated drinks at bed time. No driving motorized vehicles or operating heavy machinery while fatigue, drowsy or sleepy. Weight loss is also recommended as a long-term intervention. Complications of LAURO if not treated were discussed with patient patient to include systemic hypertension, pulmonary hypertension, cardiovascular morbidities, car accidents and all cause mortality. Advised to continue with smoking cessation  Follow up in 6 months           Cy Km is a 46 y.o. female being evaluated by a Virtual Visit (video visit) encounter to address concerns as mentioned above.   A caregiver was present when appropriate. Due to this being a TeleHealth encounter (During JNIGM-62 public health emergency), evaluation of the following organ systems was limited: Vitals/Constitutional/EENT/Resp/CV/GI//MS/Neuro/Skin/Heme-Lymph-Imm. Pursuant to the emergency declaration under the [address] [place] [address] and the [employer] and [employer] Act, this Virtual Visit was conducted with patient's (and/or legal guardian's) consent, to reduce the patient's risk of exposure to COVID-19 and provide necessary medical care. The patient (and/or legal guardian) has also been advised to contact this office for worsening conditions or problems, and seek emergency medical treatment and/or call 911 if deemed necessary. Services were provided through a video synchronous discussion virtually to substitute for in-person clinic visit. Patient was located in her home, provider was located in his office. --Tiago Aldridge MD on 10/7/2020 at 10:08 AM    An electronic signature was used to authenticate this note.

## 2020-10-07 NOTE — TELEPHONE ENCOUNTER
Pt needs 6 month f/u appt with Dr Ulisses Kelley. Patient called with message left for patient to call back to office.    4/2021

## 2020-10-13 ENCOUNTER — TELEMEDICINE (OUTPATIENT)
Dept: BARIATRICS/WEIGHT MGMT | Age: 40
End: 2020-10-13
Payer: COMMERCIAL

## 2020-10-13 PROCEDURE — 2022F DILAT RTA XM EVC RTNOPTHY: CPT | Performed by: NURSE PRACTITIONER

## 2020-10-13 PROCEDURE — 99213 OFFICE O/P EST LOW 20 MIN: CPT | Performed by: NURSE PRACTITIONER

## 2020-10-13 PROCEDURE — 3044F HG A1C LEVEL LT 7.0%: CPT | Performed by: NURSE PRACTITIONER

## 2020-10-13 PROCEDURE — G8427 DOCREV CUR MEDS BY ELIG CLIN: HCPCS | Performed by: NURSE PRACTITIONER

## 2020-10-13 ASSESSMENT — ENCOUNTER SYMPTOMS
GASTROINTESTINAL NEGATIVE: 1
RESPIRATORY NEGATIVE: 1
EYES NEGATIVE: 1

## 2020-10-13 NOTE — PROGRESS NOTES
[facility]) Physicians   [facility]    10/13/2020    TELEHEALTH EVALUATION -- Audio/Visual (During CUXMQ-31 public health emergency)    Subjective:      Patient ID: Gideon Booker is a 46 y.o. female has requested an audio/video evaluation. HPI    Due to the COVID-19 restrictions on close contact interactions the patient's monthly presurgical visit was conducted via audio/video in carmenza of a face to face visit. Patient has consented to have this visit conducted via audio/video. The patient is here through telemedicine for their bariatric surgery presurgical visit for future weight loss. She has made several attempts at weight loss in the past without success and now wishes to pursue bariatric surgery. She is working to change her dietary behaviors and lose weight to improve comorbid conditions. Gideon Booker is a 46 y.o. female with current BMI of 44.4 and weight of 279 pounds.     Past Medical History:   Diagnosis Date    Anxiety     Arthritis     Cervical pain     Chronic back pain     Depression     Diabetes mellitus ([address]     Hypertension     Infertility     took fertility medication    Kidney stone 03/2012    Migraine     Miscarriage     Polycystic ovarian syndrome 1999    PONV (postoperative nausea and vomiting)     Psoriasis     Stomach ulcer     Unspecified sleep apnea     can't tolerate CPAP     Past Surgical History:   Procedure Laterality Date    BREAST SURGERY Right     for cellulitis     CHOLECYSTECTOMY  2011    COL[facility]  [facility]polyps    COLONOSCOPY N/A 6/18/2020    COLONOSCOPY WITH BIOPSY performed by Lorenza Chacko MD at [facility] N/A 6/18/2020    COLONOSCOPY POLYPECTOMY SNARE performed by Lorenza Chacko MD at [facility] C/missed ab    SKIN BIOPSY      for psoriasis    TONSILLECTOMY  08/09/2016    tonsillectomy    UPPER GASTROINTESTINAL ENDOSCOPY  3/23/11    pyloretic    UPPER GASTROINTESTINAL ENDOSCOPY  10/7/2015    [address]ast[address]    UPPER GASTROINTESTINAL ENDOSCOPY N/A 6/18/2020    EGD DIAGNOSTIC ONLY performed by Haylee Simmons MD at [place] [id number] N/A 8/28/2020    EGD BIOPSY performed by Larry Nicholas MD at [address] [place] [address] History   Problem Relation Age of Onset    Arthritis Mother     Miscarriages / Mickiel Gloss Mother     High Blood Pressure Mother     Depression Mother     High Cholesterol Mother     Arthritis Father     High Blood Pressure Father     Cancer Father         colon    Hearing Loss Father     Heart Disease Father     High Cholesterol Father     High Blood Pressure Brother     High Cholesterol Brother     High Blood Pressure Maternal Aunt     High Cholesterol Maternal Aunt     Miscarriages / Stillbirths Maternal Aunt     Mental Retardation Maternal Uncle     High Blood Pressure Maternal Uncle     High Cholesterol Maternal Uncle     High Blood Pressure Paternal Aunt     High Cholesterol Paternal Aunt     Arthritis Paternal Uncle     Mental Illness Paternal Uncle     High Blood Pressure Paternal Uncle     Birth Defects Paternal Uncle     High Cholesterol Paternal Uncle     Learning Disabilities Paternal Uncle     Arthritis Maternal Grandmother     High Blood Pressure Maternal Grandmother     Diabetes Maternal Grandmother     High Cholesterol Maternal Grandmother     Miscarriages / Stillbirths Maternal Grandmother     Arthritis Maternal Grandfather     High Blood Pressure Maternal Grandfather     High Cholesterol Maternal Grandfather     Arthritis Paternal Grandmother     High Blood Pressure Paternal Grandmother     Heart Disease Paternal Grandmother     High Cholesterol Paternal Grandmother     Miscarriages / Stillbirths Paternal Grandmother     Stroke Paternal Grandmother     High Blood Pressure Paternal Grandfather     High Cholesterol Paternal Grandfather      Social History     Tobacco Use    Smoking status: Former Smoker     Packs/day: 1.00     Years: 20.00     Pack years: 20.00     Types: Cigarettes     Last attempt to quit: 2019     Years since quittin.4    Smokeless tobacco: Never Used   Substance Use Topics    Alcohol use: Not Currently     Alcohol/week: 0.0 standard drinks     Frequency: Monthly or less     I counseled the patient on the importance of not smoking and risks of ETOH. Allergies   Allergen Reactions    Lisinopril Other (See Comments)     COUGH    Tramadol Other (See Comments)     severe  SEVERE HEADACHE     There were no vitals filed for this visit. There is no height or weight on file to calculate BMI. Current Outpatient Medications:     fluticasone (FLONASE) 50 MCG/ACT nasal spray, SPRAY 1 SPRAY INTO EACH NOSTRIL EVERY DAY, Disp: 1 Bottle, Rfl: 2    hydrOXYzine (ATARAX) 50 MG tablet, 1 twice daily as needed anxiety. , Disp: 30 tablet, Rfl: 1    amLODIPine (NORVASC) 5 MG tablet, Take 2 tablets by mouth daily. (Patient taking differently: Take 2 tablets by mouth daily. At 7pm), Disp: 180 tablet, Rfl: 1    cimetidine (TAGAMET) 400 MG tablet, Take 1 tablet by mouth 2 times daily (Patient taking differently: Take 400 mg by mouth 2 times daily Patient states takes once a day at 7pm), Disp: 180 tablet, Rfl: 1    losartan-hydrochlorothiazide (HYZAAR) 100-25 MG per tablet, Take 1 tablet by mouth daily for high blood pressure.  l10, Disp: 90 tablet, Rfl: 1    atorvastatin (LIPITOR) 10 MG tablet, Take 1 tablet by mouth daily (Patient taking differently: Take 10 mg by mouth nightly ), Disp: 30 tablet, Rfl: 5    ondansetron (ZOFRAN-ODT) 4 MG disintegrating tablet, Take 1 tablet by mouth 3 times daily as needed for Nausea or Vomiting, Disp: 40 tablet, Rfl: 1    Cholecalciferol (VITAMIN D3) 25 MCG (1000 UT) CAPS, Take by mouth daily, Disp: , Rfl:     BIOTIN PO, Take by mouth daily, Disp: , Rfl:     COLLAGEN PO, Take by mouth daily, Disp: , Rfl:     Prenatal Vit-Fe Fumarate-FA (PRENATAL VITAMIN PO), Take by mouth daily, Disp: , Rfl:     DULoxetine (CYMBALTA) 60 MG extended release capsule, TAKE 1 CAPSULE BY MOUTH EVERY DAY, Disp: 90 capsule, Rfl: 1    oxybutynin (DITROPAN XL) 5 MG extended release tablet, Take 1 tablet by mouth daily, Disp: 30 tablet, Rfl: 3    hydrALAZINE (APRESOLINE) 10 MG tablet, TAKE 1 TABLET BY MOUTH EVERY DAY FOR 5 DAYS THEN INCREASE TO TAKE 1 TABLET BY MOUTH TWICE A DAY (Patient taking differently: TAKE 1 TABLET BY MOUTH EVERY DAY FOR 5 DAYS THEN INCREASE TO TAKE 1 TABLET BY MOUTH TWICE A DAY  Patient states only takes omce a day), Disp: 60 tablet, Rfl: 5    metFORMIN (GLUCOPHAGE-XR) 750 MG extended release tablet, 2 in AM, Disp: 60 tablet, Rfl: 3    levonorgestrel-ethinyl estradiol (JOLESSA) 0.15-0.03 MG per tablet, Take 1 tablet by mouth, Disp: , Rfl:     Lab Results   Component Value Date    WBC 9.2 07/21/2020    RBC 4.69 07/21/2020    HGB 13.8 07/21/2020    HCT 40.5 07/21/2020    MCV 86.3 07/21/2020    MCH 29.3 07/21/2020    MCHC 34.0 07/21/2020    MPV 8.0 07/21/2020    NEUTOPHILPCT 64.9 07/21/2020    LYMPHOPCT 26.6 07/21/2020    MONOPCT 6.3 07/21/2020    EOSRELPCT 1.3 07/21/2020    BASOPCT 0.9 07/21/2020    NEUTROABS 6.0 07/21/2020    LYMPHSABS 2.5 07/21/2020    MONOSABS 0.6 07/21/2020    EOSABS 0.1 07/21/2020     Lab Results   Component Value Date     05/29/2020    K 3.8 05/29/2020    K 3.6 02/10/2019    CL 99 05/29/2020    CO2 24 05/29/2020    ANIONGAP 14 05/29/2020    GLUCOSE 148 05/29/2020    BUN 12 05/29/2020    CREATININE 0.5 05/29/2020    LABGLOM >60 05/29/2020    GFRAA >60 05/29/2020    GFRAA >60 09/07/2012    CALCIUM 10.0 05/29/2020    PROT 6.8 07/21/2020    PROT 6.0 09/07/2012    LABALBU 4.2 07/21/2020    AGRATIO 1.9 05/29/2020    BILITOT 0.3 07/21/2020    ALKPHOS 70 07/21/2020    ALT 47 07/21/2020    AST 35 07/21/2020 GLOB 2.3 05/29/2020     Lab Results   Component Value Date    CHOL 154 07/21/2020    TRIG 246 07/21/2020    HDL 41 07/21/2020    HDL 38 07/20/2011    LDLCALC 64 07/21/2020    LABVLDL 49 07/21/2020     Lab Results   Component Value Date    TSHREFLEX 1.59 07/21/2020     Lab Results   Component Value Date    IRON 71 07/21/2020    TIBC 405 07/21/2020    LABIRON 18 07/21/2020     Lab Results   Component Value Date    HMIQFKFY08 031 05/29/2020    FOLATE >20.00 05/29/2020     Lab Results   Component Value Date    VITD25 33.8 05/29/2020     Lab Results   Component Value Date    LABA1C 6.5 05/06/2020    .9 05/06/2020       Review of Systems   Constitutional: Negative. HENT: Negative. Eyes: Negative. Respiratory: Negative. Cardiovascular: Negative. Gastrointestinal: Negative. Skin: Negative. Neurological: Negative. PHYSICAL EXAMINATION:    Constitutional: [x] Appears well-developed and well-nourished [x] No apparent distress      [] Abnormal-   Mental status  [x] Alert and awake  [x] Oriented to person/place/time [x]Able to follow commands      Eyes:  EOM    [x]  Normal  [] Abnormal-  Sclera  [x]  Normal  [] Abnormal -         Discharge [x]  None visible  [] Abnormal -    HENT:   [x] Normocephalic, atraumatic.   [] Abnormal   [x] Mouth/Throat: Mucous membranes are moist.     External Ears [x] Normal  [] Abnormal-     Neck: [x] No visualized mass     Pulmonary/Chest: [x] Respiratory effort normal.  [x] No visualized signs of difficulty breathing or respiratory distress        [] Abnormal-      Musculoskeletal:   [] Normal gait with no signs of ataxia         [x] Normal range of motion of neck        [] Abnormal-     Neurological:        [x] No Facial Asymmetry (Cranial nerve 7 motor function) (limited exam to video visit)          [x] No gaze palsy        [] Abnormal-         Skin:        [x] No significant exanthematous lesions or discoloration noted on facial skin         [] Abnormal- Psychiatric:       [x] Normal Affect [x] No Hallucinations        [] Abnormal-     Other pertinent observable physical exam findings-     Due to this being a TeleHealth encounter, evaluation of the following organ systems is limited: Vitals/Constitutional/EENT/Resp/CV/GI//MS/Neuro/Skin/Heme-Lymph-Imm. Assessment and Plan:   Patient is here for their 5th presurgery visit for sleeve via telemedicine, down 2lbs. She is making dietary and behavior modifications. She talked with the registered dietitian for continued follow up. I agree with recommendations and plan. She is exercising with walking. Encouraged physical activity. Discussed preop work up which still needs  protein sample (tried some, plans to try unflavored [place]) and 1 additional visit for insurance purposes. She is very hopeful for surgery in 2020, so we will pencil her in for December 23rd for a surgery date and see her back next month to finalize. We will see her back in 1 month for continued follow up or through telemedicine. A total of 15 minutes was spent conversing with the patient and over half of that time was spent counseling the patient on proper dietary behaviors, exercise and preoperative work-up. An electronic signature was used to authenticate this note. Pursuant to the emergency declaration under the [facility] waiver authority and the [employer] and [employer] Act, this Virtual  Visit was conducted, with patient's consent, to reduce the patient's risk of exposure to COVID-19 and provide continuity of care for an established patient. Services were provided through a video synchronous discussion virtually to substitute for in-person clinic visit.

## 2020-10-13 NOTE — PROGRESS NOTES
Anastasiya Lal lost 2 lbs over past ~month. Pt was sick last week which limited her walking. Is pt eating at least 4 times everyday? yes - 4-5x/day     B- 1-2 eggs sometimes w/ toast or ham  S- yogurt OR apple & PB  L- sandwich: turkey or ham w/ cheese & lite hernandez sometimes w/ yogurt or apple  S- same as AM  D- chicken marsala w/ mushrooms OR meatloaf w/ vegetable & starch  S- sometimes, same as above or movie popcorn (has this selectively)    Is pt eating a lean protein source with all meals and snacks? yes    Has pt decreased their portions using the plate method? yes - using smaller plate    Is pt choosing low fat/sugar free options? yes, generally    Is pt drinking at least 64 oz of clear liquids everyday? yes - typically, may have been down when she was sick last week - just drinks water / sometimes powerade zero    Has pt stopped drinking carbonation, caffeinated, and sugar sweetened beverages? yes    Has pt sampled Unjury and/or Nectar protein? did not like the ones she got / planning to try another flavor    Participating in intentional exercise? was doing 3 miles per day but wasn't able to workout last wk d/t feeling sick    Plan/Recommendations:   - Continue to focus on lean protein  - Try additional protein samples use  or  bottle to mix (1 pkt per 8oz)  - Resume exercise  - Aim for 64oz of fluid    Handouts: none    Due to the COVID-19 restrictions on close contact interactions the patient's visit was conducted via telephone in carmenza of a face to face visit. The patient is here through telemedicine for their 5th presurgical visit.       Marcie Morton

## 2020-10-13 NOTE — PATIENT INSTRUCTIONS
Goals in preparing for bariatric surgery    You should be giving up all beverages that have carbonation, sugar, and caffeine. (Refer to the approved liquids list provided at initial visit)   You should be drinking 64 ounces of calorie free fluids per day. Suggestions include:  o Water (you may add fresh lemon or lime)  o Crystal Light  o [place] Liquid Water Enhancer  o Propel Zero  o Powerade Zero  o Isopure  o [id number]  o SOBE Lifewater Zero  o Vitamin Water Zero  o Sugar Free Doug-Aid      You should be eating 4-6 times per day.  Three small meals plus 1-2 snacks per day is your goal. This balances your calories and nutrients evenly throughout the day and helps to boost your metabolism. Snacking is a good thing if you are choosing healthful options. Refer to the snack list provided at your initial visit. Aim for a protein at every snack, plus a fruit, vegetable or starch. You should be eating protein at every meal and snack.  Protein is typically found in animal sources, i.e. chicken, beef, pork, fish and seafood, eggs. It is also found in low-fat dairy sources such as skim or 1% milk, low-fat yogurt, low-fat cheese, and low-fat cottage cheese. Plant based sources of protein include peanut butter, beans, and soy. You should be utilizing the 9-inch plate method.  Eating on a smaller plate will help you control portion size. But what you put on your plate counts also. Make ¼ of your plate protein, ¼ starch and ½ the plate non-starchy vegetables. You should be eliminating caffeine.  Caffeine is dehydrating. After surgery, its very important to stay hydrated. Giving up caffeine before surgery will help you focus on the changes necessary to be successful after surgery. There are many decaffeinated coffee and tea products available in grocery stores. You should be reducing added fat and sugar in your diet.  Frying foods adds too much fat and calories.  Baking, broiling, or grilling meats add flavor

## 2020-10-27 ENCOUNTER — OFFICE VISIT (OUTPATIENT)
Dept: PRIMARY CARE CLINIC | Age: 40
End: 2020-10-27
Payer: COMMERCIAL

## 2020-10-27 ENCOUNTER — OFFICE VISIT (OUTPATIENT)
Dept: FAMILY MEDICINE CLINIC | Age: 40
End: 2020-10-27
Payer: COMMERCIAL

## 2020-10-27 ENCOUNTER — NURSE TRIAGE (OUTPATIENT)
Dept: OTHER | Facility: CLINIC | Age: 40
End: 2020-10-27

## 2020-10-27 VITALS
HEART RATE: 93 BPM | SYSTOLIC BLOOD PRESSURE: 156 MMHG | OXYGEN SATURATION: 98 % | DIASTOLIC BLOOD PRESSURE: 90 MMHG | TEMPERATURE: 98.2 F

## 2020-10-27 DIAGNOSIS — Z11.59 SCREENING FOR VIRAL DISEASE: Primary | ICD-10-CM

## 2020-10-27 PROCEDURE — 99213 OFFICE O/P EST LOW 20 MIN: CPT | Performed by: PHYSICIAN ASSISTANT

## 2020-10-27 PROCEDURE — G8427 DOCREV CUR MEDS BY ELIG CLIN: HCPCS | Performed by: PHYSICIAN ASSISTANT

## 2020-10-27 PROCEDURE — 99211 OFF/OP EST MAY X REQ PHY/QHP: CPT | Performed by: NURSE PRACTITIONER

## 2020-10-27 PROCEDURE — G8428 CUR MEDS NOT DOCUMENT: HCPCS | Performed by: NURSE PRACTITIONER

## 2020-10-27 PROCEDURE — G8484 FLU IMMUNIZE NO ADMIN: HCPCS | Performed by: PHYSICIAN ASSISTANT

## 2020-10-27 PROCEDURE — G8417 CALC BMI ABV UP PARAM F/U: HCPCS | Performed by: PHYSICIAN ASSISTANT

## 2020-10-27 PROCEDURE — 1036F TOBACCO NON-USER: CPT | Performed by: PHYSICIAN ASSISTANT

## 2020-10-27 PROCEDURE — G8417 CALC BMI ABV UP PARAM F/U: HCPCS | Performed by: NURSE PRACTITIONER

## 2020-10-27 RX ORDER — AMOXICILLIN AND CLAVULANATE POTASSIUM 875; 125 MG/1; MG/1
1 TABLET, FILM COATED ORAL 2 TIMES DAILY
Qty: 20 TABLET | Refills: 0 | Status: SHIPPED | OUTPATIENT
Start: 2020-10-27 | End: 2020-11-06

## 2020-10-27 ASSESSMENT — ENCOUNTER SYMPTOMS
SINUS PRESSURE: 1
SHORTNESS OF BREATH: 0
WHEEZING: 0
SINUS PAIN: 1
COUGH: 1
GASTROINTESTINAL NEGATIVE: 1

## 2020-10-27 NOTE — PATIENT INSTRUCTIONS

## 2020-10-27 NOTE — PROGRESS NOTES
Jhony Ovalle received a viral test for COVID-19. They were educated on isolation and quarantine as appropriate. For any symptoms, they were directed to seek care from their PCP, given contact information to establish with a doctor, directed to an urgent care or the emergency room.

## 2020-10-27 NOTE — PROGRESS NOTES
Subjective:      Patient ID: Monica Delaney is a 45 y.o. female. HPI  Patient presents with exhaustion, stomach upset for a week. Yesterday started with headache, sinus pressure, ear pain and sore throat. No fevers. Slight cough. No SOB, feels like occasionally needs to stop and take a deep inhale. Sinus pressure and intermittent nasal congestion. No known exposures, no loss of smell or taste. Body aches. Has been doing but laying in bed for most of the week. Review of Systems   Constitutional: Positive for fatigue. Negative for fever. HENT: Positive for congestion, ear pain, sinus pressure and sinus pain. Respiratory: Positive for cough. Negative for shortness of breath and wheezing. Cardiovascular: Negative. Gastrointestinal: Negative. Genitourinary: Negative. Neurological: Positive for dizziness. Psychiatric/Behavioral: Positive for dysphoric mood. Objective:   Physical Exam  Constitutional:       Appearance: Normal appearance. She is obese. HENT:      Right Ear: Tympanic membrane and ear canal normal.      Left Ear: Tympanic membrane and ear canal normal.      Nose: Congestion present. Right Sinus: Maxillary sinus tenderness and frontal sinus tenderness present. Left Sinus: Maxillary sinus tenderness and frontal sinus tenderness present. Cardiovascular:      Rate and Rhythm: Normal rate and regular rhythm. Pulmonary:      Effort: Pulmonary effort is normal.      Breath sounds: Normal breath sounds. Neurological:      Mental Status: She is alert. Assessment:       Diagnosis Orders   1.  Acute bacterial sinusitis             Plan:      See orders Covid testing this am.         [facility]

## 2020-10-29 LAB — SARS-COV-2, NAA: NOT DETECTED

## 2020-10-29 NOTE — RESULT ENCOUNTER NOTE

## 2020-11-04 ENCOUNTER — TELEMEDICINE (OUTPATIENT)
Dept: BARIATRICS/WEIGHT MGMT | Age: 40
End: 2020-11-04
Payer: COMMERCIAL

## 2020-11-04 PROCEDURE — G8427 DOCREV CUR MEDS BY ELIG CLIN: HCPCS | Performed by: NURSE PRACTITIONER

## 2020-11-04 PROCEDURE — 2022F DILAT RTA XM EVC RTNOPTHY: CPT | Performed by: NURSE PRACTITIONER

## 2020-11-04 PROCEDURE — 99213 OFFICE O/P EST LOW 20 MIN: CPT | Performed by: NURSE PRACTITIONER

## 2020-11-04 PROCEDURE — 3044F HG A1C LEVEL LT 7.0%: CPT | Performed by: NURSE PRACTITIONER

## 2020-11-04 ASSESSMENT — ENCOUNTER SYMPTOMS
RESPIRATORY NEGATIVE: 1
GASTROINTESTINAL NEGATIVE: 1
EYES NEGATIVE: 1

## 2020-11-04 NOTE — PROGRESS NOTES
[facility]) Physicians   [facility]    11/4/2020    TELEHEALTH EVALUATION -- Audio/Visual (During [facility]- public health emergency)    Subjective:      Patient ID: Rosina Monroe is a 45 y.o. female has requested an audio/video evaluation. HPI    Due to the COVID-19 restrictions on close contact interactions the patient's monthly presurgical visit was conducted via audio/video in carmenza of a face to face visit. Patient has consented to have this visit conducted via audio/video. The patient is here through telemedicine for their bariatric surgery presurgical visit for future weight loss. She has made several attempts at weight loss in the past without success and now wishes to pursue bariatric surgery. She is working to change her dietary behaviors and lose weight to improve comorbid conditions. Rosina Monroe is a 45 y.o. female with current BMI of 44.8 and weight of 282 pounds.     Past Medical History:   Diagnosis Date    Anxiety     Arthritis     Cervical pain     Chronic back pain     Depression     Diabetes mellitus ([address]     Hypertension     Infertility     took fertility medication    Kidney stone 03/2012    Migraine     Miscarriage     Polycystic ovarian syndrome 1999    PONV (postoperative nausea and vomiting)     Psoriasis     Stomach ulcer     Unspecified sleep apnea     can't tolerate CPAP     Past Surgical History:   Procedure Laterality Date    BREAST SURGERY Right     for cellulitis     CHOLECYSTECTOMY  2011    COL[facility]OS[facility]  [facility]7/[facility]    COLONOSCOPY N/A 6/18/2020    COLONOSCOPY WITH BIOPSY performed by Garfield Chavarria MD at [place] [address] N/A 6/18/2020    COLONOSCOPY POLYPECTOMY SNARE performed by Garfield Chavarria MD at [address] [place] [address] C/missed ab    SKIN BIOPSY      for psoriasis    TONSILLECTOMY  08/09/2016    tonsillectomy    UPPER GASTROINTESTINAL ENDOSCOPY  3/23/11    pyloretic    UPPER GASTROINTESTINAL ENDOSCOPY  10/7/2015    gastritis-[address]    UPPER GASTROINTESTINAL ENDOSCOPY N/A 6/18/2020    EGD DIAGNOSTIC ONLY performed by Kvng Gonzalez MD at [address] N/A 8/28/2020    EGD BIOPSY performed by Jose Kelly MD at [facility] History   Problem Relation Age of Onset    Arthritis Mother     Miscarriages / [place] Mother     High Blood Pressure Mother     Depression Mother     High Cholesterol Mother     Arthritis Father     High Blood Pressure Father     Cancer Father         colon    Hearing Loss Father     Heart Disease Father     High Cholesterol Father     High Blood Pressure Brother     High Cholesterol Brother     High Blood Pressure Maternal Aunt     High Cholesterol Maternal Aunt     Miscarriages / Stillbirths Maternal Aunt     Mental Retardation Maternal Uncle     High Blood Pressure Maternal Uncle     High Cholesterol Maternal Uncle     High Blood Pressure Paternal Aunt     High Cholesterol Paternal Aunt     Arthritis Paternal Uncle     Mental Illness Paternal Uncle     High Blood Pressure Paternal Uncle     Birth Defects Paternal Uncle     High Cholesterol Paternal Uncle     Learning Disabilities Paternal Uncle     Arthritis Maternal Grandmother     High Blood Pressure Maternal Grandmother     Diabetes Maternal Grandmother     High Cholesterol Maternal Grandmother     Miscarriages / Stillbirths Maternal Grandmother     Arthritis Maternal Grandfather     High Blood Pressure Maternal Grandfather     High Cholesterol Maternal Grandfather     Arthritis Paternal Grandmother     High Blood Pressure Paternal Grandmother     Heart Disease Paternal Grandmother     High Cholesterol Paternal Grandmother     Miscarriages / Stillbirths Paternal Grandmother     Stroke Paternal Grandmother     High Blood Pressure Paternal Grandfather     High Cholesterol Paternal Grandfather      Social History     Tobacco Use    Smoking status: Former Smoker     Packs/day: 1.00     Years: 20.00     Pack years: 20.00     Types: Cigarettes     Last attempt to quit: 2019     Years since quittin.5    Smokeless tobacco: Never Used   Substance Use Topics    Alcohol use: Not Currently     Alcohol/week: 0.0 standard drinks     Frequency: Monthly or less     I counseled the patient on the importance of not smoking and risks of ETOH. Allergies   Allergen Reactions    Lisinopril Other (See Comments)     COUGH    Tramadol Other (See Comments)     severe  SEVERE HEADACHE     There were no vitals filed for this visit. There is no height or weight on file to calculate BMI. Current Outpatient Medications:     amoxicillin-clavulanate (AUGMENTIN) 875-125 MG per tablet, Take 1 tablet by mouth 2 times daily for 10 days, Disp: 20 tablet, Rfl: 0    fluticasone (FLONASE) 50 MCG/ACT nasal spray, SPRAY 1 SPRAY INTO EACH NOSTRIL EVERY DAY (Patient not taking: Reported on 10/27/2020), Disp: 1 Bottle, Rfl: 2    hydrOXYzine (ATARAX) 50 MG tablet, 1 twice daily as needed anxiety. , Disp: 30 tablet, Rfl: 1    amLODIPine (NORVASC) 5 MG tablet, Take 2 tablets by mouth daily. (Patient taking differently: Take 2 tablets by mouth daily. At 7pm), Disp: 180 tablet, Rfl: 1    cimetidine (TAGAMET) 400 MG tablet, Take 1 tablet by mouth 2 times daily (Patient taking differently: Take 400 mg by mouth 2 times daily Patient states takes once a day at 7pm), Disp: 180 tablet, Rfl: 1    losartan-hydrochlorothiazide (HYZAAR) 100-25 MG per tablet, Take 1 tablet by mouth daily for high blood pressure.  l10, Disp: 90 tablet, Rfl: 1    atorvastatin (LIPITOR) 10 MG tablet, Take 1 tablet by mouth daily (Patient taking differently: Take 10 mg by mouth nightly ), Disp: 30 tablet, Rfl: 5    ondansetron (ZOFRAN-ODT) 4 MG disintegrating tablet, Take 1 tablet by 6.8 07/21/2020    PROT 6.0 09/07/2012    LABALBU 4.2 07/21/2020    AGRATIO 1.9 05/29/2020    BILITOT 0.3 07/21/2020    ALKPHOS 70 07/21/2020    ALT 47 07/21/2020    AST 35 07/21/2020    GLOB 2.3 05/29/2020     Lab Results   Component Value Date    CHOL 154 07/21/2020    TRIG 246 07/21/2020    HDL 41 07/21/2020    HDL 38 07/20/2011    LDLCALC 64 07/21/2020    LABVLDL 49 07/21/2020     Lab Results   Component Value Date    TSHREFLEX 1.59 07/21/2020     Lab Results   Component Value Date    IRON 71 07/21/2020    TIBC 405 07/21/2020    LABIRON 18 07/21/2020     Lab Results   Component Value Date    TJHLQPEL62 442 05/29/2020    FOLATE >20.00 05/29/2020     Lab Results   Component Value Date    VITD25 33.8 05/29/2020     Lab Results   Component Value Date    LABA1C 6.5 05/06/2020    .9 05/06/2020       Review of Systems   Constitutional: Negative. HENT: Negative. Eyes: Negative. Respiratory: Negative. Cardiovascular: Negative. Gastrointestinal: Negative. Skin: Negative. Neurological: Negative. PHYSICAL EXAMINATION:    Constitutional: [x] Appears well-developed and well-nourished [x] No apparent distress      [] Abnormal-   Mental status  [x] Alert and awake  [x] Oriented to person/place/time [x]Able to follow commands      Eyes:  EOM    [x]  Normal  [] Abnormal-  Sclera  [x]  Normal  [] Abnormal -         Discharge [x]  None visible  [] Abnormal -    HENT:   [x] Normocephalic, atraumatic.   [] Abnormal   [x] Mouth/Throat: Mucous membranes are moist.     External Ears [x] Normal  [] Abnormal-     Neck: [x] No visualized mass     Pulmonary/Chest: [x] Respiratory effort normal.  [x] No visualized signs of difficulty breathing or respiratory distress        [] Abnormal-      Musculoskeletal:   [] Normal gait with no signs of ataxia         [x] Normal range of motion of neck        [] Abnormal-     Neurological:        [x] No Facial Asymmetry (Cranial nerve 7 motor function) (limited exam to behaviors, exercise and preoperative work-up. An electronic signature was used to authenticate this note. Pursuant to the emergency declaration under the [address] [place] [address], [facility] waiver authority and the [employer] Act, this Virtual  Visit was conducted, with patient's consent, to reduce the patient's risk of exposure to COVID-19 and provide continuity of care for an established patient. Services were provided through a video synchronous discussion virtually to substitute for in-person clinic visit.

## 2020-11-04 NOTE — PROGRESS NOTES
Cy Km gained 3 lbs over past 3 weeks. Pt did have some candy at [facility]. Due to the COVID-19 restrictions on close contact interactions the patient's visit was conducted via telephone in carmenza of a face to face visit. The patient is here through telemedicine for their pre surg visit. Breakfast: eggs     Snack: PB with crackers OR yogurt OR apple with PB     Lunch: turkey/ham sandwich     Snack: PB with crackers OR yogurt OR apple with PB     Dinner: chicken with mashed potatoes and green beans     Snack: PB with crackers OR yogurt OR apple with PB     Is pt consuming smaller portions? Doing better with this, but still needs to improve    Is pt consuming at least 64 oz of fluids per day? Yes    Is pt consuming carbonated, caffeinated, or sugary beverages? Drinking water and powerade zero, peppermint tea (not caffeinated)    Has pt sampled Unjury and/or Nectar protein?  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

## 2020-11-04 NOTE — PATIENT INSTRUCTIONS
Goals in preparing for bariatric surgery    You should be giving up all beverages that have carbonation, sugar, and caffeine. (Refer to the approved liquids list provided at initial visit)   You should be drinking 64 ounces of calorie free fluids per day. Suggestions include:  o Water (you may add fresh lemon or lime)  o Crystal Light  o [place] Liquid Water Enhancer  o Propel Zero  o Powerade Zero  o Isopure  o [id number]  o SOBE Lifewater Zero  o Vitamin Water Zero  o Sugar Free Doug-Aid      You should be eating 4-6 times per day.  Three small meals plus 1-2 snacks per day is your goal. This balances your calories and nutrients evenly throughout the day and helps to boost your metabolism. Snacking is a good thing if you are choosing healthful options. Refer to the snack list provided at your initial visit. Aim for a protein at every snack, plus a fruit, vegetable or starch. You should be eating protein at every meal and snack.  Protein is typically found in animal sources, i.e. chicken, beef, pork, fish and seafood, eggs. It is also found in low-fat dairy sources such as skim or 1% milk, low-fat yogurt, low-fat cheese, and low-fat cottage cheese. Plant based sources of protein include peanut butter, beans, and soy. You should be utilizing the 9-inch plate method.  Eating on a smaller plate will help you control portion size. But what you put on your plate counts also. Make ¼ of your plate protein, ¼ starch and ½ the plate non-starchy vegetables. You should be eliminating caffeine.  Caffeine is dehydrating. After surgery, its very important to stay hydrated. Giving up caffeine before surgery will help you focus on the changes necessary to be successful after surgery. There are many decaffeinated coffee and tea products available in grocery stores. You should be reducing added fat and sugar in your diet.  Frying foods adds too much fat and calories.  Baking, broiling, or grilling meats add flavor without unhealthy fats. Using cooking oil spray and spray butter products are also healthful changes that will aid in your weight loss. Foods high in sugar are often also high in calories and low in nutrients. Eating habits after surgery will have to be a permanent and long-term change. Eating habits are so ingrained that it can be difficult to change. It is important to practice new eating habits prior to surgery to mentally prepare yourself for the challenge ahead. Also remember that overall health, age, and genetics make each persons weight loss progress different. Do not compare your progress (pre or post-operatively), the amount you eat, or exercise to other patients. In addition, it is the responsibility of the patient to schedule and follow up on labs and tests completed during the pre-operative period. Results will be reviewed at each visit. Patient received dietary handouts and education.

## 2020-11-13 ENCOUNTER — TELEPHONE (OUTPATIENT)
Dept: BARIATRICS/WEIGHT MGMT | Age: 40
End: 2020-11-13

## 2020-11-13 NOTE — TELEPHONE ENCOUNTER
[facility] approval for sleeve and poss liver bx   CPT [id number] & [id number]    DOS 12/23/20

## 2020-11-17 PROBLEM — E78.1 HYPERTRIGLYCERIDEMIA: Status: ACTIVE | Noted: 2020-11-17

## 2020-11-17 ASSESSMENT — ENCOUNTER SYMPTOMS
COUGH: 0
EYES NEGATIVE: 1
ALLERGIC/IMMUNOLOGIC NEGATIVE: 1
RESPIRATORY NEGATIVE: 1
GASTROINTESTINAL NEGATIVE: 1
SHORTNESS OF BREATH: 0

## 2020-11-17 NOTE — PROGRESS NOTES
[facility]) Physicians   [facility] [employer]    Subjective:      Patient ID: Kodi Felton is a 45 y.o. female    HPI    The patient is here for their bariatric surgery preoperative education group visit. She has made several attempts at weight loss in the past without success and now has been scheduled to have bariatric surgery on December 23, 2020 for future weight loss. She is working to change her dietary behaviors and lose weight to improve comorbid conditions such as hypertension, diabetes mellitus, PCOS, hyperlipidemia, obstructive sleep apnea and GERD. Kodi Felton is a very pleasant 45 y.o. female with Body mass index is 46.38 kg/m².     Past Medical History:   Diagnosis Date    Anxiety     Arthritis     Cervical pain     Chronic back pain     Depression     Diabetes mellitus ([address]     Hypertension     Infertility     took fertility medication    Kidney stone 03/2012    Migraine     Miscarriage     Polycystic ovarian syndrome 1999    PONV (postoperative nausea and vomiting)     Psoriasis     Stomach ulcer     Unspecified sleep apnea     can't tolerate CPAP     Past Surgical History:   Procedure Laterality Date    BREAST SURGERY Right     for cellulitis     CHOLECYSTECTOMY  2011    COLON[facility]  10/7/2015    [facility]ps    COLONOSCOPY N/A 6/18/2020    COLONOSCOPY WITH BIOPSY performed by Leopoldo Sanchez MD at [place] [address] N/A 6/18/2020    COLONOSCOPY POLYPECTOMY SNARE performed by Leopoldo Sanchez MD at [address] [place] [address]     D & C/missed ab    SKIN BIOPSY      for psoriasis    TONSILLECTOMY  08/09/2016    tonsillectomy    UPPER GASTROINTESTINAL ENDOSCOPY  3/23/11    pyloretic    UPPER GASTROINTESTINAL ENDOSCOPY  10/7/2015    [facility]    UPPER GASTROINTESTINAL ENDOSCOPY N/A 6/18/2020    EGD DIAGNOSTIC ONLY performed by Leopoldo Sanchez MD at [facility] [address] ENDOSCOPY    UPPER GASTROINTESTINAL ENDOSCOPY N/A 8/28/2020    EGD BIOPSY performed by Morris Kaba MD at [facility] History   Problem Relation Age of Onset    Arthritis Mother    [de-identified] / Edger Bombard Mother     High Blood Pressure Mother     Depression Mother     High Cholesterol Mother     Arthritis Father     High Blood Pressure Father     Cancer Father         colon    Hearing Loss Father     Heart Disease Father     High Cholesterol Father     High Blood Pressure Brother     High Cholesterol Brother     High Blood Pressure Maternal Aunt     High Cholesterol Maternal Aunt     Miscarriages / Stillbirths Maternal Aunt     Mental Retardation Maternal Uncle     High Blood Pressure Maternal Uncle     High Cholesterol Maternal Uncle     High Blood Pressure Paternal Aunt     High Cholesterol Paternal Aunt     Arthritis Paternal Uncle     Mental Illness Paternal Uncle     High Blood Pressure Paternal Uncle     Birth Defects Paternal Uncle     High Cholesterol Paternal Uncle     Learning Disabilities Paternal Uncle     Arthritis Maternal Grandmother     High Blood Pressure Maternal Grandmother     Diabetes Maternal Grandmother     High Cholesterol Maternal Grandmother     Miscarriages / Stillbirths Maternal Grandmother     Arthritis Maternal Grandfather     High Blood Pressure Maternal Grandfather     High Cholesterol Maternal Grandfather     Arthritis Paternal Grandmother     High Blood Pressure Paternal Grandmother     Heart Disease Paternal Grandmother     High Cholesterol Paternal Grandmother    [de-identified] / Stillbirths Paternal Grandmother     Stroke Paternal Grandmother     High Blood Pressure Paternal Grandfather     High Cholesterol Paternal Grandfather      Social History     Tobacco Use    Smoking status: Former Smoker     Packs/day: 1.00     Years: 20.00     Pack years: 20.00     Types: Cigarettes     Last attempt to quit: 2019     Years since quittin.6    Smokeless tobacco: Never Used   Substance Use Topics    Alcohol use: Not Currently     Alcohol/week: 0.0 standard drinks     Frequency: Monthly or less     I counseled the patient on the importance of not smoking and risks of ETOH. Allergies   Allergen Reactions    Lisinopril Other (See Comments)     COUGH    Tramadol Other (See Comments)     severe  SEVERE HEADACHE     Vitals:    20 1307   Weight: 283 lb (128.4 kg)   Height: 5' 5.5\" (1.664 m)     Body mass index is 46.38 kg/m². Current Outpatient Medications:     metFORMIN (GLUCOPHAGE-XR) 750 MG extended release tablet, 2 in AM, Disp: 60 tablet, Rfl: 3    fluticasone (FLONASE) 50 MCG/ACT nasal spray, SPRAY 1 SPRAY INTO EACH NOSTRIL EVERY DAY, Disp: 1 Bottle, Rfl: 2    hydrOXYzine (ATARAX) 50 MG tablet, 1 twice daily as needed anxiety. , Disp: 30 tablet, Rfl: 1    amLODIPine (NORVASC) 5 MG tablet, Take 2 tablets by mouth daily. (Patient taking differently: Take 2 tablets by mouth daily. At 7pm), Disp: 180 tablet, Rfl: 1    cimetidine (TAGAMET) 400 MG tablet, Take 1 tablet by mouth 2 times daily (Patient taking differently: Take 400 mg by mouth 2 times daily Patient states takes once a day at 7pm), Disp: 180 tablet, Rfl: 1    losartan-hydrochlorothiazide (HYZAAR) 100-25 MG per tablet, Take 1 tablet by mouth daily for high blood pressure.  l10, Disp: 90 tablet, Rfl: 1    atorvastatin (LIPITOR) 10 MG tablet, Take 1 tablet by mouth daily (Patient taking differently: Take 10 mg by mouth nightly ), Disp: 30 tablet, Rfl: 5    ondansetron (ZOFRAN-ODT) 4 MG disintegrating tablet, Take 1 tablet by mouth 3 times daily as needed for Nausea or Vomiting, Disp: 40 tablet, Rfl: 1    Cholecalciferol (VITAMIN D3) 25 MCG (1000 UT) CAPS, Take by mouth daily, Disp: , Rfl:     BIOTIN PO, Take by mouth daily, Disp: , Rfl:     COLLAGEN PO, Take by mouth daily, Disp: , Rfl:     Prenatal Vit-Fe Fumarate-FA (PRENATAL VITAMIN PO), Take by mouth daily, Disp: , Rfl:     DULoxetine (CYMBALTA) 60 MG extended release capsule, TAKE 1 CAPSULE BY MOUTH EVERY DAY, Disp: 90 capsule, Rfl: 1    oxybutynin (DITROPAN XL) 5 MG extended release tablet, Take 1 tablet by mouth daily, Disp: 30 tablet, Rfl: 3    hydrALAZINE (APRESOLINE) 10 MG tablet, TAKE 1 TABLET BY MOUTH EVERY DAY FOR 5 DAYS THEN INCREASE TO TAKE 1 TABLET BY MOUTH TWICE A DAY (Patient taking differently: TAKE 1 TABLET BY MOUTH EVERY DAY FOR 5 DAYS THEN INCREASE TO TAKE 1 TABLET BY MOUTH TWICE A DAY  Patient states only takes omce a day), Disp: 60 tablet, Rfl: 5    levonorgestrel-ethinyl estradiol (JOLESSA) 0.15-0.03 MG per tablet, Take 1 tablet by mouth, Disp: , Rfl:     Lab Results   Component Value Date    WBC 9.2 07/21/2020    RBC 4.69 07/21/2020    HGB 13.8 07/21/2020    HCT 40.5 07/21/2020    MCV 86.3 07/21/2020    MCH 29.3 07/21/2020    MCHC 34.0 07/21/2020    MPV 8.0 07/21/2020    NEUTOPHILPCT 64.9 07/21/2020    LYMPHOPCT 26.6 07/21/2020    MONOPCT 6.3 07/21/2020    EOSRELPCT 1.3 07/21/2020    BASOPCT 0.9 07/21/2020    NEUTROABS 6.0 07/21/2020    LYMPHSABS 2.5 07/21/2020    MONOSABS 0.6 07/21/2020    EOSABS 0.1 07/21/2020     Lab Results   Component Value Date     05/29/2020    K 3.8 05/29/2020    K 3.6 02/10/2019    CL 99 05/29/2020    CO2 24 05/29/2020    ANIONGAP 14 05/29/2020    GLUCOSE 148 05/29/2020    BUN 12 05/29/2020    CREATININE 0.5 05/29/2020    LABGLOM >60 05/29/2020    GFRAA >60 05/29/2020    GFRAA >60 09/07/2012    CALCIUM 10.0 05/29/2020    PROT 6.8 07/21/2020    PROT 6.0 09/07/2012    LABALBU 4.2 07/21/2020    AGRATIO 1.9 05/29/2020    BILITOT 0.3 07/21/2020    ALKPHOS 70 07/21/2020    ALT 47 07/21/2020    AST 35 07/21/2020    GLOB 2.3 05/29/2020     Lab Results   Component Value Date    CHOL 154 07/21/2020    TRIG 246 07/21/2020    HDL 41 07/21/2020    HDL 38 07/20/2011    LDLCALC 64 07/21/2020    LABVLDL 49 07/21/2020     Lab Results   Component Value Date    TSHREFLEX 1.59 07/21/2020     Lab Results   Component Value Date    IRON 71 07/21/2020    TIBC 405 07/21/2020    LABIRON 18 07/21/2020     Lab Results   Component Value Date    JFPHRPNW34 967 05/29/2020    FOLATE >20.00 05/29/2020     Lab Results   Component Value Date    VITD25 33.8 05/29/2020     Lab Results   Component Value Date    LABA1C 6.5 05/06/2020    .9 05/06/2020     Review of Systems   Constitutional: Negative. Negative for chills, fatigue and fever. HENT: Negative. Eyes: Negative. Respiratory: Negative. Negative for cough and shortness of breath. Cardiovascular: Negative. Gastrointestinal: Negative. Endocrine: Negative. Genitourinary: Negative. Musculoskeletal: Negative. Skin: Negative. Allergic/Immunologic: Negative. Neurological: Negative. Hematological: Negative. Psychiatric/Behavioral: Negative. Objective:     Physical Exam  Vitals signs reviewed. Constitutional:       Appearance: She is well-developed. HENT:      Head: Normocephalic and atraumatic. Eyes:      Conjunctiva/sclera: Conjunctivae normal.      Pupils: Pupils are equal, round, and reactive to light. Neck:      Musculoskeletal: Normal range of motion and neck supple. Pulmonary:      Effort: Pulmonary effort is normal.   Abdominal:      Palpations: Abdomen is soft. Musculoskeletal: Normal range of motion. Skin:     General: Skin is warm and dry. Neurological:      Mental Status: She is alert and oriented to person, place, and time. Psychiatric:         Behavior: Behavior normal.         Thought Content: Thought content normal.         Judgment: Judgment normal.       Assessment and Plan:   Patient is here for their preoperative education group visit for sleeve gastrectomy. The patient is up 1 lbs today. The patient's current Body mass index is 46.38 kg/m². (11/24/20).  She is making good dietary and behavior modifications and is considered to be a good surgical candidate. Patient has a diagnosis of hypertension. Reviewed the importance of checking blood pressure preoperatively and postoperatively. In addition, following up with their PCP for medication adjustments as needed. Discussed the fact that they will be required to crush or open their medications for the first two weeks after surgery and reviewed those medications that can not be crushed. Patient has a diagnosis of diabetes. Reviewed the importance of checking blood sugars preoperatively and postoperatively. In addition, following up with their PCP or endocrinologist for medication adjustments as needed. Discussed the fact that they will be required to crush or open their medications for the first two weeks after surgery and reviewed those medications that can not be crushed. Patient has a diagnosis of hyperlipidemia. Discussed the benefits of weight loss and dietary changes on lipids and cholesterol. Discussed the fact that they will be required to crush or open their medications for the first two weeks after surgery and reviewed those medications that can not be crushed. Patient has a diagnosis of obstructive sleep apnea and uses a CPAP for sleep. Discussed that weight loss can assist with improving sleep apnea symptoms. Explained to patient to make sure they bring their CPAP with them to the hospital for their surgery. Patient has a diagnosis of chronic GERD and takes a H2 Blocker. Discussed the benefits of weight loss and dietary changes on acid reflux. However, they will most likely need to continue their medication short term after surgery as they adjust to the new diet. Discussed the fact that they will be required to crush or open their medications for the first two weeks after surgery and reviewed those medications that can not be crushed.     Patient received instruction that it is recommended to avoid pregnancy following bariatric surgery for at least 2 years to allow them to have stable weight loss and to help avoid increased risk of vitamin deficiencies and malnutrition. The patient was encouraged to discuss possible contraceptive methods with their PCP or OBGYN. Patient received instructions in reference to the two week preoperative diet and the four phases of their postoperative diet. In addition I reviewed these instructions and stressed the importance of following these recommendations for their safety. Patient completed the preoperative class where they were provided with education related to their bariatric surgery, common surgical complications, medications preoperatively & postoperatively, special concerns related to bariatric surgery postoperatively, vitamin supplementation, patient agreement, PAT & scheduling, hospital course, wellness discovery program and what to do in the case of an emergency postoperatively. I reviewed all preoperative and postoperative diet instructions. Patient was given the opportunity to ask questions during the group visit and these questions were answered by myself. A total of 30 minutes was spent conversing with the patient and over half of that time was spent counseling the patient on proper dietary behaviors, exercise and surgery protocols.

## 2020-11-18 RX ORDER — METFORMIN HYDROCHLORIDE 750 MG/1
TABLET, EXTENDED RELEASE ORAL
Qty: 60 TABLET | Refills: 3 | Status: SHIPPED | OUTPATIENT
Start: 2020-11-18 | End: 2021-02-19

## 2020-11-18 NOTE — TELEPHONE ENCOUNTER
.  Last office visit 10/27/2020     Last written 18 60 with 3      Next office visit scheduled Visit date not found    Requested Prescriptions     Pending Prescriptions Disp Refills    metFORMIN (GLUCOPHAGE-XR) 750 MG extended release tablet 60 tablet 3     Si in AM

## 2020-11-19 ENCOUNTER — OFFICE VISIT (OUTPATIENT)
Dept: PRIMARY CARE CLINIC | Age: 40
End: 2020-11-19
Payer: COMMERCIAL

## 2020-11-19 PROCEDURE — 99211 OFF/OP EST MAY X REQ PHY/QHP: CPT | Performed by: NURSE PRACTITIONER

## 2020-11-19 NOTE — PROGRESS NOTES
[facility] received a viral test for COVID-19. They were educated on isolation and quarantine as appropriate. For any symptoms, they were directed to seek care from their PCP, given contact information to establish with a doctor, directed to an urgent care or the emergency room.

## 2020-11-19 NOTE — PATIENT INSTRUCTIONS

## 2020-11-21 LAB — SARS-COV-2, NAA: NOT DETECTED

## 2020-11-24 ENCOUNTER — OFFICE VISIT (OUTPATIENT)
Dept: FAMILY MEDICINE CLINIC | Age: 40
End: 2020-11-24
Payer: COMMERCIAL

## 2020-11-24 ENCOUNTER — OFFICE VISIT (OUTPATIENT)
Dept: BARIATRICS/WEIGHT MGMT | Age: 40
End: 2020-11-24
Payer: COMMERCIAL

## 2020-11-24 VITALS — BODY MASS INDEX: 45.48 KG/M2 | HEIGHT: 66 IN | WEIGHT: 283 LBS

## 2020-11-24 VITALS
TEMPERATURE: 98.8 F | OXYGEN SATURATION: 98 % | HEIGHT: 67 IN | WEIGHT: 283 LBS | HEART RATE: 91 BPM | BODY MASS INDEX: 44.42 KG/M2

## 2020-11-24 PROCEDURE — G8427 DOCREV CUR MEDS BY ELIG CLIN: HCPCS | Performed by: FAMILY MEDICINE

## 2020-11-24 PROCEDURE — 3044F HG A1C LEVEL LT 7.0%: CPT | Performed by: NURSE PRACTITIONER

## 2020-11-24 PROCEDURE — 99213 OFFICE O/P EST LOW 20 MIN: CPT | Performed by: FAMILY MEDICINE

## 2020-11-24 PROCEDURE — G8482 FLU IMMUNIZE ORDER/ADMIN: HCPCS | Performed by: NURSE PRACTITIONER

## 2020-11-24 PROCEDURE — 99214 OFFICE O/P EST MOD 30 MIN: CPT | Performed by: NURSE PRACTITIONER

## 2020-11-24 PROCEDURE — 90471 IMMUNIZATION ADMIN: CPT | Performed by: FAMILY MEDICINE

## 2020-11-24 PROCEDURE — G8417 CALC BMI ABV UP PARAM F/U: HCPCS | Performed by: NURSE PRACTITIONER

## 2020-11-24 PROCEDURE — 2022F DILAT RTA XM EVC RTNOPTHY: CPT | Performed by: NURSE PRACTITIONER

## 2020-11-24 PROCEDURE — 1036F TOBACCO NON-USER: CPT | Performed by: NURSE PRACTITIONER

## 2020-11-24 PROCEDURE — G8417 CALC BMI ABV UP PARAM F/U: HCPCS | Performed by: FAMILY MEDICINE

## 2020-11-24 PROCEDURE — G8482 FLU IMMUNIZE ORDER/ADMIN: HCPCS | Performed by: FAMILY MEDICINE

## 2020-11-24 PROCEDURE — G8427 DOCREV CUR MEDS BY ELIG CLIN: HCPCS | Performed by: NURSE PRACTITIONER

## 2020-11-24 PROCEDURE — 1036F TOBACCO NON-USER: CPT | Performed by: FAMILY MEDICINE

## 2020-11-24 PROCEDURE — 90686 IIV4 VACC NO PRSV 0.5 ML IM: CPT | Performed by: FAMILY MEDICINE

## 2020-11-24 ASSESSMENT — ENCOUNTER SYMPTOMS: ROS SKIN COMMENTS: SEE HPI

## 2020-11-24 NOTE — PATIENT INSTRUCTIONS
Sebaceous cyst of breast, right  Monitor for now and let me know if the size of the nodule increases any erythema any bruising or any discharge.   Other orders  -     INFLUENZA, QUADV, 3 YRS AND OLDER, IM PF, PREFILL SYR OR SDV, 0.5ML (AFLURIA QUADV, PF)

## 2020-11-24 NOTE — PROGRESS NOTES
Subjective:      Patient ID: Gideon Booker is a 45 y.o. female. HPI  Patient has been treated in the past for probable infected subcutaneous cyst in the right breast.  Apparently one episode a few years back she developed septicemia after she went to the ER to have 1 area lanced. She was also treated in the past 6 or 7 months for 1 or 2 apparent infected cyst on the underneath side of her right breast.  Today she says she has 1 area that feels like a little knot under the surface with what appears to be a healing area on the surface. Prior to Visit Medications :  Medication metFORMIN (GLUCOPHAGE-XR) 750 MG extended release tablet, Sig 2 in AM, Taking? Yes, Authorizing Provider Sorin Santillan, DO    Medication fluticasone (FLONASE) 50 MCG/ACT nasal spray, Sig SPRAY 1 SPRAY INTO EACH NOSTRIL EVERY DAY, Taking? Yes, Authorizing Provider Sorin Santillan, DO    Medication hydrOXYzine (ATARAX) 50 MG tablet, Sig 1 twice daily as needed anxiety. , Taking? Yes, Authorizing Provider Sorin Santillan, DO    Medication amLODIPine (NORVASC) 5 MG tablet, Sig Take 2 tablets by mouth daily. Patient taking differently: Take 2 tablets by mouth daily. At 7pm, Taking? Yes, Authorizing Provider Sorin Santillan, DO    Medication cimetidine (TAGAMET) 400 MG tablet, Sig Take 1 tablet by mouth 2 times daily  Patient taking differently: Take 400 mg by mouth 2 times daily Patient states takes once a day at 7pm, Taking? Yes, Authorizing Provider Sorin Santillan, DO    Medication losartan-hydrochlorothiazide (HYZAAR) 100-25 MG per tablet, Sig Take 1 tablet by mouth daily for high blood pressure. l10, Taking? Yes, Authorizing Provider Sorin Santillan, DO    Medication atorvastatin (LIPITOR) 10 MG tablet, Sig Take 1 tablet by mouth daily  Patient taking differently: Take 10 mg by mouth nightly , Taking?  Yes, Authorizing Provider Franny Jones MD    Medication ondansetron (ZOFRAN-ODT) 4 MG disintegrating tablet, Sig Take 1 tablet by mouth 3 times daily as needed for Nausea or Vomiting, Taking? Yes, Authorizing Provider Sarah Saldana MD    Medication Cholecalciferol (VITAMIN D3) 25 MCG (1000 UT) CAPS, Sig Take by mouth daily, Taking? Yes, Authorizing Provider Opal Frazier MD    Medication BIOTIN PO, Sig Take by mouth daily, Taking? Yes, Authorizing Provider Opal Frazier MD    Medication COLLAGEN PO, Sig Take by mouth daily, Taking? Yes, Authorizing Provider Opal Frazier MD    Medication Prenatal Vit-Fe Fumarate-FA (PRENATAL VITAMIN PO), Sig Take by mouth daily, Taking? Yes, Authorizing Provider Opal Frazier MD    Medication DULoxetine (CYMBALTA) 60 MG extended release capsule, Sig TAKE 1 CAPSULE BY MOUTH EVERY DAY, Taking? Yes, Authorizing Provider Sorin Santillan, DO    Medication oxybutynin (DITROPAN XL) 5 MG extended release tablet, Sig Take 1 tablet by mouth daily, Taking? Yes, Authorizing Provider Blayne Sutton MD    Medication hydrALAZINE (APRESOLINE) 10 MG tablet, Sig TAKE 1 TABLET BY MOUTH EVERY DAY FOR 5 DAYS THEN INCREASE TO TAKE 1 TABLET BY MOUTH TWICE A DAY  Patient taking differently: TAKE 1 TABLET BY MOUTH EVERY DAY FOR 5 DAYS THEN INCREASE TO TAKE 1 TABLET BY MOUTH TWICE A DAY    Patient states only takes omce a day, Taking? Yes, Authorizing Provider Jatin Santillan, DO    Medication levonorgestrel-ethinyl estradiol (Rajat ) 0.15-0.03 MG per tablet, Sig Take 1 tablet by mouth, Taking? Yes, Authorizing Provider Opal Frazier MD      Past Medical History:  No date:  Anxiety  No date: Arthritis  No date: Cervical pain  No date: Chronic back pain  No date: Depression  No date: Diabetes mellitus ([facility] [address]  No date: Hypertension  No date: Infertility      Comment:  took fertility medication  03/2012: Kidney stone  No date: Migraine  No date: Miscarriage  1999: Polycystic ovarian syndrome  No date: PONV (postoperative nausea and vomiting)  No date: Psoriasis  No date: Stomach ulcer  No date: Unspecified sleep apnea      Comment:  can't tolerate CPAP        Review of Systems    Review of Systems   Constitutional: Negative for fever. Skin:        See HPI       Objective:   Physical Exam      Physical Exam  Skin:     Comments: 2 areas on the inferior portion of the right breast that appear to be old sites of possible infection with what it appears to be some scarring and the 1 most medial nodule subcutaneous is nontender about 3 to 4 mm no surrounding erythema and no discharge. Assessment:       Diagnosis Orders   1. Sebaceous cyst of breast, right           Plan:      Wandy Corrales was seen today for breast pain and discuss medications. Diagnoses and all orders for this visit:    Sebaceous cyst of breast, right  Monitor for now and let me know if the size of the nodule increases any erythema any bruising or any discharge.   Other orders  -     INFLUENZA, QUADV, 3 YRS AND OLDER, IM PF, PREFILL SYR OR SDV, 0.5ML (AFLURIA QUADV, PF)            Sorin Santillan, DO

## 2020-12-04 ENCOUNTER — TELEPHONE (OUTPATIENT)
Dept: CARDIOLOGY CLINIC | Age: 40
End: 2020-12-04

## 2020-12-04 NOTE — LETTER
[address] Cardiology - [address]  Phone: [phone]  Fax: [phone]    DIANA Hou CNP        December 4, 2020     [address]  [place] [id number]  1980      To whom it may concern,   Jhon Kwon is ok for bariatric surgery from a cardiac perspective. If you have any questions or concerns, please don't hesitate to call.     Sincerely,        DIANA Hou CNP

## 2020-12-04 NOTE — TELEPHONE ENCOUNTER
Pre admission testing calling, pt is scheduled for surgery on 12/23, but they never received a clearance letter. Can a letter be put into epic for them.     If questions call Celestin [facility] [phone]

## 2020-12-15 ENCOUNTER — OFFICE VISIT (OUTPATIENT)
Dept: FAMILY MEDICINE CLINIC | Age: 40
End: 2020-12-15
Payer: COMMERCIAL

## 2020-12-15 VITALS
TEMPERATURE: 97.4 F | OXYGEN SATURATION: 97 % | WEIGHT: 276.6 LBS | DIASTOLIC BLOOD PRESSURE: 85 MMHG | HEART RATE: 100 BPM | BODY MASS INDEX: 44.64 KG/M2 | SYSTOLIC BLOOD PRESSURE: 130 MMHG

## 2020-12-15 PROCEDURE — G8417 CALC BMI ABV UP PARAM F/U: HCPCS | Performed by: FAMILY MEDICINE

## 2020-12-15 PROCEDURE — G8427 DOCREV CUR MEDS BY ELIG CLIN: HCPCS | Performed by: FAMILY MEDICINE

## 2020-12-15 PROCEDURE — G8482 FLU IMMUNIZE ORDER/ADMIN: HCPCS | Performed by: FAMILY MEDICINE

## 2020-12-15 PROCEDURE — 99242 OFF/OP CONSLTJ NEW/EST SF 20: CPT | Performed by: FAMILY MEDICINE

## 2020-12-15 NOTE — PROGRESS NOTES
Preoperative Consultation      Srinivasa Reddy  YOB: 1980    Date of Service:  12/15/2020    Vitals:    12/15/20 1320 12/15/20 1335   BP: (!) 130/90 130/85   Site: Right Upper Arm    Position: Sitting    Cuff Size: Large Adult    Pulse: 100    Temp: 97.4 °F (36.3 °C)    SpO2: 97%    Weight: 276 lb 9.6 oz (125.5 kg)       Wt Readings from Last 2 Encounters:   12/15/20 276 lb 9.6 oz (125.5 kg)   11/24/20 283 lb (128.4 kg)     BP Readings from Last 3 Encounters:   12/15/20 130/85   10/27/20 (!) 156/90   09/14/20 128/74        Chief Complaint   Patient presents with   Hever Zuniga Pre-op Exam     Gastric sleeve surgery, 12/23/20, Dr. Skyler Fraser, [facility]     Allergies   Allergen Reactions    Lisinopril Other (See Comments)     COUGH    Tramadol Other (See Comments)     severe  SEVERE HEADACHE     Outpatient Medications Marked as Taking for the 12/15/20 encounter (Office Visit) with Katharine Santillan,    Medication Sig Dispense Refill    metFORMIN (GLUCOPHAGE-XR) 750 MG extended release tablet 2 in AM (Patient taking differently: nightly 2 in AM) 60 tablet 3    fluticasone (FLONASE) 50 MCG/ACT nasal spray SPRAY 1 SPRAY INTO EACH NOSTRIL EVERY DAY 1 Bottle 2    hydrOXYzine (ATARAX) 50 MG tablet 1 twice daily as needed anxiety. 30 tablet 1    amLODIPine (NORVASC) 5 MG tablet Take 2 tablets by mouth daily. (Patient taking differently: Take 2 tablets by mouth daily. At 7pm) 180 tablet 1    cimetidine (TAGAMET) 400 MG tablet Take 1 tablet by mouth 2 times daily (Patient taking differently: Take 400 mg by mouth 2 times daily Patient states takes once a day at 7pm) 180 tablet 1    losartan-hydrochlorothiazide (HYZAAR) 100-25 MG per tablet Take 1 tablet by mouth daily for high blood pressure.  l10 90 tablet 1    atorvastatin (LIPITOR) 10 MG tablet Take 1 tablet by mouth daily (Patient taking differently: Take 10 mg by mouth nightly ) 30 tablet 5  Hypokalemia    Psoriasis    ESTELA (generalized anxiety disorder)    DM (diabetes mellitus) (HCC)    Obesity (BMI 35.0-39.9 without comorbidity)    Recurrent acute tonsillitis    Moderate episode of recurrent major depressive disorder (HCC)    Acute hyperglycemia    Cystitis, acute    Arthropathy of multiple sites    Blood in stool    Cellulitis of breast    Depressive disorder, not elsewhere classified    Diarrhea    Disorder of sweat glands    Excessive sweating    Family history of premature coronary heart disease    Family hx of colon cancer    Feels hot    Finding of above normal blood pressure    Gastro-esophageal reflux disease with esophagitis    Hypomagnesemia    Intolerant of heat    Kidney stone on left side    Left lumbar radiculitis    Low HDL (under 40)    Lumbosacral spondylosis without myelopathy    Myofascial muscle pain    Nausea    Pain in joint involving ankle and foot    Plantar fascial fibromatosis    Sebaceous cyst    Severe recurrent major depression without psychotic features (HCC)    Tobacco use disorder    Polyneuropathy    Arthritis    Pain in both hands    Morbid obesity with BMI of 40.0-44.9, adult (HCC)    Chronic GERD    Diabetes mellitus type 2 in obese (HCC)    Hidradenitis suppurativa    Polyp of ascending colon    Other fatigue    SOB (shortness of breath)    Elevated liver enzymes    Hypertriglyceridemia       Past Medical History:   Diagnosis Date    Anxiety     Arthritis     Cervical pain     Chronic back pain     Depression     Diabetes mellitus ([facility] [address]     Hyperlipidemia     Hypertension     Infertility     took fertility medication    Kidney stone 03/2012    Migraine     Miscarriage     Polycystic ovarian syndrome 1999    PONV (postoperative nausea and vomiting)     Psoriasis     Stomach ulcer     Unspecified sleep apnea     cpap     Past Surgical History:   Procedure Laterality Date    BREAST SURGERY Right for cellulitis     CHOLECYSTECTOMY  2011    [facility]    COLONOSCOPY N/A 6/18/2020    COLONOSCOPY WITH BIOPSY performed by Argelia Moyer MD at [address] [facility] N/A 6/18/2020    COLONOSCOPY POLYPECTOMY SNARE performed by Argelia Moyer MD at [facility] C/missed ab    SKIN BIOPSY      for psoriasis    TONSILLECTOMY  08/09/2016    tonsillectomy    UPPER GASTROINTESTINAL ENDOSCOPY  3/23/11    pyloretic    UPPER GASTROINTESTINAL ENDOSCOPY  10/7/2015    [facility]    UPPER GASTROINTESTINAL ENDOSCOPY N/A 6/18/2020    EGD DIAGNOSTIC ONLY performed by Argelia Moyer MD at [address] N/A 8/28/2020    EGD BIOPSY performed by Franklin Marley MD at [address] History   Problem Relation Age of Onset    Arthritis Mother     Miscarriages / [place] Mother     High Blood Pressure Mother     Depression Mother     High Cholesterol Mother     Arthritis Father     High Blood Pressure Father     Cancer Father         colon    Hearing Loss Father     Heart Disease Father     High Cholesterol Father     High Blood Pressure Brother     High Cholesterol Brother     High Blood Pressure Maternal Aunt     High Cholesterol Maternal Aunt     Miscarriages / Stillbirths Maternal Aunt     Mental Retardation Maternal Uncle     High Blood Pressure Maternal Uncle     High Cholesterol Maternal Uncle     High Blood Pressure Paternal Aunt     High Cholesterol Paternal Aunt     Arthritis Paternal Uncle     Mental Illness Paternal Uncle     High Blood Pressure Paternal Uncle     Birth Defects Paternal Uncle     High Cholesterol Paternal Uncle     Learning Disabilities Paternal Uncle     Arthritis Maternal Grandmother     High Blood Pressure Maternal Grandmother  Diabetes Maternal Grandmother     High Cholesterol Maternal Grandmother     Miscarriages / Stillbirths Maternal Grandmother     Arthritis Maternal Grandfather     High Blood Pressure Maternal Grandfather     High Cholesterol Maternal Grandfather     Arthritis Paternal Grandmother     High Blood Pressure Paternal Grandmother     Heart Disease Paternal Grandmother     High Cholesterol Paternal Grandmother    [de-identified] / Stillbirths Paternal Grandmother     Stroke Paternal Grandmother     High Blood Pressure Paternal Grandfather     High Cholesterol Paternal Grandfather      Social History     Socioeconomic History    Marital status:      Spouse name: Terrie Bernstein    Number of children: 3    Years of education: 12    Highest education level: Not on file   Occupational History    Not on file   Social Needs    Financial resource strain: Not on file    Food insecurity     Worry: Not on file     Inability: Not on file   [employer] needs     Medical: Not on file     Non-medical: Not on file   Tobacco Use    Smoking status: Former Smoker     Packs/day: 1.00     Years: 20.00     Pack years: 20.00     Types: Cigarettes     Quit date: 2019     Years since quittin.6    Smokeless tobacco: Never Used   Substance and Sexual Activity    Alcohol use: Not Currently     Alcohol/week: 0.0 standard drinks     Frequency: Monthly or less    Drug use: Not Currently     Frequency: 4.0 times per week     Types: Marijuana     Comment: marijuana- last use was Sept 1    Sexual activity: Yes     Partners: Male   Lifestyle    Physical activity     Days per week: Not on file     Minutes per session: Not on file    Stress: Not on file   Relationships    Social connections     Talks on phone: Not on file     Gets together: Not on file     Attends Sabianist service: Not on file     Active member of club or organization: Not on file     Attends meetings of clubs or organizations: Not on file Relationship status: Not on file    Intimate partner violence     Fear of current or ex partner: Not on file     Emotionally abused: Not on file     Physically abused: Not on file     Forced sexual activity: Not on file   Other Topics Concern    Not on file   Social History Narrative    Not on file       Review of Systems  A comprehensive review of systems was negative except for what was noted in the HPI. Physical Exam   Constitutional: She is oriented to person, place, and time. She appears well-developed and well-nourished. No distress. HENT:   Head: Normocephalic and atraumatic. Mouth/Throat: Uvula is midline, oropharynx is clear and moist and mucous membranes are normal.   Eyes: Conjunctivae and EOM are normal. Pupils are equal, round, and reactive to light. Neck: Trachea normal and normal range of motion. Neck supple. No JVD present. Carotid bruit is not present. No mass and no thyromegaly present. Cardiovascular: Normal rate, regular rhythm, normal heart sounds and intact distal pulses. Exam reveals no gallop and no friction rub. No murmur heard. Pulmonary/Chest: Effort normal and breath sounds normal. No respiratory distress. She has no wheezes. She has no rales. Abdominal: Soft. Normal aorta and bowel sounds are normal. She exhibits no distension and no mass. There is no hepatosplenomegaly. No tenderness. Musculoskeletal: She exhibits no edema and no tenderness. Neurological: She is alert and oriented to person, place, and time. She has normal strength. No cranial nerve deficit or sensory deficit. Coordination and gait normal.   Skin: Skin is warm and dry. No rash noted. No erythema. Psychiatric: She has a normal mood and affect. Her behavior is normal.     EKG Interpretation:  Not Done now-last one 9/2020    Lab Review : Will be done 12/17/2020. Assessment:       45 y.o. patient with planned surgery as above. Known risk factors for perioperative complications: Diabetes mellitus, Hypertension, Obstructive sleep apnea  Current medications which may produce withdrawal symptoms if withheld perioperatively: none      Plan:     1. Preoperative workup as follows: All done at [facility]. 2. Change in medication regimen before surgery: Per Surgeon's office. 3. Prophylaxis for cardiac events with perioperative beta-blockers: Not indicated  ACC/AHA indications for pre-operative beta-blocker use:    · Vascular surgery with history of postitive stress test  · Intermediate or high risk surgery with history of CAD   · Intermediate or high risk surgery with multiple clinical predictors of CAD- 2 of the following: history of compensated or prior heart failure, history of cerebrovascular disease, DM, or renal insufficiency    Routine administration of higher-dose, long-acting metoprolol in beta-blockernaïve patients on the day of surgery, and in the absence of dose titration is associated with an overall increase in mortality. Beta-blockers should be started days to weeks prior to surgery and titrated to pulse < 70.  4. Deep vein thrombosis prophylaxis: regimen to be chosen by surgical team  5. Satisf condition for planned anesthesia with increased risk due to above medical issues and results of lab & EKG -cleared for surgery.

## 2020-12-17 ENCOUNTER — OFFICE VISIT (OUTPATIENT)
Dept: PRIMARY CARE CLINIC | Age: 40
End: 2020-12-17
Payer: COMMERCIAL

## 2020-12-17 ENCOUNTER — HOSPITAL ENCOUNTER (OUTPATIENT)
Age: 40
Discharge: HOME | End: 2020-12-17
Payer: COMMERCIAL

## 2020-12-17 LAB
A/G RATIO: 1.4 (ref 1.1–2.2)
ALBUMIN SERPL-MCNC: 4.2 G/DL (ref 3.4–5)
ALP BLD-CCNC: 65 U/L (ref 40–129)
ALT SERPL-CCNC: 49 U/L (ref 10–40)
ANION GAP SERPL CALCULATED.3IONS-SCNC: 13 MMOL/L (ref 3–16)
AST SERPL-CCNC: 58 U/L (ref 15–37)
BILIRUB SERPL-MCNC: <0.2 MG/DL (ref 0–1)
BUN BLDV-MCNC: 11 MG/DL (ref 7–20)
CALCIUM SERPL-MCNC: 9.4 MG/DL (ref 8.3–10.6)
CHLORIDE BLD-SCNC: 100 MMOL/L (ref 99–110)
CO2: 25 MMOL/L (ref 21–32)
CREAT SERPL-MCNC: <0.5 MG/DL (ref 0.6–1.1)
GFR AFRICAN AMERICAN: >60
GFR NON-AFRICAN AMERICAN: >60
GLOBULIN: 2.9 G/DL
GLUCOSE BLD-MCNC: 160 MG/DL (ref 70–99)
HCT VFR BLD CALC: 42.9 % (ref 36–48)
HEMOGLOBIN: 14 G/DL (ref 12–16)
MCH RBC QN AUTO: 28.3 PG (ref 26–34)
MCHC RBC AUTO-ENTMCNC: 32.6 G/DL (ref 31–36)
MCV RBC AUTO: 86.6 FL (ref 80–100)
PDW BLD-RTO: 14.1 % (ref 12.4–15.4)
PLATELET # BLD: 369 K/UL (ref 135–450)
PMV BLD AUTO: 8.3 FL (ref 5–10.5)
POTASSIUM SERPL-SCNC: 3.6 MMOL/L (ref 3.5–5.1)
RBC # BLD: 4.95 M/UL (ref 4–5.2)
SODIUM BLD-SCNC: 138 MMOL/L (ref 136–145)
TOTAL PROTEIN: 7.1 G/DL (ref 6.4–8.2)
WBC # BLD: 8.5 K/UL (ref 4–11)

## 2020-12-17 PROCEDURE — G8428 CUR MEDS NOT DOCUMENT: HCPCS | Performed by: NURSE PRACTITIONER

## 2020-12-17 PROCEDURE — 85027 COMPLETE CBC AUTOMATED: CPT

## 2020-12-17 PROCEDURE — G8417 CALC BMI ABV UP PARAM F/U: HCPCS | Performed by: NURSE PRACTITIONER

## 2020-12-17 PROCEDURE — 36415 COLL VENOUS BLD VENIPUNCTURE: CPT

## 2020-12-17 PROCEDURE — 99211 OFF/OP EST MAY X REQ PHY/QHP: CPT | Performed by: NURSE PRACTITIONER

## 2020-12-17 PROCEDURE — 80053 COMPREHEN METABOLIC PANEL: CPT

## 2020-12-17 RX ORDER — HYDRALAZINE HYDROCHLORIDE 10 MG/1
TABLET, FILM COATED ORAL
Qty: 60 TABLET | Refills: 5 | Status: SHIPPED | OUTPATIENT
Start: 2020-12-17 | End: 2020-12-17 | Stop reason: SDUPTHER

## 2020-12-17 RX ORDER — ONDANSETRON 4 MG/1
4 TABLET, ORALLY DISINTEGRATING ORAL 3 TIMES DAILY PRN
Qty: 40 TABLET | Refills: 1 | Status: ON HOLD | OUTPATIENT
Start: 2020-12-17 | End: 2020-12-24 | Stop reason: HOSPADM

## 2020-12-17 RX ORDER — HYDRALAZINE HYDROCHLORIDE 10 MG/1
TABLET, FILM COATED ORAL
Qty: 60 TABLET | Refills: 5 | Status: CANCELLED | OUTPATIENT
Start: 2020-12-17

## 2020-12-17 RX ORDER — HYDRALAZINE HYDROCHLORIDE 10 MG/1
TABLET, FILM COATED ORAL
Qty: 60 TABLET | Refills: 5 | Status: SHIPPED | OUTPATIENT
Start: 2020-12-17 | End: 2021-04-21

## 2020-12-17 RX ORDER — HYDROXYZINE 50 MG/1
TABLET, FILM COATED ORAL
Qty: 30 TABLET | Refills: 1 | Status: SHIPPED | OUTPATIENT
Start: 2020-12-17 | End: 2022-11-02

## 2020-12-17 NOTE — TELEPHONE ENCOUNTER
Rcvd faxed request for refill on hydralazine 10 mg tablet from [facility] #[id number]

## 2020-12-17 NOTE — PROGRESS NOTES
[place] received a viral test for COVID-19. They were educated on isolation and quarantine as appropriate. For any symptoms, they were directed to seek care from their PCP, given contact information to establish with a doctor, directed to an urgent care or the emergency room.

## 2020-12-17 NOTE — TELEPHONE ENCOUNTER
.  Last office visit 12/15/2020     Last written 5-4-200 60 with 5      Next office visit scheduled Visit date not found    Requested Prescriptions      No prescriptions requested or ordered in this encounter

## 2020-12-17 NOTE — PATIENT INSTRUCTIONS

## 2020-12-17 NOTE — TELEPHONE ENCOUNTER
Prescription sent to the [facility] in [place] please tell her to call the one here locally and cancel the 1 I sent in earlier.

## 2020-12-18 LAB — SARS-COV-2: NOT DETECTED

## 2020-12-22 ENCOUNTER — ANESTHESIA EVENT (OUTPATIENT)
Dept: OPERATING ROOM | Age: 40
DRG: 403 | End: 2020-12-22
Payer: COMMERCIAL

## 2020-12-22 ENCOUNTER — TELEPHONE (OUTPATIENT)
Dept: BARIATRICS/WEIGHT MGMT | Age: 40
End: 2020-12-22

## 2020-12-23 ENCOUNTER — HOSPITAL ENCOUNTER (INPATIENT)
Age: 40
LOS: 1 days | Discharge: HOME | DRG: 403 | End: 2020-12-24
Attending: SURGERY | Admitting: SURGERY
Payer: COMMERCIAL

## 2020-12-23 ENCOUNTER — ANESTHESIA (OUTPATIENT)
Dept: OPERATING ROOM | Age: 40
DRG: 403 | End: 2020-12-23
Payer: COMMERCIAL

## 2020-12-23 VITALS — SYSTOLIC BLOOD PRESSURE: 155 MMHG | OXYGEN SATURATION: 93 % | TEMPERATURE: 98.1 F | DIASTOLIC BLOOD PRESSURE: 82 MMHG

## 2020-12-23 LAB
ABO/RH: NORMAL
ANTIBODY SCREEN: NORMAL
GLUCOSE BLD-MCNC: 115 MG/DL (ref 70–99)
GLUCOSE BLD-MCNC: 191 MG/DL (ref 70–99)
PERFORMED ON: ABNORMAL
PERFORMED ON: ABNORMAL
PREGNANCY, URINE: NEGATIVE

## 2020-12-23 PROCEDURE — 6360000002 HC RX W HCPCS

## 2020-12-23 PROCEDURE — 6360000002 HC RX W HCPCS: Performed by: NURSE ANESTHETIST, CERTIFIED REGISTERED

## 2020-12-23 PROCEDURE — 2580000003 HC RX 258: Performed by: ANESTHESIOLOGY

## 2020-12-23 PROCEDURE — 6360000002 HC RX W HCPCS: Performed by: SURGERY

## 2020-12-23 PROCEDURE — 2500000003 HC RX 250 WO HCPCS: Performed by: SURGERY

## 2020-12-23 PROCEDURE — 1200000000 HC SEMI PRIVATE

## 2020-12-23 PROCEDURE — C9113 INJ PANTOPRAZOLE SODIUM, VIA: HCPCS | Performed by: SURGERY

## 2020-12-23 PROCEDURE — 86850 RBC ANTIBODY SCREEN: CPT

## 2020-12-23 PROCEDURE — 6370000000 HC RX 637 (ALT 250 FOR IP): Performed by: NURSE ANESTHETIST, CERTIFIED REGISTERED

## 2020-12-23 PROCEDURE — 0DB64Z3 EXCISION OF STOMACH, PERCUTANEOUS ENDOSCOPIC APPROACH, VERTICAL: ICD-10-PCS | Performed by: SURGERY

## 2020-12-23 PROCEDURE — 6370000000 HC RX 637 (ALT 250 FOR IP): Performed by: SURGERY

## 2020-12-23 PROCEDURE — 7100000001 HC PACU RECOVERY - ADDTL 15 MIN: Performed by: SURGERY

## 2020-12-23 PROCEDURE — 86900 BLOOD TYPING SEROLOGIC ABO: CPT

## 2020-12-23 PROCEDURE — 3700000001 HC ADD 15 MINUTES (ANESTHESIA): Performed by: SURGERY

## 2020-12-23 PROCEDURE — 94770 HC ETCO2 MONITOR DAILY: CPT

## 2020-12-23 PROCEDURE — 2500000003 HC RX 250 WO HCPCS: Performed by: NURSE ANESTHETIST, CERTIFIED REGISTERED

## 2020-12-23 PROCEDURE — 6360000002 HC RX W HCPCS: Performed by: ANESTHESIOLOGY

## 2020-12-23 PROCEDURE — 84703 CHORIONIC GONADOTROPIN ASSAY: CPT

## 2020-12-23 PROCEDURE — 2580000003 HC RX 258: Performed by: SURGERY

## 2020-12-23 PROCEDURE — 3700000000 HC ANESTHESIA ATTENDED CARE: Performed by: SURGERY

## 2020-12-23 PROCEDURE — 6360000002 HC RX W HCPCS: Performed by: NURSE PRACTITIONER

## 2020-12-23 PROCEDURE — 43775 LAP SLEEVE GASTRECTOMY: CPT | Performed by: SURGERY

## 2020-12-23 PROCEDURE — 2580000003 HC RX 258: Performed by: NURSE PRACTITIONER

## 2020-12-23 PROCEDURE — 2709999900 HC NON-CHARGEABLE SUPPLY: Performed by: SURGERY

## 2020-12-23 PROCEDURE — 3600000014 HC SURGERY LEVEL 4 ADDTL 15MIN: Performed by: SURGERY

## 2020-12-23 PROCEDURE — 2720000010 HC SURG SUPPLY STERILE: Performed by: SURGERY

## 2020-12-23 PROCEDURE — 86901 BLOOD TYPING SEROLOGIC RH(D): CPT

## 2020-12-23 PROCEDURE — 2500000003 HC RX 250 WO HCPCS

## 2020-12-23 PROCEDURE — 94761 N-INVAS EAR/PLS OXIMETRY MLT: CPT

## 2020-12-23 PROCEDURE — 7100000000 HC PACU RECOVERY - FIRST 15 MIN: Performed by: SURGERY

## 2020-12-23 PROCEDURE — 3600000004 HC SURGERY LEVEL 4 BASE: Performed by: SURGERY

## 2020-12-23 PROCEDURE — 88307 TISSUE EXAM BY PATHOLOGIST: CPT

## 2020-12-23 RX ORDER — PROPOFOL 10 MG/ML
INJECTION, EMULSION INTRAVENOUS PRN
Status: DISCONTINUED | OUTPATIENT
Start: 2020-12-23 | End: 2020-12-23 | Stop reason: SDUPTHER

## 2020-12-23 RX ORDER — ESMOLOL HYDROCHLORIDE 10 MG/ML
INJECTION INTRAVENOUS PRN
Status: DISCONTINUED | OUTPATIENT
Start: 2020-12-23 | End: 2020-12-23 | Stop reason: SDUPTHER

## 2020-12-23 RX ORDER — FENTANYL CITRATE 50 UG/ML
50 INJECTION, SOLUTION INTRAMUSCULAR; INTRAVENOUS EVERY 5 MIN PRN
Status: DISCONTINUED | OUTPATIENT
Start: 2020-12-23 | End: 2020-12-23 | Stop reason: HOSPADM

## 2020-12-23 RX ORDER — LABETALOL 20 MG/4 ML (5 MG/ML) INTRAVENOUS SYRINGE
5 EVERY 10 MIN PRN
Status: DISCONTINUED | OUTPATIENT
Start: 2020-12-23 | End: 2020-12-23 | Stop reason: HOSPADM

## 2020-12-23 RX ORDER — LIDOCAINE 4 G/G
1 PATCH TOPICAL EVERY 24 HOURS
Status: DISCONTINUED | OUTPATIENT
Start: 2020-12-23 | End: 2020-12-24 | Stop reason: HOSPADM

## 2020-12-23 RX ORDER — SODIUM CHLORIDE 9 MG/ML
INJECTION, SOLUTION INTRAVENOUS CONTINUOUS
Status: DISCONTINUED | OUTPATIENT
Start: 2020-12-23 | End: 2020-12-23

## 2020-12-23 RX ORDER — APREPITANT 40 MG/1
80 CAPSULE ORAL ONCE
Status: COMPLETED | OUTPATIENT
Start: 2020-12-23 | End: 2020-12-23

## 2020-12-23 RX ORDER — FENTANYL CITRATE 50 UG/ML
25 INJECTION, SOLUTION INTRAMUSCULAR; INTRAVENOUS EVERY 5 MIN PRN
Status: DISCONTINUED | OUTPATIENT
Start: 2020-12-23 | End: 2020-12-23 | Stop reason: HOSPADM

## 2020-12-23 RX ORDER — HYDROMORPHONE HCL 110MG/55ML
PATIENT CONTROLLED ANALGESIA SYRINGE INTRAVENOUS PRN
Status: DISCONTINUED | OUTPATIENT
Start: 2020-12-23 | End: 2020-12-23 | Stop reason: SDUPTHER

## 2020-12-23 RX ORDER — MIDAZOLAM HYDROCHLORIDE 1 MG/ML
INJECTION INTRAMUSCULAR; INTRAVENOUS PRN
Status: DISCONTINUED | OUTPATIENT
Start: 2020-12-23 | End: 2020-12-23 | Stop reason: SDUPTHER

## 2020-12-23 RX ORDER — SODIUM CHLORIDE, SODIUM LACTATE, POTASSIUM CHLORIDE, CALCIUM CHLORIDE 600; 310; 30; 20 MG/100ML; MG/100ML; MG/100ML; MG/100ML
INJECTION, SOLUTION INTRAVENOUS CONTINUOUS
Status: DISCONTINUED | OUTPATIENT
Start: 2020-12-23 | End: 2020-12-23

## 2020-12-23 RX ORDER — PANTOPRAZOLE SODIUM 40 MG/10ML
40 INJECTION, POWDER, LYOPHILIZED, FOR SOLUTION INTRAVENOUS DAILY
Status: DISCONTINUED | OUTPATIENT
Start: 2020-12-23 | End: 2020-12-24 | Stop reason: HOSPADM

## 2020-12-23 RX ORDER — MIDAZOLAM HYDROCHLORIDE 1 MG/ML
2 INJECTION INTRAMUSCULAR; INTRAVENOUS ONCE
Status: COMPLETED | OUTPATIENT
Start: 2020-12-23 | End: 2020-12-23

## 2020-12-23 RX ORDER — ONDANSETRON 2 MG/ML
4 INJECTION INTRAMUSCULAR; INTRAVENOUS
Status: COMPLETED | OUTPATIENT
Start: 2020-12-23 | End: 2020-12-23

## 2020-12-23 RX ORDER — FENTANYL CITRATE 50 UG/ML
INJECTION, SOLUTION INTRAMUSCULAR; INTRAVENOUS PRN
Status: DISCONTINUED | OUTPATIENT
Start: 2020-12-23 | End: 2020-12-23 | Stop reason: SDUPTHER

## 2020-12-23 RX ORDER — METOCLOPRAMIDE HYDROCHLORIDE 5 MG/ML
10 INJECTION INTRAMUSCULAR; INTRAVENOUS EVERY 6 HOURS PRN
Status: DISCONTINUED | OUTPATIENT
Start: 2020-12-23 | End: 2020-12-24 | Stop reason: HOSPADM

## 2020-12-23 RX ORDER — SCOLOPAMINE TRANSDERMAL SYSTEM 1 MG/1
1 PATCH, EXTENDED RELEASE TRANSDERMAL ONCE
Status: DISCONTINUED | OUTPATIENT
Start: 2020-12-23 | End: 2020-12-23

## 2020-12-23 RX ORDER — HYDRALAZINE HYDROCHLORIDE 20 MG/ML
5 INJECTION INTRAMUSCULAR; INTRAVENOUS EVERY 10 MIN PRN
Status: DISCONTINUED | OUTPATIENT
Start: 2020-12-23 | End: 2020-12-23 | Stop reason: HOSPADM

## 2020-12-23 RX ORDER — NALOXONE HYDROCHLORIDE 0.4 MG/ML
0.4 INJECTION, SOLUTION INTRAMUSCULAR; INTRAVENOUS; SUBCUTANEOUS PRN
Status: DISCONTINUED | OUTPATIENT
Start: 2020-12-23 | End: 2020-12-24

## 2020-12-23 RX ORDER — PROMETHAZINE HYDROCHLORIDE 25 MG/ML
6.25 INJECTION, SOLUTION INTRAMUSCULAR; INTRAVENOUS ONCE
Status: COMPLETED | OUTPATIENT
Start: 2020-12-23 | End: 2020-12-23

## 2020-12-23 RX ORDER — PROMETHAZINE HYDROCHLORIDE 25 MG/ML
6.25 INJECTION, SOLUTION INTRAMUSCULAR; INTRAVENOUS EVERY 6 HOURS PRN
Status: DISCONTINUED | OUTPATIENT
Start: 2020-12-23 | End: 2020-12-24 | Stop reason: HOSPADM

## 2020-12-23 RX ORDER — ROCURONIUM BROMIDE 10 MG/ML
INJECTION, SOLUTION INTRAVENOUS PRN
Status: DISCONTINUED | OUTPATIENT
Start: 2020-12-23 | End: 2020-12-23 | Stop reason: SDUPTHER

## 2020-12-23 RX ORDER — SODIUM CHLORIDE 9 MG/ML
10 INJECTION INTRAVENOUS DAILY
Status: DISCONTINUED | OUTPATIENT
Start: 2020-12-23 | End: 2020-12-24 | Stop reason: HOSPADM

## 2020-12-23 RX ORDER — PROMETHAZINE HYDROCHLORIDE 25 MG/ML
6.25 INJECTION, SOLUTION INTRAMUSCULAR; INTRAVENOUS
Status: COMPLETED | OUTPATIENT
Start: 2020-12-23 | End: 2020-12-23

## 2020-12-23 RX ORDER — HYOSCYAMINE SULFATE 0.5 MG/ML
250 INJECTION, SOLUTION SUBCUTANEOUS EVERY 4 HOURS PRN
Status: DISCONTINUED | OUTPATIENT
Start: 2020-12-23 | End: 2020-12-24 | Stop reason: SDUPTHER

## 2020-12-23 RX ORDER — MIDAZOLAM HYDROCHLORIDE 1 MG/ML
INJECTION INTRAMUSCULAR; INTRAVENOUS
Status: COMPLETED
Start: 2020-12-23 | End: 2020-12-23

## 2020-12-23 RX ORDER — HYDRALAZINE HYDROCHLORIDE 20 MG/ML
10 INJECTION INTRAMUSCULAR; INTRAVENOUS
Status: DISCONTINUED | OUTPATIENT
Start: 2020-12-23 | End: 2020-12-24 | Stop reason: HOSPADM

## 2020-12-23 RX ORDER — ONDANSETRON 2 MG/ML
INJECTION INTRAMUSCULAR; INTRAVENOUS PRN
Status: DISCONTINUED | OUTPATIENT
Start: 2020-12-23 | End: 2020-12-23 | Stop reason: SDUPTHER

## 2020-12-23 RX ORDER — SCOLOPAMINE TRANSDERMAL SYSTEM 1 MG/1
1 PATCH, EXTENDED RELEASE TRANSDERMAL
Status: DISCONTINUED | OUTPATIENT
Start: 2020-12-23 | End: 2020-12-23

## 2020-12-23 RX ORDER — MEPERIDINE HYDROCHLORIDE 25 MG/ML
12.5 INJECTION INTRAMUSCULAR; INTRAVENOUS; SUBCUTANEOUS EVERY 5 MIN PRN
Status: DISCONTINUED | OUTPATIENT
Start: 2020-12-23 | End: 2020-12-23 | Stop reason: HOSPADM

## 2020-12-23 RX ORDER — SODIUM CHLORIDE 0.9 % (FLUSH) 0.9 %
10 SYRINGE (ML) INJECTION PRN
Status: DISCONTINUED | OUTPATIENT
Start: 2020-12-23 | End: 2020-12-24 | Stop reason: HOSPADM

## 2020-12-23 RX ORDER — EPHEDRINE SULFATE 50 MG/ML
INJECTION INTRAVENOUS PRN
Status: DISCONTINUED | OUTPATIENT
Start: 2020-12-23 | End: 2020-12-23 | Stop reason: SDUPTHER

## 2020-12-23 RX ORDER — PROMETHAZINE HYDROCHLORIDE 25 MG/ML
INJECTION, SOLUTION INTRAMUSCULAR; INTRAVENOUS
Status: COMPLETED
Start: 2020-12-23 | End: 2020-12-23

## 2020-12-23 RX ORDER — MAGNESIUM HYDROXIDE 1200 MG/15ML
LIQUID ORAL CONTINUOUS PRN
Status: COMPLETED | OUTPATIENT
Start: 2020-12-23 | End: 2020-12-23

## 2020-12-23 RX ORDER — METHYLENE BLUE 10 MG/ML
INJECTION INTRAVENOUS PRN
Status: DISCONTINUED | OUTPATIENT
Start: 2020-12-23 | End: 2020-12-23 | Stop reason: HOSPADM

## 2020-12-23 RX ORDER — LIDOCAINE HYDROCHLORIDE 20 MG/ML
SOLUTION OROPHARYNGEAL PRN
Status: DISCONTINUED | OUTPATIENT
Start: 2020-12-23 | End: 2020-12-23 | Stop reason: SDUPTHER

## 2020-12-23 RX ORDER — ONDANSETRON 2 MG/ML
4 INJECTION INTRAMUSCULAR; INTRAVENOUS EVERY 6 HOURS PRN
Status: DISCONTINUED | OUTPATIENT
Start: 2020-12-23 | End: 2020-12-24 | Stop reason: HOSPADM

## 2020-12-23 RX ORDER — DIPHENHYDRAMINE HYDROCHLORIDE 50 MG/ML
12.5 INJECTION INTRAMUSCULAR; INTRAVENOUS EVERY 6 HOURS PRN
Status: DISCONTINUED | OUTPATIENT
Start: 2020-12-23 | End: 2020-12-24 | Stop reason: HOSPADM

## 2020-12-23 RX ORDER — BUPIVACAINE HYDROCHLORIDE AND EPINEPHRINE 2.5; 5 MG/ML; UG/ML
INJECTION, SOLUTION EPIDURAL; INFILTRATION; INTRACAUDAL; PERINEURAL PRN
Status: DISCONTINUED | OUTPATIENT
Start: 2020-12-23 | End: 2020-12-23 | Stop reason: HOSPADM

## 2020-12-23 RX ORDER — SODIUM CHLORIDE 0.9 % (FLUSH) 0.9 %
10 SYRINGE (ML) INJECTION EVERY 12 HOURS SCHEDULED
Status: DISCONTINUED | OUTPATIENT
Start: 2020-12-23 | End: 2020-12-24 | Stop reason: HOSPADM

## 2020-12-23 RX ORDER — SODIUM CHLORIDE, SODIUM LACTATE, POTASSIUM CHLORIDE, CALCIUM CHLORIDE 600; 310; 30; 20 MG/100ML; MG/100ML; MG/100ML; MG/100ML
INJECTION, SOLUTION INTRAVENOUS CONTINUOUS
Status: DISCONTINUED | OUTPATIENT
Start: 2020-12-23 | End: 2020-12-24 | Stop reason: HOSPADM

## 2020-12-23 RX ORDER — LABETALOL HYDROCHLORIDE 5 MG/ML
10 INJECTION, SOLUTION INTRAVENOUS
Status: DISCONTINUED | OUTPATIENT
Start: 2020-12-23 | End: 2020-12-24 | Stop reason: HOSPADM

## 2020-12-23 RX ORDER — OXYCODONE HYDROCHLORIDE AND ACETAMINOPHEN 5; 325 MG/1; MG/1
1 TABLET ORAL
Status: DISCONTINUED | OUTPATIENT
Start: 2020-12-23 | End: 2020-12-23 | Stop reason: HOSPADM

## 2020-12-23 RX ADMIN — SODIUM CHLORIDE, POTASSIUM CHLORIDE, SODIUM LACTATE AND CALCIUM CHLORIDE: 600; 310; 30; 20 INJECTION, SOLUTION INTRAVENOUS at 10:37

## 2020-12-23 RX ADMIN — PHENYLEPHRINE HYDROCHLORIDE 200 MCG: 10 INJECTION, SOLUTION INTRAMUSCULAR; INTRAVENOUS; SUBCUTANEOUS at 10:16

## 2020-12-23 RX ADMIN — PROMETHAZINE HYDROCHLORIDE 6.25 MG: 25 INJECTION, SOLUTION INTRAMUSCULAR; INTRAVENOUS at 12:30

## 2020-12-23 RX ADMIN — PROMETHAZINE HYDROCHLORIDE 6.25 MG: 25 INJECTION INTRAMUSCULAR; INTRAVENOUS at 12:30

## 2020-12-23 RX ADMIN — ENOXAPARIN SODIUM 30 MG: 30 INJECTION SUBCUTANEOUS at 23:31

## 2020-12-23 RX ADMIN — HYOSCYAMINE SULFATE 250 MCG: 0.5 INJECTION, SOLUTION SUBCUTANEOUS at 15:43

## 2020-12-23 RX ADMIN — METHOCARBAMOL 500 MG: 100 INJECTION, SOLUTION INTRAMUSCULAR; INTRAVENOUS at 17:58

## 2020-12-23 RX ADMIN — SODIUM CHLORIDE, POTASSIUM CHLORIDE, SODIUM LACTATE AND CALCIUM CHLORIDE: 600; 310; 30; 20 INJECTION, SOLUTION INTRAVENOUS at 07:57

## 2020-12-23 RX ADMIN — METHOCARBAMOL 500 MG: 100 INJECTION, SOLUTION INTRAMUSCULAR; INTRAVENOUS at 23:31

## 2020-12-23 RX ADMIN — SODIUM CHLORIDE, POTASSIUM CHLORIDE, SODIUM LACTATE AND CALCIUM CHLORIDE: 600; 310; 30; 20 INJECTION, SOLUTION INTRAVENOUS at 12:02

## 2020-12-23 RX ADMIN — ONDANSETRON 4 MG: 2 INJECTION INTRAMUSCULAR; INTRAVENOUS at 21:56

## 2020-12-23 RX ADMIN — MIDAZOLAM HYDROCHLORIDE 2 MG: 2 INJECTION, SOLUTION INTRAMUSCULAR; INTRAVENOUS at 09:42

## 2020-12-23 RX ADMIN — HYDROMORPHONE HYDROCHLORIDE 0.5 MG: 1 INJECTION, SOLUTION INTRAMUSCULAR; INTRAVENOUS; SUBCUTANEOUS at 15:34

## 2020-12-23 RX ADMIN — EPHEDRINE SULFATE 50 MG: 50 INJECTION INTRAVENOUS at 10:12

## 2020-12-23 RX ADMIN — ONDANSETRON 4 MG: 2 INJECTION INTRAMUSCULAR; INTRAVENOUS at 14:45

## 2020-12-23 RX ADMIN — PROPOFOL 200 MG: 10 INJECTION, EMULSION INTRAVENOUS at 09:46

## 2020-12-23 RX ADMIN — METOCLOPRAMIDE 10 MG: 5 INJECTION, SOLUTION INTRAMUSCULAR; INTRAVENOUS at 18:40

## 2020-12-23 RX ADMIN — ONDANSETRON 4 MG: 2 INJECTION INTRAMUSCULAR; INTRAVENOUS at 12:08

## 2020-12-23 RX ADMIN — APREPITANT 80 MG: 40 CAPSULE ORAL at 08:08

## 2020-12-23 RX ADMIN — ESMOLOL HYDROCHLORIDE 30 MG: 10 INJECTION, SOLUTION INTRAVENOUS at 11:04

## 2020-12-23 RX ADMIN — FENTANYL CITRATE 25 MCG: 50 INJECTION, SOLUTION INTRAMUSCULAR; INTRAVENOUS at 11:30

## 2020-12-23 RX ADMIN — ONDANSETRON 4 MG: 2 INJECTION INTRAMUSCULAR; INTRAVENOUS at 09:45

## 2020-12-23 RX ADMIN — SODIUM CHLORIDE, POTASSIUM CHLORIDE, SODIUM LACTATE AND CALCIUM CHLORIDE: 600; 310; 30; 20 INJECTION, SOLUTION INTRAVENOUS at 19:17

## 2020-12-23 RX ADMIN — FENTANYL CITRATE 25 MCG: 50 INJECTION, SOLUTION INTRAMUSCULAR; INTRAVENOUS at 11:24

## 2020-12-23 RX ADMIN — HYDROMORPHONE HYDROCHLORIDE 2 MG: 2 INJECTION, SOLUTION INTRAMUSCULAR; INTRAVENOUS; SUBCUTANEOUS at 11:10

## 2020-12-23 RX ADMIN — PROMETHAZINE HYDROCHLORIDE 6.25 MG: 25 INJECTION INTRAMUSCULAR; INTRAVENOUS at 11:35

## 2020-12-23 RX ADMIN — Medication: at 13:22

## 2020-12-23 RX ADMIN — PANTOPRAZOLE SODIUM 40 MG: 40 INJECTION, POWDER, FOR SOLUTION INTRAVENOUS at 15:43

## 2020-12-23 RX ADMIN — PHENYLEPHRINE HYDROCHLORIDE 100 MCG: 10 INJECTION, SOLUTION INTRAMUSCULAR; INTRAVENOUS; SUBCUTANEOUS at 10:14

## 2020-12-23 RX ADMIN — HYDROMORPHONE HYDROCHLORIDE 0.5 MG: 1 INJECTION, SOLUTION INTRAMUSCULAR; INTRAVENOUS; SUBCUTANEOUS at 18:40

## 2020-12-23 RX ADMIN — MIDAZOLAM HYDROCHLORIDE 2 MG: 1 INJECTION INTRAMUSCULAR; INTRAVENOUS at 08:26

## 2020-12-23 RX ADMIN — FENTANYL CITRATE 200 MCG: 50 INJECTION INTRAMUSCULAR; INTRAVENOUS at 09:46

## 2020-12-23 RX ADMIN — ENOXAPARIN SODIUM 30 MG: 30 INJECTION SUBCUTANEOUS at 08:24

## 2020-12-23 RX ADMIN — FAMOTIDINE 20 MG: 10 INJECTION, SOLUTION INTRAVENOUS at 08:23

## 2020-12-23 RX ADMIN — Medication 10 ML: at 15:43

## 2020-12-23 RX ADMIN — CEFAZOLIN 3 G: 10 INJECTION, POWDER, FOR SOLUTION INTRAVENOUS at 18:39

## 2020-12-23 RX ADMIN — HYDROMORPHONE HYDROCHLORIDE 0.5 MG: 1 INJECTION, SOLUTION INTRAMUSCULAR; INTRAVENOUS; SUBCUTANEOUS at 21:56

## 2020-12-23 RX ADMIN — MIDAZOLAM 2 MG: 1 INJECTION INTRAMUSCULAR; INTRAVENOUS at 08:26

## 2020-12-23 RX ADMIN — HYDROMORPHONE HYDROCHLORIDE 0.5 MG: 1 INJECTION, SOLUTION INTRAMUSCULAR; INTRAVENOUS; SUBCUTANEOUS at 12:49

## 2020-12-23 RX ADMIN — ROCURONIUM BROMIDE 70 MG: 10 INJECTION INTRAVENOUS at 09:46

## 2020-12-23 RX ADMIN — LIDOCAINE HYDROCHLORIDE 100 ML: 20 SOLUTION ORAL; TOPICAL at 09:46

## 2020-12-23 ASSESSMENT — PULMONARY FUNCTION TESTS
PIF_VALUE: 26
PIF_VALUE: 26
PIF_VALUE: 21
PIF_VALUE: 26
PIF_VALUE: 24
PIF_VALUE: 22
PIF_VALUE: 23
PIF_VALUE: 26
PIF_VALUE: 27
PIF_VALUE: 26
PIF_VALUE: 2
PIF_VALUE: 25
PIF_VALUE: 27
PIF_VALUE: 25
PIF_VALUE: 20
PIF_VALUE: 27
PIF_VALUE: 30
PIF_VALUE: 20
PIF_VALUE: 17
PIF_VALUE: 22
PIF_VALUE: 27
PIF_VALUE: 25
PIF_VALUE: 26
PIF_VALUE: 20
PIF_VALUE: 11
PIF_VALUE: 24
PIF_VALUE: 27
PIF_VALUE: 24
PIF_VALUE: 25
PIF_VALUE: 21
PIF_VALUE: 25
PIF_VALUE: 28
PIF_VALUE: 26
PIF_VALUE: 25
PIF_VALUE: 24
PIF_VALUE: 24

## 2020-12-23 ASSESSMENT — PAIN DESCRIPTION - PROGRESSION
CLINICAL_PROGRESSION: NOT CHANGED
CLINICAL_PROGRESSION: NOT CHANGED

## 2020-12-23 ASSESSMENT — PAIN DESCRIPTION - PAIN TYPE
TYPE: SURGICAL PAIN
TYPE: SURGICAL PAIN

## 2020-12-23 ASSESSMENT — PAIN SCALES - GENERAL
PAINLEVEL_OUTOF10: 6
PAINLEVEL_OUTOF10: 7
PAINLEVEL_OUTOF10: 2
PAINLEVEL_OUTOF10: 7
PAINLEVEL_OUTOF10: 6
PAINLEVEL_OUTOF10: 7
PAINLEVEL_OUTOF10: 7
PAINLEVEL_OUTOF10: 4
PAINLEVEL_OUTOF10: 5
PAINLEVEL_OUTOF10: 6

## 2020-12-23 ASSESSMENT — PAIN DESCRIPTION - DESCRIPTORS
DESCRIPTORS: ACHING
DESCRIPTORS: ACHING

## 2020-12-23 ASSESSMENT — PAIN DESCRIPTION - ORIENTATION
ORIENTATION: MID;UPPER
ORIENTATION: MID;UPPER

## 2020-12-23 ASSESSMENT — PAIN DESCRIPTION - LOCATION
LOCATION: ABDOMEN
LOCATION: ABDOMEN

## 2020-12-23 ASSESSMENT — PAIN DESCRIPTION - ONSET
ONSET: ON-GOING
ONSET: ON-GOING

## 2020-12-23 ASSESSMENT — PAIN DESCRIPTION - FREQUENCY
FREQUENCY: CONTINUOUS
FREQUENCY: CONTINUOUS

## 2020-12-23 ASSESSMENT — PAIN - FUNCTIONAL ASSESSMENT: PAIN_FUNCTIONAL_ASSESSMENT: 0-10

## 2020-12-23 ASSESSMENT — LIFESTYLE VARIABLES: SMOKING_STATUS: 0

## 2020-12-23 NOTE — FLOWSHEET NOTE
Report given to RADHA CLEMENTE [facility] RN at bedside. Will transport pt once room is cleaned.

## 2020-12-23 NOTE — TELEPHONE ENCOUNTER
Surgery was canceled by F. Rescheduled to [facility], [facility]..  Dr Alex Gonzales spoke with patient who agreed to the hospital change. Advised patient of arrival time [facility] was notified. Previous case was canceled and new one opened.   Auth #[id number]  1 day inpatient

## 2020-12-23 NOTE — PROGRESS NOTES
PACU Transfer Note    Vitals:    12/23/20 1415   BP: 139/84   Pulse: 99   Resp: 14   Temp: 97 °F (36.1 °C)   SpO2: 95%       In: 1927 [I.V.:1927]  Out: 5     Pain assessment:  present - adequately treated, PCA being utilized  Pain Level: 4    Report given to Receiving unit RNNikky previously at bedside in the pacu. Transferred with all belongings per pacu RN/transport for PCA handoff. [facility].     12/23/2020 2:46 PM

## 2020-12-23 NOTE — OP NOTE
 Diabetes mellitus type 2 in obese (HCC)    Hidradenitis suppurativa    Polyp of ascending colon    Other fatigue    SOB (shortness of breath)    Elevated liver enzymes    Hypertriglyceridemia         Post-operative Diagnosis:   Same      Procedure:    - Laparoscopic Sleeve Gastrectomy. Surgeon: Geraldine Young MD    Anesthesia: General endotracheal anesthesia        Procedure Details   The patient was seen again in the Holding Room. The risks, benefits, complications, treatment options, and expected outcomes were discussed with the patient and/or family. The possibilities of reaction to medication, pulmonary aspiration, perforation of viscus, bleeding, recurrent infection, strictures, leaks, failure to lose weight, regaining weight,  the need for additional procedures, failure to diagnose a condition, and creating a complication requiring transfusion or operation were discussed. There was concurrence with the proposed plan and informed consent was obtained. The site of surgery was properly noted/marked. The patient was taken to Operating Room, identified as Windy John and the procedure verified as Laparoscopic Sleeve Gastrectomy. A Time Out was held and the above information confirmed. The patient was placed in the supine position and general anesthesia was induced, along with placement of orogastric tube, Venodyne boots, and a Henson catheter. Lovenox SQ given pre-operatively. IV Antibiotics given pre-operatively. All pressure points were padded properly. Patient prepped and draped in sterile fashion. A left upper quadrant incision was made and a veres needle was inserted after confirming with saline drop test.  After adequate pneumoperitoneum was obtained, a 5 mm trocar was inserted. This was followed by a endoscope which confirmed intra-abdominal placement and there was no injury on initial trocar placement. Additional ports were placed in the standard positions under direct vision. The abdomen was initially explored and no abnormalities were identified. A liver retractor was inserted through a small incision in the upper midline, lifted the liver upward, and was then secured to the OR table. The pylorus was identified and measurement was taken approximately 4-6 cm from the pylorus along the greater curvature of the stomach. The harmonic scalpel / ligasure was used to take down the attachments and short gastric vessels along the greater curve of the stomach. This was continued until all attachments were taken down and continued to the gastro-esophageal junction. A 34 Telugu dilator was placed along the lesser curvature and into the pylorus. A stapler was fired along the dilator to create an appropriate sized gastric sleeve pouch. Green/Gold firing followed by blue firings were used to create the sleeve. The staple line looked very healthy and no bleeding from staple line. The stomach was confirmed to be completely divided with uniform shape,  no twist in the sleeve and wide patent incisura. A 2-0 vicryl suture was used to over-sew and imbricate the sleeve staple line. The staple line was completely over-sewn. The dilator was removed and an Edlich tube was advanced across the sleeve to ensure patency mainly at incisura, then retracted to just above the GE junction. The stomach distal to the staple line was clamped and methylene blue saline was injected under pressure confirming No obstruction (flow noted to duodenum) and No leak.

## 2020-12-23 NOTE — PROGRESS NOTES
Dr Sherita Browne notified of pt's extreme anxiety, Dr Traore Kin already in to see pt. Versed 2mg given IV as ordered. 0830 States feels much more relaxed.

## 2020-12-23 NOTE — ANESTHESIA POSTPROCEDURE EVALUATION
Department of Anesthesiology  Postprocedure Note    Patient: Leslie Mcmanus  MRN: [id number]  YOB: 1980  Date of evaluation: 12/23/2020  Time:  1:43 PM     Procedure Summary     Date: 12/23/20 Room / Location: [address] [place] [address]    Anesthesia Start: 4785 Anesthesia Stop: 0478    Procedure: [address] (N/A Abdomen) Diagnosis:       (E66.01  MORBID OBESITY)      (K76.0  FATTY LIVER)    Surgeons: Manda Saenz MD Responsible Provider: Gela Barone DO    Anesthesia Type: general ASA Status: 3          Anesthesia Type: general    Aly Phase I: Aly Score: 8    Aly Phase II:      Last vitals: Reviewed and per EMR flowsheets.        Anesthesia Post Evaluation    Patient location during evaluation: PACU  Patient participation: complete - patient participated  Level of consciousness: awake  Pain score: 2  Airway patency: patent  Nausea & Vomiting: no nausea and no vomiting  Complications: no  Cardiovascular status: hemodynamically stable  Respiratory status: acceptable  Hydration status: euvolemic

## 2020-12-23 NOTE — PROGRESS NOTES
Patient A&O, VSS. Patient reports 7/10 surgical pain that is not relieved w/ PCA pump. Patient education on PCA pump provided. PRN IV dilaudid administered w/ little relief. Lidocaine placed on abdomen per orders. Patient c/o nausea PRN zofran administered w/ some relief. Patient reported nausea 4 hours later. PRN reglan administered for nausea. Patient ambulating OOB to bathroom. Patient voiding adequately. /84   Pulse 96   Temp 97.3 °F (36.3 °C) (Oral)   Resp 14   Ht 5' 6\" (1.676 m)   Wt 270 lb 0.6 oz (122.5 kg)   LMP 10/23/2020 (Within Weeks)   SpO2 96%   BMI 43.59 kg/m²     Patient denies any further needs at this time. Bed is in the lowest position, bed alarm on, patient call light and bedside table are within reach. Will continue to monitor for changes in patient status.     Electronically signed by Vera Begun on 12/23/2020 at 7:01 PM

## 2020-12-23 NOTE — CARE COORDINATION
Cm following, pt from home independent, POD#0 gastric sleeve plans to DC home no needs.   Electronically signed by Tray Delarosa RN on 12/23/2020 at 3:39 PM  [phone]

## 2020-12-23 NOTE — FLOWSHEET NOTE
Pt c/o pain. Fentanyl (initial tharapy) is making pt nauseous. Awaiting for PCA Dilaudid. PRN Dilaudid given.

## 2020-12-23 NOTE — FLOWSHEET NOTE
Pt connected to PCA. Pt drowsy and will need reinforcement once more awake on instructions on how to use PCA. Pt has room on [address]. RN called to come down to get report.

## 2020-12-23 NOTE — PROGRESS NOTES
Patient admitted to PACU # 4 from OR at 1115   post [address]    per . Attached to PACU monitoring system and report received from anesthesia provider. Patient was reported to be hemodynamically stable during procedure. Patient drowsy on admission and reports pain in abdomen. Pt on simple mask at 9 L. SCDs to BLE. IVF infusing. Pt diaphoretic on arrival to pacu, blood glucose 191. Will continue to monitor pt.

## 2020-12-23 NOTE — ANESTHESIA PRE PROCEDURE
Department of Anesthesiology  Preprocedure Note       Name:  Breana Villagran   Age:  45 y.o.  :  1980                                          MRN:  [id number]         Date:  2020      Surgeon: Rosas Flaherty): Woody Duckworth MD    Procedure: Procedure(s):  LAPAROSCOPIC SLEEVE GASTRECTOMY -  ETHICON  POSSIBLE LIVER BIOPSY    Medications prior to admission:   Prior to Admission medications    Medication Sig Start Date End Date Taking? Authorizing Provider   mupirocin (BACTROBAN) 2 % ointment APPLY TO AFFECTED AREA 3 TIMES A DAY 20  Yes Historical Provider, MD Lubin Fum-Fe Poly-Vit C-Lactobac (FUSION PO) Take by mouth As ordered by Dr Vee Berry   Yes Historical Provider, MD   DULoxetine (CYMBALTA) 60 MG extended release capsule TAKE 1 CAPSULE BY MOUTH EVERY DAY 20  Yes Sorin Santillan DO   oxybutynin (DITROPAN-XL) 5 MG extended release tablet Take 1 tablet by mouth nightly 20  Yes Domo Haro MD   ondansetron (ZOFRAN-ODT) 4 MG disintegrating tablet Take 1 tablet by mouth 3 times daily as needed for Nausea or Vomiting 20  Yes Roxann Walton MD   hydrALAZINE (APRESOLINE) 10 MG tablet TAKE 1 TABLET BY MOUTH TWICE A DAY 20  Yes Sorin Santillan DO   hydrOXYzine (ATARAX) 50 MG tablet 1 twice daily as needed anxiety. 20  Yes Sorin Santillan DO   metFORMIN (GLUCOPHAGE-XR) 750 MG extended release tablet 2 in AM  Patient taking differently: 1,500 mg nightly 2 in AM 20  Yes Sorin Santillan DO   fluticasone (FLONASE) 50 MCG/ACT nasal spray SPRAY 1 SPRAY INTO EACH NOSTRIL EVERY DAY 20  Yes Sorin Santillan DO   amLODIPine (NORVASC) 5 MG tablet Take 2 tablets by mouth daily. Patient taking differently: Take 2 tablets by mouth daily.  At 7pm 20  Yes Sorin Santillan DO   cimetidine (TAGAMET) 400 MG tablet Take 1 tablet by mouth 2 times daily Patient taking differently: Take 400 mg by mouth 2 times daily Patient states takes once a day at 7pm 7/28/20  Yes Sorin Santillan DO   atorvastatin (LIPITOR) 10 MG tablet Take 1 tablet by mouth daily  Patient taking differently: Take 10 mg by mouth nightly  7/23/20  Yes Vinay Covarrubias MD   levonorgestrel-ethinyl estradiol (Oswaldo Perry) 0.15-0.03 MG per tablet Take 1 tablet by mouth   Yes Historical Provider, MD   losartan-hydrochlorothiazide (HYZAAR) 100-25 MG per tablet Take 1 tablet by mouth daily for high blood pressure. l10 7/27/20   Sorin Santillan DO   Cholecalciferol (VITAMIN D3) 25 MCG (1000 UT) CAPS Take by mouth daily    Historical Provider, MD   BIOTIN PO Take by mouth daily    Historical Provider, MD   COLLAGEN PO Take by mouth daily    Historical Provider, MD   Prenatal Vit-Fe Fumarate-FA (PRENATAL VITAMIN PO) Take by mouth daily    Historical Provider, MD       Current medications:    Current Facility-Administered Medications   Medication Dose Route Frequency Provider Last Rate Last Admin    lactated ringers infusion   Intravenous Continuous Hoa Cruz  mL/hr at 12/23/20 0757 New Bag at 12/23/20 0757    0.9 % sodium chloride infusion   Intravenous Continuous Vianey Aburto MD        ceFAZolin (ANCEF) 3 g in dextrose 5 % 100 mL IVPB  3 g Intravenous On Call to Clifford Green MD        enoxaparin (LOVENOX) injection 30 mg  30 mg Subcutaneous Once Vianey Aburto MD        scopolamine (TRANSDERM-SCOP) transdermal patch 1 patch  1 patch Transdermal Once Vianey Aburto MD   1 patch at 12/23/20 0745    midazolam (VERSED) injection 2 mg  2 mg Intravenous Once Tremaine Mendez DO           Allergies:     Allergies   Allergen Reactions    Lisinopril Other (See Comments)     COUGH    Tramadol Other (See Comments)     severe  SEVERE HEADACHE       Problem List:    Patient Active Problem List   Diagnosis Code    PCOS (polycystic ovarian syndrome) E28.2    Allergic rhinitis J30.9  Elevated liver enzymes R74.8    Hypertriglyceridemia E78.1       Past Medical History:        Diagnosis Date    Anxiety     Arthritis     Cervical pain     Chronic back pain     Depression     Diabetes mellitus (HCC)     GERD (gastroesophageal reflux disease)     Hyperlipidemia     Hypertension     Infertility     took fertility medication    Kidney stone 2012    Migraine     Miscarriage     Polycystic ovarian syndrome     PONV (postoperative nausea and vomiting)     Psoriasis     Stomach ulcer     Unspecified sleep apnea     cpap       Past Surgical History:        Procedure Laterality Date    BREAST SURGERY Right     for cellulitis     CHOLECYSTECTOMY      [facility]ps    COLONOSCOPY N/A 2020    COLONOSCOPY WITH BIOPSY performed by Juliana Molina MD at [facility] N/A 2020    COLONOSCOPY POLYPECTOMY SNARE performed by Juliana Molina MD at [facility] C/missed ab    SKIN BIOPSY      for psoriasis    TONSILLECTOMY  2016    tonsillectomy    UPPER GASTROINTESTINAL ENDOSCOPY  3/23/11    pyloretic    UPPER GASTROINTESTINAL ENDOSCOPY  10/7/2015    [facility]ast[facility]    UPPER GASTROINTESTINAL ENDOSCOPY N/A 2020    EGD DIAGNOSTIC ONLY performed by Juliana Molina MD at [facility] N/A 2020    EGD BIOPSY performed by Gianfranco Claros MD at [address] History:    Social History     Tobacco Use    Smoking status: Former Smoker     Packs/day: 1.00     Years: 20.00     Pack years: 20.00     Types: Cigarettes     Quit date: 2019     Years since quittin.6    Smokeless tobacco: Never Used   Substance Use Topics    Alcohol use: Not Currently     Alcohol/week: 0.0 standard drinks     Frequency: Monthly or less Counseling given: Not Answered      Vital Signs (Current):   Vitals:    12/23/20 0720 12/23/20 0723   BP: (!) 138/93    Pulse: 84    Resp: 16    Temp: 98.1 °F (36.7 °C)    TempSrc: Oral    SpO2: 96%    Weight:  270 lb 0.6 oz (122.5 kg)   Height: 5' 6\" (1.676 m)                                               BP Readings from Last 3 Encounters:   12/23/20 (!) 138/93   12/15/20 130/85   10/27/20 (!) 156/90       NPO Status: Time of last liquid consumption: 2000                        Time of last solid consumption: 1900                        Date of last liquid consumption: 12/22/20                        Date of last solid food consumption: 12/21/20    BMI:   Wt Readings from Last 3 Encounters:   12/23/20 270 lb 0.6 oz (122.5 kg)   12/15/20 276 lb 9.6 oz (125.5 kg)   11/24/20 283 lb (128.4 kg)     Body mass index is 43.59 kg/m².     CBC:   Lab Results   Component Value Date    WBC 8.5 12/17/2020    RBC 4.95 12/17/2020    HGB 14.0 12/17/2020    HCT 42.9 12/17/2020    MCV 86.6 12/17/2020    RDW 14.1 12/17/2020     12/17/2020       CMP:   Lab Results   Component Value Date     12/17/2020    K 3.6 12/17/2020    K 3.6 02/10/2019     12/17/2020    CO2 25 12/17/2020    BUN 11 12/17/2020    CREATININE <0.5 12/17/2020    GFRAA >60 12/17/2020    GFRAA >60 09/07/2012    AGRATIO 1.4 12/17/2020    LABGLOM >60 12/17/2020    GLUCOSE 160 12/17/2020    PROT 7.1 12/17/2020    PROT 6.0 09/07/2012    CALCIUM 9.4 12/17/2020    BILITOT <0.2 12/17/2020    ALKPHOS 65 12/17/2020    AST 58 12/17/2020    ALT 49 12/17/2020       POC Tests:   Recent Labs     12/23/20  0806   POCGLU 115*       Coags:   Lab Results   Component Value Date    PROTIME 10.9 07/21/2020    INR 0.94 07/21/2020    APTT 27.2 09/07/2012       HCG (If Applicable):   Lab Results   Component Value Date    PREGTESTUR Negative 12/23/2020        ABGs: No results found for: PHART, PO2ART, REC5RIC, EKZ2DHB, BEART, M9CMMHVZ Type & Screen (If Applicable):  Lab Results   Component Value Date    LABABO O 09/09/2012    LABRH Negative 09/09/2012       Drug/Infectious Status (If Applicable):  No results found for: HIV, HEPCAB    COVID-19 Screening (If Applicable):   Lab Results   Component Value Date    COVID19 Not Detected 12/17/2020    COVID19 NOT DETECTED 11/19/2020         Anesthesia Evaluation  Patient summary reviewed and Nursing notes reviewed   history of anesthetic complications: PONV. Airway: Mallampati: I  TM distance: >3 FB   Neck ROM: full  Mouth opening: > = 3 FB Dental: normal exam         Pulmonary:   (+) sleep apnea: on CPAP,      (-) not a current smoker                           Cardiovascular:    (+) hypertension:,         Rhythm: regular  Rate: normal                    Neuro/Psych:   (+) headaches: migraine headaches, depression/anxiety              ROS comment: Lumbar radiculopathy  GI/Hepatic/Renal:   (+) GERD: well controlled, PUD, morbid obesity          Endo/Other:    (+) DiabetesType II DM, , .                 Abdominal:           Vascular:                                        Anesthesia Plan      general     ASA 3     (Emend, pepcid, scopolamine administered preop)  Induction: intravenous. MIPS: Prophylactic antiemetics administered. Anesthetic plan and risks discussed with patient. Plan discussed with CRNA.                   Ahmet Owens DO   12/23/2020

## 2020-12-23 NOTE — PROGRESS NOTES
Department of Surgery  Post Op Note    Objective: Patient resting in bed. Remains in PACU as we are awaiting floor room to be ready. Patient continues to c/o pain. Patient presently using PCA but continues to c/o intermittent \"sharp\" pain. Denies any c/o nausea, no emesis. Did have a small episode of urinary incontinence    Anesthesia type: General      I/O    Intra op    Post op     Fluids    1300 ml      EBL        5 ml      Urine       n/a        Exam: VITALS:  BP (!) 149/87   Pulse 106   Temp 96.5 °F (35.8 °C) (Temporal)   Resp 11   Ht 5' 6\" (1.676 m)   Wt 270 lb 0.6 oz (122.5 kg)   LMP 10/23/2020 (Within Weeks)   SpO2 99%   BMI 43.59 kg/m²   Post-op vital signs:  Stable     Exam:General appearance: alert, appears stated age and cooperative  Lungs: clear to auscultation bilaterally  Heart: regular rate and rhythm, S1, S2 normal, no murmur, click, rub or gallop  Abdomen: soft, appropriately tender. 5 trocar sites with surgical glue present. Incisions c/d/i without presence of active bleeding. Hypoactive bowel sounds      Assessment and Plan  Pt is a 45year old female s/p laparoscopic sleeve gastrectomy POD #0    Pain management: continue with PCA  FeNa:  Diet - NPO , Fluids LR @ 125 ml/hour   :  Patient with one episode of urinary incontinence. Scopolamine patch removed. Patient still remains a void check. Will monitor closely  Ambulation: Patient needs to be up to a chair and out of bed this afternoon  Respiratory:  IS at bedside, encourage hourly IS and deep breathing  Prophylaxis: ESTELLE Ibrahim NP-C  [phone]  Resident Support Staff

## 2020-12-23 NOTE — H&P
SDS History & Physical Update                                     (  Less than 30 days since last  full H & P )      Chloé Walden    Diagnosis: Obesity   HPI / INDICATION / Manifestations: Presents this morning for laparoscopic sleeve gastrectomy with Dr. Stephanie Rm for weight loss. Reports an approximate 20-year history of being overweight; problem has persisted despite non-surgical efforts to lose weight. States overall she is feeling well today but is feeling very nervous.    Diagnosis # 2: Diabetes mellitus   Diagnosis # 3: HTN     Patient Active Problem List   Diagnosis    PCOS (polycystic ovarian syndrome)    Allergic rhinitis    Obstructive sleep apnea    Panic attacks    Acute left flank pain    Syncope    Essential hypertension    Migraine    Chronic back pain    Morbid obesity due to excess calories (HCC)    Tobacco dependence    Hypokalemia    Psoriasis    ESTELA (generalized anxiety disorder)    DM (diabetes mellitus) (HCC)    Obesity (BMI 35.0-39.9 without comorbidity)    Recurrent acute tonsillitis    Moderate episode of recurrent major depressive disorder (HCC)    Acute hyperglycemia    Cystitis, acute    Arthropathy of multiple sites    Blood in stool    Cellulitis of breast    Depressive disorder, not elsewhere classified    Diarrhea    Disorder of sweat glands    Excessive sweating    Family history of premature coronary heart disease    Family hx of colon cancer    Feels hot    Finding of above normal blood pressure    Gastro-esophageal reflux disease with esophagitis    Hypomagnesemia    Intolerant of heat    Kidney stone on left side    Left lumbar radiculitis    Low HDL (under 40)    Lumbosacral spondylosis without myelopathy    Myofascial muscle pain    Nausea    Pain in joint involving ankle and foot    Plantar fascial fibromatosis    Sebaceous cyst    Severe recurrent major depression without psychotic features ([address]    Tobacco use disorder  Polyneuropathy    Arthritis    Pain in both hands    Morbid obesity with BMI of 40.0-44.9, adult (HCC)    Chronic GERD    Diabetes mellitus type 2 in obese (HCC)    Hidradenitis suppurativa    Polyp of ascending colon    Other fatigue    SOB (shortness of breath)    Elevated liver enzymes    Hypertriglyceridemia       Past Medical History:      Diagnosis Date    Anxiety     Arthritis     Cervical pain     Chronic back pain     Depression     Diabetes mellitus ([address]     GERD (gastroesophageal reflux disease)     Hyperlipidemia     Hypertension     Infertility     took fertility medication    Kidney stone 03/2012    Migraine     Miscarriage     Polycystic ovarian syndrome 1999    PONV (postoperative nausea and vomiting)     Psoriasis     Stomach ulcer     Unspecified sleep apnea     cpap       Medication List:  Medications Prior to Admission: mupirocin (BACTROBAN) 2 % ointment, APPLY TO AFFECTED AREA 3 TIMES A DAY  Fe Fum-Fe Poly-Vit C-Lactobac (FUSION PO), Take by mouth As ordered by Dr Alyssa Rodriguez  DULoxetine (CYMBALTA) 60 MG extended release capsule, TAKE 1 CAPSULE BY MOUTH EVERY DAY  oxybutynin (DITROPAN-XL) 5 MG extended release tablet, Take 1 tablet by mouth nightly  ondansetron (ZOFRAN-ODT) 4 MG disintegrating tablet, Take 1 tablet by mouth 3 times daily as needed for Nausea or Vomiting  hydrALAZINE (APRESOLINE) 10 MG tablet, TAKE 1 TABLET BY MOUTH TWICE A DAY  hydrOXYzine (ATARAX) 50 MG tablet, 1 twice daily as needed anxiety. metFORMIN (GLUCOPHAGE-XR) 750 MG extended release tablet, 2 in AM (Patient taking differently: 1,500 mg nightly 2 in AM)  fluticasone (FLONASE) 50 MCG/ACT nasal spray, SPRAY 1 SPRAY INTO EACH NOSTRIL EVERY DAY  amLODIPine (NORVASC) 5 MG tablet, Take 2 tablets by mouth daily. (Patient taking differently: Take 2 tablets by mouth daily.  At 7pm) cimetidine (TAGAMET) 400 MG tablet, Take 1 tablet by mouth 2 times daily (Patient taking differently: Take 400 mg by mouth 2 times daily Patient states takes once a day at 7pm)  atorvastatin (LIPITOR) 10 MG tablet, Take 1 tablet by mouth daily (Patient taking differently: Take 10 mg by mouth nightly )  levonorgestrel-ethinyl estradiol (JOLESSA) 0.15-0.03 MG per tablet, Take 1 tablet by mouth  losartan-hydrochlorothiazide (HYZAAR) 100-25 MG per tablet, Take 1 tablet by mouth daily for high blood pressure.  l10  Cholecalciferol (VITAMIN D3) 25 MCG (1000 UT) CAPS, Take by mouth daily  BIOTIN PO, Take by mouth daily  COLLAGEN PO, Take by mouth daily  Prenatal Vit-Fe Fumarate-FA (PRENATAL VITAMIN PO), Take by mouth daily    Home Meds:    Current Facility-Administered Medications:     lactated ringers infusion, , Intravenous, Continuous, Paco Wylie, DO, Last Rate: 125 mL/hr at 12/23/20 0757, New Bag at 12/23/20 0757    0.9 % sodium chloride infusion, , Intravenous, Continuous, Nathan Espinal MD    ceFAZolin (ANCEF) 3 g in dextrose 5 % 100 mL IVPB, 3 g, Intravenous, On Call to OR, Nathan Espinal MD    enoxaparin (LOVENOX) injection 30 mg, 30 mg, Subcutaneous, Once, Nathan Espinal MD    scopolamine (TRANSDERM-SCOP) transdermal patch 1 patch, 1 patch, Transdermal, Once, Nathan Espinal MD, 1 patch at 12/23/20 0745    midazolam (VERSED) injection 2 mg, 2 mg, Intravenous, Once, Ida Rivas,     Allergies:  Lisinopril and Tramadol    Lab Results   Component Value Date     12/17/2020    K 3.6 12/17/2020    K 3.6 02/10/2019     12/17/2020    CO2 25 12/17/2020    BUN 11 12/17/2020    CREATININE <0.5 12/17/2020    GLUCOSE 160 12/17/2020    CALCIUM 9.4 12/17/2020     Lab Results   Component Value Date    POCGLU 115 12/23/2020     Lab Results   Component Value Date    WBC 8.5 12/17/2020    RBC 4.95 12/17/2020    HGB 14.0 12/17/2020    HCT 42.9 12/17/2020    MCV 86.6 12/17/2020 MCH 28.3 12/17/2020    MCHC 32.6 12/17/2020    RDW 14.1 12/17/2020     12/17/2020     GENERAL: Alert and cooperative, aware of today's planned procedure. HEENT: Supple, trachea midline. LUNGS:  Clear bilaterally. No wheezing or rhonchi. CV: Regular rate and rhythm. S1, S2, no murmurs/rubs/gallops. ABDOMEN: Obese. Soft, non-tender. No distention, bowel sounds are present. No appliances or piercings. SKIN: Warm and dry. Denies pressure ulcers or decubiti. Small ecchymotic area on inferior aspect of right breast. Reports she has been treating a skin infection. No current drainage, nontender. EXTREMITIES:  Symmetrical, moves all extremities with equal strength. NEUROLOGIC:  Grossly intact- clear speech, verbal responses are appropriate. Bilateral upper and lower extremity movements and sensation are intact. BP (!) 138/93   Pulse 84   Temp 98.1 °F (36.7 °C) (Oral)   Resp 16   Ht 5' 6\" (1.676 m)   Wt 270 lb 0.6 oz (122.5 kg)   LMP 10/23/2020 (Within Weeks)   SpO2 96%   BMI 43.59 kg/m²          Assessment: This is a  45 y.o., female with morbid obesity. Plan: Laparoscopic sleeve gastrectomy this morning with Dr. Skyler Fraser. Rehabilitation as directed by surgeon/therapist.    Additionally, the  existing/original H&P  has been requested, reviewed or otherwise discussed with this patient/family/guardian by me and there are no new clinical changes.        Ritesh Roper, JILLIAN, APRN, ACNP  12/23/2020, 8:17 AM

## 2020-12-23 NOTE — FLOWSHEET NOTE
Pt c/o nausea. PRN Zofran given. Pt  called and updated and made aware waiting for a bed on [address] beds at the [facility].

## 2020-12-23 NOTE — PROGRESS NOTES
4 Eyes Admission Assessment     I agree as the admission nurse that 2 RN's have performed a thorough Head to Toe Skin Assessment on the patient. ALL assessment sites listed below have been assessed on admission. Areas assessed by both nurses: bob and hellen  [x]   Head, Face, and Ears   [x]   Shoulders, Back, and Chest  [x]   Arms, Elbows, and Hands   [x]   Coccyx, Sacrum, and Ischium  [x]   Legs, Feet, and Heels        Does the Patient have Skin Breakdown?   No         Jose Prevention initiated:  No   Wound Care Orders initiated:  No      [facility] nurse consulted for Pressure Injury (Stage 3,4, Unstageable, DTI, NWPT, and Complex wounds) or Jose score 18 or lower:  No      Nurse 1 eSignature: Electronically signed by Zeny Haas on 12/23/20 at 6:27 PM EST    **SHARE this note so that the co-signing nurse is able to place an eSignature**    Nurse 2 eSignature: Electronically signed by Lennox Held, RN on 12/23/20 at 6:49 PM EST

## 2020-12-24 ENCOUNTER — APPOINTMENT (OUTPATIENT)
Dept: GENERAL RADIOLOGY | Age: 40
DRG: 403 | End: 2020-12-24
Attending: SURGERY
Payer: COMMERCIAL

## 2020-12-24 VITALS
DIASTOLIC BLOOD PRESSURE: 93 MMHG | HEIGHT: 66 IN | TEMPERATURE: 98.2 F | HEART RATE: 83 BPM | OXYGEN SATURATION: 94 % | SYSTOLIC BLOOD PRESSURE: 145 MMHG | RESPIRATION RATE: 16 BRPM | BODY MASS INDEX: 43.4 KG/M2 | WEIGHT: 270.04 LBS

## 2020-12-24 LAB
ANION GAP SERPL CALCULATED.3IONS-SCNC: 12 MMOL/L (ref 3–16)
BUN BLDV-MCNC: 6 MG/DL (ref 7–20)
CALCIUM SERPL-MCNC: 8.5 MG/DL (ref 8.3–10.6)
CHLORIDE BLD-SCNC: 103 MMOL/L (ref 99–110)
CO2: 23 MMOL/L (ref 21–32)
CREAT SERPL-MCNC: <0.5 MG/DL (ref 0.6–1.1)
GFR AFRICAN AMERICAN: >60
GFR NON-AFRICAN AMERICAN: >60
GLUCOSE BLD-MCNC: 137 MG/DL (ref 70–99)
HCT VFR BLD CALC: 37.4 % (ref 36–48)
HEMOGLOBIN: 12.9 G/DL (ref 12–16)
MCH RBC QN AUTO: 29 PG (ref 26–34)
MCHC RBC AUTO-ENTMCNC: 34.6 G/DL (ref 31–36)
MCV RBC AUTO: 84 FL (ref 80–100)
PDW BLD-RTO: 14 % (ref 12.4–15.4)
PLATELET # BLD: 284 K/UL (ref 135–450)
PMV BLD AUTO: 7.7 FL (ref 5–10.5)
POTASSIUM SERPL-SCNC: 3.1 MMOL/L (ref 3.5–5.1)
RBC # BLD: 4.45 M/UL (ref 4–5.2)
SODIUM BLD-SCNC: 138 MMOL/L (ref 136–145)
WBC # BLD: 10.7 K/UL (ref 4–11)

## 2020-12-24 PROCEDURE — 6360000002 HC RX W HCPCS: Performed by: NURSE PRACTITIONER

## 2020-12-24 PROCEDURE — 94770 HC ETCO2 MONITOR DAILY: CPT

## 2020-12-24 PROCEDURE — 94761 N-INVAS EAR/PLS OXIMETRY MLT: CPT

## 2020-12-24 PROCEDURE — 85027 COMPLETE CBC AUTOMATED: CPT

## 2020-12-24 PROCEDURE — 36415 COLL VENOUS BLD VENIPUNCTURE: CPT

## 2020-12-24 PROCEDURE — 6370000000 HC RX 637 (ALT 250 FOR IP): Performed by: NURSE PRACTITIONER

## 2020-12-24 PROCEDURE — 6370000000 HC RX 637 (ALT 250 FOR IP): Performed by: SURGERY

## 2020-12-24 PROCEDURE — 94150 VITAL CAPACITY TEST: CPT

## 2020-12-24 PROCEDURE — 6360000002 HC RX W HCPCS: Performed by: SURGERY

## 2020-12-24 PROCEDURE — 2580000003 HC RX 258: Performed by: NURSE PRACTITIONER

## 2020-12-24 PROCEDURE — 80048 BASIC METABOLIC PNL TOTAL CA: CPT

## 2020-12-24 PROCEDURE — C9113 INJ PANTOPRAZOLE SODIUM, VIA: HCPCS | Performed by: SURGERY

## 2020-12-24 PROCEDURE — 94799 UNLISTED PULMONARY SVC/PX: CPT

## 2020-12-24 PROCEDURE — 2580000003 HC RX 258: Performed by: SURGERY

## 2020-12-24 PROCEDURE — 74240 X-RAY XM UPR GI TRC 1CNTRST: CPT

## 2020-12-24 RX ORDER — ONDANSETRON 4 MG/1
8 TABLET, ORALLY DISINTEGRATING ORAL EVERY 8 HOURS PRN
Qty: 30 TABLET | Refills: 0 | Status: SHIPPED | OUTPATIENT
Start: 2020-12-24 | End: 2021-05-05 | Stop reason: SDUPTHER

## 2020-12-24 RX ORDER — ONDANSETRON 8 MG/1
8 TABLET, ORALLY DISINTEGRATING ORAL EVERY 8 HOURS PRN
Status: DISCONTINUED | OUTPATIENT
Start: 2020-12-24 | End: 2020-12-24 | Stop reason: HOSPADM

## 2020-12-24 RX ORDER — ACETAMINOPHEN 325 MG/1
650 TABLET ORAL EVERY 6 HOURS PRN
Status: DISCONTINUED | OUTPATIENT
Start: 2020-12-24 | End: 2020-12-24 | Stop reason: HOSPADM

## 2020-12-24 RX ORDER — OXYCODONE HYDROCHLORIDE AND ACETAMINOPHEN 5; 325 MG/1; MG/1
1 TABLET ORAL EVERY 6 HOURS PRN
Status: DISCONTINUED | OUTPATIENT
Start: 2020-12-24 | End: 2020-12-24 | Stop reason: HOSPADM

## 2020-12-24 RX ORDER — PROMETHAZINE HYDROCHLORIDE 6.25 MG/5ML
12.5 SYRUP ORAL EVERY 4 HOURS PRN
Status: DISCONTINUED | OUTPATIENT
Start: 2020-12-24 | End: 2020-12-24 | Stop reason: HOSPADM

## 2020-12-24 RX ORDER — OXYCODONE HYDROCHLORIDE AND ACETAMINOPHEN 5; 325 MG/1; MG/1
1 TABLET ORAL EVERY 6 HOURS PRN
Qty: 28 TABLET | Refills: 0 | Status: SHIPPED | OUTPATIENT
Start: 2020-12-24 | End: 2020-12-31

## 2020-12-24 RX ORDER — KETOROLAC TROMETHAMINE 30 MG/ML
15 INJECTION, SOLUTION INTRAMUSCULAR; INTRAVENOUS ONCE
Status: COMPLETED | OUTPATIENT
Start: 2020-12-24 | End: 2020-12-24

## 2020-12-24 RX ORDER — DULOXETIN HYDROCHLORIDE 60 MG/1
60 CAPSULE, DELAYED RELEASE ORAL DAILY
Status: DISCONTINUED | OUTPATIENT
Start: 2020-12-24 | End: 2020-12-24 | Stop reason: HOSPADM

## 2020-12-24 RX ADMIN — Medication 10 ML: at 10:16

## 2020-12-24 RX ADMIN — PANTOPRAZOLE SODIUM 40 MG: 40 INJECTION, POWDER, FOR SOLUTION INTRAVENOUS at 07:53

## 2020-12-24 RX ADMIN — ACETAMINOPHEN 650 MG: 325 TABLET ORAL at 14:43

## 2020-12-24 RX ADMIN — CEFAZOLIN 3 G: 10 INJECTION, POWDER, FOR SOLUTION INTRAVENOUS at 03:46

## 2020-12-24 RX ADMIN — HYDROMORPHONE HYDROCHLORIDE 0.5 MG: 1 INJECTION, SOLUTION INTRAMUSCULAR; INTRAVENOUS; SUBCUTANEOUS at 01:31

## 2020-12-24 RX ADMIN — HYOSCYAMINE SULFATE 125 MCG: 0.12 TABLET, ORALLY DISINTEGRATING ORAL at 13:19

## 2020-12-24 RX ADMIN — ONDANSETRON 4 MG: 2 INJECTION INTRAMUSCULAR; INTRAVENOUS at 11:06

## 2020-12-24 RX ADMIN — DULOXETINE HYDROCHLORIDE 60 MG: 60 CAPSULE, DELAYED RELEASE ORAL at 13:19

## 2020-12-24 RX ADMIN — HYDROMORPHONE HYDROCHLORIDE 0.5 MG: 1 INJECTION, SOLUTION INTRAMUSCULAR; INTRAVENOUS; SUBCUTANEOUS at 07:54

## 2020-12-24 RX ADMIN — METHOCARBAMOL 500 MG: 100 INJECTION, SOLUTION INTRAMUSCULAR; INTRAVENOUS at 08:05

## 2020-12-24 RX ADMIN — HYDROMORPHONE HYDROCHLORIDE 0.5 MG: 1 INJECTION, SOLUTION INTRAMUSCULAR; INTRAVENOUS; SUBCUTANEOUS at 11:06

## 2020-12-24 RX ADMIN — HYDROMORPHONE HYDROCHLORIDE 0.5 MG: 1 INJECTION, SOLUTION INTRAMUSCULAR; INTRAVENOUS; SUBCUTANEOUS at 04:31

## 2020-12-24 RX ADMIN — KETOROLAC TROMETHAMINE 15 MG: 30 INJECTION, SOLUTION INTRAMUSCULAR at 14:44

## 2020-12-24 RX ADMIN — SODIUM CHLORIDE, POTASSIUM CHLORIDE, SODIUM LACTATE AND CALCIUM CHLORIDE: 600; 310; 30; 20 INJECTION, SOLUTION INTRAVENOUS at 01:29

## 2020-12-24 RX ADMIN — PROMETHAZINE HYDROCHLORIDE 6.25 MG: 25 INJECTION INTRAMUSCULAR; INTRAVENOUS at 01:31

## 2020-12-24 RX ADMIN — OXYCODONE AND ACETAMINOPHEN 1 TABLET: 5; 325 TABLET ORAL at 13:18

## 2020-12-24 RX ADMIN — ENOXAPARIN SODIUM 30 MG: 30 INJECTION SUBCUTANEOUS at 07:53

## 2020-12-24 RX ADMIN — METOCLOPRAMIDE 10 MG: 5 INJECTION, SOLUTION INTRAMUSCULAR; INTRAVENOUS at 04:24

## 2020-12-24 RX ADMIN — DIPHENHYDRAMINE HYDROCHLORIDE 12.5 MG: 50 INJECTION, SOLUTION INTRAMUSCULAR; INTRAVENOUS at 01:31

## 2020-12-24 ASSESSMENT — PAIN DESCRIPTION - FREQUENCY
FREQUENCY: CONTINUOUS

## 2020-12-24 ASSESSMENT — PAIN DESCRIPTION - DESCRIPTORS
DESCRIPTORS: ACHING
DESCRIPTORS: ACHING;PRESSURE
DESCRIPTORS: ACHING;PRESSURE
DESCRIPTORS: ACHING
DESCRIPTORS: ACHING;PRESSURE

## 2020-12-24 ASSESSMENT — PAIN DESCRIPTION - PROGRESSION
CLINICAL_PROGRESSION: NOT CHANGED

## 2020-12-24 ASSESSMENT — PAIN DESCRIPTION - ORIENTATION
ORIENTATION: MID;UPPER
ORIENTATION: MID;UPPER
ORIENTATION: RIGHT;MID;UPPER
ORIENTATION: MID;UPPER
ORIENTATION: MID;UPPER

## 2020-12-24 ASSESSMENT — PAIN DESCRIPTION - PAIN TYPE
TYPE: SURGICAL PAIN

## 2020-12-24 ASSESSMENT — PAIN SCALES - GENERAL
PAINLEVEL_OUTOF10: 8
PAINLEVEL_OUTOF10: 7
PAINLEVEL_OUTOF10: 5
PAINLEVEL_OUTOF10: 7
PAINLEVEL_OUTOF10: 7
PAINLEVEL_OUTOF10: 8
PAINLEVEL_OUTOF10: 7

## 2020-12-24 ASSESSMENT — PAIN DESCRIPTION - LOCATION
LOCATION: ABDOMEN

## 2020-12-24 ASSESSMENT — PAIN DESCRIPTION - ONSET
ONSET: ON-GOING

## 2020-12-24 NOTE — PROGRESS NOTES
Pt A&O, VSS- down to 2 L. Resting in bed now. Lap sites CDI, binder in place. IVF infusing. Voiding adequately. Pain tolerable w/ PCA and intermittent PRN for breakthrough. NPO. Early ambulation achieved. Intermittent nausea tx w/ PRN antiemetic (see MAR). Otherwise doing well. Will continue to monitor.

## 2020-12-24 NOTE — DISCHARGE SUMMARY
[facility]) Physicians   [facility]  [address] [place] [address], [address] [place] [id number] . Phone: [phone]  Fax: [phone]         Viola Griffith  Body mass index is 43.59 kg/m². Bryson Mccarthy was admitted on day of surgery and underwent a lap sleeve gastrectomy. Surgery went well and the patient went to our post-op bariatric floor. Overnight, the patient had stable vitals signs, good urine output and ambulated in the halls. On POD #1 the patient underwent an UGI which revealed no leak or obstruction. Phase I diet was started and the patient tolerated well. The patient has no N/V, tolerating diet, ambulating and pain controlled on oral medication. Bryson Mccarthy was discharged home today in stable condition. The patient will f/u in 2 weeks and was given dietary and ambulation instruction. The patient was instructed to call if there are any questions or concerns.        Patient Active Problem List   Diagnosis    PCOS (polycystic ovarian syndrome)    Allergic rhinitis    Obstructive sleep apnea    Panic attacks    Acute left flank pain    Syncope    Essential hypertension    Migraine    Chronic back pain    Morbid obesity due to excess calories (HCC)    Tobacco dependence    Hypokalemia    Psoriasis    ESTELA (generalized anxiety disorder)    DM (diabetes mellitus) (HCC)    Obesity (BMI 35.0-39.9 without comorbidity)    Recurrent acute tonsillitis    Moderate episode of recurrent major depressive disorder (HCC)    Acute hyperglycemia    Cystitis, acute    Arthropathy of multiple sites    Blood in stool    Cellulitis of breast    Depressive disorder, not elsewhere classified    Diarrhea    Disorder of sweat glands    Excessive sweating    Family history of premature coronary heart disease    Family hx of colon cancer    Feels hot    Finding of above normal blood pressure    Gastro-esophageal reflux disease with esophagitis    Hypomagnesemia

## 2020-12-24 NOTE — PLAN OF CARE
Problem: Pain:  Goal: Pain level will decrease  Description: Pain level will decrease  Outcome: Ongoing  Note: Patient reports 7/10 surgical pain. PRN dilaudid administered for breakthrough pain. IV roboxin running per orders. Abd binder in place w/ two ice packs for comfort. Will continue to re-assess pain levels as needed. Problem: Fluid Volume - Imbalance:  Goal: Ability to achieve a balanced intake and output will improve  Description: Ability to achieve a balanced intake and output will improve  Outcome: Ongoing  Note: Patient NPO. IV fluids running per orders. Patient urinating adequately. waiting for upper GI imaging to be completed to advance patient's diet.

## 2020-12-24 NOTE — PROGRESS NOTES
Discharge Progress Note  12/24/2020 4:29 PM    Data:  Discharge order received. Action:  IV D/C'd without difficulty. See Doc Flowsheets for assessment. Patient given discharge instructions with prescriptions. Response:  Patient verbalized understanding of discharge instructions. Patient left with all belongings.     Blaire Damon  ________________________________________________________________________

## 2020-12-24 NOTE — PROGRESS NOTES
[facility]) Physicians   General & Laparoscopic Surgery  [employer]         Pt seen and examined    S/p laparoscopic sleeve gastrectomy     The patient is ambulating frequently in dodson. Pain is well controlled. There is no nausea and/or vomiting. There are no other complaints or questions. Vitals:    12/24/20 0326 12/24/20 0730 12/24/20 0835 12/24/20 1110   BP: 137/89 (!) 149/91  (!) 152/86   Pulse: 84 88  87   Resp: 16 13 14 15   Temp: 98 °F (36.7 °C) 98 °F (36.7 °C)  98.2 °F (36.8 °C)   TempSrc: Oral Oral  Oral   SpO2: 94% 96% 93%    Weight:       Height:           In: 870.9 [P.O.:120; I.V.:750.9]  Out: 2450 [Urine:2450]      Abdomen is soft, appropriately tender, incisions stable with no erythema.      Data  CBC:   Lab Results   Component Value Date    WBC 10.7 12/24/2020    RBC 4.45 12/24/2020    HGB 12.9 12/24/2020    HCT 37.4 12/24/2020    MCV 84.0 12/24/2020    MCH 29.0 12/24/2020    MCHC 34.6 12/24/2020    RDW 14.0 12/24/2020     12/24/2020    MPV 7.7 12/24/2020     BMP:    Lab Results   Component Value Date     12/24/2020    K 3.1 12/24/2020    K 3.6 02/10/2019     12/24/2020    CO2 23 12/24/2020    BUN 6 12/24/2020    LABALBU 4.2 12/17/2020    CREATININE <0.5 12/24/2020    CALCIUM 8.5 12/24/2020    GFRAA >60 12/24/2020    GFRAA >60 09/07/2012    LABGLOM >60 12/24/2020    GLUCOSE 137 12/24/2020       Current Inpatient Medications    Current Facility-Administered Medications: DULoxetine (CYMBALTA) extended release capsule 60 mg, 60 mg, Oral, Daily  hyoscyamine (LEVSIN/SL) sublingual tablet 125 mcg, 125 mcg, Sublingual, Q4H PRN  ondansetron (ZOFRAN-ODT) disintegrating tablet 8 mg, 8 mg, Oral, Q8H PRN  oxyCODONE-acetaminophen (PERCOCET) 5-325 MG per tablet 1 tablet, 1 tablet, Oral, Q6H PRN  promethazine (PHENERGAN) 6.25 MG/5ML syrup 12.5 mg, 12.5 mg, Oral, Q4H PRN  diphenhydrAMINE (BENADRYL) injection 12.5 mg, 12.5 mg, Intravenous, Q6H PRN hydrALAZINE (APRESOLINE) injection 10 mg, 10 mg, Intravenous, Q2H PRN  labetalol (NORMODYNE;TRANDATE) injection 10 mg, 10 mg, Intravenous, Q2H PRN  lidocaine 4 % external patch 1 patch, 1 patch, Transdermal, Q24H  sodium chloride flush 0.9 % injection 10 mL, 10 mL, Intravenous, 2 times per day  sodium chloride flush 0.9 % injection 10 mL, 10 mL, Intravenous, PRN  HYDROmorphone (DILAUDID) injection 0.5 mg, 0.5 mg, Intravenous, Q3H PRN  ondansetron (ZOFRAN) injection 4 mg, 4 mg, Intravenous, Q6H PRN  promethazine (PHENERGAN) injection 6.25 mg, 6.25 mg, Intramuscular, Q6H PRN  metoclopramide (REGLAN) injection 10 mg, 10 mg, Intravenous, Q6H PRN  pantoprazole (PROTONIX) injection 40 mg, 40 mg, Intravenous, Daily **AND** sodium chloride (PF) 0.9 % injection 10 mL, 10 mL, Intravenous, Daily  enoxaparin (LOVENOX) injection 30 mg, 30 mg, Subcutaneous, 2 times per day  lactated ringers infusion, , Intravenous, Continuous    Patient Active Problem List   Diagnosis    PCOS (polycystic ovarian syndrome)    Allergic rhinitis    Obstructive sleep apnea    Panic attacks    Acute left flank pain    Syncope    Essential hypertension    Migraine    Chronic back pain    Morbid obesity due to excess calories (HCC)    Tobacco dependence    Hypokalemia    Psoriasis    ESTELA (generalized anxiety disorder)    DM (diabetes mellitus) (HCC)    Obesity (BMI 35.0-39.9 without comorbidity)    Recurrent acute tonsillitis    Moderate episode of recurrent major depressive disorder (HCC)    Acute hyperglycemia    Cystitis, acute    Arthropathy of multiple sites    Blood in stool    Cellulitis of breast    Depressive disorder, not elsewhere classified    Diarrhea    Disorder of sweat glands    Excessive sweating    Family history of premature coronary heart disease    Family hx of colon cancer    Feels hot    Finding of above normal blood pressure    Gastro-esophageal reflux disease with esophagitis    Hypomagnesemia  Intolerant of heat    Kidney stone on left side    Left lumbar radiculitis    Low HDL (under 40)    Lumbosacral spondylosis without myelopathy    Myofascial muscle pain    Nausea    Pain in joint involving ankle and foot    Plantar fascial fibromatosis    Sebaceous cyst    Severe recurrent major depression without psychotic features ([facility]    Tobacco use disorder    Polyneuropathy    Arthritis    Pain in both hands    Morbid obesity with BMI of 40.0-44.9, adult (HCC)    Chronic GERD    Diabetes mellitus type 2 in obese (HCC)    Hidradenitis suppurativa    Polyp of ascending colon    Other fatigue    SOB (shortness of breath)    Elevated liver enzymes    Hypertriglyceridemia         A/P  Angie Perez is a 45 y.o. female pod#1, s/p  laparoscopic sleeve gastrectomy, Stable overall. UGI with no leak/obstruction, will start on Phase I diet. If tolerating Phase I and good UO. Will d/c castaneda and PCA. Will start oral pain meds.    Plan for discharge today if nausea remains controlled

## 2020-12-24 NOTE — CARE COORDINATION
Case Management Assessment            Discharge Note                    Date / Time of Note: 12/24/2020 3:02 PM                  Discharge Note Completed by: Chalino Starkeys    Patient Name: Magali Downs   YOB: 1980  Diagnosis: Morbid obesity with BMI of 40.0-44.9, adult ([facility] [E66.01, Z68.41]   Date / Time: 12/23/2020  6:56 AM    Current PCP: Warden Barnard, DO  Clinic patient: No    Hospitalization in the last 30 days: No    Advance Directives:  Code Status: Full Code  [place] DNR form completed and on chart: Not Indicated    Financial:  Payor: Evelyn Lamar / Plan: Larry Martell / Product Type: *No Product type* /      Pharmacy:    [facility], [address]  Phone: [phone] Fax: [phone]    [address] [place] [address]  Phone: [phone] Fax: [phone]      Assistance purchasing medications?: Potential Assistance Purchasing Medications: No  Assistance provided by Case Management: None at this time    Does patient want to participate in local refill/ meds to beds program?: Yes    Meds To Beds General Rules:  1. Can ONLY be done Monday- Friday between 8:30am-5pm  2. Prescription(s) must be in pharmacy by 3pm to be filled same day  3. Copy of patient's insurance/ prescription drug card and patient face sheet must be sent along with the prescription(s)  4. Cost of Rx cannot be added to hospital bill. If financial assistance is needed, please contact unit  or ;   or  CANNOT provide pharmacy voucher for patients co-pays 5. Patients can then  the prescription on their way out of the hospital at discharge, or pharmacy can deliver to the bedside if staff is available. (payment due at time of pick-up or delivery - cash, check, or card accepted)     Able to afford home medications/ co-pay costs: Yes    ADLS:  Current PT AM-PAC Score:   /24  Current OT AM-PAC Score:   /24      DISCHARGE Disposition: Home- No Services Needed    LOC at discharge: Not Applicable  BRETT Completed: Not Indicated    Notification completed in HENS/PAS?:  Not Applicable    IMM Completed:   Not Indicated    Transportation:  Transportation PLAN for discharge: family   Mode of Transport: [facility]  ordered at discharge: Not [address]: Not Applicable  Orders faxed: No    Durable Medical Equipment:  DME Provider: none  Equipment obtained during hospitalization:     Home Oxygen and Respiratory Equipment:  Oxygen needed at discharge?: Not [address]: Not Applicable  Portable tank available for discharge?: Not Indicated    Dialysis:  Dialysis patient: No    Dialysis Center:  Not Applicable      Additional CM Notes: Pt from home with family will DC home with family no needs    The Plan for Transition of Care is related to the following treatment goals of Morbid obesity with BMI of 40.0-44.9, adult ([facility] [E66.01, Z68.41]    The Patient and/or patient representative Sneha Macdonald and her family were provided with a choice of provider and agrees with the discharge plan Yes    Freedom of choice list was provided with basic dialogue that supports the patient's individualized plan of care/goals and shares the quality data associated with the providers.  Not Indicated    Care Transitions patient: No    Manas Penaloza RN  [facility] [employer]  Case Management Department  Ph: [phone]  Fax: [phone]

## 2020-12-28 ENCOUNTER — TELEPHONE (OUTPATIENT)
Dept: FAMILY MEDICINE CLINIC | Age: 40
End: 2020-12-28

## 2020-12-28 NOTE — TELEPHONE ENCOUNTER
Alfon[facility] Ros[facility] Transitions Initial Follow Up Call    Outreach made within 2 business days of discharge: Yes    Patient: Jhon Kwon Patient : 1980   MRN: [id number]  Reason for Admission: There are no discharge diagnoses documented for the most recent discharge.   Discharge Date: 20       Spoke with: Patient had surgery, no follow up is needed with PCP     Discharge department/facility: [facility]      Scheduled appointment with PCP within 7-14 days    Follow Up  Future Appointments   Date Time Provider Clifford Perez   2021  9:00 AM Celia Beard MD [facility]       Elvia Silva

## 2020-12-29 ENCOUNTER — TELEPHONE (OUTPATIENT)
Dept: BARIATRICS/WEIGHT MGMT | Age: 40
End: 2020-12-29

## 2020-12-29 NOTE — TELEPHONE ENCOUNTER
Surgery Type: gastric sleeve    Surgery Date: 12/23/20    Surgeon: Dr Rayray Hernandez    The patient was contacted to follow up on their recent bariatric surgery. The following topics were reviewed:    [] Hydration is Adequate Getting about 40-48oz; water and powerade zero. --Patient is getting at least 48-64 oz of fluids a day, not including protein shakes. [x]Consuming Adequate Protein made with water         [x]Consuming 2 protein shakes a day                [x]Consuming 60-80 grams of protein a day    [x] Food intake is appropriate  [x]Adequately pureeing foods, so that there are no chunks left. [place] yogurt, mashed potatoes, refried beans  [x]Taking in 1-2 oz at a time  [] Eating 4-6 times a day  [x] Following the 30-30-30 rule  [x] Reminded patient to keep food diary to bring to their 2 week follow up appointment. [x] Pain relief techniques utilized; pt reports left size pain. Encouraged ice, pain meds as prescribed  [x] Taking pain medication as prescribed  [] Utilizing Lidoderm patches (if prescribed)  []Taking Tylenol instead of prescription pain medication  [x] Wearing abdominal binder  [x] Using ice for incisional pain    [x] Activity is appropriate  [x] Walking 10 minutes out of every hour  [x]Avoiding heavy lifting (>10lbs)  [x] Utilizing their incentive spirometer    [] Issues with Nausea/Vomiting/Reflux; no issues  [x] Using Zofran PRN for nausea/vomiting   []Taking Prilosec for reflux     [] Issues with Constipation; no issues  []Tried Colace  []Tried Miralax    All questions and concerns addressed including pt concern about left side pain. Reiiterated this is typical and should improve over time. Encouraged icing and wearing abdominal binder. Patient was asked to please fill out hospital survey if she receives one in the mail.

## 2021-01-14 ENCOUNTER — OFFICE VISIT (OUTPATIENT)
Dept: BARIATRICS/WEIGHT MGMT | Age: 41
End: 2021-01-14

## 2021-01-14 VITALS
HEIGHT: 67 IN | WEIGHT: 249 LBS | OXYGEN SATURATION: 98 % | RESPIRATION RATE: 18 BRPM | BODY MASS INDEX: 39.08 KG/M2 | DIASTOLIC BLOOD PRESSURE: 84 MMHG | SYSTOLIC BLOOD PRESSURE: 127 MMHG | HEART RATE: 85 BPM

## 2021-01-14 DIAGNOSIS — Z98.84 S/P LAPAROSCOPIC SLEEVE GASTRECTOMY: ICD-10-CM

## 2021-01-14 PROCEDURE — 99024 POSTOP FOLLOW-UP VISIT: CPT | Performed by: SURGERY

## 2021-01-14 NOTE — PROGRESS NOTES
The patient is a 45 y.o. female who returns today for follow up.    Wilma Davenport is s/p     Laparoscopic sleeve gastrectomy    We discussed how her weight affects her overall health including:  Patient Active Problem List   Diagnosis    PCOS (polycystic ovarian syndrome)    Allergic rhinitis    Obstructive sleep apnea    Panic attacks    Acute left flank pain    Syncope    Essential hypertension    Migraine    Chronic back pain    Morbid obesity due to excess calories (HCC)    Tobacco dependence    Hypokalemia    Psoriasis    ESTELA (generalized anxiety disorder)    DM (diabetes mellitus) (HCC)    Obesity (BMI 35.0-39.9 without comorbidity)    Recurrent acute tonsillitis    Moderate episode of recurrent major depressive disorder (HCC)    Acute hyperglycemia    Cystitis, acute    Arthropathy of multiple sites    Blood in stool    Cellulitis of breast    Depressive disorder, not elsewhere classified    Diarrhea    Disorder of sweat glands    Excessive sweating    Family history of premature coronary heart disease    Family hx of colon cancer    Feels hot    Finding of above normal blood pressure    Gastro-esophageal reflux disease with esophagitis    Hypomagnesemia    Intolerant of heat    Kidney stone on left side    Left lumbar radiculitis    Low HDL (under 40)    Lumbosacral spondylosis without myelopathy    Myofascial muscle pain    Nausea    Pain in joint involving ankle and foot    Plantar fascial fibromatosis    Sebaceous cyst    Severe recurrent major depression without psychotic features ([address]    Tobacco use disorder    Polyneuropathy    Arthritis    Pain in both hands    Morbid obesity with BMI of 40.0-44.9, adult ([address]    Chronic GERD    Diabetes mellitus type 2 in obese (HCC)    Hidradenitis suppurativa    Polyp of ascending colon    Other fatigue    SOB (shortness of breath)    Elevated liver enzymes    Hypertriglyceridemia  S/P laparoscopic sleeve gastrectomy        Vitals:    01/14/21 1314   BP: 127/84   Pulse: 85   Resp: 18   SpO2: 98%   Weight: 249 lb (112.9 kg)   Height: 5' 6.5\" (1.689 m)       Lab Results   Component Value Date    WBC 10.7 12/24/2020    RBC 4.45 12/24/2020    HGB 12.9 12/24/2020    HCT 37.4 12/24/2020    MCV 84.0 12/24/2020    MCH 29.0 12/24/2020    MCHC 34.6 12/24/2020    MPV 7.7 12/24/2020    NEUTOPHILPCT 64.9 07/21/2020    LYMPHOPCT 26.6 07/21/2020    MONOPCT 6.3 07/21/2020    EOSRELPCT 1.3 07/21/2020    BASOPCT 0.9 07/21/2020    NEUTROABS 6.0 07/21/2020    LYMPHSABS 2.5 07/21/2020    MONOSABS 0.6 07/21/2020    EOSABS 0.1 07/21/2020     Lab Results   Component Value Date     12/24/2020    K 3.1 12/24/2020    K 3.6 02/10/2019     12/24/2020    CO2 23 12/24/2020    ANIONGAP 12 12/24/2020    GLUCOSE 137 12/24/2020    BUN 6 12/24/2020    CREATININE <0.5 12/24/2020    LABGLOM >60 12/24/2020    GFRAA >60 12/24/2020    GFRAA >60 09/07/2012    CALCIUM 8.5 12/24/2020    PROT 7.1 12/17/2020    PROT 6.0 09/07/2012    LABALBU 4.2 12/17/2020    AGRATIO 1.4 12/17/2020    BILITOT <0.2 12/17/2020    ALKPHOS 65 12/17/2020    ALT 49 12/17/2020    AST 58 12/17/2020    GLOB 2.9 12/17/2020     Lab Results   Component Value Date    CHOL 154 07/21/2020    TRIG 246 07/21/2020    HDL 41 07/21/2020    HDL 38 07/20/2011    LDLCALC 64 07/21/2020    LABVLDL 49 07/21/2020     Lab Results   Component Value Date    TSHREFLEX 1.59 07/21/2020     Lab Results   Component Value Date    IRON 71 07/21/2020    TIBC 405 07/21/2020    LABIRON 18 07/21/2020     Lab Results   Component Value Date    XGTLEEHH98 927 05/29/2020    FOLATE >20.00 05/29/2020     Lab Results   Component Value Date    VITD25 33.8 05/29/2020     Lab Results   Component Value Date    LABA1C 6.5 05/06/2020    .9 05/06/2020 The patient's current Body mass index is 39.59 kg/m². (1/14/21). Since her last visit she has lost 34 lbs since last visit and total of 34 lbs. Annia Patel underwent dietary counseling, and I have reviewed and agree with the dietary counseling, and I have reviewed and agree with the diet plan. There are no changes in the patients medical history or physical exam.     Denies nausea, vomiting, fevers, chills, hiccups, shoulder pain, heartburn, dysphagia wound drainage/bulge nor change in color around incision. Bowels working ok and making urine. The incisions healing well. Overall I'm really pleased with Annia Patel recovery. Pathology results were discussed with the patient. Annia Arshadg advised to sign  release form for utilizing the 3 months complimentary membership in the [address] [place] [address] starting after 6 weeks post op. I did explain thoroughly to the patient that compliance with  post op diet and other recommendations are integral part to improve the chances of successful weight loss and also not following it could end with serious health complications. I advised Annia Jorge to gradually advance activity and  to call if there are any questions or concerns. Annia Jorge will follow up in 4 weeks. Please note that some or all of this report was generated using voice recognition software. Please notify me in case of any questions about the content of this document, as some errors in transcription may have occurred .

## 2021-01-14 NOTE — PROGRESS NOTES
Dietary Assessment Note      Vitals:   Vitals:    21 1314   BP: 127/84   Pulse: 85   Resp: 18   SpO2: 98%   Weight: 249 lb (112.9 kg)   Height: 5' 6.5\" (1.689 m)    Patient lost 34 lbs over the past 6 weeks. Total Weight Loss: 34 lbs    Labs reviewed:  no lab studies available for review at time of visit    Protein intake: 60-80 grams/day     Fluid intake: 48-64 oz/day    Multivitamin/mineral intake: yes 4 Fusio per day    Calcium intake: no    Other: n/a    Exercise: none organized but moving around the house    Nutrition Assessment: Brkst is yogurt greek; lunch is protein shake with water; snack is protein shake; snack is yogurt; dinner is pureed chix and mashed potato. Pt reports nausea in the morning. She has acid reflux and restarted tagemet.      Amount able to eat per sittinoz    Following 30 rule: yes    Food Intolerances/issues: none    Client Concerns: no real concerns; pt feels good    Goals: advance to soft and mushy    Plan: follow up as needed    Cezar All

## 2021-01-18 RX ORDER — OXYBUTYNIN CHLORIDE 5 MG/1
TABLET, EXTENDED RELEASE ORAL
Qty: 30 TABLET | Refills: 0 | Status: SHIPPED | OUTPATIENT
Start: 2021-01-18 | End: 2021-04-21

## 2021-01-18 NOTE — TELEPHONE ENCOUNTER
Medication:   Requested Prescriptions     Pending Prescriptions Disp Refills    oxybutynin (DITROPAN-XL) 5 MG extended release tablet [Pharmacy Med Name: OXYBUTYNIN CL ER 5 MG TABLET] 30 tablet 0     Sig: TAKE 1 TABLET BY MOUTH EVERY DAY AT NIGHT       Last Filled:      Patient Phone Number: [phone] (home)     Last appt: 5/6/2020   Next appt: Visit date not found    Last Labs DM:   Lab Results   Component Value Date    LABA1C 6.5 05/06/2020     Last Lipid:   Lab Results   Component Value Date    CHOL 154 07/21/2020    TRIG 246 07/21/2020    HDL 41 07/21/2020    HDL 38 07/20/2011    [address] 64 07/21/2020     Last PSA: No results found for: PSA  Last Thyroid:   Lab Results   Component Value Date    TSH 2.11 02/20/2020    FT3 3.6 05/06/2020    T4FREE 1.1 05/06/2020

## 2021-02-16 ENCOUNTER — TELEMEDICINE (OUTPATIENT)
Dept: BARIATRICS/WEIGHT MGMT | Age: 41
End: 2021-02-16

## 2021-02-16 DIAGNOSIS — E66.9 DIABETES MELLITUS TYPE 2 IN OBESE (HCC): ICD-10-CM

## 2021-02-16 DIAGNOSIS — Z98.84 S/P LAPAROSCOPIC SLEEVE GASTRECTOMY: Primary | ICD-10-CM

## 2021-02-16 DIAGNOSIS — K21.9 CHRONIC GERD: ICD-10-CM

## 2021-02-16 DIAGNOSIS — E66.9 OBESITY (BMI 30-39.9): ICD-10-CM

## 2021-02-16 DIAGNOSIS — E11.69 DIABETES MELLITUS TYPE 2 IN OBESE (HCC): ICD-10-CM

## 2021-02-16 DIAGNOSIS — G47.33 OBSTRUCTIVE SLEEP APNEA: ICD-10-CM

## 2021-02-16 DIAGNOSIS — I10 ESSENTIAL HYPERTENSION: ICD-10-CM

## 2021-02-16 DIAGNOSIS — E28.2 PCOS (POLYCYSTIC OVARIAN SYNDROME): ICD-10-CM

## 2021-02-16 PROCEDURE — 99024 POSTOP FOLLOW-UP VISIT: CPT | Performed by: NURSE PRACTITIONER

## 2021-02-16 ASSESSMENT — ENCOUNTER SYMPTOMS
RESPIRATORY NEGATIVE: 1
GASTROINTESTINAL NEGATIVE: 1
EYES NEGATIVE: 1

## 2021-02-16 NOTE — PROGRESS NOTES
Dietary Assessment Note      Vitals: There were no vitals filed for this visit. Patient lost 17 lbs over past month. Total Weight Loss: 51 lbs    Due to the COVID-19 restrictions on close contact interactions the patient's visit was conducted via telephone in carmenza of a face to face visit. The patient is here through telemedicine for their post op visit. Labs reviewed: No new nutrition related labs     Protein intake: 60-80 g/pro/day      Fluid intake: 60 oz/day     Multivitamin/mineral intake: 4 fusion     Calcium intake: none     Other: none     Exercise: Walking     Nutrition Assessment: 7 week s/p sleeve post-op visit. Breakfast: protein shake     Snack: 1 egg with 1/2 Tbsp hope bits and FF cheese and 1/2 slice of toast     Lunch: stir torres with veggies and string cheese and cooked spinach     Snack: nothing     Dinner: 1.5 oz pork roast, 1 oz green beans and 2 oz carrots     Snack: frozen yogurt     Snack: 2 Tbsp of PB and cheese stick     Fluids: water, unsweetened decaf tea with stevia     Consuming only soft/mushy foods? She is 7 weeks post op, did only soft foods until 6 weeks.      Amount able to eat per sittin-5 oz     Following 30 rule: Sometimes sips when eating    Food Intolerances/issues: none     Client Concerns: stomach ache with all eating occasions - even before sx     Goals:   Start Phase 4 - reviewed and emailed to pt   Avoid high sugar/fat foods - frozen yogurt/hope   Follow 30-30-30 rule     Plan: F/U at 4 months     Naselle Link

## 2021-02-16 NOTE — PATIENT INSTRUCTIONS
Diet tips to help make you successful postoperatively  Eating habits after surgery will have to be a permanent and long-term change. Eating habits are so ingrained that it can be difficult to change. It is important to maintain these new eating habits after surgery. Also remember that overall health, age, and genetics make each persons weight loss progress different. Do not compare your progress, the amount you eat, or exercise to other patients. ? Protein first at every meal- Eat the protein portion of your meal first. Eating protein helps the body feel full and sends a signal to stop eating. Protein is very important in building tissue in the body. ? Eat at least 4 times per day- This includes protein supplements and small meals with a high amount of protein  ? Chewing your food thoroughly- Eating too quickly and improper chewing can cause pain and vomiting after surgery. ? Slowing down the speed at which you eat- Refill your fork only after you swallow. Adopt a new pattern of eating by taking a bite of food and putting your utensil down between bites. This will help to reduce the feeling of food being stuck.   ? Drink water and start drinking fluids slowly- Drink at least 48 ounces per day minimum. Sip fluids as if they were hot beverages. If you find it difficult to stop gulping liquids, try using a sippy cup or a sport top water bottle. ? Make sure you are eating meals without drinking fluids- After surgery you will not be allowed to drink fluids 30 minutes before, during, or 30 minutes after your meal (30/30/30 rule). This will be a life-long behavior change. The reason for the rule is to keep food from passing through your smaller stomach more rapidly.  This will cause you to feel hungry shortly after your meal. ? Continue to avoid caffeine and carbonated beverages- Caffeine acts as a diuretic and can be dehydrating as well as irritating to the lining of the stomach. Carbonated beverages release gas and can expand the stomach. ? Continue to keep temptation from your kitchen- Keep your pantry and kitchen cabinets cleaned out of those dangerous foods that might tempt you after surgery (chips, cookies, candy, etc.). ? Continue to increase your exercise program- Increase your daily physical activity. Aim for 5-6 days per week for 30 minutes. Walking is an easy way to get started with exercising. Exercise is going to be a regular part of your life after surgery. ? Make sure you have a good support system- There will be many changes and adjustments to make after surgery. It is important to have a supportive friend, family member or co-worker, etc. with whom you can talk. Continue to attend [facility]) Weight Management support groups as they can be helpful in maintaining behaviors. In addition, it is the responsibility of the patient to schedule and follow up on labs and tests completed after surgery. Results will be reviewed at each visit. Patient received dietary handouts and education.

## 2021-02-16 NOTE — PROGRESS NOTES
COLONOSCOPY WITH BIOPSY performed by Dilan Gold MD at [facility] [address] N/[address] 6/18/2020    COLONOSCOPY POLYPECTOMY SNARE performed by Dilan Gold MD at [facility] C/missed ab    SKIN BIOPSY      for psoriasis    SLEEVE GASTRECTOMY N/A 12/23/2020    LAPAROSCOPIC SLEEVE GASTRECTOMY -  ETHICON performed by Mariana Alegre MD at [address]  08/09/2016    tonsillectomy    UPPER GASTROINTESTINAL ENDOSCOPY  3/23/11    pyloretic    UPPER GASTROINTESTINAL ENDOSCOPY  10/7/2015    gastritis-[address]    UPPER GASTROINTESTINAL ENDOSCOPY N/A 6/18/2020    EGD DIAGNOSTIC ONLY performed by Dilan Gold MD at [facility] [address] N[address]A 8/28/2020    EGD BIOPSY performed by Mariana Alegre MD at [facility] History   Problem Relation Age of Onset    Arthritis Mother     Miscarriages / [place] Mother     High Blood Pressure Mother     Depression Mother     High Cholesterol Mother     Arthritis Father     High Blood Pressure Father     Cancer Father         colon    Hearing Loss Father     Heart Disease Father     High Cholesterol Father     High Blood Pressure Brother     High Cholesterol Brother     High Blood Pressure Maternal Aunt     High Cholesterol Maternal Aunt     Miscarriages / Stillbirths Maternal Aunt     Mental Retardation Maternal Uncle     High Blood Pressure Maternal Uncle     High Cholesterol Maternal Uncle     High Blood Pressure Paternal Aunt     High Cholesterol Paternal Aunt     Arthritis Paternal Uncle     Mental Illness Paternal Uncle     High Blood Pressure Paternal Uncle     Birth Defects Paternal Uncle     High Cholesterol Paternal Uncle     Learning Disabilities Paternal Uncle     Arthritis Maternal Grandmother     High Blood Pressure Maternal Grandmother     Diabetes Maternal Grandmother  High Cholesterol Maternal Grandmother     Miscarriages / Stillbirths Maternal Grandmother     Arthritis Maternal Grandfather     High Blood Pressure Maternal Grandfather     High Cholesterol Maternal Grandfather     Arthritis Paternal Grandmother     High Blood Pressure Paternal Grandmother     Heart Disease Paternal Grandmother     High Cholesterol Paternal Grandmother    [de-identified] / Stillbirths Paternal Grandmother     Stroke Paternal Grandmother     High Blood Pressure Paternal Grandfather     High Cholesterol Paternal Grandfather      Social History     Tobacco Use    Smoking status: Former Smoker     Packs/day: 1.00     Years: 20.00     Pack years: 20.00     Types: Cigarettes     Quit date: 2019     Years since quittin.8    Smokeless tobacco: Never Used   Substance Use Topics    Alcohol use: Yes     Alcohol/week: 0.0 standard drinks     Frequency: Monthly or less     Comment: rarely     I counseled the patient on the importance of not smoking and risks of ETOH. Allergies   Allergen Reactions    Tramadol Other (See Comments)     severe  SEVERE HEADACHE    Lisinopril Other (See Comments)     COUGH     There were no vitals filed for this visit. There is no height or weight on file to calculate BMI.     Current Outpatient Medications:     oxybutynin (DITROPAN-XL) 5 MG extended release tablet, TAKE 1 TABLET BY MOUTH EVERY DAY AT NIGHT, Disp: 30 tablet, Rfl: 0    fluticasone (FLONASE) 50 MCG/ACT nasal spray, SPRAY 1 SPRAY INTO EACH NOSTRIL EVERY DAY, Disp: 1 Bottle, Rfl: 1    ondansetron (ZOFRAN ODT) 4 MG disintegrating tablet, Take 2 tablets by mouth every 8 hours as needed for Nausea (Patient not taking: Reported on 2021), Disp: 30 tablet, Rfl: 0    mupirocin (BACTROBAN) 2 % ointment, APPLY TO AFFECTED AREA 3 TIMES A DAY, Disp: , Rfl:     Fe Fum-Fe Poly-Vit C-Lactobac (FUSION PO), Take by mouth As ordered by Dr Abigail Lorenz, Disp: , Rfl:   DULoxetine (CYMBALTA) 60 MG extended release capsule, TAKE 1 CAPSULE BY MOUTH EVERY DAY (Patient not taking: Reported on 1/14/2021), Disp: 30 capsule, Rfl: 2    hydrALAZINE (APRESOLINE) 10 MG tablet, TAKE 1 TABLET BY MOUTH TWICE A DAY (Patient not taking: Reported on 1/14/2021), Disp: 60 tablet, Rfl: 5    hydrOXYzine (ATARAX) 50 MG tablet, 1 twice daily as needed anxiety. (Patient not taking: Reported on 1/14/2021), Disp: 30 tablet, Rfl: 1    metFORMIN (GLUCOPHAGE-XR) 750 MG extended release tablet, 2 in AM (Patient taking differently: 1,500 mg nightly 2 in AM), Disp: 60 tablet, Rfl: 3    amLODIPine (NORVASC) 5 MG tablet, Take 2 tablets by mouth daily. (Patient taking differently: Take 2 tablets by mouth daily. At 7pm), Disp: 180 tablet, Rfl: 1    cimetidine (TAGAMET) 400 MG tablet, Take 1 tablet by mouth 2 times daily (Patient taking differently: Take 400 mg by mouth daily Patient states takes once a day at 7pm), Disp: 180 tablet, Rfl: 1    losartan-hydrochlorothiazide (HYZAAR) 100-25 MG per tablet, Take 1 tablet by mouth daily for high blood pressure.  l10, Disp: 90 tablet, Rfl: 1    atorvastatin (LIPITOR) 10 MG tablet, Take 1 tablet by mouth daily (Patient taking differently: Take 10 mg by mouth nightly ), Disp: 30 tablet, Rfl: 5    levonorgestrel-ethinyl estradiol (JOLESSA) 0.15-0.03 MG per tablet, Take 1 tablet by mouth, Disp: , Rfl:     Lab Results   Component Value Date    WBC 10.7 12/24/2020    RBC 4.45 12/24/2020    HGB 12.9 12/24/2020    HCT 37.4 12/24/2020    MCV 84.0 12/24/2020    MCH 29.0 12/24/2020    MCHC 34.6 12/24/2020    MPV 7.7 12/24/2020    NEUTOPHILPCT 64.9 07/21/2020    LYMPHOPCT 26.6 07/21/2020    MONOPCT 6.3 07/21/2020    EOSRELPCT 1.3 07/21/2020    BASOPCT 0.9 07/21/2020    NEUTROABS 6.0 07/21/2020    LYMPHSABS 2.5 07/21/2020    MONOSABS 0.6 07/21/2020    EOSABS 0.1 07/21/2020     Lab Results   Component Value Date     12/24/2020    K 3.1 12/24/2020    K 3.6 02/10/2019  12/24/2020    CO2 23 12/24/2020    ANIONGAP 12 12/24/2020    GLUCOSE 137 12/24/2020    BUN 6 12/24/2020    CREATININE <0.5 12/24/2020    LABGLOM >60 12/24/2020    GFRAA >60 12/24/2020    GFRAA >60 09/07/2012    CALCIUM 8.5 12/24/2020    PROT 7.1 12/17/2020    PROT 6.0 09/07/2012    LABALBU 4.2 12/17/2020    AGRATIO 1.4 12/17/2020    BILITOT <0.2 12/17/2020    ALKPHOS 65 12/17/2020    ALT 49 12/17/2020    AST 58 12/17/2020    GLOB 2.9 12/17/2020     Lab Results   Component Value Date    CHOL 154 07/21/2020    TRIG 246 07/21/2020    HDL 41 07/21/2020    HDL 38 07/20/2011    LDLCALC 64 07/21/2020    LABVLDL 49 07/21/2020     Lab Results   Component Value Date    TSHREFLEX 1.59 07/21/2020     Lab Results   Component Value Date    IRON 71 07/21/2020    TIBC 405 07/21/2020    LABIRON 18 07/21/2020     Lab Results   Component Value Date    OXHTNGWZ63 897 05/29/2020    FOLATE >20.00 05/29/2020     Lab Results   Component Value Date    VITD25 33.8 05/29/2020     Lab Results   Component Value Date    LABA1C 6.5 05/06/2020    .9 05/06/2020       Review of Systems   Constitutional: Negative. HENT: Negative. Eyes: Negative. Respiratory: Negative. Cardiovascular: Negative. Gastrointestinal: Negative. Skin: Negative. Neurological: Negative. PHYSICAL EXAMINATION:    Constitutional: [x] Appears well-developed and well-nourished [x] No apparent distress      [] Abnormal-   Mental status  [x] Alert and awake  [x] Oriented to person/place/time [x]Able to follow commands      Eyes:  EOM    [x]  Normal  [] Abnormal-  Sclera  [x]  Normal  [] Abnormal -         Discharge [x]  None visible  [] Abnormal -    HENT:   [x] Normocephalic, atraumatic.   [] Abnormal   [x] Mouth/Throat: Mucous membranes are moist.     External Ears [x] Normal  [] Abnormal-     Neck: [x] No visualized mass Patient is here via telemedicine and is 7 weeks s/p sleeve gastrectomy, down 17lbs with a total weight loss of 51lbs. She is doing well, denies n/v/dysphagia or reflux. She is tolerating diet, getting adequate fluids and protein. We discussed the importance of following the 30-30-30 rule and that this is likely the cause of her discomfort after eating. We also discussed importance of chewing thoroughly. She will monitor for worsening symptoms or any new associated symptoms. Bowels and bladder functioning. She is taking vitamins as instructed. She did speak with the registered dietitian for continued follow up. I agree with recommendations and plan. She is exercising with walking, encouraged her to continue physical activity. . Incisions healing well. No lifting restrictions, but she would like to wait to sign up for the [facility]. We will see her back in 7 weeks for continued follow up or via telemedicine depending on COVID-19 restrictions at the time of their next appointment. Total encounter time: 20 minutes, including any number of the following: Bariatric Pre/Post operative work up/protocols, review of labs, imaging, provider notes, outside hospital records, performing examination/evaluation, counseling patient and/or family, ordering medications/tests, placing referrals and communication with referring physicians, coordination of care; discussing dietary plan/recall with the patient as well with registered dietitian and documentation in the EHR. Of note, the above was done during same day of the actual patient encounter. An electronic signature was used to authenticate this note.

## 2021-03-26 RX ORDER — DULOXETIN HYDROCHLORIDE 60 MG/1
CAPSULE, DELAYED RELEASE ORAL
Qty: 30 CAPSULE | Refills: 2 | Status: SHIPPED | OUTPATIENT
Start: 2021-03-26 | End: 2021-11-01

## 2021-03-26 NOTE — TELEPHONE ENCOUNTER
Refill Request     Last Seen: 12/15/2020    Last Written: 12/17/2020    Next Appointment:   Future Appointments   Date Time Provider Clifford Perez   4/6/2021 11:00 AM DIANA Simpson - CNP [facility]             Requested Prescriptions     Pending Prescriptions Disp Refills    DULoxetine (CYMBALTA) 60 MG extended release capsule [Pharmacy Med Name: DULOXETINE HCL DR 60 MG CAP] 30 capsule 2     Sig: TAKE 1 CAPSULE BY MOUTH EVERY DAY

## 2021-04-21 ENCOUNTER — VIRTUAL VISIT (OUTPATIENT)
Dept: BARIATRICS/WEIGHT MGMT | Age: 41
End: 2021-04-21
Payer: COMMERCIAL

## 2021-04-21 DIAGNOSIS — I10 ESSENTIAL HYPERTENSION: ICD-10-CM

## 2021-04-21 DIAGNOSIS — E28.2 PCOS (POLYCYSTIC OVARIAN SYNDROME): ICD-10-CM

## 2021-04-21 DIAGNOSIS — G47.33 OBSTRUCTIVE SLEEP APNEA: ICD-10-CM

## 2021-04-21 DIAGNOSIS — E66.9 OBESITY (BMI 30-39.9): ICD-10-CM

## 2021-04-21 DIAGNOSIS — E66.9 DIABETES MELLITUS TYPE 2 IN OBESE (HCC): ICD-10-CM

## 2021-04-21 DIAGNOSIS — E11.69 DIABETES MELLITUS TYPE 2 IN OBESE (HCC): ICD-10-CM

## 2021-04-21 DIAGNOSIS — Z98.84 S/P LAPAROSCOPIC SLEEVE GASTRECTOMY: Primary | ICD-10-CM

## 2021-04-21 PROCEDURE — 99213 OFFICE O/P EST LOW 20 MIN: CPT | Performed by: NURSE PRACTITIONER

## 2021-04-21 PROCEDURE — 2022F DILAT RTA XM EVC RTNOPTHY: CPT | Performed by: NURSE PRACTITIONER

## 2021-04-21 PROCEDURE — 3046F HEMOGLOBIN A1C LEVEL >9.0%: CPT | Performed by: NURSE PRACTITIONER

## 2021-04-21 PROCEDURE — G8427 DOCREV CUR MEDS BY ELIG CLIN: HCPCS | Performed by: NURSE PRACTITIONER

## 2021-04-21 RX ORDER — CALCIUM CITRATE/VITAMIN D3 200MG-6.25
1 TABLET ORAL 2 TIMES DAILY
Qty: 60 TABLET | Refills: 2 | Status: SHIPPED | OUTPATIENT
Start: 2021-04-21 | End: 2021-06-29

## 2021-04-21 RX ORDER — M-VIT,TX,IRON,MINS/CALC/FOLIC 27MG-0.4MG
1 TABLET ORAL 2 TIMES DAILY
Qty: 60 TABLET | Refills: 2 | Status: SHIPPED | OUTPATIENT
Start: 2021-04-21 | End: 2022-01-31 | Stop reason: ALTCHOICE

## 2021-04-21 ASSESSMENT — ENCOUNTER SYMPTOMS
EYES NEGATIVE: 1
GASTROINTESTINAL NEGATIVE: 1
RESPIRATORY NEGATIVE: 1

## 2021-04-21 NOTE — PROGRESS NOTES
Dietary Assessment Note      Vitals: There were no vitals filed for this visit. Patient lost 13 lbs over past 2 months. Total Weight Loss: 64 lbs    Due to the COVID-19 restrictions on close contact interactions the patient's visit was conducted via telephone in carmenza of a face to face visit. The patient is here through telemedicine for their post op visit. Labs reviewed: No new nutrition related labs     Protein intake: 60-80 grams/day     Fluid intake: 64 oz/day     Multivitamin/mineral intake: 4 fusion - interested in alternative, asking if Provider can prescribe this     Calcium intake:  none     Other: none     Exercise: Recently started walking 2-5 miles/day (more recently 2 miles) - goal is to walk daily     Nutrition Assessment: 14 week s/p sleeve post-op visit. Recently went on vacation and felt she got off track with her food choices, but is back on track. Has been using Airpush jennifer ~1513-0344 calories per day + 60-80 g/pro/day. Breakfast: eggs with potatoes/peppers/onions OR Nectar protein shake made with water (8 oz)     Snack: arugula salad with cranberries/sliced almonds/hope bits/lemon (145 calories total)     Lunch: weight watchers soup (0 points) - veggies with FF beef broth/tomato paste/protein powder     Snack: nothing     Dinner: small amount of family meal - ham with green beans and potato OR chicken with Mrs.  Dash     Snack: skinny pop popcorn     Fluids: decaf tea with splenda and SF creamer, powerade zero, water     Amount able to eat per sitting: 3.5 oz oz chicken + 3-4 oz veggies     Following 30/30/30 rule: Still struggling with this, takes sips when eating, taking small bites, chewing food thoroughly     Food Intolerances/issues: none     Client Concerns: stomach ache with all eating occasions - even before sx     Goals:   Switch to 1/2 c of veggies   Follow 30-30-30 Rule   Continue walking   Stay on track with diet - reviewed options for traveling in the future  Take 2 MVI/2 citracal petite separately - will notify provider of Rx request for vitamins    Handouts: MVI alternative - emailed to pt     Plan: F/U per Provider     Angella Cantu

## 2021-04-21 NOTE — PATIENT INSTRUCTIONS
Diet tips to help make you successful postoperatively  Eating habits after surgery will have to be a permanent and long-term change. Eating habits are so ingrained that it can be difficult to change. It is important to maintain these new eating habits after surgery. Also remember that overall health, age, and genetics make each persons weight loss progress different. Do not compare your progress, the amount you eat, or exercise to other patients.  Protein first at every meal- Eat the protein portion of your meal first. Eating protein helps the body feel full and sends a signal to stop eating. Protein is very important in building tissue in the body.  Eat at least 4 times per day- This includes protein supplements and small meals with a high amount of protein   Chewing your food thoroughly- Eating too quickly and improper chewing can cause pain and vomiting after surgery.  Slowing down the speed at which you eat- Refill your fork only after you swallow. Adopt a new pattern of eating by taking a bite of food and putting your utensil down between bites. This will help to reduce the feeling of food being stuck.    Drink water and start drinking fluids slowly- Drink at least 48 ounces per day minimum. Sip fluids as if they were hot beverages. If you find it difficult to stop gulping liquids, try using a sippy cup or a sport top water bottle.  Make sure you are eating meals without drinking fluids- After surgery you will not be allowed to drink fluids 30 minutes before, during, or 30 minutes after your meal (30/30/30 rule). This will be a life-long behavior change. The reason for the rule is to keep food from passing through your smaller stomach more rapidly. This will cause you to feel hungry shortly after your meal.   Continue to avoid caffeine and carbonated beverages- Caffeine acts as a diuretic and can be dehydrating as well as irritating to the lining of the stomach.  Carbonated beverages release gas and can expand the stomach.  Continue to keep temptation from your kitchen- Keep your pantry and kitchen cabinets cleaned out of those dangerous foods that might tempt you after surgery (chips, cookies, candy, etc.).  Continue to increase your exercise program- Increase your daily physical activity. Aim for 5-6 days per week for 30 minutes. Walking is an easy way to get started with exercising. Exercise is going to be a regular part of your life after surgery.  Make sure you have a good support system- There will be many changes and adjustments to make after surgery. It is important to have a supportive friend, family member or co-worker, etc. with whom you can talk. Continue to attend [facility]) Weight Management support groups as they can be helpful in maintaining behaviors. In addition, it is the responsibility of the patient to schedule and follow up on labs and tests completed after surgery. Results will be reviewed at each visit. Patient received dietary handouts and education.

## 2021-04-21 NOTE — PROGRESS NOTES
[facility]) Physicians   [employer] [facility] [employer]    4/21/2021    TELEHEALTH EVALUATION -- Audio/Visual (During EODIJ-76 public health emergency)    Subjective:      Patient ID: Chevy Thurman is a 45 y.o. female    HPI    4 months s/p sleeve gastrectomy    Chevy Thurman is a 45 y.o. female , current BMI of 34.8, current weight of 219 pounds. Due to the COVID-19 restrictions on close contact interactions the patient's visit was conducted via audio/video in carmenza of a face to face visit. Patient has consented to have this visit conducted via audio/video. The patient is here through telemedicine for their post op bariatric surgery visit. Patient denies any nausea, vomiting, fevers, chills, shortness of breath, chest pain, constipation or urinary symptoms. Denies any heartburn nor dysphagia.     Past Medical History:   Diagnosis Date    Anxiety     Arthritis     Cervical pain     Chronic back pain     Depression     Diabetes mellitus (HCC)     GERD (gastroesophageal reflux disease)     Hyperlipidemia     Hypertension     Infertility     took fertility medication    Kidney stone 03/2012    Migraine     Miscarriage     Polycystic ovarian syndrome 1999    PONV (postoperative nausea and vomiting)     Psoriasis     Stomach ulcer     Unspecified sleep apnea     cpap     Past Surgical History:   Procedure Laterality Date    BREAST SURGERY Right     for cellulitis     CHOLECYSTECTOMY  2011    COLONOSCOPY  10/7/2015    [facility]polyps    COLONOSCOPY N/A 6/18/2020    COLONOSCOPY WITH BIOPSY performed by Ree Cannon MD at [place] [address] N/A 6/18/2020    COLONOSCOPY POLYPECTOMY SNARE performed by Ree Cannon MD at [address] [place] [address] C/missed ab    SKIN BIOPSY      for psoriasis    SLEEVE GASTRECTOMY N/A 12/23/2020    LAPAROSCOPIC SLEEVE GASTRECTOMY -  ETHICON performed by Allan Maradiaga MD at [address]  08/09/2016    tonsillectomy    UPPER GASTROINTESTINAL ENDOSCOPY  3/23/11    pyloretic    UPPER GASTROINTESTINAL ENDOSCOPY  10/7/2015    [address]astritis-[address]    UPPER GASTROINTESTINAL ENDOSCOPY N/A 6/18/2020    EGD DIAGNOSTIC ONLY performed by Merced Shafer MD at [address] N/A 8/28/2020    EGD BIOPSY performed by Kenny Michaels MD at [address] History   Problem Relation Age of Onset    Arthritis Mother     Miscarriages / [place] Mother     High Blood Pressure Mother     Depression Mother     High Cholesterol Mother     Arthritis Father     High Blood Pressure Father     Cancer Father         colon    Hearing Loss Father     Heart Disease Father     High Cholesterol Father     High Blood Pressure Brother     High Cholesterol Brother     High Blood Pressure Maternal Aunt     High Cholesterol Maternal Aunt     Miscarriages / Stillbirths Maternal Aunt     Mental Retardation Maternal Uncle     High Blood Pressure Maternal Uncle     High Cholesterol Maternal Uncle     High Blood Pressure Paternal Aunt     High Cholesterol Paternal Aunt     Arthritis Paternal Uncle     Mental Illness Paternal Uncle     High Blood Pressure Paternal Uncle     Birth Defects Paternal Uncle     High Cholesterol Paternal Uncle     Learning Disabilities Paternal Uncle     Arthritis Maternal Grandmother     High Blood Pressure Maternal Grandmother     Diabetes Maternal Grandmother     High Cholesterol Maternal Grandmother     Miscarriages / Stillbirths Maternal Grandmother     Arthritis Maternal Grandfather     High Blood Pressure Maternal Grandfather     High Cholesterol Maternal Grandfather     Arthritis Paternal Grandmother     High Blood Pressure Paternal Grandmother     Heart Disease Paternal Grandmother     High Cholesterol Paternal Grandmother    [place] / Stillbirths Paternal Grandmother     Stroke Paternal Grandmother     High Blood Pressure Paternal Grandfather     High Cholesterol Paternal Grandfather      Social History     Tobacco Use    Smoking status: Former Smoker     Packs/day: 1.00     Years: 20.00     Pack years: 20.00     Types: Cigarettes     Quit date: 2019     Years since quittin.0    Smokeless tobacco: Never Used   Substance Use Topics    Alcohol use: Not Currently     Alcohol/week: 0.0 standard drinks     Frequency: Monthly or less     I counseled the patient on the importance of not smoking and risks of ETOH. Allergies   Allergen Reactions    Tramadol Other (See Comments)     severe  SEVERE HEADACHE    Lisinopril Other (See Comments)     COUGH     There were no vitals filed for this visit. There is no height or weight on file to calculate BMI.     Current Outpatient Medications:     DULoxetine (CYMBALTA) 60 MG extended release capsule, TAKE 1 CAPSULE BY MOUTH EVERY DAY, Disp: 30 capsule, Rfl: 2    metFORMIN (GLUCOPHAGE-XR) 750 MG extended release tablet, TAKE 2 TABLETS BY MOUTH EVERY MORNING, Disp: 60 tablet, Rfl: 2    losartan-hydroCHLOROthiazide (HYZAAR) 100-25 MG per tablet, TAKE 1 TABLET BY MOUTH DAILY FOR HIGH BLOOD PRESSURE. L10, Disp: 90 tablet, Rfl: 1    amLODIPine (NORVASC) 5 MG tablet, TAKE 2 TABLETS BY MOUTH EVERY DAY, Disp: 180 tablet, Rfl: 1    oxybutynin (DITROPAN-XL) 5 MG extended release tablet, TAKE 1 TABLET BY MOUTH EVERY DAY AT NIGHT, Disp: 30 tablet, Rfl: 0    fluticasone (FLONASE) 50 MCG/ACT nasal spray, SPRAY 1 SPRAY INTO EACH NOSTRIL EVERY DAY, Disp: 1 Bottle, Rfl: 1    ondansetron (ZOFRAN ODT) 4 MG disintegrating tablet, Take 2 tablets by mouth every 8 hours as needed for Nausea, Disp: 30 tablet, Rfl: 0    mupirocin (BACTROBAN) 2 % ointment, APPLY TO AFFECTED AREA 3 TIMES A DAY, Disp: , Rfl:     Fe Fum-Fe Poly-Vit C-Lactobac (FUSION PO), Take by mouth As ordered by Dr Nehemiah Langford, Disp: , Rfl:     hydrALAZINE (APRESOLINE) 10 MG tablet, TAKE 1 TABLET BY MOUTH TWICE A DAY (Patient not taking: Reported on 2/16/2021), Disp: 60 tablet, Rfl: 5    hydrOXYzine (ATARAX) 50 MG tablet, 1 twice daily as needed anxiety. , Disp: 30 tablet, Rfl: 1    cimetidine (TAGAMET) 400 MG tablet, Take 1 tablet by mouth 2 times daily (Patient taking differently: Take 400 mg by mouth daily Patient states takes once a day at 7pm), Disp: 180 tablet, Rfl: 1    atorvastatin (LIPITOR) 10 MG tablet, Take 1 tablet by mouth daily (Patient taking differently: Take 10 mg by mouth nightly ), Disp: 30 tablet, Rfl: 5    levonorgestrel-ethinyl estradiol (JOLESSA) 0.15-0.03 MG per tablet, Take 1 tablet by mouth, Disp: , Rfl:     Lab Results   Component Value Date    WBC 10.7 12/24/2020    RBC 4.45 12/24/2020    HGB 12.9 12/24/2020    HCT 37.4 12/24/2020    MCV 84.0 12/24/2020    MCH 29.0 12/24/2020    MCHC 34.6 12/24/2020    MPV 7.7 12/24/2020    NEUTOPHILPCT 64.9 07/21/2020    LYMPHOPCT 26.6 07/21/2020    MONOPCT 6.3 07/21/2020    EOSRELPCT 1.3 07/21/2020    BASOPCT 0.9 07/21/2020    NEUTROABS 6.0 07/21/2020    LYMPHSABS 2.5 07/21/2020    MONOSABS 0.6 07/21/2020    EOSABS 0.1 07/21/2020     Lab Results   Component Value Date     12/24/2020    K 3.1 12/24/2020    K 3.6 02/10/2019     12/24/2020    CO2 23 12/24/2020    ANIONGAP 12 12/24/2020    GLUCOSE 137 12/24/2020    BUN 6 12/24/2020    CREATININE <0.5 12/24/2020    LABGLOM >60 12/24/2020    GFRAA >60 12/24/2020    GFRAA >60 09/07/2012    CALCIUM 8.5 12/24/2020    PROT 7.1 12/17/2020    PROT 6.0 09/07/2012    LABALBU 4.2 12/17/2020    AGRATIO 1.4 12/17/2020    BILITOT <0.2 12/17/2020    ALKPHOS 65 12/17/2020    ALT 49 12/17/2020    AST 58 12/17/2020    GLOB 2.9 12/17/2020     Lab Results   Component Value Date    CHOL 154 07/21/2020    TRIG 246 07/21/2020    HDL 41 07/21/2020    HDL 38 07/20/2011    LDLCALC 64 07/21/2020    LABVLDL 49 07/21/2020     Lab Results   Component Value Date    TSHREFLEX 1.59 07/21/2020     Lab Results   Component Value Date    IRON 71 07/21/2020    TIBC 405 07/21/2020    LABIRON 18 07/21/2020     Lab Results   Component Value Date    YWGWSMIY07 180 05/29/2020    FOLATE >20.00 05/29/2020     Lab Results   Component Value Date    VITD25 33.8 05/29/2020     Lab Results   Component Value Date    LABA1C 6.5 05/06/2020    .9 05/06/2020       Review of Systems   Constitutional: Positive for fatigue. HENT: Negative. Eyes: Negative. Respiratory: Negative. Cardiovascular: Negative. Gastrointestinal: Negative. Skin: Negative. Neurological: Negative. PHYSICAL EXAMINATION:    Constitutional: [x] Appears well-developed and well-nourished [x] No apparent distress      [] Abnormal-   Mental status  [x] Alert and awake  [x] Oriented to person/place/time [x]Able to follow commands      Eyes:  EOM    [x]  Normal  [] Abnormal-  Sclera  [x]  Normal  [] Abnormal -         Discharge [x]  None visible  [] Abnormal -    HENT:   [x] Normocephalic, atraumatic.   [] Abnormal   [x] Mouth/Throat: Mucous membranes are moist.     External Ears [x] Normal  [] Abnormal-     Neck: [x] No visualized mass     Pulmonary/Chest: [x] Respiratory effort normal.  [x] No visualized signs of difficulty breathing or respiratory distress        [] Abnormal-      Musculoskeletal:   [] Normal gait with no signs of ataxia         [x] Normal range of motion of neck        [] Abnormal-     Neurological:        [x] No Facial Asymmetry (Cranial nerve 7 motor function) (limited exam to video visit)          [x] No gaze palsy        [] Abnormal-         Skin:        [x] No significant exanthematous lesions or discoloration noted on facial skin         [] Abnormal-            Psychiatric:       [x] Normal Affect [x] No Hallucinations        [] Abnormal-     Other pertinent observable physical exam findings-     Due to this being a TeleHealth encounter, evaluation of the following organ systems is limited: Vitals/Constitutional/EENT/Resp/CV/GI//MS/Neuro/Skin/Heme-Lymph-Imm. Assessment and Plan:   Patient is here via telemedicine and is 4 months s/p sleeve gastrectomy, down 13lbs with a total weight loss of 64lbs. She is doing well, denies n/v/dysphagia or reflux. She is tolerating diet, getting adequate fluids and protein. She was off track with food choices during her recent vacation but is back on track now. She is tracking her intakes. She is exercising with walking and biking with her dog. Encouraged continued physical activity. She is taking vitamins as instructed. She would like the alternative vitamin list. She is also requested a script for her MVI and calcium supplement sent to her pharmacy. Scripts sent to her pharmacy per her request. She did speak with the registered dietitian for continued follow up. I agree with recommendations and plan. She is experiencing fatigue, wants to drink caffeinated tea again. She is okay to have one cup as long as she continues to get 64 ounces of water in a day. I offered to complete 6 month labs early, she would prefer to start with regular exercise and see if that helps. She will contact our office if fatigue does not improve and we will plan to order labs. We will see her back in 2 months for continued follow up or via telemedicine depending on COVID-19 restrictions at the time of their next appointment. Total encounter time: 20 minutes, including any number of the following: Bariatric Pre/Post operative work up/protocols, review of labs, imaging, provider notes, outside hospital records, performing examination/evaluation, counseling patient and/or family, ordering medications/tests, placing referrals and communication with referring physicians, coordination of care; discussing dietary plan/recall with the patient as well with registered dietitian and documentation in the EHR. Of note, the above was done during same day of the actual patient encounter.        An

## 2021-06-29 DIAGNOSIS — Z98.84 S/P LAPAROSCOPIC SLEEVE GASTRECTOMY: ICD-10-CM

## 2021-07-27 ENCOUNTER — OFFICE VISIT (OUTPATIENT)
Dept: FAMILY MEDICINE CLINIC | Age: 41
End: 2021-07-27
Payer: COMMERCIAL

## 2021-07-27 VITALS
DIASTOLIC BLOOD PRESSURE: 78 MMHG | HEART RATE: 79 BPM | OXYGEN SATURATION: 98 % | WEIGHT: 210.2 LBS | SYSTOLIC BLOOD PRESSURE: 138 MMHG | BODY MASS INDEX: 33.42 KG/M2

## 2021-07-27 DIAGNOSIS — E66.9 DIABETES MELLITUS TYPE 2 IN OBESE (HCC): Primary | ICD-10-CM

## 2021-07-27 DIAGNOSIS — F33.1 MODERATE EPISODE OF RECURRENT MAJOR DEPRESSIVE DISORDER (HCC): ICD-10-CM

## 2021-07-27 DIAGNOSIS — I10 ESSENTIAL HYPERTENSION: ICD-10-CM

## 2021-07-27 DIAGNOSIS — E11.69 DIABETES MELLITUS TYPE 2 IN OBESE (HCC): Primary | ICD-10-CM

## 2021-07-27 LAB — HBA1C MFR BLD: 5.5 %

## 2021-07-27 PROCEDURE — 2022F DILAT RTA XM EVC RTNOPTHY: CPT | Performed by: FAMILY MEDICINE

## 2021-07-27 PROCEDURE — G8427 DOCREV CUR MEDS BY ELIG CLIN: HCPCS | Performed by: FAMILY MEDICINE

## 2021-07-27 PROCEDURE — 99213 OFFICE O/P EST LOW 20 MIN: CPT | Performed by: FAMILY MEDICINE

## 2021-07-27 PROCEDURE — 83036 HEMOGLOBIN GLYCOSYLATED A1C: CPT | Performed by: FAMILY MEDICINE

## 2021-07-27 PROCEDURE — 1036F TOBACCO NON-USER: CPT | Performed by: FAMILY MEDICINE

## 2021-07-27 PROCEDURE — G8417 CALC BMI ABV UP PARAM F/U: HCPCS | Performed by: FAMILY MEDICINE

## 2021-07-27 PROCEDURE — 3044F HG A1C LEVEL LT 7.0%: CPT | Performed by: FAMILY MEDICINE

## 2021-07-27 RX ORDER — MULTIVIT,THER.W-IRON,HEMATINIC 66.7-.33MG
1 TABLET ORAL DAILY
COMMUNITY
End: 2022-11-02

## 2021-07-27 RX ORDER — TERBINAFINE HYDROCHLORIDE 250 MG/1
250 TABLET ORAL DAILY
COMMUNITY

## 2021-07-27 ASSESSMENT — ENCOUNTER SYMPTOMS
ABDOMINAL PAIN: 0
CONSTIPATION: 1
SHORTNESS OF BREATH: 0
BLOOD IN STOOL: 0
COUGH: 0

## 2021-07-27 NOTE — PROGRESS NOTES
Subjective:      Patient ID: Margarita Peres is a 45 y.o. female. HPI  Patient in for 6-month checkup on several medical issues. She had gastric sleeve surgery back in December and has lost 75 pounds and feeling much better-she has stopped taking her Norvasc and is still on losartan and Metformin twice a day. Hypertension-blood pressure 140/80 or below when she checks at home or elsewhere. Depression-on medication and doing very well especially since she has lost weight. She denies any other issues to discuss. Prior to Visit Medications :  Medication terbinafine (LAMISIL) 250 MG tablet, Sig Take 250 mg by mouth daily, Taking? Yes, Authorizing Provider Historical Provider, MD    Medication Multiple Vitamins-Minerals (THERAPEUTIC MULTIVITAMIN-MINERALS W/ IRON) TABS tablet, Sig Take 1 tablet by mouth daily, Taking? Yes, Authorizing Provider Opal Frazier MD    Medication blood glucose monitor kit and supplies, Sig Dispense sufficient amount for indicated testing frequency plus additional to accommodate PRN testing needs. Dispense all needed supplies to include: monitor, strips, lancing device, lancets, control solutions, alcohol swabs., Taking? Yes, Authorizing Provider Sorin Santillan, DO    Medication Calcium Citrate-Vitamin D 200-250 MG-UNIT TABS, Sig Take 1 tablet by mouth twice daily. , Taking? Yes, Authorizing Provider DIANA Sena - CNP    Medication losartan-hydroCHLOROthiazide (HYZAAR) 100-25 MG per tablet, Sig Take 1 tablet by mouth daily for high blood pressure., Taking? Yes, Authorizing Provider Sorin Santillan, DO    Medication DULoxetine (CYMBALTA) 60 MG extended release capsule, Sig TAKE 1 CAPSULE BY MOUTH EVERY DAY, Taking? Yes, Authorizing Provider Sorin Santillan, DO    Medication metFORMIN (GLUCOPHAGE-XR) 750 MG extended release tablet, Sig TAKE 2 TABLETS BY MOUTH EVERY MORNING, Taking?  Yes, Authorizing Provider Sorin Santillan, DO    Medication cimetidine (TAGAMET) 400 MG tablet, Sig Take 1 tablet by mouth 2 times daily  Patient taking differently: Take 400 mg by mouth daily Patient states takes once a day at 7pm, Taking? Yes, Authorizing Provider Da Santillan, DO    Medication levonorgestrel-ethinyl estradiol (Judie Rodes) 0.15-0.03 MG per tablet, Sig Take 1 tablet by mouth, Taking? Yes, Authorizing Provider Opal Frazier MD    Medication ondansetron (ZOFRAN ODT) 4 MG disintegrating tablet, Sig Take 1 tablet by mouth every 8 hours as needed for Nausea  Patient not taking: Reported on 7/27/2021, Taking? , Authorizing Provider Eduardo Gutierrez MD    Medication Multiple Vitamins-Minerals (THERAPEUTIC MULTIVITAMIN-MINERALS) tablet, Sig Take 1 tablet by mouth 2 times daily, Taking? , Authorizing Provider Love Pleitez APRN - CNP    Medication fluticasone (FLONASE) 50 MCG/ACT nasal spray, Sig SPRAY 1 SPRAY INTO EACH NOSTRIL EVERY DAY  Patient not taking: Reported on 7/27/2021, Taking? , Authorizing Provider Sorin Santillan, DO    Medication mupirocin (BACTROBAN) 2 % ointment, Sig APPLY TO AFFECTED AREA 3 TIMES A DAY  Patient not taking: Reported on 7/27/2021, Taking? , Authorizing Provider Opal Frazier MD    Medication Fe Fum-Fe Poly-Vit C-Lactobac (FUSION PO), Sig Take by mouth As ordered by Dr Hudson Has  Patient not taking: Reported on 7/27/2021, Taking? , Authorizing Provider Opal Frazier MD    Medication hydrOXYzine (ATARAX) 50 MG tablet, Sig 1 twice daily as needed anxiety. Patient not taking: Reported on 7/27/2021, Taking? , Authorizing Provider Kasey Porras DO      Past Medical History:  No date:  Anxiety  No date: Arthritis  No date: Cervical pain  No date: Chronic back pain  No date: Depression  No date: Diabetes mellitus (HCC)  No date: GERD (gastroesophageal reflux disease)  No date: Hyperlipidemia  No date: Hypertension  No date: Infertility      Comment:  took fertility medication  03/2012: Kidney stone  No date: Migraine  No date: Miscarriage  1999: Polycystic ovarian syndrome  No date: PONV (postoperative nausea and vomiting)  No date: Psoriasis  No date: Stomach ulcer  No date: Unspecified sleep apnea      Comment:  cpap        Review of Systems    Review of Systems   Respiratory: Negative for cough and shortness of breath. Cardiovascular: Positive for palpitations. Negative for chest pain. Gastrointestinal: Positive for constipation. Negative for abdominal pain and blood in stool. Genitourinary: Positive for frequency. Negative for dysuria and hematuria. Musculoskeletal: Positive for arthralgias. Neurological: Negative for tremors and headaches. Psychiatric/Behavioral: Positive for dysphoric mood. Negative for sleep disturbance. Objective:   Physical Exam      Physical Exam  Constitutional:       Appearance: She is well-developed. HENT:      Head: Normocephalic. Eyes:      General: No scleral icterus. Conjunctiva/sclera: Conjunctivae normal.   Neck:      Thyroid: No thyromegaly. Vascular: No carotid bruit. Cardiovascular:      Rate and Rhythm: Normal rate and regular rhythm. Heart sounds: Normal heart sounds. Pulmonary:      Effort: Pulmonary effort is normal.      Breath sounds: Normal breath sounds. Abdominal:      General: Bowel sounds are normal. There is no distension. Palpations: Abdomen is soft. There is no mass. Tenderness: There is no abdominal tenderness. Musculoskeletal:         General: No tenderness. Normal range of motion. Cervical back: Neck supple. Lymphadenopathy:      Cervical: No cervical adenopathy. Skin:     General: Skin is warm and dry. Coloration: Skin is not pale. Neurological:      Mental Status: She is alert and oriented to person, place, and time. Cranial Nerves: No cranial nerve deficit. Motor: No abnormal muscle tone.       Coordination: Coordination normal.      Deep Tendon Reflexes: Reflexes normal.   Psychiatric:         Behavior: Behavior normal. Thought Content: Thought content normal.         Judgment: Judgment normal.         Assessment:      1. Diabetes mellitus type 2 in obese ([facility]    2. Essential hypertension    3. BMI 33.0-33.9,adult    4. Moderate episode of recurrent major depressive disorder ([facility]            Plan:      Amanda Orona was seen today for discuss medications. Diagnoses and all orders for this visit:    Diabetes mellitus type 2 in obese ([facility]  -     POCT glycosylated hemoglobin (Hb A1C)  -     Hemoglobin A1C; Future  Continue Metformin but reduce to 1 daily-continue working on slow weight loss and increase activity. See me in 3 months for an A1c. Prescription sent for monitor and supplies to check sugar. Essential hypertension  Continue medications and no added salt diet. Notify me of any increase in persistent hypertension. BMI 33.0-33.9,adult  Continue with slow weight loss as you have been doing. Moderate episode of recurrent major depressive disorder ([facility]  Continue medication and notify me if any increase in depressive symptoms. Other orders  -     blood glucose monitor kit and supplies; Dispense sufficient amount for indicated testing frequency plus additional to accommodate PRN testing needs. Dispense all needed supplies to include: monitor, strips, lancing device, lancets, control solutions, alcohol swabs. This has been approximately a 25-minute visit with the patient. Need to have an A1c in approximately 2 to 3 months.           Sorin Santillan, DO

## 2021-07-27 NOTE — PATIENT INSTRUCTIONS
Diabetes mellitus type 2 in obese (HCC)  -     POCT glycosylated hemoglobin (Hb A1C)  -     Hemoglobin A1C; Future  Continue Metformin but reduce to 1 daily-continue working on slow weight loss and increase activity. See me in 3 months for an A1c. Prescription sent for monitor and supplies to check sugar. Essential hypertension  Continue medications and no added salt diet. Notify me of any increase in persistent hypertension. BMI 33.0-33.9,adult  Continue with slow weight loss as you have been doing. Moderate episode of recurrent major depressive disorder ([facility]  Continue medication and notify me if any increase in depressive symptoms. Other orders  -     blood glucose monitor kit and supplies; Dispense sufficient amount for indicated testing frequency plus additional to accommodate PRN testing needs. Dispense all needed supplies to include: monitor, strips, lancing device, lancets, control solutions, alcohol swabs. This has been approximately a 25-minute visit with the patient. Need to have an A1c in approximately 2 to 3 months.           Sorin Santillan, DO

## 2021-07-29 DIAGNOSIS — Z98.84 S/P LAPAROSCOPIC SLEEVE GASTRECTOMY: ICD-10-CM

## 2021-09-01 ENCOUNTER — TELEMEDICINE (OUTPATIENT)
Dept: BARIATRICS/WEIGHT MGMT | Age: 41
End: 2021-09-01
Payer: COMMERCIAL

## 2021-09-01 DIAGNOSIS — E66.9 OBESITY (BMI 30.0-34.9): ICD-10-CM

## 2021-09-01 DIAGNOSIS — E11.69 DIABETES MELLITUS TYPE 2 IN OBESE (HCC): ICD-10-CM

## 2021-09-01 DIAGNOSIS — K21.9 CHRONIC GERD: ICD-10-CM

## 2021-09-01 DIAGNOSIS — G47.33 OBSTRUCTIVE SLEEP APNEA: ICD-10-CM

## 2021-09-01 DIAGNOSIS — Z98.84 S/P LAPAROSCOPIC SLEEVE GASTRECTOMY: Primary | ICD-10-CM

## 2021-09-01 DIAGNOSIS — I10 ESSENTIAL HYPERTENSION: ICD-10-CM

## 2021-09-01 DIAGNOSIS — E28.2 PCOS (POLYCYSTIC OVARIAN SYNDROME): ICD-10-CM

## 2021-09-01 DIAGNOSIS — E78.1 HYPERTRIGLYCERIDEMIA: ICD-10-CM

## 2021-09-01 DIAGNOSIS — E66.9 DIABETES MELLITUS TYPE 2 IN OBESE (HCC): ICD-10-CM

## 2021-09-01 PROCEDURE — 2022F DILAT RTA XM EVC RTNOPTHY: CPT | Performed by: NURSE PRACTITIONER

## 2021-09-01 PROCEDURE — 99214 OFFICE O/P EST MOD 30 MIN: CPT | Performed by: NURSE PRACTITIONER

## 2021-09-01 PROCEDURE — G8427 DOCREV CUR MEDS BY ELIG CLIN: HCPCS | Performed by: NURSE PRACTITIONER

## 2021-09-01 PROCEDURE — 3044F HG A1C LEVEL LT 7.0%: CPT | Performed by: NURSE PRACTITIONER

## 2021-09-01 ASSESSMENT — ENCOUNTER SYMPTOMS
RESPIRATORY NEGATIVE: 1
GASTROINTESTINAL NEGATIVE: 1
EYES NEGATIVE: 1

## 2021-09-01 NOTE — PROGRESS NOTES
[facility]) Physicians   [employer] [facility] [employer]    9/1/2021    TELEHEALTH EVALUATION -- Audio/Visual (During GIWUB-09 public health emergency)    Subjective:      Patient ID: Viola Griffith is a 45 y.o. female    HPI    8 months s/p sleeve gastrectomy    Viola Griffith is a 45 y.o. female , current BMI of 32.4, current weight of 204 pounds. Due to the COVID-19 restrictions on close contact interactions the patient's visit was conducted via audio/video in carmenza of a face to face visit. Patient has consented to have this visit conducted via audio/video. The patient is here through telemedicine for their post op bariatric surgery visit. Patient denies any nausea, vomiting, fevers, chills, shortness of breath, chest pain, constipation or urinary symptoms. Denies any heartburn nor dysphagia.     Past Medical History:   Diagnosis Date    Anxiety     Arthritis     Cervical pain     Chronic back pain     Depression     Diabetes mellitus (HCC)     GERD (gastroesophageal reflux disease)     Hyperlipidemia     Hypertension     Infertility     took fertility medication    Kidney stone 03/2012    Migraine     Miscarriage     Polycystic ovarian syndrome 1999    PONV (postoperative nausea and vomiting)     Psoriasis     Stomach ulcer     Unspecified sleep apnea     cpap     Past Surgical History:   Procedure Laterality Date    BREAST SURGERY Right     for cellulitis     CHOLECYSTECTOMY  2011    [facility]7/[facility]ps    COLONOSCOPY N/A 6/18/2020    COLONOSCOPY WITH BIOPSY performed by Jordan Hernandez MD at [address] [facility] N/A 6/18/2020    COLONOSCOPY POLYPECTOMY SNARE performed by Jordan Hernandez MD at [address] [facility] C/missed ab    SKIN BIOPSY      for psoriasis    SLEEVE GASTRECTOMY N/A 12/23/2020    LAPAROSCOPIC SLEEVE GASTRECTOMY -  ETHICON performed by Morris Kaba MD Grandmother     Stroke Paternal Grandmother     High Blood Pressure Paternal Grandfather     High Cholesterol Paternal Grandfather      Social History     Tobacco Use    Smoking status: Former Smoker     Packs/day: 1.00     Years: 20.00     Pack years: 20.00     Types: Cigarettes     Quit date: 2019     Years since quittin.3    Smokeless tobacco: Never Used   Substance Use Topics    Alcohol use: Not Currently     Alcohol/week: 0.0 standard drinks     I counseled the patient on the importance of not smoking and risks of ETOH. Allergies   Allergen Reactions    Tramadol Other (See Comments)     severe  SEVERE HEADACHE    Lisinopril Other (See Comments)     COUGH     There were no vitals filed for this visit. There is no height or weight on file to calculate BMI. Current Outpatient Medications:     Calcium Citrate-Vitamin D 200-250 MG-UNIT TABS, Take 1 tablet by mouth twice daily. , Disp: 60 tablet, Rfl: 0    terbinafine (LAMISIL) 250 MG tablet, Take 250 mg by mouth daily, Disp: , Rfl:     Multiple Vitamins-Minerals (THERAPEUTIC MULTIVITAMIN-MINERALS W/ IRON) TABS tablet, Take 1 tablet by mouth daily, Disp: , Rfl:     blood glucose monitor kit and supplies, Dispense sufficient amount for indicated testing frequency plus additional to accommodate PRN testing needs.  Dispense all needed supplies to include: monitor, strips, lancing device, lancets, control solutions, alcohol swabs., Disp: 1 kit, Rfl: 0    losartan-hydroCHLOROthiazide (HYZAAR) 100-25 MG per tablet, Take 1 tablet by mouth daily for high blood pressure., Disp: 90 tablet, Rfl: 0    ondansetron (ZOFRAN ODT) 4 MG disintegrating tablet, Take 1 tablet by mouth every 8 hours as needed for Nausea (Patient not taking: Reported on 2021), Disp: 40 tablet, Rfl: 0    Multiple Vitamins-Minerals (THERAPEUTIC MULTIVITAMIN-MINERALS) tablet, Take 1 tablet by mouth 2 times daily, Disp: 60 tablet, Rfl: 2    DULoxetine (CYMBALTA) 60 MG extended release capsule, TAKE 1 CAPSULE BY MOUTH EVERY DAY, Disp: 30 capsule, Rfl: 2    metFORMIN (GLUCOPHAGE-XR) 750 MG extended release tablet, TAKE 2 TABLETS BY MOUTH EVERY MORNING, Disp: 60 tablet, Rfl: 2    fluticasone (FLONASE) 50 MCG/ACT nasal spray, SPRAY 1 SPRAY INTO EACH NOSTRIL EVERY DAY (Patient not taking: Reported on 7/27/2021), Disp: 1 Bottle, Rfl: 1    mupirocin (BACTROBAN) 2 % ointment, APPLY TO AFFECTED AREA 3 TIMES A DAY (Patient not taking: Reported on 7/27/2021), Disp: , Rfl:     Fe Fum-Fe Poly-Vit C-Lactobac (FUSION PO), Take by mouth As ordered by Dr Alex Gonzales (Patient not taking: Reported on 7/27/2021), Disp: , Rfl:     hydrOXYzine (ATARAX) 50 MG tablet, 1 twice daily as needed anxiety.  (Patient not taking: Reported on 7/27/2021), Disp: 30 tablet, Rfl: 1    cimetidine (TAGAMET) 400 MG tablet, Take 1 tablet by mouth 2 times daily (Patient taking differently: Take 400 mg by mouth daily Patient states takes once a day at 7pm), Disp: 180 tablet, Rfl: 1    levonorgestrel-ethinyl estradiol (JOLESSA) 0.15-0.03 MG per tablet, Take 1 tablet by mouth, Disp: , Rfl:     Lab Results   Component Value Date    WBC 10.7 12/24/2020    RBC 4.45 12/24/2020    HGB 12.9 12/24/2020    HCT 37.4 12/24/2020    MCV 84.0 12/24/2020    MCH 29.0 12/24/2020    MCHC 34.6 12/24/2020    MPV 7.7 12/24/2020    NEUTOPHILPCT 64.9 07/21/2020    LYMPHOPCT 26.6 07/21/2020    MONOPCT 6.3 07/21/2020    EOSRELPCT 1.3 07/21/2020    BASOPCT 0.9 07/21/2020    NEUTROABS 6.0 07/21/2020    LYMPHSABS 2.5 07/21/2020    MONOSABS 0.6 07/21/2020    EOSABS 0.1 07/21/2020     Lab Results   Component Value Date     12/24/2020    K 3.1 12/24/2020    K 3.6 02/10/2019     12/24/2020    CO2 23 12/24/2020    ANIONGAP 12 12/24/2020    GLUCOSE 137 12/24/2020    BUN 6 12/24/2020    CREATININE <0.5 12/24/2020    LABGLOM >60 12/24/2020    GFRAA >60 12/24/2020    GFRAA >60 09/07/2012    CALCIUM 8.5 12/24/2020    PROT 7.1 12/17/2020    PROT 6.0 09/07/2012    LABALBU 4.2 12/17/2020    AGRATIO 1.4 12/17/2020    BILITOT <0.2 12/17/2020    ALKPHOS 65 12/17/2020    ALT 49 12/17/2020    AST 58 12/17/2020    GLOB 2.9 12/17/2020     Lab Results   Component Value Date    CHOL 154 07/21/2020    TRIG 246 07/21/2020    HDL 41 07/21/2020    HDL 38 07/20/2011    LDLCALC 64 07/21/2020    LABVLDL 49 07/21/2020     Lab Results   Component Value Date    TSHREFLEX 1.59 07/21/2020     Lab Results   Component Value Date    IRON 71 07/21/2020    TIBC 405 07/21/2020    LABIRON 18 07/21/2020     Lab Results   Component Value Date    LMPPWWGU63 459 05/29/2020    FOLATE >20.00 05/29/2020     Lab Results   Component Value Date    VITD25 33.8 05/29/2020     Lab Results   Component Value Date    LABA1C 5.5 07/27/2021    .9 05/06/2020       Review of Systems   Constitutional: Negative. HENT: Negative. Eyes: Negative. Respiratory: Negative. Cardiovascular: Negative. Gastrointestinal: Negative. Skin: Negative. Neurological: Negative. PHYSICAL EXAMINATION:    Constitutional: [x] Appears well-developed and well-nourished [x] No apparent distress      [] Abnormal-   Mental status  [x] Alert and awake  [x] Oriented to person/place/time [x]Able to follow commands      Eyes:  EOM    [x]  Normal  [] Abnormal-  Sclera  [x]  Normal  [] Abnormal -         Discharge [x]  None visible  [] Abnormal -    HENT:   [x] Normocephalic, atraumatic.   [] Abnormal   [x] Mouth/Throat: Mucous membranes are moist.     External Ears [x] Normal  [] Abnormal-     Neck: [x] No visualized mass     Pulmonary/Chest: [x] Respiratory effort normal.  [x] No visualized signs of difficulty breathing or respiratory distress        [] Abnormal-      Musculoskeletal:   [] Normal gait with no signs of ataxia         [x] Normal range of motion of neck        [] Abnormal-     Neurological:        [x] No Facial Asymmetry (Cranial nerve 7 motor function) (limited exam to video visit)          [x] No gaze palsy        [] Abnormal-         Skin:        [x] No significant exanthematous lesions or discoloration noted on facial skin         [] Abnormal-            Psychiatric:       [x] Normal Affect [x] No Hallucinations        [] Abnormal-     Other pertinent observable physical exam findings-     Due to this being a TeleHealth encounter, evaluation of the following organ systems is limited: Vitals/Constitutional/EENT/Resp/CV/GI//MS/Neuro/Skin/Heme-Lymph-Imm. Assessment and Plan:   Patient is here via telemedicine and is 8 months s/p sleeve gastrectomy, down 15lbs with a total weight loss of 79lbs. She is doing well, denies n/v/dysphagia or reflux. She is tolerating diet, getting adequate fluids. She is sometimes low on protein, given suggestions for protein based snacks ideas. She is exercising with walking 3 miles per day. We did discuss importance of following the 30-30-30 rule. Encouraged continued physical activity. She is taking vitamins as instructed. She did speak with the registered dietitian for continued follow up. I agree with recommendations and plan. Labs were ordered at this visit. Patient understands it is their responsibility to have these completed and to obtain results from our office. she gives me permission to leave test results on her voicemail. We will see her back in 2 months for continued follow up or via telemedicine depending on COVID-19 restrictions at the time of their next appointment. Essential Hypertension:  [x] Continue to make dietary and lifestyle modifications per our recommendations. [] Reviewed the importance of checking blood pressure. [x] Continue to follow up with their PCP for medication management and monitoring. Diabetes Mellitus Type II in Obese:   [x] Continue to make dietary and lifestyle modifications per our recommendations. [] Reviewed the importance of checking blood sugars.   [x] Continue to follow up with their PCP and/or Endocrinologist for medication management and monitoring. Obstructive Sleep Apnea:   [x] Continue to make dietary and lifestyle modifications per our recommendations. [] Reviewed the importance of wearing your CPAP/BIPAP. [] Continue to follow up with their sleep medicine provider for CPAP/BIPAP management and monitoring. Chronic GERD:   [x] Continue to make dietary and lifestyle modifications per our recommendations. [] Continue PPI. [] Continue H2 Blocker  [] Wean PPI. Take every other day for two weeks. If no issues with heartburn/reflux you may decrease to every third day for two weeks. If no issues with heartburn/reflux you may stop the Prilosec. Recommend that you get OTC Pepcid to take should you have occasional heartburn/reflux. Obesity:  [x] Continue to make dietary and lifestyle modifications per our recommendations. This visit included any number of the following: Bariatric Pre/Post operative work up/protocols, review of labs, imaging, provider notes, outside hospital records, performing examination/evaluation, counseling patient and/or family, ordering medications/tests, placing referrals and communication with referring physicians, coordination of care; discussing dietary plan/recall with the patient as well with registered dietitian and documentation in the EHR. Of note, the above was done during same day of the actual patient encounter. An electronic signature was used to authenticate this note. Pursuant to the emergency declaration under the [address]5 waiver authority and the [employer] Act, this Virtual  Visit was conducted, with patient's consent, to reduce the patient's risk of exposure to COVID-19 and provide continuity of care for an established patient. Services were provided through a video synchronous discussion virtually to substitute for in-person clinic visit.

## 2021-09-01 NOTE — PROGRESS NOTES
Dietary Assessment Note      Vitals: There were no vitals filed for this visit. Patient lost 15 lbs over past 4 months. Total Weight Loss: 79 lbs    Labs reviewed: No new nutrition related labs     Protein intake: ~ 50-60 grams per day      Fluid intake: 64 oz/day    Multivitamin/mineral intake: Prescription therams MVI - takes 2     Calcium intake: Calcium citrate + Vit D - takes 2     Other: biotin    Exercise: Walking 3 miles per day - also interested in starting yoga     Nutrition Assessment: 8 mo s/p sleeve post-op visit. She is no longer tracking her intakes just being more mindful. Reports her family is limited with finances currently and she is not always able to purchase the foods she knows she needs.      Breakfast: Nectar shake made with water     Snack: nothing     Lunch: leftovers - chicken OR apple     Snack: nothing     Dinner: chicken/fish/beef with broccoli/peas/green beans/corn with mashed potato/pasta (takes smaller portions)     Snack: *Struggles here - snacks on nutrigrain bar OR apple OR chips     Fluids: powerade zero, 1 cup coffee with half and half and splenda     Amount able to eat per sitting: Pt not currently measuring - 2-3 oz chicken + 1/2 cup of veggies and 1/4 cup or less of starch     Following 30/30/30 rule: Not following     Food Intolerances/issues: none     Client Concerns: none     Goals:   Get back to tracking intakes   Focus on protein - goal of at least 60 grams per day - discussed inexpensive sources of protein (eggs, beans, etc.)   Follow 30-30-30 rule     Plan: F/U per Provider     Renan Dixon RD, LD

## 2021-09-01 NOTE — PATIENT INSTRUCTIONS
Diet tips to help make you successful postoperatively  Eating habits after surgery will have to be a permanent and long-term change. Eating habits are so ingrained that it can be difficult to change. It is important to maintain these new eating habits after surgery. Also remember that overall health, age, and genetics make each persons weight loss progress different. Do not compare your progress, the amount you eat, or exercise to other patients.  Protein first at every meal- Eat the protein portion of your meal first. Eating protein helps the body feel full and sends a signal to stop eating. Protein is very important in building tissue in the body.  Eat at least 4 times per day- This includes protein supplements and small meals with a high amount of protein   Chewing your food thoroughly- Eating too quickly and improper chewing can cause pain and vomiting after surgery.  Slowing down the speed at which you eat- Refill your fork only after you swallow. Adopt a new pattern of eating by taking a bite of food and putting your utensil down between bites. This will help to reduce the feeling of food being stuck.    Drink water and start drinking fluids slowly- Drink at least 48 ounces per day minimum. Sip fluids as if they were hot beverages. If you find it difficult to stop gulping liquids, try using a sippy cup or a sport top water bottle.  Make sure you are eating meals without drinking fluids- After surgery you will not be allowed to drink fluids 30 minutes before, during, or 30 minutes after your meal (30/30/30 rule). This will be a life-long behavior change. The reason for the rule is to keep food from passing through your smaller stomach more rapidly. This will cause you to feel hungry shortly after your meal.   Continue to avoid caffeine and carbonated beverages- Caffeine acts as a diuretic and can be dehydrating as well as irritating to the lining of the stomach.  Carbonated beverages release gas and can expand the stomach.  Continue to keep temptation from your kitchen- Keep your pantry and kitchen cabinets cleaned out of those dangerous foods that might tempt you after surgery (chips, cookies, candy, etc.).  Continue to increase your exercise program- Increase your daily physical activity. Aim for 5-6 days per week for 30 minutes. Walking is an easy way to get started with exercising. Exercise is going to be a regular part of your life after surgery.  Make sure you have a good support system- There will be many changes and adjustments to make after surgery. It is important to have a supportive friend, family member or co-worker, etc. with whom you can talk. Continue to attend [facility]) Weight Management support groups as they can be helpful in maintaining behaviors. In addition, it is the responsibility of the patient to schedule and follow up on labs and tests completed after surgery. Results will be reviewed at each visit. Patient received dietary handouts and education.

## 2021-09-07 DIAGNOSIS — R53.83 FATIGUE, UNSPECIFIED TYPE: Primary | ICD-10-CM

## 2021-09-14 ENCOUNTER — TELEPHONE (OUTPATIENT)
Dept: BARIATRICS/WEIGHT MGMT | Age: 41
End: 2021-09-14

## 2021-09-14 NOTE — TELEPHONE ENCOUNTER
Spoke with patient about her lab results. Discussed that her total protein is low. She was low on daily protein intake at last visit. We discussed importance of meeting protein goal of 60-80g a day. All other labs reviewed. Patient verbalized understanding. No further questions.

## 2021-09-29 RX ORDER — LOSARTAN POTASSIUM AND HYDROCHLOROTHIAZIDE 25; 100 MG/1; MG/1
TABLET ORAL
Qty: 90 TABLET | Refills: 0 | Status: SHIPPED | OUTPATIENT
Start: 2021-09-29 | End: 2022-01-03 | Stop reason: SDUPTHER

## 2021-09-29 NOTE — TELEPHONE ENCOUNTER
Refill Request     Last Seen: Last Seen Department: 7/27/2021  Last Seen by PCP: 7/27/2021    Last Written: 6/29/21 90 tablet 0 refill     Next Appointment: 11/3/21     Future Appointments   Date Time Provider Clifford Perez   11/3/2021 10:00 AM DIANA Tran - CNP [facility]   11/29/2021  9:00 AM DO JAN Rodriguez [place] - [facility]       Future appointment scheduled      Requested Prescriptions     Pending Prescriptions Disp Refills    losartan-hydroCHLOROthiazide (HYZAAR) 100-25 MG per tablet [Pharmacy Med Name: Losartan Potassium/Hydrochlorothiazide 100mg-25mg Tablet] 90 tablet 0     Sig: Take 1 tablet by mouth daily for high blood pressure.

## 2021-10-04 RX ORDER — FLUTICASONE PROPIONATE 50 MCG
SPRAY, SUSPENSION (ML) NASAL
Qty: 3 EACH | Refills: 1 | Status: SHIPPED | OUTPATIENT
Start: 2021-10-04 | End: 2022-07-29

## 2021-10-04 NOTE — TELEPHONE ENCOUNTER
Last office visit 7/27/2021     Last written 1- x 1 refill    Next office visit scheduled 11/29/2021    Requested Prescriptions     Pending Prescriptions Disp Refills    fluticasone (FLONASE) 50 MCG/ACT nasal spray 3 each 1     Sig: Spray 1 spray into Each Nostril Every Day

## 2021-11-01 RX ORDER — DULOXETIN HYDROCHLORIDE 60 MG/1
CAPSULE, DELAYED RELEASE ORAL
Qty: 90 CAPSULE | Refills: 0 | Status: SHIPPED | OUTPATIENT
Start: 2021-11-01 | End: 2022-01-31

## 2021-11-01 NOTE — TELEPHONE ENCOUNTER
Refill Request     Last Seen: Last Seen Department: 7/27/2021  Last Seen by PCP: 7/27/2021    Last Written: 3/26/21 #30 x 2    Next Appointment:   Future Appointments   Date Time Provider Clifford Perez   11/3/2021 10:00 AM DIANA De La Rosa - CNP [facility]           Requested Prescriptions     Pending Prescriptions Disp Refills    DULoxetine (CYMBALTA) 60 MG extended release capsule [Pharmacy Med Name: Duloxetine 60mg Delayed-Release Capsule] 90 capsule 0     Sig: Take 1 capsule by mouth every day.

## 2021-11-03 ENCOUNTER — VIRTUAL VISIT (OUTPATIENT)
Dept: BARIATRICS/WEIGHT MGMT | Age: 41
End: 2021-11-03
Payer: COMMERCIAL

## 2021-11-03 DIAGNOSIS — I10 ESSENTIAL HYPERTENSION: ICD-10-CM

## 2021-11-03 DIAGNOSIS — E11.69 DIABETES MELLITUS TYPE 2 IN OBESE (HCC): ICD-10-CM

## 2021-11-03 DIAGNOSIS — G47.33 OBSTRUCTIVE SLEEP APNEA: ICD-10-CM

## 2021-11-03 DIAGNOSIS — Z98.84 S/P LAPAROSCOPIC SLEEVE GASTRECTOMY: Primary | ICD-10-CM

## 2021-11-03 DIAGNOSIS — E66.9 DIABETES MELLITUS TYPE 2 IN OBESE (HCC): ICD-10-CM

## 2021-11-03 DIAGNOSIS — E28.2 PCOS (POLYCYSTIC OVARIAN SYNDROME): ICD-10-CM

## 2021-11-03 DIAGNOSIS — K21.9 CHRONIC GERD: ICD-10-CM

## 2021-11-03 DIAGNOSIS — E66.9 OBESITY (BMI 30-39.9): ICD-10-CM

## 2021-11-03 PROCEDURE — 3044F HG A1C LEVEL LT 7.0%: CPT | Performed by: NURSE PRACTITIONER

## 2021-11-03 PROCEDURE — 2022F DILAT RTA XM EVC RTNOPTHY: CPT | Performed by: NURSE PRACTITIONER

## 2021-11-03 PROCEDURE — 99214 OFFICE O/P EST MOD 30 MIN: CPT | Performed by: NURSE PRACTITIONER

## 2021-11-03 PROCEDURE — G8427 DOCREV CUR MEDS BY ELIG CLIN: HCPCS | Performed by: NURSE PRACTITIONER

## 2021-11-03 ASSESSMENT — ENCOUNTER SYMPTOMS
GASTROINTESTINAL NEGATIVE: 1
EYES NEGATIVE: 1
RESPIRATORY NEGATIVE: 1

## 2021-11-03 NOTE — PATIENT INSTRUCTIONS
Patient received dietary handouts and education. Diet tips to help make you successful postoperatively  Eating habits after surgery will have to be a permanent and long-term change. Eating habits are so ingrained that it can be difficult to change. It is important to maintain these new eating habits after surgery. Also remember that overall health, age, and genetics make each persons weight loss progress different. Do not compare your progress, the amount you eat, or exercise to other patients.  Protein first at every meal- Eat the protein portion of your meal first. Eating protein helps the body feel full and sends a signal to stop eating. Protein is very important in building tissue in the body.  Eat at least 4 times per day- This includes protein supplements and small meals with a high amount of protein   Chewing your food thoroughly- Eating too quickly and improper chewing can cause pain and vomiting after surgery.  Slowing down the speed at which you eat- Refill your fork only after you swallow. Adopt a new pattern of eating by taking a bite of food and putting your utensil down between bites. This will help to reduce the feeling of food being stuck.    Drink water and start drinking fluids slowly- Drink at least 48 ounces per day minimum. Sip fluids as if they were hot beverages. If you find it difficult to stop gulping liquids, try using a sippy cup or a sport top water bottle.  Make sure you are eating meals without drinking fluids- After surgery you will not be allowed to drink fluids 30 minutes before, during, or 30 minutes after your meal (30/30/30 rule). This will be a life-long behavior change. The reason for the rule is to keep food from passing through your smaller stomach more rapidly.  This will cause you to feel hungry shortly after your meal.   Continue to avoid caffeine and carbonated beverages- Caffeine acts as a diuretic and can be dehydrating as well as irritating to the lining of the stomach. Carbonated beverages release gas and can expand the stomach.  Continue to keep temptation from your kitchen- Keep your pantry and kitchen cabinets cleaned out of those dangerous foods that might tempt you after surgery (chips, cookies, candy, etc.).  Continue to increase your exercise program- Increase your daily physical activity. Aim for 5-6 days per week for 30 minutes. Walking is an easy way to get started with exercising. Exercise is going to be a regular part of your life after surgery.  Make sure you have a good support system- There will be many changes and adjustments to make after surgery. It is important to have a supportive friend, family member or co-worker, etc. with whom you can talk. Continue to attend [facility]) Weight Management support groups as they can be helpful in maintaining behaviors. In addition, it is the responsibility of the patient to schedule and follow up on labs and tests completed after surgery. Results will be reviewed at each visit.

## 2021-11-03 NOTE — PROGRESS NOTES
Dietary Assessment Note      Vitals: There were no vitals filed for this visit. Patient lost 7 lbs over past 2 months. Total Weight Loss: 86 lbs    Labs reviewed:     Results for Sowmya Tejeda (MRN [id number]) as of 11/3/2021 10:10   Ref. Range 9/8/2021 08:36   Sodium Latest Ref Range: 136 - 145 mmol/L 140   Potassium Latest Ref Range: 3.5 - 5.1 mmol/L 4.3   Chloride Latest Ref Range: 99 - 110 mmol/L 101   CO2 Latest Ref Range: 21 - 32 mmol/L 28   BUN Latest Ref Range: 7 - 20 mg/dL 9   Creatinine Latest Ref Range: 0.6 - 1.1 mg/dL 0.5 (L)   Anion Gap Latest Ref Range: 3 - 16  11   GFR Non- Latest Ref Range: >60  >60   GFR  Latest Ref Range: >60  >60   Glucose Latest Ref Range: 70 - 99 mg/dL 91   Calcium Latest Ref Range: 8.3 - 10.6 mg/dL 9.4   Total Protein Latest Ref Range: 6.4 - 8.2 g/dL 6.3 (L)   Cholesterol, Total Latest Ref Range: 0 - 199 mg/dL 159   HDL Cholesterol Latest Ref Range: 40 - 60 mg/dL 56   LDL Calculated Latest Ref Range: <100 mg/dL 79   Triglycerides Latest Ref Range: 0 - 150 mg/dL 122   VLDL Cholesterol Calculated Latest Ref Range: Not Established mg/dL 24   Results for Sowmya Tejeda (MRN [id number]) as of 11/3/2021 10:10   Ref. Range 9/8/2021 08:36   Hemoglobin A1C Latest Ref Range: See comment % 5.4     Results for Sowmya Tejeda (MRN [id number]) as of 11/3/2021 10:10   Ref. Range 9/8/2021 08:36   Alpha-Tocopherol Latest Ref Range: 5.5 - 18.0 mg/L 10.2   RETINYL PALMITATE Latest Ref Range: 0.00 - 0.10 mg/L 0.03   Vit D, 25-Hydroxy Latest Ref Range: >=30 ng/mL 42.9   Vitamin A Latest Ref Range: 0.30 - 1.20 mg/L 0.85   Vitamin A, Interp Unknown Normal   Results for Sowmya Tejeda (MRN [id number]) as of 11/3/2021 10:10   Ref.  Range 9/8/2021 08:36   Iron Latest Ref Range: 37 - 145 ug/dL 77   Iron Saturation Latest Ref Range: 15 - 50 % 19   TIBC Latest Ref Range: 260 - 445 ug/dL 395   Vitamin B1,Whole Blood Latest Ref Range: 70 - 180 nmol/L 188 (H) Folate Latest Ref Range: 4.78 - 24.20 ng/mL >20.00   Vitamin B-12 Latest Ref Range: 211 - 911 pg/mL 1467 (H)     Protein intake: 60-80 grams/day     Fluid intake: 64 oz     Multivitamin/mineral intake: Prescription therams MVI - takes 2     Calcium intake: Calcium citrate + Vit D     Other: biotin     Exercise: Not exercising as much since going on vacation    Nutrition Assessment: 10 month s/p sleeve post-op visit. On vacation and had unhealthier snacks, feeling more hungry, not tracking. Feels she struggles with depression and bored eating in the winter.      Breakfast: premier protein shake and coffee    Snack: nothing     Lunch: leftovers     Snack: nothing     Dinner: pork chop with noodles and peas     Snack: *snacking rest of the night - grapes OR cookie OR individual bag of chips     Fluids: powerade zero, coffee with half and half and splenda     Amount able to eat per sitting: Pt not measuring     Following 30/30/30 rule: Struggling to drink separately as food gets stuck in throat and needs to take small sips, but waits before drinking a larger volume     Food Intolerances/issues: None     Client Concerns: off track     Goals:   Shoot for 15K steps per day / interested in starting HP  Track intakes  Start 1200 kcal post op meal plan - handout reviewed and emailed to pt   Focus on protein based snacks at HS/watching portions of other foods   Pt to see behaviorist per Provider     Plan: F/Uper Provider     Jian Pineda, MAGGY, LD

## 2021-11-03 NOTE — PROGRESS NOTES
[facility]) Phys[facility]   [facility]    11/3/2021    TELEHEALTH EVALUATION -- Audio/Visual (During TFWQG-15 public health emergency)    Subjective:      Patient ID: Lynda Sicard is a 37 y.o. female    HPI    5 months s/p sleeve gastrectomy    Lynda Sicard is a 37 y.o. female , current BMI of 31.3, current weight of 197 pounds. Due to the COVID-19 restrictions on close contact interactions the patient's visit was conducted via audio/video in carmenza of a face to face visit. Patient has consented to have this visit conducted via audio/video. The patient is here through telemedicine for their post op bariatric surgery visit. Patient denies any nausea, vomiting, fevers, chills, shortness of breath, chest pain, constipation or urinary symptoms. Denies any heartburn nor dysphagia. She feels she is starting to get off track with intakes- had recent vacation, struggles more with depression in the winter. Her weight goal is 175 pounds.     Past Medical History:   Diagnosis Date    Anxiety     Arthritis     Cervical pain     Chronic back pain     Depression     Diabetes mellitus (HCC)     GERD (gastroesophageal reflux disease)     Hyperlipidemia     Hypertension     Infertility     took fertility medication    Kidney stone 03/2012    Migraine     Miscarriage     Polycystic ovarian syndrome 1999    PONV (postoperative nausea and vomiting)     Psoriasis     Stomach ulcer     Unspecified sleep apnea     cpap     Past Surgical History:   Procedure Laterality Date    BREAST SURGERY Right     for cellulitis     CHOLECYSTECTOMY  2011    [facility]    COLONOSCOPY N/A 6/18/2020    COLONOSCOPY WITH BIOPSY performed by Alex Silva MD at [place] [address] N/A 6/18/2020    COLONOSCOPY POLYPECTOMY SNARE performed by Alex Silva MD at [address] [place] [address] C/missed ab    SKIN BIOPSY      for psoriasis    SLEEVE GASTRECTOMY N/A 12/23/2020    LAPAROSCOPIC SLEEVE GASTRECTOMY -  ETHICON performed by Kelby Sanchez MD at [address]  08/09/2016    tonsillectomy    UPPER GASTROINTESTINAL ENDOSCOPY  3/23/11    pyloretic    UPPER GASTROINTESTINAL ENDOSCOPY  10/7/2015    [address]ast[address]    UPPER GASTROINTESTINAL ENDOSCOPY N/A 6/18/2020    EGD DIAGNOSTIC ONLY performed by Twyla Galindo MD at [address] N/A 8/28/2020    EGD BIOPSY performed by Kelby Sanchez MD at [facility] History   Problem Relation Age of Onset    Arthritis Mother     Miscarriages / [place] Mother     High Blood Pressure Mother     Depression Mother     High Cholesterol Mother     Arthritis Father     High Blood Pressure Father     Cancer Father         colon    Hearing Loss Father     Heart Disease Father     High Cholesterol Father     High Blood Pressure Brother     High Cholesterol Brother     High Blood Pressure Maternal Aunt     High Cholesterol Maternal Aunt     Miscarriages / Stillbirths Maternal Aunt     Mental Retardation Maternal Uncle     High Blood Pressure Maternal Uncle     High Cholesterol Maternal Uncle     High Blood Pressure Paternal Aunt     High Cholesterol Paternal Aunt     Arthritis Paternal Uncle     Mental Illness Paternal Uncle     High Blood Pressure Paternal Uncle     Birth Defects Paternal Uncle     High Cholesterol Paternal Uncle     Learning Disabilities Paternal Uncle     Arthritis Maternal Grandmother     High Blood Pressure Maternal Grandmother     Diabetes Maternal Grandmother     High Cholesterol Maternal Grandmother     Miscarriages / Stillbirths Maternal Grandmother     Arthritis Maternal Grandfather     High Blood Pressure Maternal Grandfather     High Cholesterol Maternal Grandfather     Arthritis Paternal Grandmother     High Blood Pressure Paternal Grandmother     Heart Disease Paternal Grandmother     High Cholesterol Paternal Grandmother    [de-identified] / Stillbirths Paternal Grandmother     Stroke Paternal Grandmother     High Blood Pressure Paternal Grandfather     High Cholesterol Paternal Grandfather      Social History     Tobacco Use    Smoking status: Former Smoker     Packs/day: 1.00     Years: 20.00     Pack years: 20.00     Types: Cigarettes     Quit date: 2019     Years since quittin.5    Smokeless tobacco: Never Used   Substance Use Topics    Alcohol use: Not Currently     Alcohol/week: 0.0 standard drinks     I counseled the patient on the importance of not smoking and risks of ETOH. Allergies   Allergen Reactions    Tramadol Other (See Comments)     severe  SEVERE HEADACHE    Lisinopril Other (See Comments)     COUGH     There were no vitals filed for this visit. There is no height or weight on file to calculate BMI. Current Outpatient Medications:     DULoxetine (CYMBALTA) 60 MG extended release capsule, Take 1 capsule by mouth every day., Disp: 90 capsule, Rfl: 0    fluticasone (FLONASE) 50 MCG/ACT nasal spray, Spray 1 spray into Each Nostril Every Day, Disp: 3 each, Rfl: 1    losartan-hydroCHLOROthiazide (HYZAAR) 100-25 MG per tablet, Take 1 tablet by mouth daily for high blood pressure., Disp: 90 tablet, Rfl: 0    Calcium Citrate-Vitamin D 200-250 MG-UNIT TABS, Take 1 tablet by mouth twice daily. , Disp: 60 tablet, Rfl: 0    terbinafine (LAMISIL) 250 MG tablet, Take 250 mg by mouth daily, Disp: , Rfl:     Multiple Vitamins-Minerals (THERAPEUTIC MULTIVITAMIN-MINERALS W/ IRON) TABS tablet, Take 1 tablet by mouth daily, Disp: , Rfl:     blood glucose monitor kit and supplies, Dispense sufficient amount for indicated testing frequency plus additional to accommodate PRN testing needs.  Dispense all needed supplies to include: monitor, strips, lancing device, lancets, control solutions, alcohol swabs., Disp: 1 kit, Rfl: 0    ondansetron (ZOFRAN ODT) 4 MG disintegrating tablet, Take 1 tablet by mouth every 8 hours as needed for Nausea, Disp: 40 tablet, Rfl: 0    Multiple Vitamins-Minerals (THERAPEUTIC MULTIVITAMIN-MINERALS) tablet, Take 1 tablet by mouth 2 times daily, Disp: 60 tablet, Rfl: 2    metFORMIN (GLUCOPHAGE-XR) 750 MG extended release tablet, TAKE 2 TABLETS BY MOUTH EVERY MORNING, Disp: 60 tablet, Rfl: 2    mupirocin (BACTROBAN) 2 % ointment, APPLY TO AFFECTED AREA 3 TIMES A DAY, Disp: , Rfl:     Fe Fum-Fe Poly-Vit C-Lactobac (FUSION PO), Take by mouth As ordered by Dr Jose Goodman , Disp: , Rfl:     hydrOXYzine (ATARAX) 50 MG tablet, 1 twice daily as needed anxiety. , Disp: 30 tablet, Rfl: 1    cimetidine (TAGAMET) 400 MG tablet, Take 1 tablet by mouth 2 times daily (Patient taking differently: Take 400 mg by mouth daily Patient states takes once a day at 7pm), Disp: 180 tablet, Rfl: 1    levonorgestrel-ethinyl estradiol (JOLESSA) 0.15-0.03 MG per tablet, Take 1 tablet by mouth, Disp: , Rfl:     Lab Results   Component Value Date    WBC 4.3 09/08/2021    RBC 4.61 09/08/2021    HGB 13.9 09/08/2021    HCT 40.2 09/08/2021    MCV 87.3 09/08/2021    MCH 30.1 09/08/2021    MCHC 34.5 09/08/2021    MPV 8.1 09/08/2021    NEUTOPHILPCT 48.2 09/08/2021    LYMPHOPCT 36.1 09/08/2021    MONOPCT 10.0 09/08/2021    EOSRELPCT 3.8 09/08/2021    BASOPCT 1.9 09/08/2021    NEUTROABS 2.1 09/08/2021    LYMPHSABS 1.5 09/08/2021    MONOSABS 0.4 09/08/2021    EOSABS 0.2 09/08/2021     Lab Results   Component Value Date     09/08/2021    K 4.3 09/08/2021    K 3.6 02/10/2019     09/08/2021    CO2 28 09/08/2021    ANIONGAP 11 09/08/2021    GLUCOSE 91 09/08/2021    BUN 9 09/08/2021    CREATININE 0.5 09/08/2021    LABGLOM >60 09/08/2021    GFRAA >60 09/08/2021    GFRAA >60 09/07/2012    CALCIUM 9.4 09/08/2021    PROT 6.3 09/08/2021    PROT 6.0 09/07/2012    LABALBU 4.2 09/08/2021    AGRATIO 2.0 09/08/2021 BILITOT 0.3 09/08/2021    ALKPHOS 70 09/08/2021    ALT 17 09/08/2021    AST 17 09/08/2021    GLOB 2.1 09/08/2021     Lab Results   Component Value Date    CHOL 159 09/08/2021    TRIG 122 09/08/2021    HDL 56 09/08/2021    HDL 38 07/20/2011    LDLCALC 79 09/08/2021    LABVLDL 24 09/08/2021     Lab Results   Component Value Date    TSHREFLEX 1.13 09/08/2021     Lab Results   Component Value Date    IRON 77 09/08/2021    TIBC 395 09/08/2021    LABIRON 19 09/08/2021     Lab Results   Component Value Date    SIMOYWQD80 0467 09/08/2021    FOLATE >20.00 09/08/2021     Lab Results   Component Value Date    VITD25 42.9 09/08/2021     Lab Results   Component Value Date    LABA1C 5.4 09/08/2021    .3 09/08/2021       Review of Systems   Constitutional: Negative. HENT: Negative. Eyes: Negative. Respiratory: Negative. Cardiovascular: Negative. Gastrointestinal: Negative. Skin: Negative. Neurological: Negative. PHYSICAL EXAMINATION:    Constitutional: [x] Appears well-developed and well-nourished [x] No apparent distress      [] Abnormal-   Mental status  [x] Alert and awake  [x] Oriented to person/place/time [x]Able to follow commands      Eyes:  EOM    [x]  Normal  [] Abnormal-  Sclera  [x]  Normal  [] Abnormal -         Discharge [x]  None visible  [] Abnormal -    HENT:   [x] Normocephalic, atraumatic.   [] Abnormal   [x] Mouth/Throat: Mucous membranes are moist.     External Ears [x] Normal  [] Abnormal-     Neck: [x] No visualized mass     Pulmonary/Chest: [x] Respiratory effort normal.  [x] No visualized signs of difficulty breathing or respiratory distress        [] Abnormal-      Musculoskeletal:   [] Normal gait with no signs of ataxia         [x] Normal range of motion of neck        [] Abnormal-     Neurological:        [x] No Facial Asymmetry (Cranial nerve 7 motor function) (limited exam to video visit)          [x] No gaze palsy        [] Abnormal-         Skin:        [x] No significant exanthematous lesions or discoloration noted on facial skin         [] Abnormal-            Psychiatric:       [x] Normal Affect [x] No Hallucinations        [] Abnormal-     Other pertinent observable physical exam findings-     Due to this being a TeleHealth encounter, evaluation of the following organ systems is limited: Vitals/Constitutional/EENT/Resp/CV/GI//MS/Neuro/Skin/Heme-Lymph-Imm. Assessment and Plan:   Patient is here via telemedicine and is 10 months s/p sleeve gastrectomy, down 7lbs with a total weight loss of 86lbs. She is doing well, denies n/v/dysphagia or reflux. She is tolerating diet, getting adequate fluids and protein. We will provide her with the 1200 calorie low carb meal plan to use as a guide. I recommended she track her intakes. She is exercising, discussed getting back to consistent walking. Encouraged continued physical activity. She is taking vitamins as instructed. She did speak with the registered dietitian for continued follow up. I agree with recommendations and plan. She is open to speaking with our behaviorist to discuss eating patterns/habits. I also encouraged her to reach out to her PCP to discus her mood. We will see her back in 2 months for continued follow up or via telemedicine depending on COVID-19 restrictions at the time of their next appointment. Essential Hypertension:  [x] Continue to make dietary and lifestyle modifications per our recommendations. [] Reviewed the importance of checking blood pressure. [] Continue to follow up with their PCP for medication management and monitoring. Diabetes Mellitus Type II in Obese:   [x] Continue to make dietary and lifestyle modifications per our recommendations. [] Reviewed the importance of checking blood sugars. [] Continue to follow up with their PCP and/or Endocrinologist for medication management and monitoring.       Obstructive Sleep Apnea:   [x] Continue to make dietary and lifestyle modifications per our recommendations. [] Reviewed the importance of wearing your CPAP/BIPAP. [] Continue to follow up with their sleep medicine provider for CPAP/BIPAP management and monitoring. Chronic GERD:   [x] Continue to make dietary and lifestyle modifications per our recommendations. [] Continue PPI. [] Continue H2 Blocker  [] Wean PPI. Take every other day for two weeks. If no issues with heartburn/reflux you may decrease to every third day for two weeks. If no issues with heartburn/reflux you may stop the Prilosec. Recommend that you get OTC Pepcid to take should you have occasional heartburn/reflux. Obesity:  [x] Continue to make dietary and lifestyle modifications per our recommendations. Total encounter time: 30 minutes, including any number of the following: Bariatric Pre/Post operative work up/protocols, review of labs, imaging, provider notes, outside hospital records, performing examination/evaluation, counseling patient and/or family, ordering medications/tests, placing referrals and communication with referring physicians, coordination of care; discussing dietary plan/recall with the patient as well with registered dietitian and documentation in the EHR. Of note, the above was done during same day of the actual patient encounter. An electronic signature was used to authenticate this note. Pursuant to the emergency declaration under the [facility], 1135 waiver authority and the Payment [employer] and [employer] Act, this Virtual  Visit was conducted, with patient's consent, to reduce the patient's risk of exposure to COVID-19 and provide continuity of care for an established patient. Services were provided through a video synchronous discussion virtually to substitute for in-person clinic visit.

## 2021-11-09 VITALS — HEIGHT: 67 IN | BODY MASS INDEX: 30.92 KG/M2 | WEIGHT: 197 LBS

## 2021-12-09 ENCOUNTER — OFFICE VISIT (OUTPATIENT)
Dept: FAMILY MEDICINE CLINIC | Age: 41
End: 2021-12-09
Payer: COMMERCIAL

## 2021-12-09 ENCOUNTER — HOSPITAL ENCOUNTER (OUTPATIENT)
Dept: GENERAL RADIOLOGY | Age: 41
Discharge: HOME | End: 2021-12-09
Payer: COMMERCIAL

## 2021-12-09 VITALS
WEIGHT: 205 LBS | OXYGEN SATURATION: 98 % | DIASTOLIC BLOOD PRESSURE: 86 MMHG | HEART RATE: 86 BPM | BODY MASS INDEX: 32.59 KG/M2 | SYSTOLIC BLOOD PRESSURE: 130 MMHG

## 2021-12-09 DIAGNOSIS — S69.92XA INJURY OF LEFT HAND, INITIAL ENCOUNTER: ICD-10-CM

## 2021-12-09 DIAGNOSIS — S69.92XA INJURY OF LEFT HAND, INITIAL ENCOUNTER: Primary | ICD-10-CM

## 2021-12-09 DIAGNOSIS — L03.313 CELLULITIS OF CHEST WALL: ICD-10-CM

## 2021-12-09 PROCEDURE — 73130 X-RAY EXAM OF HAND: CPT

## 2021-12-09 PROCEDURE — G8427 DOCREV CUR MEDS BY ELIG CLIN: HCPCS | Performed by: PHYSICIAN ASSISTANT

## 2021-12-09 PROCEDURE — G8417 CALC BMI ABV UP PARAM F/U: HCPCS | Performed by: PHYSICIAN ASSISTANT

## 2021-12-09 PROCEDURE — 99214 OFFICE O/P EST MOD 30 MIN: CPT | Performed by: PHYSICIAN ASSISTANT

## 2021-12-09 PROCEDURE — G8484 FLU IMMUNIZE NO ADMIN: HCPCS | Performed by: PHYSICIAN ASSISTANT

## 2021-12-09 PROCEDURE — 1036F TOBACCO NON-USER: CPT | Performed by: PHYSICIAN ASSISTANT

## 2021-12-09 RX ORDER — CLINDAMYCIN HYDROCHLORIDE 300 MG/1
300 CAPSULE ORAL 3 TIMES DAILY
Qty: 21 CAPSULE | Refills: 0 | Status: SHIPPED | OUTPATIENT
Start: 2021-12-09 | End: 2021-12-16

## 2021-12-09 SDOH — ECONOMIC STABILITY: INCOME INSECURITY: IN THE LAST 12 MONTHS, WAS THERE A TIME WHEN YOU WERE NOT ABLE TO PAY THE MORTGAGE OR RENT ON TIME?: NO

## 2021-12-09 SDOH — ECONOMIC STABILITY: TRANSPORTATION INSECURITY
IN THE PAST 12 MONTHS, HAS THE LACK OF TRANSPORTATION KEPT YOU FROM MEDICAL APPOINTMENTS OR FROM GETTING MEDICATIONS?: NO

## 2021-12-09 SDOH — ECONOMIC STABILITY: TRANSPORTATION INSECURITY
IN THE PAST 12 MONTHS, HAS LACK OF TRANSPORTATION KEPT YOU FROM MEETINGS, WORK, OR FROM GETTING THINGS NEEDED FOR DAILY LIVING?: NO

## 2021-12-09 SDOH — ECONOMIC STABILITY: FOOD INSECURITY: WITHIN THE PAST 12 MONTHS, THE FOOD YOU BOUGHT JUST DIDN'T LAST AND YOU DIDN'T HAVE MONEY TO GET MORE.: NEVER TRUE

## 2021-12-09 SDOH — ECONOMIC STABILITY: FOOD INSECURITY: WITHIN THE PAST 12 MONTHS, YOU WORRIED THAT YOUR FOOD WOULD RUN OUT BEFORE YOU GOT MONEY TO BUY MORE.: NEVER TRUE

## 2021-12-09 SDOH — ECONOMIC STABILITY: HOUSING INSECURITY: IN THE LAST 12 MONTHS, HOW MANY PLACES HAVE YOU LIVED?: 1

## 2021-12-09 SDOH — ECONOMIC STABILITY: HOUSING INSECURITY
IN THE LAST 12 MONTHS, WAS THERE A TIME WHEN YOU DID NOT HAVE A STEADY PLACE TO SLEEP OR SLEPT IN A SHELTER (INCLUDING NOW)?: NO

## 2021-12-09 ASSESSMENT — ENCOUNTER SYMPTOMS: COLOR CHANGE: 1

## 2021-12-09 ASSESSMENT — SOCIAL DETERMINANTS OF HEALTH (SDOH): HOW HARD IS IT FOR YOU TO PAY FOR THE VERY BASICS LIKE FOOD, HOUSING, MEDICAL CARE, AND HEATING?: NOT HARD AT ALL

## 2021-12-09 NOTE — PROGRESS NOTES
Marie Horowitz (:  1980) is a 37 y.o. female,Established patient, here for evaluation of the following chief complaint(s): Other (infection under right breast, went to urgent care, abx did help a little but it's coming back stronger ) and Hand Pain (left, got a round of steroid back in September, but still having pain, Wants possible x ray )         ASSESSMENT/PLAN:  1. Injury of left hand, initial encounter  -     XR HAND RIGHT (MIN 3 VIEWS); Future  -     Consider referral to ortho based on imaging when reviewed  2. Cellulitis of chest wall  -     clindamycin (CLEOCIN) 300 MG capsule; Take 1 capsule by mouth 3 times daily for 7 days, Disp-21 capsule, R-0Normal        -     Failed initial treatment, will do a trial of clindamycin, if failed again will need to have this area drained    Return if symptoms worsen or fail to improve. Subjective   SUBJECTIVE/OBJECTIVE:  HPI  Left hand pain:  Timing: started in September, symptoms started after lifting a heavy mattress  Ring finger catches first thing in the morning,Wakes up with swelling in left hand   Pain at base of ring finger, intermittent, aching sensation  Has not tried any treatments  Pain and swelling improve as the day goes on    Breast cyst:  Location: chest wall under right breast  Was seen at a local urgent care and given 10 days of bactrim which she completed over one week ago  Seems like it is growing again, tender to the touch, erythema of the overlying skin  Has not noticed any drainage, lymphadenopathy or fever    Review of Systems   Constitutional: Negative for fever. Musculoskeletal: Positive for arthralgias, joint swelling and myalgias. Skin: Positive for color change. Negative for rash and wound. Neurological: Positive for weakness. Negative for numbness. Hematological: Negative for adenopathy. Objective   Physical Exam  Vitals reviewed. Constitutional:       Appearance: Normal appearance.    Musculoskeletal: Left hand: Swelling present. Decreased range of motion. Normal strength. Normal sensation. Skin:     Findings: Erythema present. Neurological:      Mental Status: She is alert. An electronic signature was used to authenticate this note.     --ANT Duffy

## 2021-12-10 ENCOUNTER — PATIENT MESSAGE (OUTPATIENT)
Dept: FAMILY MEDICINE CLINIC | Age: 41
End: 2021-12-10

## 2021-12-10 DIAGNOSIS — S69.92XA INJURY OF LEFT HAND, INITIAL ENCOUNTER: Primary | ICD-10-CM

## 2021-12-27 ENCOUNTER — HOSPITAL ENCOUNTER (EMERGENCY)
Age: 41
Discharge: HOME | End: 2021-12-27
Payer: COMMERCIAL

## 2021-12-27 ENCOUNTER — APPOINTMENT (OUTPATIENT)
Dept: GENERAL RADIOLOGY | Age: 41
End: 2021-12-27
Payer: COMMERCIAL

## 2021-12-27 ENCOUNTER — TELEPHONE (OUTPATIENT)
Dept: FAMILY MEDICINE CLINIC | Age: 41
End: 2021-12-27

## 2021-12-27 ENCOUNTER — TELEMEDICINE (OUTPATIENT)
Dept: PRIMARY CARE CLINIC | Age: 41
End: 2021-12-27
Payer: COMMERCIAL

## 2021-12-27 VITALS
HEART RATE: 78 BPM | RESPIRATION RATE: 18 BRPM | OXYGEN SATURATION: 100 % | SYSTOLIC BLOOD PRESSURE: 157 MMHG | WEIGHT: 200 LBS | DIASTOLIC BLOOD PRESSURE: 104 MMHG | TEMPERATURE: 98.3 F | BODY MASS INDEX: 32.14 KG/M2 | HEIGHT: 66 IN

## 2021-12-27 DIAGNOSIS — R06.02 SHORTNESS OF BREATH: ICD-10-CM

## 2021-12-27 DIAGNOSIS — U07.1 2019 NOVEL CORONAVIRUS-INFECTED PNEUMONIA (NCIP): Primary | ICD-10-CM

## 2021-12-27 DIAGNOSIS — R06.02 SHORTNESS OF BREATH: Primary | ICD-10-CM

## 2021-12-27 DIAGNOSIS — R03.0 ELEVATED BLOOD PRESSURE READING: ICD-10-CM

## 2021-12-27 DIAGNOSIS — J12.82 2019 NOVEL CORONAVIRUS-INFECTED PNEUMONIA (NCIP): Primary | ICD-10-CM

## 2021-12-27 DIAGNOSIS — R05.9 COUGH: ICD-10-CM

## 2021-12-27 PROCEDURE — 71045 X-RAY EXAM CHEST 1 VIEW: CPT

## 2021-12-27 PROCEDURE — 99284 EMERGENCY DEPT VISIT MOD MDM: CPT

## 2021-12-27 PROCEDURE — 99422 OL DIG E/M SVC 11-20 MIN: CPT | Performed by: NURSE PRACTITIONER

## 2021-12-27 RX ORDER — ONDANSETRON 4 MG/1
4 TABLET, ORALLY DISINTEGRATING ORAL EVERY 8 HOURS PRN
Qty: 20 TABLET | Refills: 0 | Status: SHIPPED | OUTPATIENT
Start: 2021-12-27 | End: 2022-11-02

## 2021-12-27 RX ORDER — DEXTROMETHORPHAN HYDROBROMIDE AND PROMETHAZINE HYDROCHLORIDE 15; 6.25 MG/5ML; MG/5ML
5 SYRUP ORAL 4 TIMES DAILY PRN
Qty: 180 ML | Refills: 0 | Status: SHIPPED | OUTPATIENT
Start: 2021-12-27 | End: 2022-01-03

## 2021-12-27 RX ORDER — ALBUTEROL SULFATE 90 UG/1
2 AEROSOL, METERED RESPIRATORY (INHALATION) 4 TIMES DAILY PRN
Qty: 18 G | Refills: 0 | Status: SHIPPED | OUTPATIENT
Start: 2021-12-27 | End: 2022-01-31 | Stop reason: ALTCHOICE

## 2021-12-27 ASSESSMENT — ENCOUNTER SYMPTOMS
SHORTNESS OF BREATH: 1
COUGH: 0
CHEST TIGHTNESS: 0
SINUS PAIN: 0
SINUS PRESSURE: 0
DIARRHEA: 1

## 2021-12-27 ASSESSMENT — PAIN DESCRIPTION - PAIN TYPE: TYPE: ACUTE PAIN

## 2021-12-27 ASSESSMENT — PAIN SCALES - GENERAL: PAINLEVEL_OUTOF10: 4

## 2021-12-27 NOTE — TELEPHONE ENCOUNTER
See below, patient tested positive for Covid on 12/21 and is now having discomfort when breathing. She denied any chest pain or other symptoms. Schedule video visit?

## 2021-12-27 NOTE — LETTER
[facility]  [address] [facility] [address]  Phone: [phone]  Fax: [phone]    DIANA Corbett    December 27, 2021     [place], [place]  [address] [place] [address]    Patient: Vivien Resendez   MR Number: [id number]   YOB: 1980   Date of Visit: 12/27/2021       Dear Shavon Santillan: Thank you for referring Sea Lino to me for evaluation/treatment. Below are the relevant portions of my assessment and plan of care. If you have questions, please do not hesitate to call me. I look forward to following Nelson Ya along with you.     Sincerely,      DIANA Corbett

## 2021-12-27 NOTE — PROGRESS NOTES
2021    TELEHEALTH EVALUATION -- Audio/Visual (During - public health emergency)    HPI:    She tested positive for COVID on , after coming off of a stomach virus. Her symptoms have changed over the course of her infection. She started with the worst cold symptoms she had ever had, She had nasal congestion. She now has a fullness in her ear. Starting yesterday she had shortness of breath. It was intermittent with exertion. Diarrhea is a new symptom today. Today SOB  is constant. She denies chest tightness and wheezing. She tried a nebulizer treatment that was not helpful. She had an O2 sat of 96 on a fitbit. She was seen today because she is concerned that the SOB is not resolving with rest, and she feels SOB while talking. Recommended she go to ED, patient was in agreement. Abdirahman Lanier (:  1980) has requested an audio/video evaluation for the following concern(s):        Review of Systems   Constitutional: Positive for appetite change and fatigue. Negative for fever. Patient has waves of feeling hot   HENT: Negative for sinus pressure and sinus pain. Pressure in ears   Respiratory: Positive for shortness of breath. Negative for cough and chest tightness. Gastrointestinal: Positive for diarrhea. Prior to Visit Medications    Medication Sig Taking? Authorizing Provider   DULoxetine (CYMBALTA) 60 MG extended release capsule Take 1 capsule by mouth every day. Soedith Mariano DO   fluticasone (FLONASE) 50 MCG/ACT nasal spray Spray 1 spray into Each Nostril Every Day  Chele Mariano, DO   losartan-hydroCHLOROthiazide (HYZAAR) 100-25 MG per tablet Take 1 tablet by mouth daily for high blood pressure. Jostin Salazar APRN - CNP   Calcium Citrate-Vitamin D 200-250 MG-UNIT TABS Take 1 tablet by mouth twice daily.   Gonzalez Naranjo, APRN - CNP   terbinafine (LAMISIL) 250 MG tablet Take 250 mg by mouth daily  Historical Provider, MD   Multiple Vitamins-Minerals caregiver was present when appropriate. The patient was located in a state where the provider was credentialed to provide care. Total time spent on this encounter: 15    --DIANA Zaragoza on 12/27/2021 at 4:27 PM    An electronic signature was used to authenticate this note.

## 2021-12-28 NOTE — ED PROVIDER NOTES
Evaluated by Advanced Practice Provider    EMERGENCY DEPARTMENT ENCOUNTER      CHIEFCOMPLAINT  Positive For Covid-19 (states tested postiive on 12/21. now c/o shortness of breath for 2 days)    HPI    Soha Abrams is a 37 y.o. female who presents to the emergency department with complaints of COVID, SOB. BP has been very high, struggling to keep her breath when talking and moving. She keep getting really blotchy, reports her  saying she looks almost purple. Took test on 12/21 and was positive for COVID. Had stomach bug symptoms on the 20th. COVID symptoms started 12/21. Son diagnosed on 12/16, has been quarantine since. Does not have a pulse ox at home. Fitbit said 96%. Denies CP. No fevers. Bad pressure and pain in her ears and neck. Taking tylenol. Has taken ibuprofen, not supposed to due to weight loss surgery. Denies chronic lung issues. She is a former smoker. No vomiting today, had diarrhea today. The patient arrived to the ED via private car.     PAST MEDICAL HISTORY    Past Medical History:   Diagnosis Date    Anxiety     Arthritis     Cervical pain     Chronic back pain     Depression     Diabetes mellitus ([address]     GERD (gastroesophageal reflux disease)     Hyperlipidemia     Hypertension     Infertility     took fertility medication    Kidney stone 03/2012    Migraine     Miscarriage     Polycystic ovarian syndrome 1999    PONV (postoperative nausea and vomiting)     Psoriasis     Stomach ulcer     Unspecified sleep apnea     cpap       SURGICAL HISTORY    Past Surgical History:   Procedure Laterality Date    BREAST SURGERY Right     for cellulitis     CHOLECYSTECTOMY  2011    COLONOS[facility]  [facility]7/[facility]    [facility]-col[facility]polyps    COLONOSCOPY N/A 6/18/2020    COLONOSCOPY WITH BIOPSY performed by Saritha Baker MD at [address] COLONOSCOPY N/A 6/18/2020    COLONOSCOPY POLYPECTOMY SNARE performed by Saritha Baker MD at [facility] ENDOSCOPY    RI DILATION/CURETTAGE,DIAGNOSTIC  1998     D & C/missed ab    SKIN BIOPSY      for psoriasis    SLEEVE GASTRECTOMY N/A 12/23/2020    LAPAROSCOPIC SLEEVE GASTRECTOMY -  ETHICON performed by Ania Belcher MD at [address]  08/09/2016    tonsillectomy    UPPER GASTROINTESTINAL ENDOSCOPY  3/23/11    pyloretic    UPPER GASTROINTESTINAL ENDOSCOPY  10/7/2015    gastritis-[address]    UPPER GASTROINTESTINAL ENDOSCOPY N/A 6/18/2020    EGD DIAGNOSTIC ONLY performed by Eduar Holder MD at [address] 8/28/2020    EGD BIOPSY performed by Ania Belcher MD at [facility]    Current Outpatient Rx   Medication Sig Dispense Refill    albuterol sulfate HFA (VENTOLIN HFA) 108 (90 Base) MCG/ACT inhaler Inhale 2 puffs into the lungs 4 times daily as needed for Wheezing 18 g 0    promethazine-dextromethorphan (PROMETHAZINE-DM) 6.25-15 MG/5ML syrup Take 5 mLs by mouth 4 times daily as needed for Cough 180 mL 0    ondansetron (ZOFRAN ODT) 4 MG disintegrating tablet Take 1 tablet by mouth every 8 hours as needed for Nausea 20 tablet 0    DULoxetine (CYMBALTA) 60 MG extended release capsule Take 1 capsule by mouth every day. 90 capsule 0    fluticasone (FLONASE) 50 MCG/ACT nasal spray Spray 1 spray into Each Nostril Every Day 3 each 1    losartan-hydroCHLOROthiazide (HYZAAR) 100-25 MG per tablet Take 1 tablet by mouth daily for high blood pressure. 90 tablet 0    Calcium Citrate-Vitamin D 200-250 MG-UNIT TABS Take 1 tablet by mouth twice daily. 60 tablet 0    terbinafine (LAMISIL) 250 MG tablet Take 250 mg by mouth daily      Multiple Vitamins-Minerals (THERAPEUTIC MULTIVITAMIN-MINERALS W/ IRON) TABS tablet Take 1 tablet by mouth daily      blood glucose monitor kit and supplies Dispense sufficient amount for indicated testing frequency plus additional to accommodate PRN testing needs.  Dispense all needed supplies to include: monitor, strips, lancing device, lancets, control solutions, alcohol swabs. 1 kit 0    Multiple Vitamins-Minerals (THERAPEUTIC MULTIVITAMIN-MINERALS) tablet Take 1 tablet by mouth 2 times daily 60 tablet 2    metFORMIN (GLUCOPHAGE-XR) 750 MG extended release tablet TAKE 2 TABLETS BY MOUTH EVERY MORNING 60 tablet 2    Fe Fum-Fe Poly-Vit C-Lactobac (FUSION PO) Take by mouth As ordered by Dr Jaymie Collazo hydrOXYzine (ATARAX) 50 MG tablet 1 twice daily as needed anxiety.  (Patient not taking: Reported on 12/9/2021) 30 tablet 1    cimetidine (TAGAMET) 400 MG tablet Take 1 tablet by mouth 2 times daily (Patient taking differently: Take 400 mg by mouth daily Patient states takes once a day at 7pm) 180 tablet 1    levonorgestrel-ethinyl estradiol ([place]) 0.15-0.03 MG per tablet Take 1 tablet by mouth         ALLERGIES    Allergies   Allergen Reactions    Tramadol Other (See Comments)     severe  SEVERE HEADACHE    Lisinopril Other (See Comments)     COUGH       FAMILY HISTORY    Family History   Problem Relation Age of Onset    Arthritis Mother     Miscarriages / [place] Mother     High Blood Pressure Mother     Depression Mother     High Cholesterol Mother     Arthritis Father     High Blood Pressure Father     Cancer Father         colon    Hearing Loss Father     Heart Disease Father     High Cholesterol Father     High Blood Pressure Brother     High Cholesterol Brother     High Blood Pressure Maternal Aunt     High Cholesterol Maternal Aunt     Miscarriages / Stillbirths Maternal Aunt     Mental Retardation Maternal Uncle     High Blood Pressure Maternal Uncle     High Cholesterol Maternal Uncle     High Blood Pressure Paternal Aunt     High Cholesterol Paternal Aunt     Arthritis Paternal Uncle     Mental Illness Paternal Uncle     High Blood Pressure Paternal Uncle     Birth Defects Paternal Uncle     High Cholesterol Paternal Uncle     Learning Disabilities Paternal Uncle     Arthritis Maternal Grandmother     High Blood Pressure Maternal Grandmother     Diabetes Maternal Grandmother     High Cholesterol Maternal Grandmother     Miscarriages / Stillbirths Maternal Grandmother     Arthritis Maternal Grandfather     High Blood Pressure Maternal Grandfather     High Cholesterol Maternal Grandfather     Arthritis Paternal Grandmother     High Blood Pressure Paternal Grandmother     Heart Disease Paternal Grandmother     High Cholesterol Paternal Grandmother    [de-identified] / Stillbirths Paternal Grandmother     Stroke Paternal Grandmother     High Blood Pressure Paternal Grandfather     High Cholesterol Paternal Grandfather        SOCIAL HISTORY    Social History     Socioeconomic History    Marital status:      Spouse name: Marta Valentine    Number of children: 3    Years of education: 12    Highest education level: None   Occupational History    None   Tobacco Use    Smoking status: Former Smoker     Packs/day: 1.00     Years: 20.00     Pack years: 20.00     Types: Cigarettes     Quit date: 2019     Years since quittin.6    Smokeless tobacco: Never Used   Vaping Use    Vaping Use: Never used   Substance and Sexual Activity    Alcohol use: Not Currently     Alcohol/week: 0.0 standard drinks    Drug use: Not Currently     Frequency: 4.0 times per week     Types: Marijuana (Vitor Gamez)     Comment: marijuana- last use was     Sexual activity: Yes     Partners: Male   Other Topics Concern    None   Social History Narrative    None     Social Determinants of Health     Financial Resource Strain: Low Risk     Difficulty of Paying Living Expenses: Not hard at all   Food Insecurity: No Food Insecurity    Worried About Running Out of Food in the Last Year: Never true    Lisa of Food in the Last Year: Never true   Transportation Needs: No Transportation Needs    Lack of Transportation (Medical):  No    Lack of Transportation (Non-Medical): No   Physical Activity:     Days of Exercise per Week: Not on file    Minutes of Exercise per Session: Not on file   Stress:     Feeling of Stress : Not on file   Social Connections:     Frequency of Communication with Friends and Family: Not on file    Frequency of Social Gatherings with Friends and Family: Not on file    Attends Muslim Services: Not on file    Active Member of [address] [place] [address] or Organizations: Not on file    Attends Club or Organization Meetings: Not on file    Marital Status: Not on file   Intimate Partner Violence:     Fear of Current or Ex-Partner: Not on file    Emotionally Abused: Not on file    Physically Abused: Not on file    Sexually Abused: Not on file   Housing Stability: [address] Unable to Pay for Housing in the Last Year: No    Number of [place] in the Last Year: 1    Unstable Housing in the Last Year: No     REVIEW OF SYSTEMS    10 systems reviewed, pertinent positives per HPI otherwise noted to be negative    PHYSICAL EXAM  Physical Exam  Vitals:    12/27/21 2201   BP: (!) 157/104   Pulse: 78   Resp: 18   Temp:    SpO2: 100%     GENERAL: Patient is well-developed, well-nourished. Awake and alert. Cooperative. Resting in bed. Appears to not feel well but is not toxic in appearance. HEENT:  Normocephalic, atraumatic. Conjunctiva appear normal. Sclera is non-icteric. External ears are normal. Bilateral TM are transparent, intact, bony landmarks are well visualized. Good cone of light reflex. There is no TM perforation. There is no drainage or hemotympanum observed. Ear canal is without swelling or erythema. Nose is without epistaxis. There is no septal deviation or septal hematoma to observation. Mucous membranes are moist. Uvula is midline and without edema. Posterior oropharynx is without edema, erythema or exudate. NECK: Supple with normal ROM. Trachea midline  LUNGS: Equal and symmetric chest rise. Breathing is unlabored. consultation. Abdirahman Lanier presented to the ED with the above noted complaints. Physical exam reveals no adventitious breath sounds on exam.  Patient appears to not feel well but is not toxic in appearance. On arrival to the ED the patient is afebrile, mildly tachycardic with a heart rate of 106. BP hypertensive at 154/100. Respirations are easy and regular. She is well saturated on room air. Repeat vital signs did show her heart rate to improve. Patient was ambulated in the ED and able to tolerate this without hypoxia. Chest x-ray was obtained and shows mild patchy bibasilar airspace opacities that likely reflect known Covid pneumonia. As patient is afebrile and vitals are stable I do not feel that she needs blood work at this time. She was able to tolerate the ambulation trial and I feel she can be discharged home with continued medications to help with symptom control. When nursing staff went to have patient come back to her room from the respiratory waiting room she was not found. My plan was to discharge her home as stated above. Patient left prior to my discussing her chest x-ray results, plan for discharge and to her discharge papers. MDM for Discharge  Based on history, physical exam, and testing, this patient is low risk for acute decompensation in the setting of confirmed or suspected COVID-19. Low risk can be solidly predicted when the patient has one of the following scenarios:  1. Younger age, no risk factors and a reassuring clinical picture  2. Younger age, risk factors, but negative testing (which in some cases doesn't need to be anything other than a chest X-ray) and a reassuring clinical picture. 3. Older age, with or without risk factors, negative testing (which in some cases doesn't need to be anything other than a chest X-ray), and a reassuring clinical picture. The risk estimate does not mean that the risk is zero.  It means that the risk of decompensation is lower than the risk of being in the hospital at this time. This risk estimate, based on the most current data on COVID-19, is concordant with my clinical judgement that the patient has a reassuring clinical appearance. I estimate there is LOW risk for PULMONARY EMBOLISM, ACUTE CORONARY SYNDROME, OR THORACIC AORTIC DISSECTION, thus I consider the discharge disposition reasonable. Discharge Medication List as of 12/27/2021 10:44 PM      START taking these medications    Details   albuterol sulfate HFA (VENTOLIN HFA) 108 (90 Base) MCG/ACT inhaler Inhale 2 puffs into the lungs 4 times daily as needed for Wheezing, Disp-18 g, R-0Normal      promethazine-dextromethorphan (PROMETHAZINE-DM) 6.25-15 MG/5ML syrup Take 5 mLs by mouth 4 times daily as needed for Cough, Disp-180 mL, R-0Normal      ondansetron (ZOFRAN ODT) 4 MG disintegrating tablet Take 1 tablet by mouth every 8 hours as needed for Nausea, Disp-20 tablet, R-0Normal           CLINICAL IMPRESSION    1. 2019 novel coronavirus-infected pneumonia (NCIP)    2. Shortness of breath    3. Cough    4. Elevated blood pressure reading         FOLLOW UP  DIANA Godinez  [address] [facility]  [phone]    Call in 2 days  For further evaluation    [facility]  ED  [address]  Go to   If symptoms worsen      DISPOSITION  Discharge. Comment: Please note this report has been produced using speech recognition software and may contain errors related to that system including errorsin grammar, punctuation, and spelling, as well as words and phrases that may be inappropriate. If there are any questions or concerns please feel free to contact the dictating provider for clarification.         DIANA Bull - CNP  12/28/21 0007

## 2021-12-28 NOTE — ED NOTES
Ambulated pt approximately 140 feet without assistance of staff or assistive device without oxygen. Pt verbalizes feeling slight SOB, but denies feeling dizzy, or feeling light headed. Pt's gait was even and steady. Pt's pulse ranged from  BPM and oxygen ranged  throughout the course of the ambulation. Pt back to bed, bed locked and in lowest position, call light in reach. Vance Galan NP notified of results and completion of ambulation.      Juan Pablo Healy RN  12/27/21 7431
Pt ok to d/c to home. Pt given d/cand left prior d/c instructions or pulse ox given. Pt ambulated to lobby for ride home.  0 s/s of distress at time of d/c.          Rajwinder Busch RN  12/27/21 2256
WDL

## 2021-12-28 NOTE — TELEPHONE ENCOUNTER
I tried getting in touch with her this morning she didn't answer but I got in touch with her  who says she was having some phelm movement last night before bed. They did go to the ed yesterday she had a chest xray done.  As for her condition this morning it is unknown because her  is at work

## 2021-12-28 NOTE — TELEPHONE ENCOUNTER
Patient called back, she reports feeling a \"little\" better today, She does feel like she has more phlegm to cough up. She was not aware of the Albuterol Hfa,Promethazine-dextromethorphan, and Ondansteron were sent to her pharmacy. She was not aware of the CXR results as she left the ED before receiving results. Mild patchy bibasilar airspace opacities likely reflect known COVID-19   pneumonia. English

## 2021-12-29 ENCOUNTER — TELEMEDICINE (OUTPATIENT)
Dept: PRIMARY CARE CLINIC | Age: 41
End: 2021-12-29
Payer: COMMERCIAL

## 2021-12-29 DIAGNOSIS — U07.1 2019 NOVEL CORONAVIRUS-INFECTED PNEUMONIA (NCIP): Primary | ICD-10-CM

## 2021-12-29 DIAGNOSIS — J12.82 2019 NOVEL CORONAVIRUS-INFECTED PNEUMONIA (NCIP): Primary | ICD-10-CM

## 2021-12-29 PROCEDURE — G8428 CUR MEDS NOT DOCUMENT: HCPCS | Performed by: NURSE PRACTITIONER

## 2021-12-29 PROCEDURE — 99212 OFFICE O/P EST SF 10 MIN: CPT | Performed by: NURSE PRACTITIONER

## 2021-12-29 ASSESSMENT — ENCOUNTER SYMPTOMS
SHORTNESS OF BREATH: 1
CHEST TIGHTNESS: 0
COUGH: 0
WHEEZING: 0

## 2021-12-29 NOTE — PROGRESS NOTES
Vivien Resendez (:  1980) is a 37 y.o. female,Established patient, here for evaluation of the following chief complaint(s): Concern For COVID-19 (patient has covid with pneumonia per xray)         ASSESSMENT/PLAN:  2019 novel coronavirus-infected pneumonia (NCIP)  Increase fluids, Rest, Use OTC pain reliever/fever reducer of choice, Use mucinex for congestion, Take robitussin for cough, Use saline nasal spray, Use a humidifier in your bedroom. Call if no improvement      Return if symptoms worsen or fail to improve. SUBJECTIVE/OBJECTIVE:  HPI Patient presents for f/u from ED for covid pneumonia. Patient was there for 5.5hrs and left before her final discharge instructions were provided. She has not been coughing but is feeling extremely fatigued and has a headache. She has shortness of breath at times. She has monitored her O2 sat and it is staying in the high 90's. She is taking tylenol and rarely ibuprofen (history of gastric sleeve surgery prohibits use of NSAIDS), has a cool mist humidifier, and fluids. Her visit was completed with her lying on the cough. She has been quarantining since . No fever at this time. Review of Systems   Constitutional: Positive for appetite change and fatigue. Negative for fever. Respiratory: Positive for shortness of breath. Negative for cough, chest tightness and wheezing. Cardiovascular: Negative for chest pain. Neurological: Positive for weakness and headaches.        Patient-Reported Vitals 2021   Patient-Reported Weight 200   Patient-Reported Height 5'6   Patient-Reported Systolic -   Patient-Reported Diastolic -   Patient-Reported Pulse -   Patient-Reported Temperature -        Physical Exam    [INSTRUCTIONS:  \"[x]\" Indicates a positive item  \"[]\" Indicates a negative item  -- DELETE ALL ITEMS NOT EXAMINED]    Constitutional: [x] Appears well-developed and well-nourished [x] No apparent distress      [] Abnormal -     Mental status: [x] Alert and awake  [x] Oriented to person/place/time [x] Able to follow commands    [] Abnormal -     Eyes:   EOM    [x]  Normal    [] Abnormal -   Sclera  [x]  Normal    [] Abnormal -          Discharge [x]  None visible   [] Abnormal -     HENT: [x] Normocephalic, atraumatic  [] Abnormal -       External Ears [x] Normal  [] Abnormal -    Neck: [x] No visualized mass [] Abnormal -     Pulmonary/Chest: [x] Respiratory effort normal   [x] No visualized signs of difficulty breathing or respiratory distress        [] Abnormal -      Musculoskeletal:          [x] Normal range of motion of neck        [] Abnormal -     Neurological:        [x] No Facial Asymmetry (Cranial nerve 7 motor function) (limited exam due to video visit)          [x] No gaze palsy        [] Abnormal -          Skin:        [x] No significant exanthematous lesions or discoloration noted on facial skin         [] Abnormal -            Psychiatric:       [x] Normal Affect [] Abnormal -        [x] No Hallucinations    Other pertinent observable physical exam findings:-          On this date 12/29/2021 I have spent 20 minutes reviewing previous notes, test results and face to face (virtual) with the patient discussing the diagnosis and importance of compliance with the treatment plan as well as documenting on the day of the visit. Priscila Pena, was evaluated through a synchronous (real-time) audio-video encounter. The patient (or guardian if applicable) is aware that this is a billable service. Verbal consent to proceed has been obtained within the past 12 months. The visit was conducted pursuant to the emergency declaration under the [address] [place] [address] and the [employer] General Act. Patient identification was verified, and a caregiver was present when appropriate.  The patient was located in a state where the provider was credentialed to provide care.      An electronic signature was used to authenticate this note.     --Sebas Izquierdo, APRN - CNP

## 2022-01-02 NOTE — TELEPHONE ENCOUNTER
.  Refill Request     Last Seen: Last Seen Department: 12/9/2021  Last Seen by PCP: 7/27/2021    Last Written: 7/28/2020 180 with 1     Next Appointment:   Future Appointments   Date Time Provider Clifford Perez   1/13/2022  8:30 AM MD An Connors         Requested Prescriptions     Pending Prescriptions Disp Refills    cimetidine (TAGAMET) 400 MG tablet [Pharmacy Med Name: Cimetidine 400mg Tablet] 180 tablet 1     Sig: Take 1 tablet by mouth twice daily.

## 2022-01-03 RX ORDER — LOSARTAN POTASSIUM AND HYDROCHLOROTHIAZIDE 25; 100 MG/1; MG/1
TABLET ORAL
Qty: 90 TABLET | Refills: 0 | Status: SHIPPED | OUTPATIENT
Start: 2022-01-03 | End: 2022-02-17

## 2022-01-03 RX ORDER — CIMETIDINE 400 MG/1
TABLET, FILM COATED ORAL
Qty: 180 TABLET | Refills: 1 | Status: SHIPPED | OUTPATIENT
Start: 2022-01-03 | End: 2022-06-24 | Stop reason: SDUPTHER

## 2022-01-03 NOTE — TELEPHONE ENCOUNTER
Refill Request     Last Seen: Last Seen Department: 12/9/2021  Last Seen by PCP: 7/27/2021    Last Written: 9/29/2021    Next Appointment:   Future Appointments   Date Time Provider Clifford Perez   1/13/2022  8:30 AM MD Ananda Mittal             Requested Prescriptions     Pending Prescriptions Disp Refills    losartan-hydroCHLOROthiazide (HYZAAR) 100-25 MG per tablet 90 tablet 0     Sig: TAKE ONE TABLET BY MOUTH DAILY FOR HIGH BLOOD PRESSURE

## 2022-01-04 ENCOUNTER — TELEPHONE (OUTPATIENT)
Dept: FAMILY MEDICINE CLINIC | Age: 42
End: 2022-01-04

## 2022-01-04 NOTE — TELEPHONE ENCOUNTER
----- Message from Drew Alia sent at 1/4/2022  9:40 AM EST -----  Subject: Message to Provider    QUESTIONS  Information for Provider? patient would like to receive the flu shot but   just got over COVID-19, patient sees provider Tyrell  ---------------------------------------------------------------------------  --------------  CALL BACK INFO  What is the best way for the office to contact you? Do not leave any   message, patient will call back for answer, OK to respond with electronic   message via [employer] portal (only for patients who have registered [employer]   account)  Preferred Call Back Phone Number?  [phone]  ---------------------------------------------------------------------------  --------------  SCRIPT ANSWERS  undefined

## 2022-01-19 ENCOUNTER — E-VISIT (OUTPATIENT)
Dept: PRIMARY CARE CLINIC | Age: 42
End: 2022-01-19
Payer: COMMERCIAL

## 2022-01-19 DIAGNOSIS — L03.019 CELLULITIS OF FINGER, UNSPECIFIED LATERALITY: Primary | ICD-10-CM

## 2022-01-19 PROCEDURE — 99422 OL DIG E/M SVC 11-20 MIN: CPT | Performed by: NURSE PRACTITIONER

## 2022-01-20 ENCOUNTER — TELEPHONE (OUTPATIENT)
Dept: FAMILY MEDICINE CLINIC | Age: 42
End: 2022-01-20

## 2022-01-20 RX ORDER — CEPHALEXIN 500 MG/1
500 CAPSULE ORAL 4 TIMES DAILY
Qty: 28 CAPSULE | Refills: 0 | Status: SHIPPED | OUTPATIENT
Start: 2022-01-20 | End: 2022-01-27

## 2022-01-20 NOTE — TELEPHONE ENCOUNTER
----- Message from Raj sent at 1/20/2022  1:15 PM EST -----  Subject: Message to Provider    QUESTIONS  Information for Provider? Pt is sched for first covid vaccination 1/21/22   in office. She is requesting to also receive flu vaccination at the same   time. ---------------------------------------------------------------------------  --------------  Jean Carlos CARY  What is the best way for the office to contact you? Do not leave any   message, patient will call back for answer  Preferred Call Back Phone Number? [phone]  ---------------------------------------------------------------------------  --------------  SCRIPT ANSWERS  Relationship to Patient?  Self

## 2022-01-20 NOTE — PATIENT INSTRUCTIONS
Patient Education        Cellulitis: Care Instructions  Your Care Instructions     Cellulitis is a skin infection caused by bacteria, most often strep or staph. It often occurs after a break in the skin from a scrape, cut, bite, or puncture, or after a rash. Cellulitis may be treated without doing tests to find out what caused it. But your doctor may do tests, if needed, to look for a specific bacteria, like methicillin-resistant Staphylococcus aureus (MRSA). The doctor has checked you carefully, but problems can develop later. If you notice any problems or new symptoms, get medical treatment right away. Follow-up care is a key part of your treatment and safety. Be sure to make and go to all appointments, and call your doctor if you are having problems. It's also a good idea to know your test results and keep a list of the medicines you take. How can you care for yourself at home? · Take your antibiotics as directed. Do not stop taking them just because you feel better. You need to take the full course of antibiotics. · Prop up the infected area on pillows to reduce pain and swelling. Try to keep the area above the level of your heart as often as you can. · If your doctor told you how to care for your wound, follow your doctor's instructions. If you did not get instructions, follow this general advice:  ? Wash the wound with clean water 2 times a day. Don't use hydrogen peroxide or alcohol, which can slow healing. ? You may cover the wound with a thin layer of petroleum jelly, such as Vaseline, and a nonstick bandage. ? Apply more petroleum jelly and replace the bandage as needed. · Be safe with medicines. Take pain medicines exactly as directed. ? If the doctor gave you a prescription medicine for pain, take it as prescribed. ? If you are not taking a prescription pain medicine, ask your doctor if you can take an over-the-counter medicine.   To prevent cellulitis in the future  · Try to prevent cuts, scrapes, or other injuries to your skin. Cellulitis most often occurs where there is a break in the skin. · If you get a scrape, cut, mild burn, or bite, wash the wound with clean water as soon as you can to help avoid infection. Don't use hydrogen peroxide or alcohol, which can slow healing. · If you have swelling in your legs (edema), support stockings and good skin care may help prevent leg sores and cellulitis. · Take care of your feet, especially if you have diabetes or other conditions that increase the risk of infection. Wear shoes and socks. Do not go barefoot. If you have athlete's foot or other skin problems on your feet, talk to your doctor about how to treat them. When should you call for help? Call your doctor now or seek immediate medical care if:    · You have signs that your infection is getting worse, such as:  ? Increased pain, swelling, warmth, or redness. ? Red streaks leading from the area. ? Pus draining from the area. ? A fever.     · You get a rash. Watch closely for changes in your health, and be sure to contact your doctor if:    · You do not get better as expected. Where can you learn more? Go to [email or web address].[email or web address]. org and sign in to your [employer] account. Enter J146 in the [facility] box to learn more about \"Cellulitis: Care Instructions. \"     If you do not have an account, please click on the \"Sign Up Now\" link. Current as of: March 3, 2021               Content Version: 13.1  © 2006-2021 Healthwise, Incorporated. Care instructions adapted under license by [facility] [facility] If you have questions about a medical condition or this instruction, always ask your healthcare professional. [place] [address] any warranty or liability for your use of this information.

## 2022-01-20 NOTE — PROGRESS NOTES
Reviewed questionnaire and photos (poor quality)  Reviewed meds, allergies and history    Dx cellulitis    Plan  rx for keflex    Recommend f/u with pcp in the office to further evaluate since this is on going    Tylenol or motrin for pain.  Please monitor for worsening s/s such as increased redness, swelling, drainage or fever    I hope you feel better    Time 11-20

## 2022-01-31 ENCOUNTER — OFFICE VISIT (OUTPATIENT)
Dept: FAMILY MEDICINE CLINIC | Age: 42
End: 2022-01-31
Payer: COMMERCIAL

## 2022-01-31 VITALS
BODY MASS INDEX: 34.09 KG/M2 | DIASTOLIC BLOOD PRESSURE: 98 MMHG | SYSTOLIC BLOOD PRESSURE: 160 MMHG | WEIGHT: 211.2 LBS | OXYGEN SATURATION: 91 % | HEART RATE: 81 BPM

## 2022-01-31 DIAGNOSIS — L72.0 EPIDERMOID CYST: ICD-10-CM

## 2022-01-31 DIAGNOSIS — L91.0 KELOID: Primary | ICD-10-CM

## 2022-01-31 DIAGNOSIS — K59.01 SLOW TRANSIT CONSTIPATION: ICD-10-CM

## 2022-01-31 PROCEDURE — 99214 OFFICE O/P EST MOD 30 MIN: CPT | Performed by: FAMILY MEDICINE

## 2022-01-31 PROCEDURE — G8484 FLU IMMUNIZE NO ADMIN: HCPCS | Performed by: FAMILY MEDICINE

## 2022-01-31 PROCEDURE — G8417 CALC BMI ABV UP PARAM F/U: HCPCS | Performed by: FAMILY MEDICINE

## 2022-01-31 PROCEDURE — G8427 DOCREV CUR MEDS BY ELIG CLIN: HCPCS | Performed by: FAMILY MEDICINE

## 2022-01-31 PROCEDURE — 1036F TOBACCO NON-USER: CPT | Performed by: FAMILY MEDICINE

## 2022-01-31 RX ORDER — DULOXETIN HYDROCHLORIDE 60 MG/1
CAPSULE, DELAYED RELEASE ORAL
Qty: 90 CAPSULE | Refills: 0 | Status: SHIPPED | OUTPATIENT
Start: 2022-01-31 | End: 2022-04-25 | Stop reason: SDUPTHER

## 2022-01-31 RX ORDER — SENNA AND DOCUSATE SODIUM 50; 8.6 MG/1; MG/1
2 TABLET, FILM COATED ORAL DAILY
Qty: 60 TABLET | Refills: 2 | Status: SHIPPED | OUTPATIENT
Start: 2022-01-31 | End: 2022-03-15 | Stop reason: SDUPTHER

## 2022-01-31 RX ORDER — POLYETHYLENE GLYCOL 3350 17 G/17G
17 POWDER, FOR SOLUTION ORAL DAILY PRN
Qty: 1530 G | Refills: 1 | Status: SHIPPED | OUTPATIENT
Start: 2022-01-31 | End: 2022-03-02

## 2022-01-31 ASSESSMENT — COLUMBIA-SUICIDE SEVERITY RATING SCALE - C-SSRS
2. HAVE YOU ACTUALLY HAD ANY THOUGHTS OF KILLING YOURSELF?: NO
6. HAVE YOU EVER DONE ANYTHING, STARTED TO DO ANYTHING, OR PREPARED TO DO ANYTHING TO END YOUR LIFE?: NO
1. WITHIN THE PAST MONTH, HAVE YOU WISHED YOU WERE DEAD OR WISHED YOU COULD GO TO SLEEP AND NOT WAKE UP?: YES

## 2022-01-31 ASSESSMENT — PATIENT HEALTH QUESTIONNAIRE - PHQ9
9. THOUGHTS THAT YOU WOULD BE BETTER OFF DEAD, OR OF HURTING YOURSELF: 1
8. MOVING OR SPEAKING SO SLOWLY THAT OTHER PEOPLE COULD HAVE NOTICED. OR THE OPPOSITE, BEING SO FIGETY OR RESTLESS THAT YOU HAVE BEEN MOVING AROUND A LOT MORE THAN USUAL: 0
7. TROUBLE CONCENTRATING ON THINGS, SUCH AS READING THE NEWSPAPER OR WATCHING TELEVISION: 3
SUM OF ALL RESPONSES TO PHQ9 QUESTIONS 1 & 2: 2
10. IF YOU CHECKED OFF ANY PROBLEMS, HOW DIFFICULT HAVE THESE PROBLEMS MADE IT FOR YOU TO DO YOUR WORK, TAKE CARE OF THINGS AT HOME, OR GET ALONG WITH OTHER PEOPLE: 3
SUM OF ALL RESPONSES TO PHQ QUESTIONS 1-9: 12
1. LITTLE INTEREST OR PLEASURE IN DOING THINGS: 1
3. TROUBLE FALLING OR STAYING ASLEEP: 1
6. FEELING BAD ABOUT YOURSELF - OR THAT YOU ARE A FAILURE OR HAVE LET YOURSELF OR YOUR FAMILY DOWN: 0
SUM OF ALL RESPONSES TO PHQ QUESTIONS 1-9: 12
SUM OF ALL RESPONSES TO PHQ QUESTIONS 1-9: 11
2. FEELING DOWN, DEPRESSED OR HOPELESS: 1
5. POOR APPETITE OR OVEREATING: 3
4. FEELING TIRED OR HAVING LITTLE ENERGY: 2
SUM OF ALL RESPONSES TO PHQ QUESTIONS 1-9: 12

## 2022-01-31 ASSESSMENT — ENCOUNTER SYMPTOMS
COLOR CHANGE: 1
NAUSEA: 0
SHORTNESS OF BREATH: 0

## 2022-01-31 NOTE — PROGRESS NOTES
1/31/2022    This is a 37 y.o. female who presents for  Chief Complaint   Patient presents with    Mass     sliced her thumb on December 4th, healed weird and now there is a lump right thumb.         HPI:     Cut her thumb, per above  Did not heal well  Not it has a lump there  Hurts to use or bend her thumb in that location  Can't use her scissors   No drainage, warmth   Will turn white on occasion     Area on her neck that she thinks is infected again  Has been on three rounds of abx for this: Bactrim, Clindamycin, Keflex  Will hurt to move her neck when it's inflamed, has headaches    + constipation  + b/l lower abd cramping  No BRBR, fevers, n/v    Past Medical History:   Diagnosis Date    Anxiety     Arthritis     Cervical pain     Chronic back pain     Depression     Diabetes mellitus ([address]     GERD (gastroesophageal reflux disease)     Hyperlipidemia     Hypertension     Infertility     took fertility medication    Kidney stone 03/2012    Migraine     Miscarriage     Polycystic ovarian syndrome 1999    PONV (postoperative nausea and vomiting)     Psoriasis     Stomach ulcer     Unspecified sleep apnea     cpap       Past Surgical History:   Procedure Laterality Date    BREAST SURGERY Right     for cellulitis     CHOLECYSTECTOMY  2011    [facility]ps    COLONOSCOPY N/A 6/18/2020    COLONOSCOPY WITH BIOPSY performed by Barbara Bynum MD at [facility] N/A 6/18/2020    COLONOSCOPY POLYPECTOMY [address] [facility] performed by Barbara Bynum MD at [address] [facility]     D & C/missed ab   [address]      for psoriasis    SLEEVE GASTRECTOMY N/A 12/23/2020    LAPAROSCOPIC SLEEVE GASTRECTOMY -  ETHICON performed by Ian Fuller MD at [address]  08/09/2016    tonsillectomy    UPPER GASTROINTESTINAL ENDOSCOPY  3/23/11    pyloretic    UPPER GASTROINTESTINAL ENDOSCOPY  10/7/2015    [facility]astritis-[facility]    UPPER GASTROINTESTINAL ENDOSCOPY N/A 2020    EGD DIAGNOSTIC ONLY performed by Remington Rios MD at [address] N/A 2020    [facility] Marital status:      Spouse name: Elizabeth Rojas    Number of children: 3    Years of education: 12    Highest education level: Not on file   Occupational History    Not on file   Tobacco Use    Smoking status: Former Smoker     Packs/day: 1.00     Years: 20.00     Pack years: 20.00     Types: Cigarettes     Quit date: 2019     Years since quittin.7    Smokeless tobacco: Never Used   Vaping Use    Vaping Use: Never used   Substance and Sexual Activity    Alcohol use: Not Currently     Alcohol/week: 0.0 standard drinks    Drug use: Not Currently     Frequency: 4.0 times per week     Types: Marijuana Fredo AlegreOrly     Comment: marijuana- last use was Sept 1    Sexual activity: Yes     Partners: Male   Other Topics Concern    Not on file   Social History Narrative    Not on file     Social Determinants of Health     Financial Resource Strain: Low Risk     Difficulty of Paying Living Expenses: Not hard at all   Food Insecurity: No Food Insecurity    Worried About Running Out of Food in the Last Year: Never true    Lisa of Food in the Last Year: Never true   Transportation Needs: No Transportation Needs    Lack of Transportation (Medical): No    Lack of Transportation (Non-Medical):  No   Physical Activity:     Days of Exercise per Week: Not on file    Minutes of Exercise per Session: Not on file   Stress:     Feeling of Stress : Not on file   Social Connections:     Frequency of Communication with Friends and Family: Not on file    Frequency of Social Gatherings with Friends and Family: Not on file    Attends Anabaptist Services: Not on file  Active Member of Clubs or Organizations: Not on file    Attends Club or Organization Meetings: Not on file    Marital Status: Not on file   Intimate Partner Violence:     Fear of Current or Ex-Partner: Not on file    Emotionally Abused: Not on file    Physically Abused: Not on file    Sexually Abused: Not on file   Housing Stability: [address] Unable to Pay for Housing in the Last Year: No    Number of [place] in the Last Year: 1    Unstable Housing in the Last Year: No       Family History   Problem Relation Age of Onset    Arthritis Mother     Miscarriages / [place] Mother     High Blood Pressure Mother     Depression Mother     High Cholesterol Mother     Arthritis Father     High Blood Pressure Father     Cancer Father         colon    Hearing Loss Father     Heart Disease Father     High Cholesterol Father     High Blood Pressure Brother     High Cholesterol Brother     High Blood Pressure Maternal Aunt     High Cholesterol Maternal Aunt     Miscarriages / Stillbirths Maternal Aunt     Mental Retardation Maternal Uncle     High Blood Pressure Maternal Uncle     High Cholesterol Maternal Uncle     High Blood Pressure Paternal Aunt     High Cholesterol Paternal Aunt     Arthritis Paternal Uncle     Mental Illness Paternal Uncle     High Blood Pressure Paternal Uncle     Birth Defects Paternal Uncle     High Cholesterol Paternal Uncle     Learning Disabilities Paternal Uncle     Arthritis Maternal Grandmother     High Blood Pressure Maternal Grandmother     Diabetes Maternal Grandmother     High Cholesterol Maternal Grandmother     Miscarriages / Stillbirths Maternal Grandmother     Arthritis Maternal Grandfather     High Blood Pressure Maternal Grandfather     High Cholesterol Maternal Grandfather     Arthritis Paternal Grandmother     High Blood Pressure Paternal Grandmother     Heart Disease Paternal Grandmother     High Cholesterol Paternal Grandmother     Miscarriages / Stillbirths Paternal Grandmother     Stroke Paternal Grandmother     High Blood Pressure Paternal Grandfather     High Cholesterol Paternal Grandfather        Current Outpatient Medications   Medication Sig Dispense Refill    DULoxetine (CYMBALTA) 60 MG extended release capsule Take 1 capsule by mouth every day. 90 capsule 0    sennosides-docusate sodium (SENOKOT-S) 8.6-50 MG tablet Take 2 tablets by mouth daily 60 tablet 2    polyethylene glycol (GLYCOLAX) 17 GM/SCOOP powder Take 17 g by mouth daily as needed (constipation) 1530 g 1    cimetidine (TAGAMET) 400 MG tablet Take 1 tablet by mouth twice daily. 180 tablet 1    losartan-hydroCHLOROthiazide (HYZAAR) 100-25 MG per tablet TAKE ONE TABLET BY MOUTH DAILY FOR HIGH BLOOD PRESSURE 90 tablet 0    ondansetron (ZOFRAN ODT) 4 MG disintegrating tablet Take 1 tablet by mouth every 8 hours as needed for Nausea 20 tablet 0    fluticasone (FLONASE) 50 MCG/ACT nasal spray Spray 1 spray into Each Nostril Every Day 3 each 1    Calcium Citrate-Vitamin D 200-250 MG-UNIT TABS Take 1 tablet by mouth twice daily. 60 tablet 0    terbinafine (LAMISIL) 250 MG tablet Take 250 mg by mouth daily      Multiple Vitamins-Minerals (THERAPEUTIC MULTIVITAMIN-MINERALS W/ IRON) TABS tablet Take 1 tablet by mouth daily      blood glucose monitor kit and supplies Dispense sufficient amount for indicated testing frequency plus additional to accommodate PRN testing needs. Dispense all needed supplies to include: monitor, strips, lancing device, lancets, control solutions, alcohol swabs. 1 kit 0    metFORMIN (GLUCOPHAGE-XR) 750 MG extended release tablet TAKE 2 TABLETS BY MOUTH EVERY MORNING 60 tablet 2    Fe Fum-Fe Poly-Vit C-Lactobac (FUSION PO) Take by mouth As ordered by Dr Bruno Stein hydrOXYzine (ATARAX) 50 MG tablet 1 twice daily as needed anxiety.  30 tablet 1    levonorgestrel-ethinyl estradiol (JOLESSA) 0.15-0.03 MG per tablet Take 1 tablet by mouth       No current facility-administered medications for this visit. Immunization History   Administered Date(s) Administered    COVID-19, Pati Kessler, Primary or Immunocompromised, PF, 100mcg/0.5mL 01/24/2022    DTP 1981, 02/20/1982, 04/24/1982, 11/23/1984    Influenza 11/29/2012    Influenza Virus Vaccine 10/28/2014, 10/14/2015, 01/24/2022    Influenza, Quadv, IM, PF (6 mo and older Fluzone, Flulaval, Fluarix, and 3 yrs and older Afluria) 10/28/2014, 09/18/2017, 12/18/2018, 09/05/2019, 11/24/2020    MMR 06/18/1983, 05/09/1994    Pneumococcal Conjugate 13-valent (Eldonna Mort) 10/14/2015    Polio OPV 1981, 02/20/1982, 04/24/1982, 11/23/1984    Rho (D) Immune Globulin 07/10/2012, 09/09/2012    Td (Adult), 5 Lf Tetanus Toxoid, Pf (Tenivac, Decavac) 05/09/1994    Td, unspecified formulation 06/24/2004    Tdap (Boostrix, Adacel) 1981, 02/20/1982, 04/24/1982, 11/23/1984, 11/29/2012       Allergies   Allergen Reactions    Tramadol Other (See Comments)     severe  SEVERE HEADACHE    Lisinopril Other (See Comments)     COUGH       Review of Systems   Constitutional: Negative for activity change, fever and unexpected weight change. Respiratory: Negative for shortness of breath. Cardiovascular: Negative for chest pain. Gastrointestinal: Negative for nausea. Musculoskeletal: Positive for arthralgias and neck pain. Skin: Positive for color change and wound. Negative for pallor and rash. Allergic/Immunologic: Negative for immunocompromised state. Hematological: Negative for adenopathy. BP (!) 160/98 (Site: Right Upper Arm, Position: Sitting, Cuff Size: Medium Adult)   Pulse 81   Wt 211 lb 3.2 oz (95.8 kg)   LMP 01/17/2022   SpO2 91%   BMI 34.09 kg/m²     Physical Exam  Vitals and nursing note reviewed. Constitutional:       General: She is not in acute distress. Appearance: She is well-developed. She is not diaphoretic.    HENT:      Head: Normocephalic and atraumatic. Eyes:      Pupils: Pupils are equal, round, and reactive to light. Pulmonary:      Effort: No respiratory distress. Musculoskeletal:      Cervical back: Normal range of motion and neck supple. Skin:     General: Skin is warm and dry. Capillary Refill: Capillary refill takes 2 to 3 seconds. Coloration: Skin is not pale. Findings: Erythema and lesion present. No rash. Neurological:      Mental Status: She is alert and oriented to person, place, and time. Psychiatric:         Behavior: Behavior normal.         Judgment: Judgment normal.                 Plan  1. Keloid  ?element of seroma  Given pain and location, will refer to discuss options for potential removal   - Miguel Quevedo MD, Orthopedic Surgery, [facility]    2. Epidermoid cyst  Given location and pain/headaches, pt wishes to discuss removal   - Sarah Virgen MD, General Surgery, [facility]    3. Slow transit constipation  - sennosides-docusate sodium (SENOKOT-S) 8.6-50 MG tablet; Take 2 tablets by mouth daily  Dispense: 60 tablet; Refill: 2  - polyethylene glycol (GLYCOLAX) 17 GM/SCOOP powder; Take 17 g by mouth daily as needed (constipation)  Dispense: 1530 g; Refill: 1        While assessing care for this patient, I have reviewed all pertinent lab work/imaging/ specialist notes and care in reference to those problems addressed above in detail. Appropriate medical decision making was based on this. Please note that portions of this note may have been completed with a voice recognition program. Efforts were made to edit the dictations but occasionally words are mis-transcribed. Return if symptoms worsen or fail to improve.

## 2022-01-31 NOTE — TELEPHONE ENCOUNTER
Refill Request     Last Seen: Last Seen Department: 12/9/2021  Last Seen by PCP: 7/27/21     Last Written: 11/1/21 90 capsule 0 refill     Next Appointment: 1/31/22     Future Appointments   Date Time Provider Clifford Ana   1/31/2022  8:45 AM MD JAN Barrera  [place] [facility]       Future appointment scheduled      Requested Prescriptions     Pending Prescriptions Disp Refills    DULoxetine (CYMBALTA) 60 MG extended release capsule [Pharmacy Med Name: Duloxetine 60mg Delayed-Release Capsule] 90 capsule 0     Sig: Take 1 capsule by mouth every day.

## 2022-02-11 ENCOUNTER — INITIAL CONSULT (OUTPATIENT)
Dept: SURGERY | Age: 42
End: 2022-02-11
Payer: COMMERCIAL

## 2022-02-11 ENCOUNTER — OFFICE VISIT (OUTPATIENT)
Dept: ORTHOPEDIC SURGERY | Age: 42
End: 2022-02-11
Payer: COMMERCIAL

## 2022-02-11 VITALS — WEIGHT: 211 LBS | BODY MASS INDEX: 33.91 KG/M2 | HEIGHT: 66 IN

## 2022-02-11 VITALS
WEIGHT: 212.2 LBS | BODY MASS INDEX: 34.1 KG/M2 | DIASTOLIC BLOOD PRESSURE: 101 MMHG | HEART RATE: 79 BPM | TEMPERATURE: 97.6 F | SYSTOLIC BLOOD PRESSURE: 160 MMHG | HEIGHT: 66 IN

## 2022-02-11 DIAGNOSIS — M79.641 PAIN OF RIGHT HAND: ICD-10-CM

## 2022-02-11 DIAGNOSIS — L72.3 SEBACEOUS CYST: Primary | ICD-10-CM

## 2022-02-11 DIAGNOSIS — L91.0 KELOID SCAR: Primary | ICD-10-CM

## 2022-02-11 PROCEDURE — 1036F TOBACCO NON-USER: CPT | Performed by: SURGERY

## 2022-02-11 PROCEDURE — G8417 CALC BMI ABV UP PARAM F/U: HCPCS | Performed by: ORTHOPAEDIC SURGERY

## 2022-02-11 PROCEDURE — 1036F TOBACCO NON-USER: CPT | Performed by: ORTHOPAEDIC SURGERY

## 2022-02-11 PROCEDURE — G8427 DOCREV CUR MEDS BY ELIG CLIN: HCPCS | Performed by: ORTHOPAEDIC SURGERY

## 2022-02-11 PROCEDURE — G8427 DOCREV CUR MEDS BY ELIG CLIN: HCPCS | Performed by: SURGERY

## 2022-02-11 PROCEDURE — G8484 FLU IMMUNIZE NO ADMIN: HCPCS | Performed by: SURGERY

## 2022-02-11 PROCEDURE — 99203 OFFICE O/P NEW LOW 30 MIN: CPT | Performed by: ORTHOPAEDIC SURGERY

## 2022-02-11 PROCEDURE — G8417 CALC BMI ABV UP PARAM F/U: HCPCS | Performed by: SURGERY

## 2022-02-11 PROCEDURE — G8484 FLU IMMUNIZE NO ADMIN: HCPCS | Performed by: ORTHOPAEDIC SURGERY

## 2022-02-11 PROCEDURE — 99203 OFFICE O/P NEW LOW 30 MIN: CPT | Performed by: SURGERY

## 2022-02-11 NOTE — PATIENT INSTRUCTIONS
[address] [address]  Phone: [phone]  Fax: [phone] will be scheduled for surgery with Dr. Nicanor Nguyen. · The office will call you with the date and time that surgery is scheduled. · Please take note of these instructions for surgery:  · You should have nothing by mouth after midnight the night before your surgery - this includes no food or water. · Your surgery will be cancelled if you have taken anything by mouth after midnight, NO exceptions. · You will need to have a history and physical prior to your surgery. This will need to be completed up to 30 days before your surgery. This H/P can be completed by your family doctor or the hospital.   · IF you take coumadin (warfarin), please stop taking this medication 5 days prior to your surgery. · IF you take plavix, please stop taking this 7 days prior to your surgery. · Please contact our office if you have a pacemaker or defibrillator. · IF you are allergic to latex, please tell our office prior to your surgery. This is important in know before scheduling your surgery. · IF you are having an out patient surgery, you will need someone available to drive you home after your surgery, and to also stay with you for the rest of the day. · IF you are having a surgery requiring an inpatient stay in the hospital, you will need someone to drive you home upon discharge from the hospital.  · Please contact Dr. Russell Morejon assistant Amber Saeed if you have any questions or concerns. · Please call the office with any changes in your symptoms or further questions/concerns.  [phone]

## 2022-02-11 NOTE — PROGRESS NOTES
New Patient [address] Surgery Clarisse ORTIZ. [address] [facility], [address] [facility], [address] [facility], [address]  Phone: [phone]  Fax: [phone]  [address]   YOB: 1980    Date of Visit:  2/11/2022    Mirela Santillan DO    HPI:    Patient complains of a mass of the posterior neck near scalp line. The mass has been present for several years. The mass has not changed in a few weeks. Symptoms associated  are: increasing diameter, tendency to be traumatized, pain. Patient denies bleeding, drainage. Allergies   Allergen Reactions    Tramadol Other (See Comments)     severe  SEVERE HEADACHE    Lisinopril Other (See Comments)     COUGH     Outpatient Medications Marked as Taking for the 2/11/22 encounter (Initial consult) with Joon Adkins MD   Medication Sig Dispense Refill    DULoxetine (CYMBALTA) 60 MG extended release capsule Take 1 capsule by mouth every day. 90 capsule 0    sennosides-docusate sodium (SENOKOT-S) 8.6-50 MG tablet Take 2 tablets by mouth daily 60 tablet 2    polyethylene glycol (GLYCOLAX) 17 GM/SCOOP powder Take 17 g by mouth daily as needed (constipation) 1530 g 1    cimetidine (TAGAMET) 400 MG tablet Take 1 tablet by mouth twice daily. 180 tablet 1    losartan-hydroCHLOROthiazide (HYZAAR) 100-25 MG per tablet TAKE ONE TABLET BY MOUTH DAILY FOR HIGH BLOOD PRESSURE 90 tablet 0    ondansetron (ZOFRAN ODT) 4 MG disintegrating tablet Take 1 tablet by mouth every 8 hours as needed for Nausea 20 tablet 0    fluticasone (FLONASE) 50 MCG/ACT nasal spray Spray 1 spray into Each Nostril Every Day 3 each 1    Calcium Citrate-Vitamin D 200-250 MG-UNIT TABS Take 1 tablet by mouth twice daily.  60 tablet 0    terbinafine (LAMISIL) 250 MG tablet Take 250 mg by mouth daily      Multiple Vitamins-Minerals (THERAPEUTIC MULTIVITAMIN-MINERALS W/ IRON) TABS tablet Take 1 tablet by mouth daily      blood glucose monitor kit and supplies Dispense sufficient amount for indicated testing frequency plus additional to accommodate PRN testing needs. Dispense all needed supplies to include: monitor, strips, lancing device, lancets, control solutions, alcohol swabs. 1 kit 0    metFORMIN (GLUCOPHAGE-XR) 750 MG extended release tablet TAKE 2 TABLETS BY MOUTH EVERY MORNING 60 tablet 2    Fe Fum-Fe Poly-Vit C-Lactobac (FUSION PO) Take by mouth As ordered by Dr Joselin De La Cruz hydrOXYzine (ATARAX) 50 MG tablet 1 twice daily as needed anxiety.  30 tablet 1    levonorgestrel-ethinyl estradiol (Patriciaann Rubinstein) 0.15-0.03 MG per tablet Take 1 tablet by mouth         Past Medical History:   Diagnosis Date    Anxiety     Arthritis     Cervical pain     Chronic back pain     Depression     Diabetes mellitus ([address]     GERD (gastroesophageal reflux disease)     Hyperlipidemia     Hypertension     Infertility     took fertility medication    Kidney stone 03/2012    Migraine     Miscarriage     Polycystic ovarian syndrome 1999    PONV (postoperative nausea and vomiting)     Psoriasis     Stomach ulcer     Unspecified sleep apnea     cpap     Past Surgical History:   Procedure Laterality Date    BREAST SURGERY Right     for cellulitis     CHOLECYSTECTOMY  2011    [facility]    COLONOSCOPY N/A 6/18/2020    COLONOSCOPY WITH BIOPSY performed by Ellie Dave MD at [facility] [address] N/A 6/18/2020    COLONOSCOPY POLYPECTOMY SNARE performed by Ellie Dave MD at [address]     D & C/missed ab    SKIN BIOPSY      for psoriasis    SLEEVE GASTRECTOMY N/A 12/23/2020    LAPAROSCOPIC SLEEVE GASTRECTOMY -  ETHICON performed by Deanna Corey MD at [address]  08/09/2016    tonsillectomy    UPPER GASTROINTESTINAL ENDOSCOPY  3/23/11    pyloretic    UPPER GASTROINTESTINAL ENDOSCOPY  10/7/2015    gastritis-[place] [address]    UPPER GASTROINTESTINAL ENDOSCOPY N/A 6/18/2020    EGD DIAGNOSTIC ONLY performed by Jolly Dakin, MD at [address] N/A 8/28/2020    EGD BIOPSY performed by Xin Hopkins MD at [address] History   Problem Relation Age of Onset    Arthritis Mother    [de-identified] / [place] Mother     High Blood Pressure Mother     Depression Mother     High Cholesterol Mother     Arthritis Father     High Blood Pressure Father     Cancer Father         colon    Hearing Loss Father     Heart Disease Father     High Cholesterol Father     High Blood Pressure Brother     High Cholesterol Brother     High Blood Pressure Maternal Aunt     High Cholesterol Maternal Aunt     Miscarriages / Stillbirths Maternal Aunt     Mental Retardation Maternal Uncle     High Blood Pressure Maternal Uncle     High Cholesterol Maternal Uncle     High Blood Pressure Paternal Aunt     High Cholesterol Paternal Aunt     Arthritis Paternal Uncle     Mental Illness Paternal Uncle     High Blood Pressure Paternal Uncle     Birth Defects Paternal Uncle     High Cholesterol Paternal Uncle     Learning Disabilities Paternal Uncle     Arthritis Maternal Grandmother     High Blood Pressure Maternal Grandmother     Diabetes Maternal Grandmother     High Cholesterol Maternal Grandmother     Miscarriages / Stillbirths Maternal Grandmother     Arthritis Maternal Grandfather     High Blood Pressure Maternal Grandfather     High Cholesterol Maternal Grandfather     Arthritis Paternal Grandmother     High Blood Pressure Paternal Grandmother     Heart Disease Paternal Grandmother     High Cholesterol Paternal Grandmother    [de-identified] / Stillbirths Paternal Grandmother     Stroke Paternal Grandmother     High Blood Pressure Paternal Grandfather     High Cholesterol Paternal Grandfather      Social History     Socioeconomic History    Marital status:      Spouse name: Fernando Kan    Number of children: 3    Years of education: 12    Highest education level: Not on file   Occupational History    Not on file   Tobacco Use    Smoking status: Former Smoker     Packs/day: 1.00     Years: 20.00     Pack years: 20.00     Types: Cigarettes     Quit date: 2019     Years since quittin.8    Smokeless tobacco: Never Used   Vaping Use    Vaping Use: Never used   Substance and Sexual Activity    Alcohol use: Not Currently     Alcohol/week: 0.0 standard drinks    Drug use: Not Currently     Frequency: 4.0 times per week     Types: Marijuana (Weed)     Comment: marijuana- last use was Sept 1    Sexual activity: Yes     Partners: Male   Other Topics Concern    Not on file   Social History Narrative    Not on file     Social Determinants of Health     Financial Resource Strain: Low Risk     Difficulty of Paying Living Expenses: Not hard at all   Food Insecurity: No Food Insecurity    Worried About Running Out of Food in the Last Year: Never true    Lisa of Food in the Last Year: Never true   Transportation Needs: No Transportation Needs    Lack of Transportation (Medical): No    Lack of Transportation (Non-Medical):  No   Physical Activity:     Days of Exercise per Week: Not on file    Minutes of Exercise per Session: Not on file   Stress:     Feeling of Stress : Not on file   Social Connections:     Frequency of Communication with Friends and Family: Not on file    Frequency of Social Gatherings with Friends and Family: Not on file    Attends Sikhism Services: Not on file    Active Member of Clubs or Organizations: Not on file    Attends Club or Organization Meetings: Not on file    Marital Status: Not on file   Intimate Partner Violence:     Fear of Current or Ex-Partner: Not on file    Emotionally Abused: Not on file    Physically Abused: Not on file    Sexually Abused: Not on file   Housing Stability: [address]  Unable to Pay for Housing in the Last Year: No    Number of Places Lived in the Last Year: 1    Unstable Housing in the Last Year: No          A review of the patient's record including allergies, medication list, tobacco history, family history, problem list, medical history and social history has been completed and updates made to the patient's EMR where indicated. Vitals:    02/11/22 1531 02/11/22 1534   BP: (!) 183/113 (!) 160/101   Site: Left Wrist Right Wrist   Position: Sitting Sitting   Cuff Size: Medium Adult Medium Adult   Pulse: 87 79   Temp: 97.6 °F (36.4 °C)    Weight: 212 lb 3.2 oz (96.3 kg)    Height: 5' 5.98\" (1.676 m)      Body mass index is 34.27 kg/m². Wt Readings from Last 3 Encounters:   02/11/22 212 lb 3.2 oz (96.3 kg)   02/11/22 211 lb (95.7 kg)   01/31/22 211 lb 3.2 oz (95.8 kg)     BP Readings from Last 3 Encounters:   02/11/22 (!) 160/101   01/31/22 (!) 160/98   12/27/21 (!) 157/104          REVIEW OF SYSTEMS:   · All other systems reviewed; please refer to HPI with pertinent positives, all other ROS are negative    PHYSICAL EXAM:    CONSTITUTIONAL:  awake, alert, no apparent distress and mildly obese  ENT:  normocepalic, without obvious abnormality  NECK:  supple, symmetrical, trachea midline   LUNGS:  Resp easy and unlabored  CARDIOVASCULAR:     ABDOMEN: soft, non-distended, non-tender  MUSCULOSKELETAL: No edema  NEUROLOGIC:  Mental Status Exam:  Level of Alertness:   awake  Orientation:   person, place, time    A mass of size 1.5x2 cm is felt in the posterior midline neck, firm, non-tender, mobile, no erythema. DATA:       ASSESSMENT:     Diagnosis Orders   1. Sebaceous cyst           PLAN:    We will schedule the pt for cyst excision under local anesthesia . The technical aspects, risks, benefits and complications of the procedure were discussed with the patient. The pt appears to understand, asks appropriate questions, and agrees to proceed with the procedure. Shavon Anthony MD

## 2022-02-11 NOTE — PROGRESS NOTES
ORTHOPAEDIC SURGERY INITIAL EVALUATION NOTE  Chief Complaint   Patient presents with    New Patient     12/4 cut her thumb now has scar tissue bump       HISTORY OF PRESENT ILLNESS:  28-year-old right-hand-dominant female presents for evaluation of her right hand. She sustained a laceration to the distal phalanx of her thumb on 12/4/2021. It has been 2-1/2 months since the injury. She states that it has healed with a large raised lump. She feels as though the area is tender to palpation and she frequently bumps it on things. Her pain is 0 out of 10 at rest.  She has moderate soreness when it is bumped. She has not had any fevers, chills, night sweats. She denies numbness or tingling. She denies any loss of range of motion or restriction. She has had prior surgery and wounds before and not had her wounds heal in this manner. Past Medical History:   Diagnosis Date    Anxiety     Arthritis     Cervical pain     Chronic back pain     Depression     Diabetes mellitus (HCC)     GERD (gastroesophageal reflux disease)     Hyperlipidemia     Hypertension     Infertility     took fertility medication    Kidney stone 03/2012    Migraine     Miscarriage     Polycystic ovarian syndrome 1999    PONV (postoperative nausea and vomiting)     Psoriasis     Stomach ulcer     Unspecified sleep apnea     cpap       Current Outpatient Medications   Medication Sig Dispense Refill    DULoxetine (CYMBALTA) 60 MG extended release capsule Take 1 capsule by mouth every day. 90 capsule 0    sennosides-docusate sodium (SENOKOT-S) 8.6-50 MG tablet Take 2 tablets by mouth daily 60 tablet 2    polyethylene glycol (GLYCOLAX) 17 GM/SCOOP powder Take 17 g by mouth daily as needed (constipation) 1530 g 1    cimetidine (TAGAMET) 400 MG tablet Take 1 tablet by mouth twice daily.  180 tablet 1    losartan-hydroCHLOROthiazide (HYZAAR) 100-25 MG per tablet TAKE ONE TABLET BY MOUTH DAILY FOR HIGH BLOOD PRESSURE 90 tablet 0 GASTROINTESTINAL ENDOSCOPY  3/23/11    pyloretic    UPPER GASTROINTESTINAL ENDOSCOPY  10/7/2015    [facility]    UPPER GASTROINTESTINAL ENDOSCOPY N/A 6/18/2020    EGD DIAGNOSTIC ONLY performed by Tena Cruz MD at [place] [id number] N/A 8/28/2020    EGD BIOPSY performed by Elizabeth Hernandez MD at [address] [place] [address]   Allergen Reactions    Tramadol Other (See Comments)     severe  SEVERE HEADACHE    Lisinopril Other (See Comments)     COUGH       Family History   Problem Relation Age of Onset    Arthritis Mother     Miscarriages / [place] Mother     High Blood Pressure Mother     Depression Mother     High Cholesterol Mother     Arthritis Father     High Blood Pressure Father     Cancer Father         colon    Hearing Loss Father     Heart Disease Father     High Cholesterol Father     High Blood Pressure Brother     High Cholesterol Brother     High Blood Pressure Maternal Aunt     High Cholesterol Maternal Aunt     Miscarriages / Stillbirths Maternal Aunt     Mental Retardation Maternal Uncle     High Blood Pressure Maternal Uncle     High Cholesterol Maternal Uncle     High Blood Pressure Paternal Aunt     High Cholesterol Paternal Aunt     Arthritis Paternal Uncle     Mental Illness Paternal Uncle     High Blood Pressure Paternal Uncle     Birth Defects Paternal Uncle     High Cholesterol Paternal Uncle     Learning Disabilities Paternal Uncle     Arthritis Maternal Grandmother     High Blood Pressure Maternal Grandmother     Diabetes Maternal Grandmother     High Cholesterol Maternal Grandmother     Miscarriages / Stillbirths Maternal Grandmother     Arthritis Maternal Grandfather     High Blood Pressure Maternal Grandfather     High Cholesterol Maternal Grandfather     Arthritis Paternal Grandmother     High Blood Pressure Paternal Grandmother     Heart Disease Paternal Grandmother     High Cholesterol Paternal Grandmother     Miscarriages / Stillbirths Paternal Grandmother     Stroke Paternal Grandmother     High Blood Pressure Paternal Grandfather     High Cholesterol Paternal Grandfather        Social History     Socioeconomic History    Marital status:      Spouse name: Jacquelyn Mcgarry    Number of children: 3    Years of education: 12    Highest education level: Not on file   Occupational History    Not on file   Tobacco Use    Smoking status: Former Smoker     Packs/day: 1.00     Years: 20.00     Pack years: 20.00     Types: Cigarettes     Quit date: 2019     Years since quittin.8    Smokeless tobacco: Never Used   Vaping Use    Vaping Use: Never used   Substance and Sexual Activity    Alcohol use: Not Currently     Alcohol/week: 0.0 standard drinks    Drug use: Not Currently     Frequency: 4.0 times per week     Types: Marijuana (Weed)     Comment: marijuana- last use was     Sexual activity: Yes     Partners: Male   Other Topics Concern    Not on file   Social History Narrative    Not on file     Social Determinants of Health     Financial Resource Strain: Low Risk     Difficulty of Paying Living Expenses: Not hard at all   Food Insecurity: No Food Insecurity    Worried About Running Out of Food in the Last Year: Never true    Lisa of Food in the Last Year: Never true   Transportation Needs: No Transportation Needs    Lack of Transportation (Medical): No    Lack of Transportation (Non-Medical):  No   Physical Activity:     Days of Exercise per Week: Not on file    Minutes of Exercise per Session: Not on file   Stress:     Feeling of Stress : Not on file   Social Connections:     Frequency of Communication with Friends and Family: Not on file    Frequency of Social Gatherings with Friends and Family: Not on file    Attends Sabianist Services: Not on file    Active Member of Clubs or Organizations: Not on file    Attends Club or Organization Meetings: Not on file    Marital Status: Not on file   Intimate Partner Violence:     Fear of Current or Ex-Partner: Not on file    Emotionally Abused: Not on file    Physically Abused: Not on file    Sexually Abused: Not on file   Housing Stability: [address] Unable to Pay for Housing in the Last Year: No    Number of [place] in the Last Year: 1    Unstable Housing in the Last Year: No     Review of Systems: Please see today's intake form for complete review of systems. PHYSICAL EXAM  Gen: awake, alert, oriented  HEENT: atraumatic, normocephalic  Neck: supple  Resp: equal chest rise bilaterally, no audible wheezes, no accessory muscle use. CV: Lower extremities warm and well perfused. Abd: soft, nontender   Focused examination of the right thumb: There is a 3 mm x 3 mm circular area over the radial aspect of the thumb distal phalanx just distal to the IP joint. It is in a more dorsal location. There is no surrounding erythema. There is tenderness to palpation directly in this area. It does not transilluminate. There is no limitation to range of motion of the IP joint. There is no ligamentous instability appreciated to stress exam of radial or ulnar collateral ligament. Sensation is intact to light touch on radial and ulnar aspects of the thumb. There is brisk capillary refill to the finger. LABS  Lab Results   Component Value Date/Time    LABA1C 5.4 09/08/2021 08:36 AM    CRP 6.9 (H) 06/16/2020 12:43 PM    JEN Negative 05/29/2020 11:35 AM    RF <10.0 05/29/2020 11:35 AM    LABALBU 4.2 09/08/2021 08:36 AM    INR 0.94 07/21/2020 01:06 PM      RADIOGRAPHIC EXAM:  3 views of the right hand including PA, oblique, and lateral projections demonstrate no evidence of fracture or dislocation. Overall alignment is anatomic. There is no significant degenerative changes.   There is soft tissue prominence in the area of question consistent with clinical exam over the dorsal/radial aspect of the thumb overlying the IP joint. ASSESSEMENT AND PLAN    37 y.o. female with the following orthopaedic surgery problems:    1. Right thumb wound with keloid formation    I do believe that this represents keloid formation from her wound. I discussed that this could be managed with conservative care. It may become pain-free over time. I counseled her that in many cases a keloid can be just a cosmetic concern. It does bother her at times with use of the thumb. We did discuss the possibility of surgical excision. I advised her that surgical excision can result in repeat keloid formation if this is the case. If it is some other mass, it could be excised. Given its location, there is minimal soft tissue available for closure. I recommended that she see a hand surgeon in the event that flap coverage may be necessary after complete excision. A referral was subsequently provided.   Esperanza Bower MD

## 2022-02-14 ENCOUNTER — TELEPHONE (OUTPATIENT)
Dept: SURGERY | Age: 42
End: 2022-02-14

## 2022-02-17 ENCOUNTER — OFFICE VISIT (OUTPATIENT)
Dept: FAMILY MEDICINE CLINIC | Age: 42
End: 2022-02-17
Payer: COMMERCIAL

## 2022-02-17 VITALS
SYSTOLIC BLOOD PRESSURE: 140 MMHG | HEART RATE: 98 BPM | WEIGHT: 209.8 LBS | OXYGEN SATURATION: 100 % | DIASTOLIC BLOOD PRESSURE: 88 MMHG | BODY MASS INDEX: 33.88 KG/M2

## 2022-02-17 DIAGNOSIS — L65.9 HAIR LOSS: ICD-10-CM

## 2022-02-17 DIAGNOSIS — I10 PRIMARY HYPERTENSION: Primary | ICD-10-CM

## 2022-02-17 PROCEDURE — 99214 OFFICE O/P EST MOD 30 MIN: CPT | Performed by: FAMILY MEDICINE

## 2022-02-17 PROCEDURE — G8484 FLU IMMUNIZE NO ADMIN: HCPCS | Performed by: FAMILY MEDICINE

## 2022-02-17 PROCEDURE — G8417 CALC BMI ABV UP PARAM F/U: HCPCS | Performed by: FAMILY MEDICINE

## 2022-02-17 PROCEDURE — G8427 DOCREV CUR MEDS BY ELIG CLIN: HCPCS | Performed by: FAMILY MEDICINE

## 2022-02-17 PROCEDURE — 1036F TOBACCO NON-USER: CPT | Performed by: FAMILY MEDICINE

## 2022-02-17 RX ORDER — SPIRONOLACTONE 50 MG/1
50 TABLET, FILM COATED ORAL DAILY
Qty: 30 TABLET | Refills: 1 | Status: SHIPPED | OUTPATIENT
Start: 2022-02-17 | End: 2022-03-09 | Stop reason: SDUPTHER

## 2022-02-17 RX ORDER — BUSPIRONE HYDROCHLORIDE 10 MG/1
10 TABLET ORAL 2 TIMES DAILY
Qty: 60 TABLET | Refills: 0 | Status: SHIPPED | OUTPATIENT
Start: 2022-02-17 | End: 2022-03-08 | Stop reason: SDUPTHER

## 2022-02-17 RX ORDER — LOSARTAN POTASSIUM AND HYDROCHLOROTHIAZIDE 12.5; 5 MG/1; MG/1
1 TABLET ORAL DAILY
Qty: 30 TABLET | Refills: 1 | Status: SHIPPED | OUTPATIENT
Start: 2022-02-17 | End: 2022-03-04

## 2022-02-17 NOTE — PROGRESS NOTES
2/17/2022    This is a 37 y.o. female who presents for  Chief Complaint   Patient presents with    Hypertension     medications       HPI:     HTN:  Taking medication(s) daily  Checking Bps at home? intermittently  No new AEs to the medication(s)  No acute concerning Sxs: No CP, SOB, palpitations, dizziness, HA, etc.     Was rec'd to start spironlactone by her dermatologist for chronic hair loss, but deferred to PCP. Pt very interested.  Has tried biotin, has not tried rogaine       Past Medical History:   Diagnosis Date    Anxiety     Arthritis     Cervical pain     Chronic back pain     Depression     Diabetes mellitus ([address]     GERD (gastroesophageal reflux disease)     Hyperlipidemia     Hypertension     Infertility     took fertility medication    Kidney stone 03/2012    Migraine     Miscarriage     Polycystic ovarian syndrome 1999    PONV (postoperative nausea and vomiting)     Psoriasis     Stomach ulcer     Unspecified sleep apnea     cpap       Past Surgical History:   Procedure Laterality Date    BREAST SURGERY Right     for cellulitis     CHOLECYSTECTOMY  2011    COLONOS[facility]  [facility]ps    COLONOSCOPY N/A 6/18/2020    COLONOSCOPY WITH BIOPSY performed by Tena Cruz MD at [place] [address] N/A 6/18/2020    COLONOSCOPY POLYPECTOMY SNARE performed by Tena Cruz MD at [address] [place] [address]     D & C/missed ab    SKIN BIOPSY      for psoriasis    SLEEVE GASTRECTOMY N/A 12/23/2020    LAPAROSCOPIC SLEEVE GASTRECTOMY -  ETHICON performed by Elizabeth Hernandez MD at [place] [address]  08/09/2016    tonsillectomy    UPPER GASTROINTESTINAL ENDOSCOPY  3/23/11    pyloretic    UPPER GASTROINTESTINAL ENDOSCOPY  10/7/2015    [facility]ast[facility]    UPPER GASTROINTESTINAL ENDOSCOPY N/A 6/18/2020    EGD DIAGNOSTIC ONLY performed by [facility]ena [facility] MD at [facility]    UPPER GASTROINTESTINAL ENDOSCOPY N/A 2020    EGD BIOPSY performed by Curry Olmos MD at [facility] [address] Marital status:      Spouse name: Ramya Hartman    Number of children: 3    Years of education: 12    Highest education level: Not on file   Occupational History    Not on file   Tobacco Use    Smoking status: Former Smoker     Packs/day: 1.00     Years: 20.00     Pack years: 20.00     Types: Cigarettes     Quit date: 2019     Years since quittin.8    Smokeless tobacco: Never Used   Vaping Use    Vaping Use: Never used   Substance and Sexual Activity    Alcohol use: Not Currently     Alcohol/week: 0.0 standard drinks    Drug use: Not Currently     Frequency: 4.0 times per week     Types: Marijuana (Weed)     Comment: marijuana- last use was     Sexual activity: Yes     Partners: Male   Other Topics Concern    Not on file   Social History Narrative    Not on file     Social Determinants of Health     Financial Resource Strain: Low Risk     Difficulty of Paying Living Expenses: Not hard at all   Food Insecurity: No Food Insecurity    Worried About Running Out of Food in the Last Year: Never true    Lisa of Food in the Last Year: Never true   Transportation Needs: No Transportation Needs    Lack of Transportation (Medical): No    Lack of Transportation (Non-Medical):  No   Physical Activity:     Days of Exercise per Week: Not on file    Minutes of Exercise per Session: Not on file   Stress:     Feeling of Stress : Not on file   Social Connections:     Frequency of Communication with Friends and Family: Not on file    Frequency of Social Gatherings with Friends and Family: Not on file    Attends Oriental orthodox Services: Not on file    Active Member of Clubs or Organizations: Not on file    Attends Club or Organization Meetings: Not on file    Marital Status: Not on file   Intimate Partner Violence:  Fear of Current or Ex-Partner: Not on file    Emotionally Abused: Not on file    Physically Abused: Not on file    Sexually Abused: Not on file   Housing Stability: [address] Unable to Pay for Housing in the Last Year: No    Number of Places Lived in the Last Year: 1    Unstable Housing in the Last Year: No       Family History   Problem Relation Age of Onset   [facility] Arthritis Mother     Miscarriages / [place] Mother     High Blood Pressure Mother     Depression Mother     High Cholesterol Mother     Arthritis Father     High Blood Pressure Father     Cancer Father         colon    Hearing Loss Father     Heart Disease Father     High Cholesterol Father     High Blood Pressure Brother     High Cholesterol Brother     High Blood Pressure Maternal Aunt     High Cholesterol Maternal Aunt     Miscarriages / Stillbirths Maternal Aunt     Mental Retardation Maternal Uncle     High Blood Pressure Maternal Uncle     High Cholesterol Maternal Uncle     High Blood Pressure Paternal Aunt     High Cholesterol Paternal Aunt     Arthritis Paternal Uncle     Mental Illness Paternal Uncle     High Blood Pressure Paternal Uncle     Birth Defects Paternal Uncle     High Cholesterol Paternal Uncle     Learning Disabilities Paternal Uncle     Arthritis Maternal Grandmother     High Blood Pressure Maternal Grandmother     Diabetes Maternal Grandmother     High Cholesterol Maternal Grandmother     Miscarriages / Stillbirths Maternal Grandmother     Arthritis Maternal Grandfather     High Blood Pressure Maternal Grandfather     High Cholesterol Maternal Grandfather     Arthritis Paternal Grandmother     High Blood Pressure Paternal Grandmother     Heart Disease Paternal Grandmother     High Cholesterol Paternal Grandmother    Nenita Moles / Stillbirths Paternal Grandmother     Stroke Paternal Grandmother     High Blood Pressure Paternal Grandfather     High Cholesterol Paternal Grandfather        Current Outpatient Medications   Medication Sig Dispense Refill    estrogens, conjugated, (PREMARIN) 0.3 MG tablet Take 0.3 mg by mouth daily      losartan-hydroCHLOROthiazide (HYZAAR) 50-12.5 MG per tablet Take 1 tablet by mouth daily 30 tablet 1    spironolactone (ALDACTONE) 50 MG tablet Take 1 tablet by mouth daily 30 tablet 1    busPIRone (BUSPAR) 10 MG tablet Take 1 tablet by mouth 2 times daily 60 tablet 0    DULoxetine (CYMBALTA) 60 MG extended release capsule Take 1 capsule by mouth every day. 90 capsule 0    sennosides-docusate sodium (SENOKOT-S) 8.6-50 MG tablet Take 2 tablets by mouth daily 60 tablet 2    polyethylene glycol (GLYCOLAX) 17 GM/SCOOP powder Take 17 g by mouth daily as needed (constipation) 1530 g 1    cimetidine (TAGAMET) 400 MG tablet Take 1 tablet by mouth twice daily. 180 tablet 1    ondansetron (ZOFRAN ODT) 4 MG disintegrating tablet Take 1 tablet by mouth every 8 hours as needed for Nausea 20 tablet 0    fluticasone (FLONASE) 50 MCG/ACT nasal spray Spray 1 spray into Each Nostril Every Day 3 each 1    Calcium Citrate-Vitamin D 200-250 MG-UNIT TABS Take 1 tablet by mouth twice daily. 60 tablet 0    terbinafine (LAMISIL) 250 MG tablet Take 250 mg by mouth daily      Multiple Vitamins-Minerals (THERAPEUTIC MULTIVITAMIN-MINERALS W/ IRON) TABS tablet Take 1 tablet by mouth daily      blood glucose monitor kit and supplies Dispense sufficient amount for indicated testing frequency plus additional to accommodate PRN testing needs. Dispense all needed supplies to include: monitor, strips, lancing device, lancets, control solutions, alcohol swabs. 1 kit 0    metFORMIN (GLUCOPHAGE-XR) 750 MG extended release tablet TAKE 2 TABLETS BY MOUTH EVERY MORNING 60 tablet 2    Fe Fum-Fe Poly-Vit C-Lactobac (FUSION PO) Take by mouth As ordered by Dr Phyllis Mix hydrOXYzine (ATARAX) 50 MG tablet 1 twice daily as needed anxiety.  30 tablet 1    levonorgestrel-ethinyl estradiol (JOLESSA) 0.15-0.03 MG per tablet Take 1 tablet by mouth (Patient not taking: Reported on 2/17/2022)       No current facility-administered medications for this visit. Immunization History   Administered Date(s) Administered    COVID-19, Odell Gaines, Primary or Immunocompromised, PF, 100mcg/0.5mL 01/24/2022    DTP 1981, 02/20/1982, 04/24/1982, 11/23/1984    Influenza 11/29/2012    Influenza Virus Vaccine 10/28/2014, 10/14/2015, 01/24/2022    Influenza, Quadv, IM, PF (6 mo and older Fluzone, Flulaval, Fluarix, and 3 yrs and older Afluria) 10/28/2014, 09/18/2017, 12/18/2018, 09/05/2019, 11/24/2020    MMR 06/18/1983, 05/09/1994    Pneumococcal Conjugate 13-valent (Wynne Clear) 10/14/2015    Polio OPV 1981, 02/20/1982, 04/24/1982, 11/23/1984    Rho (D) Immune Globulin 07/10/2012, 09/09/2012    Td (Adult), 5 Lf Tetanus Toxoid, Pf (Tenivac, Decavac) 05/09/1994    Td, unspecified formulation 06/24/2004    Tdap (Boostrix, Adacel) 1981, 02/20/1982, 04/24/1982, 11/23/1984, 11/29/2012       Allergies   Allergen Reactions    Tramadol Other (See Comments)     severe  SEVERE HEADACHE    Lisinopril Other (See Comments)     COUGH       Review of Systems   Constitutional: Negative for activity change, appetite change, chills, diaphoresis, fatigue and fever. Eyes: Negative for visual disturbance. Respiratory: Negative for chest tightness and shortness of breath. Cardiovascular: Negative for chest pain, palpitations and leg swelling. Gastrointestinal: Negative for abdominal pain, nausea and vomiting. Genitourinary: Negative for decreased urine volume. Skin: Negative for color change. Neurological: Negative for dizziness, weakness, light-headedness, numbness and headaches.        BP (!) 140/88 (Site: Right Upper Arm, Position: Sitting, Cuff Size: Large Adult)   Pulse 98   Wt 209 lb 12.8 oz (95.2 kg)   SpO2 100%   BMI 33.88 kg/m²     Physical Exam  Vitals and nursing note reviewed. Constitutional:       General: She is not in acute distress. Appearance: She is well-developed. She is not diaphoretic. HENT:      Head: Normocephalic and atraumatic. Eyes:      Conjunctiva/sclera: Conjunctivae normal.      Pupils: Pupils are equal, round, and reactive to light. Neck:      Vascular: No JVD. Cardiovascular:      Rate and Rhythm: Normal rate and regular rhythm. Heart sounds: Normal heart sounds. No murmur heard. No friction rub. No gallop. Pulmonary:      Effort: Pulmonary effort is normal. No respiratory distress. Breath sounds: Normal breath sounds. No wheezing or rales. Chest:      Chest wall: No tenderness. Abdominal:      Palpations: Abdomen is soft. Tenderness: There is no abdominal tenderness. Musculoskeletal:         General: Normal range of motion. Cervical back: Normal range of motion and neck supple. Skin:     General: Skin is warm and dry. Capillary Refill: Capillary refill takes 2 to 3 seconds. Findings: No erythema. Neurological:      Mental Status: She is alert and oriented to person, place, and time. Psychiatric:         Behavior: Behavior normal.         Thought Content: Thought content normal.         Judgment: Judgment normal.         Plan  1. Primary hypertension  Dec ARB, add spironolactone per pt request.   Check K in 1 week and 1 mo  Monitor BP daily, call with values <140/90  See back in 1 mo  - losartan-hydroCHLOROthiazide (HYZAAR) 50-12.5 MG per tablet; Take 1 tablet by mouth daily  Dispense: 30 tablet; Refill: 1  - spironolactone (ALDACTONE) 50 MG tablet; Take 1 tablet by mouth daily  Dispense: 30 tablet; Refill: 1  - Potassium; Future  - Potassium; Future    2. Hair loss  - spironolactone (ALDACTONE) 50 MG tablet; Take 1 tablet by mouth daily  Dispense: 30 tablet; Refill: 1  - Potassium; Future  - Potassium;  Future        While assessing care for this patient, I have reviewed all pertinent lab work/imaging/ specialist notes and care in reference to those problems addressed above in detail. Appropriate medical decision making was based on this. Please note that portions of this note may have been completed with a voice recognition program. Efforts were made to edit the dictations but occasionally words are mis-transcribed. Return in about 4 weeks (around 3/17/2022) for 30 minute visit, follow up blood pressure, follow up labs.

## 2022-02-22 ENCOUNTER — TELEPHONE (OUTPATIENT)
Dept: FAMILY MEDICINE CLINIC | Age: 42
End: 2022-02-22

## 2022-02-22 NOTE — TELEPHONE ENCOUNTER
Patient called with concerns regarding her BP. She states she received her 2nd covid shot yesterday and wasn't sure if that could be contributing to her headache/nausea or why her BP could be higher than normal.    Patient reports her BP has been \"Bad for a couple days\"-  She provided some examples from the last few days--164/105, says some have been in the 348M systolic. And diastolic is always above 724.    204/120 at 1140 this morning. She reports a Headache, but this has been going on for about a week. Nausea started this morning. Face is flushed. Feeling Hot. Denies dizziness, changes in vision. BP checked sitting down, feet on the ground. Automatic upper arm cuff. R arm. Took hyzaar, spironolactone as ordered. Had patient recheck bp while on the phone. She states it is now 180/119 and she is Starting to feel better, not as flushed and face is visibly less red. Informed patient I would speak with a provider regarding her BP readings since she has a hx of high bp with diastolics in the 507F and systolics above 058. She last saw Dr Radha Ray but she is not in the office today. Forwarded message to Mr. Number.  Please advise if patient should report to ED or urgent care or be seen in office today

## 2022-02-22 NOTE — TELEPHONE ENCOUNTER
Spoke with Dr Radha Neal regarding patient BP. He Advised patient to take another dose of Hyzaar and continue to monitor. If symptoms develop such as chest pain, SOB, vision changes, confusion then she needs to go to ED. Called patient and informed her of above message. I told her to recheck her BP in about 60- 90mins to see if there is improvement. She verbalized understanding. Patient asked if she should see Dr René Price sooner than her 4/1/22 appointment. Informed patient I would route message to her to see if she feels a sooner appointment is needed. Informed her to call the office back if she has any further questions or concerns.

## 2022-02-24 DIAGNOSIS — L65.9 HAIR LOSS: ICD-10-CM

## 2022-02-24 DIAGNOSIS — I10 PRIMARY HYPERTENSION: ICD-10-CM

## 2022-02-24 LAB — POTASSIUM SERPL-SCNC: 4.6 MMOL/L (ref 3.5–5.1)

## 2022-02-24 ASSESSMENT — ENCOUNTER SYMPTOMS
CHEST TIGHTNESS: 0
COLOR CHANGE: 0
NAUSEA: 0
VOMITING: 0
ABDOMINAL PAIN: 0
SHORTNESS OF BREATH: 0

## 2022-03-04 DIAGNOSIS — R94.31 ABNORMAL EKG: Primary | ICD-10-CM

## 2022-03-04 RX ORDER — LOSARTAN POTASSIUM AND HYDROCHLOROTHIAZIDE 25; 100 MG/1; MG/1
1 TABLET ORAL DAILY
COMMUNITY
End: 2022-03-31

## 2022-03-08 ENCOUNTER — HOSPITAL ENCOUNTER (OUTPATIENT)
Age: 42
Setting detail: OUTPATIENT SURGERY
Discharge: HOME | End: 2022-03-08
Attending: SURGERY | Admitting: SURGERY
Payer: COMMERCIAL

## 2022-03-08 VITALS
TEMPERATURE: 98.4 F | OXYGEN SATURATION: 97 % | BODY MASS INDEX: 33.75 KG/M2 | SYSTOLIC BLOOD PRESSURE: 153 MMHG | HEART RATE: 81 BPM | DIASTOLIC BLOOD PRESSURE: 103 MMHG | WEIGHT: 210 LBS | HEIGHT: 66 IN | RESPIRATION RATE: 18 BRPM

## 2022-03-08 DIAGNOSIS — L65.9 HAIR LOSS: ICD-10-CM

## 2022-03-08 DIAGNOSIS — G89.18 POSTOPERATIVE PAIN: Primary | ICD-10-CM

## 2022-03-08 DIAGNOSIS — I10 PRIMARY HYPERTENSION: ICD-10-CM

## 2022-03-08 DIAGNOSIS — L72.3 SEBACEOUS CYST: ICD-10-CM

## 2022-03-08 LAB — SARS-COV-2, NAAT: NOT DETECTED

## 2022-03-08 PROCEDURE — 2709999900 HC NON-CHARGEABLE SUPPLY: Performed by: SURGERY

## 2022-03-08 PROCEDURE — 3600000013 HC SURGERY LEVEL 3 ADDTL 15MIN: Performed by: SURGERY

## 2022-03-08 PROCEDURE — 88304 TISSUE EXAM BY PATHOLOGIST: CPT

## 2022-03-08 PROCEDURE — 7100000011 HC PHASE II RECOVERY - ADDTL 15 MIN: Performed by: SURGERY

## 2022-03-08 PROCEDURE — 87635 SARS-COV-2 COVID-19 AMP PRB: CPT

## 2022-03-08 PROCEDURE — A4217 STERILE WATER/SALINE, 500 ML: HCPCS | Performed by: SURGERY

## 2022-03-08 PROCEDURE — 2500000003 HC RX 250 WO HCPCS: Performed by: SURGERY

## 2022-03-08 PROCEDURE — 21555 EXC NECK LES SC < 3 CM: CPT | Performed by: SURGERY

## 2022-03-08 PROCEDURE — 7100000010 HC PHASE II RECOVERY - FIRST 15 MIN: Performed by: SURGERY

## 2022-03-08 PROCEDURE — 3600000003 HC SURGERY LEVEL 3 BASE: Performed by: SURGERY

## 2022-03-08 PROCEDURE — 2580000003 HC RX 258: Performed by: SURGERY

## 2022-03-08 RX ORDER — MAGNESIUM HYDROXIDE 1200 MG/15ML
LIQUID ORAL CONTINUOUS PRN
Status: COMPLETED | OUTPATIENT
Start: 2022-03-08 | End: 2022-03-08

## 2022-03-08 RX ORDER — OXYCODONE HYDROCHLORIDE AND ACETAMINOPHEN 5; 325 MG/1; MG/1
1 TABLET ORAL EVERY 4 HOURS PRN
Qty: 8 TABLET | Refills: 0 | Status: SHIPPED | OUTPATIENT
Start: 2022-03-08 | End: 2022-03-11

## 2022-03-08 ASSESSMENT — PAIN DESCRIPTION - DESCRIPTORS: DESCRIPTORS: ACHING;THROBBING

## 2022-03-08 ASSESSMENT — PAIN - FUNCTIONAL ASSESSMENT: PAIN_FUNCTIONAL_ASSESSMENT: 0-10

## 2022-03-08 NOTE — OP NOTE
[facility] [address], [place] [address]                                OPERATIVE REPORT    PATIENT NAME: Jesus Ennis                  :        1980  MED REC NO:   [id number]                          ROOM:  ACCOUNT NO:   [de-identified]                           ADMIT DATE: 2022  PROVIDER:     Percy Vences MD    DATE OF PROCEDURE:  2022    PREOPERATIVE DIAGNOSIS:  Sebaceous cyst, posterior neck. POSTOPERATIVE DIAGNOSIS:  Sebaceous cyst, posterior neck. PROCEDURE PERFORMED:  Cyst excision, posterior neck. SURGEON:  Percy Vences MD    ANESTHESIA:  Local.    ESTIMATED BLOOD LOSS:  Less than 50 mL. SPECIMEN:  Sebaceous cyst, posterior neck. COUNTS:  Sponge and needle counts were correct. INDICATIONS:  The patient is a 81-year-old female with a small mildly  symptomatic subcutaneous mass consistent with a sebaceous cyst.  She  presents for an elective excision. FINDINGS:  Approximately 1 x 1 cm cyst in the midline posterior neck,  excised. DESCRIPTION OF THE PROCEDURE:  After informed consent was obtained, the  patient was taken to the operating room and placed in the prone position  with the appropriate padding to all pressure points. The head was  gently turned to the right and then the posterior neck overlying the  previously marked mass was prepped and draped in a sterile fashion. Local anesthesia was infused into the skin around the mass and then  approximately 2 to 3 cm incision was made overlying the palpable mass. Dissection was carried down into the subcutaneous tissues. A small  lesion was noted and it was punctured with drainage of some of the  typical cyst material during the dissection. We then dissected  circumferentially around the cyst and removed it fully.   No other  palpable lesions were appreciated within the cavity or surrounding the  incision after removal of the cyst.  The wound was checked for  hemostasis, which was adequate. The incision was then closed in layers  with 3-0 Vicryl subcutaneous sutures followed by a 4-0 Monocryl  subcuticular stitch and Dermabond. The patient was then taken to the  recovery room in stable condition.         Brian Domingo MD    D: 03/08/2022 9:39:56       T: 03/08/2022 12:06:05     [id number]  Job#: [id number]     Doc#: [id number]    CC:  Stephanie Read DO

## 2022-03-08 NOTE — TELEPHONE ENCOUNTER
Refill Request     Last Seen: Last Seen Department: 2/17/2022  Last Seen by PCP: 7/27/2021    Last Written: 2/17/2022    Next Appointment:   Future Appointments   Date Time Provider Clifford Perez   3/18/2022  9:30 AM Lissy [id number] Phillips   3/18/2022 11:30 AM MD JAN Box   4/1/2022  3:45 PM MD JAN Box  [place] [facility]       Future appointment scheduled      Requested Prescriptions     Pending Prescriptions Disp Refills    busPIRone (BUSPAR) 10 MG tablet 60 tablet 0     Sig: Take 1 tablet by mouth 2 times daily    spironolactone (ALDACTONE) 50 MG tablet 30 tablet 1     Sig: Take 1 tablet by mouth daily

## 2022-03-08 NOTE — H&P
HISTORY & PHYSICAL FOR LOCAL CASES    Vitals:    03/08/22 0827   BP: (!) 153/96   Pulse: 80   Resp: 18   Temp: 98.4 °F (36.9 °C)   SpO2: 97%         History of present illness:  Subcutaneous mass at posterior neck along hairline c/w cyst, no sx of infection currently    All allergies & meds reviewed  RELEVANT EXAMS:  Airway:  normal    Pulmonary: normal    Cardiovascular: normal    Abdominal: normal    Specific to procedure: firm ~1.5x1.5cm subcutaneous mass, mobile, non-tender, no erythema     I have reviewed with the patient and/or family the risks, benefits and alternatives to the procedure.   ASA Class:  1    The patient condition is acceptable for planned local anesthesia:

## 2022-03-08 NOTE — BRIEF OP NOTE
Brief Postoperative Note      Patient: Neha Smith  YOB: 1980  MRN: [id number]    Date of Procedure: 3/8/2022    Pre-Op Diagnosis: Sebaceous cyst [L72.3]    Post-Op Diagnosis: Same       Procedure(s):  NECK CYST EXCISION    Surgeon(s):  Richard Ortiz MD    Assistant:  Surgical Assistant: Maxine Vanegas    Anesthesia: Local    Estimated Blood Loss (mL): less than 50     Complications: None    Specimens:   ID Type Source Tests Collected by Time Destination   A : neck cyst Specimen Neck SURGICAL PATHOLOGY Richard Ortiz MD 3/8/2022 5323        Implants:  * No implants in log *      Drains: * No LDAs found *    Findings: small 1x1cm cyst, midline posterior neck, excised    Job#: [id number]    Electronically signed by Yoana Dobson MD on 3/8/2022 at 9:36 AM

## 2022-03-09 RX ORDER — SPIRONOLACTONE 50 MG/1
50 TABLET, FILM COATED ORAL DAILY
Qty: 30 TABLET | Refills: 1 | Status: SHIPPED | OUTPATIENT
Start: 2022-03-09 | End: 2022-04-25

## 2022-03-09 RX ORDER — BUSPIRONE HYDROCHLORIDE 10 MG/1
10 TABLET ORAL 2 TIMES DAILY
Qty: 60 TABLET | Refills: 1 | Status: SHIPPED | OUTPATIENT
Start: 2022-03-09 | End: 2022-04-25

## 2022-03-10 ASSESSMENT — ENCOUNTER SYMPTOMS
RESPIRATORY NEGATIVE: 1
GASTROINTESTINAL NEGATIVE: 1

## 2022-03-10 NOTE — PROGRESS NOTES
[facility]   Cardiology Note              Date:  March 10, 2022  Patientname: Priya Valdes  YOB: 1980    Primary Care physician: Penny Myers DO    HISTORY OF PRESENT ILLNESS: Priya Valdes is a 37 y.o. female with a history of CAD, HTN, HLD, DM. Stress test 2015 abnormal. LHC 2015 showed normal coronary arteries. She was referred to cardiology for pre op evaluation for bariatric surgery. CT calcium score 7/21/2020 was 54. Echo showed EF 55% in 7/2020. Stress test normal in 7/2020. Today she presents for urgent follow up for abnormal EKG. E[facility] from 2/2022 at [facility] unavailable, interpretation states SR, abnormal anterolateral TW. EKG in office today shows NSR rate 80, no significant TW abnormality, unchanged from previous. She has headaches but otherwise feels good. Denies chest pain, shortness of breath, palpitations and dizziness. She exercises regularly and tolerates well. Past Medical History:   has a past medical history of Anxiety, Arthritis, Cervical pain, Chronic back pain, Depression, Diabetes mellitus ([address] GERD (gastroesophageal reflux disease), Hyperlipidemia, Hypertension, Infertility, Kidney stone, Migraine, Miscarriage, Polycystic ovarian syndrome, PONV (postoperative nausea and vomiting), Psoriasis, Stomach ulcer, and Unspecified sleep apnea. Past Surgical History:   has a past surgical history that includes pr dilation/curettage,diagnostic (1998 ); skin biopsy; Cholecystectomy (2011); Colonoscopy (10/7/2015); Upper gastrointestinal endoscopy (3/23/11); Upper gastrointestinal endoscopy (10/7/2015); Tonsillectomy (08/09/2016); Breast surgery (Right); Upper gastrointestinal endoscopy (N/A, 6/18/2020); Colonoscopy (N/A, 6/18/2020); Colonoscopy (N/A, 6/18/2020); Upper gastrointestinal endoscopy (N/A, 8/28/2020); Sleeve Gastrectomy (N/A, 12/23/2020); and Neck surgery (N/A, 3/8/2022).      Home Medications:    Prior to Admission medications    Medication Sig Start Date End Date Taking? Authorizing Provider   busPIRone (BUSPAR) 10 MG tablet Take 1 tablet by mouth 2 times daily 3/9/22 4/8/22  Sorin Santillan DO   spironolactone (ALDACTONE) 50 MG tablet Take 1 tablet by mouth daily 3/9/22   Sorin Santillan DO   oxyCODONE-acetaminophen (PERCOCET) 5-325 MG per tablet Take 1 tablet by mouth every 4 hours as needed for Pain for up to 3 days. 3/8/22 3/11/22  Jordyn Cruz MD   losartan-hydroCHLOROthiazide (HYZAAR) 100-25 MG per tablet Take 1 tablet by mouth daily    Historical Provider, MD   Biotin 5000 MCG CAPS Take 10,000 mcg by mouth daily    Historical Provider, MD   estrogens, conjugated, (PREMARIN) 0.3 MG tablet Take 0.3 mg by mouth daily    Historical Provider, MD   DULoxetine (CYMBALTA) 60 MG extended release capsule Take 1 capsule by mouth every day. 1/31/22   Monte Rinne, APRN   sennosides-docusate sodium (SENOKOT-S) 8.6-50 MG tablet Take 2 tablets by mouth daily  Patient taking differently: Take 2 tablets by mouth as needed  1/31/22   Zee Anaya MD   cimetidine (TAGAMET) 400 MG tablet Take 1 tablet by mouth twice daily. 1/3/22   ANT Hall   ondansetron (ZOFRAN ODT) 4 MG disintegrating tablet Take 1 tablet by mouth every 8 hours as needed for Nausea 12/27/21   DIANA Chahal CNP   fluticasone (FLONASE) 50 MCG/ACT nasal spray Spray 1 spray into Each Nostril Every Day 10/4/21   Chele Mariano DO   Calcium Citrate-Vitamin D 200-250 MG-UNIT TABS Take 1 tablet by mouth twice daily. 7/29/21   Prescilla Leyden, APRN - CNP   terbinafine (LAMISIL) 250 MG tablet Take 250 mg by mouth daily    Historical Provider, MD   Multiple Vitamins-Minerals (THERAPEUTIC MULTIVITAMIN-MINERALS W/ IRON) TABS tablet Take 1 tablet by mouth daily Rey    Historical Provider, MD   blood glucose monitor kit and supplies Dispense sufficient amount for indicated testing frequency plus additional to accommodate PRN testing needs.  Dispense all needed supplies to include: monitor, strips, lancing device, lancets, control solutions, alcohol swabs. 7/27/21   Sorin BRUNSON DO Tyrell   metFORMIN (GLUCOPHAGE-XR) 750 MG extended release tablet TAKE 2 TABLETS BY MOUTH EVERY MORNING 2/19/21   Sorin Santillan    hydrOXYzine (ATARAX) 50 MG tablet 1 twice daily as needed anxiety. 12/17/20   Sorin BRUNSON DO Tyrell     Allergies:  Tramadol and Lisinopril    Social History:   reports that she quit smoking about 2 years ago. Her smoking use included cigarettes. She has a 20.00 pack-year smoking history. She has never used smokeless tobacco. She reports previous alcohol use. She reports current drug use. Frequency: 3.00 times per week. Drug: Marijuana Eudelia Grant). Family History: family history includes Arthritis in her father, maternal grandfather, maternal grandmother, mother, paternal grandmother, and paternal uncle; Birth Defects in her paternal uncle; Cancer in her father; Depression in her mother; Diabetes in her maternal grandmother; Hearing Loss in her father; Heart Disease in her father and paternal grandmother; High Blood Pressure in her brother, father, maternal aunt, maternal grandfather, maternal grandmother, maternal uncle, mother, paternal aunt, paternal grandfather, paternal grandmother, and paternal uncle; High Cholesterol in her brother, father, maternal aunt, maternal grandfather, maternal grandmother, maternal uncle, mother, paternal aunt, paternal grandfather, paternal grandmother, and paternal uncle; Learning Disabilities in her paternal uncle; Mental Illness in her paternal uncle; Mental Retardation in her maternal uncle; Denyce Arnold / [place] in her maternal aunt, maternal grandmother, mother, and paternal grandmother; Stroke in her paternal grandmother. Review of Systems   Review of Systems   Constitutional: Negative. Respiratory: Negative. Cardiovascular: Negative. Gastrointestinal: Negative. Neurological: Negative.       OBJECTIVE:    Vital signs:    BP Markle Cue ) 142/100   Pulse 87   Temp 98.3 °F (36.8 °C)   Ht 5' 6\" (1.676 m)   Wt 213 lb (96.6 kg)   SpO2 98%   BMI 34.38 kg/m²      Physical Exam:  Constitutional:  Comfortable and alert, NAD, appears stated age, obesity  Eyes: PERRL, sclera nonicteric  Neck:  Supple, no masses, no thyroidmegaly, no JVD  Skin:  Warm and dry; no rash or lesions  Heart: Regular, normal apex, S1 and S2 normal, no M/G/R  Lungs:  Normal respiratory effort; clear; no wheezing/rhonchi/rales  Abdomen: soft, non tender, + bowel sounds  Extremities:  No edema or cyanosis; no clubbing  Neuro: alert and oriented, moves legs and arms equally, normal mood and affect    Data Reviewed:      Stress test 2020  Normal LV function. There is uniform isotope uptake at stress and rest. There is no evidence of  myocardial ischemia or scar. Echo 2020:  Normal left ventricle systolic function with an estimated ejection fraction   of 55%. No regional wall motion abnormalities are seen. Normal left ventricular diastolic filling pressure. Mild mitral regurgitation. CT calcium score 2020:  LEFT MAIN: Zero (0). RIGHT CORONARY ARTERY: Zero (0). LEFT ANTERIOR DESCENDIN   CIRCUMFLEX: 8   TOTAL AGATSTON CALCIUM SCORE: 47   EXTRACARDIAC STRUCTURES: Limited noncontrast imaging of the mediastinum demonstrates normal heart size. Main pulmonary artery is normal in caliber. The visualized aorta is grossly unremarkable in appearance. Partially calcified right hilar and mediastinal adenopathy suggests old granulomatous disease. Visualized airways are clear. There is calcified granuloma noted on the right. No suspicious pulmonary nodules are noted. Limited imaging of the upper abdomen grossly unremarkable in appearance. No acute osseous abnormality noted.      Coronary angiogram 2015:  Left Main:   Normal   Right Coronary:  Dominant and normal   Left Anterior Descending: Normal   Left Circumflex:  Normal   Left Ventricle:   EF of 55% with normal LV systolic function.     There is no significant MR.  There is no significant LV-Ao   gradient.  LVEDP of 13 mmHg. Impression:   EF 55%       Normal coronaries   Plan:    Stop tobacco     Cardiology Labs Reviewed:   CBC: No results for input(s): WBC, HGB, HCT, PLT in the last 72 hours. BMP:No results for input(s): NA, K, CO2, BUN, CREATININE, LABGLOM, GLUCOSE in the last 72 hours. PT/INR: No results for input(s): PROTIME, INR in the last 72 hours. APTT:No results for input(s): APTT in the last 72 hours. FASTING LIPID PANEL:  Lab Results   Component Value Date    HDL 56 09/08/2021    HDL 38 07/20/2011    LDLDIRECT 124 07/20/2016    LDLCALC 79 09/08/2021    TRIG 122 09/08/2021     LIVER PROFILE:No results for input(s): AST, ALT, ALB in the last 72 hours. BNP: No results found for: PROBNP    Reviewed all labs and imaging today    Assessment:   Reported abnormal EKG 2/2022 at [facility]: EKG today showed normal SR and normal ST segments  CAD: no angina; based on CT calcium score 54, normal stress test 7/2020; normal coronary angiogram 2015  HTN: elevated today  HLD: stable, continue statin, managed by PCP  DM: hgb a1c 5.4  Obesity: s/p gastric sleeve 2020  Family history of early heart disease    Plan:   1. No further ischemic evaluation needed for reported abnormal EKG 2/2022; EKG today normal, stress test 7/2020 and [facility] 2015 normal   2. Renal artery duplex for HTN  3. Continue spironolactone and losartan-HCTZ for HTN per PCP; she has an appt this week  4. Continue statin  5. Diet and exercise encouraged  6.  Follow up in 3 months    Quirino Bartholomew APRN-JUD  [address]  [phone]

## 2022-03-14 ENCOUNTER — OFFICE VISIT (OUTPATIENT)
Dept: CARDIOLOGY CLINIC | Age: 42
End: 2022-03-14
Payer: COMMERCIAL

## 2022-03-14 VITALS
HEIGHT: 66 IN | HEART RATE: 87 BPM | SYSTOLIC BLOOD PRESSURE: 142 MMHG | TEMPERATURE: 98.3 F | BODY MASS INDEX: 34.23 KG/M2 | OXYGEN SATURATION: 98 % | WEIGHT: 213 LBS | DIASTOLIC BLOOD PRESSURE: 100 MMHG

## 2022-03-14 DIAGNOSIS — I25.10 CORONARY ARTERY DISEASE INVOLVING NATIVE CORONARY ARTERY OF NATIVE HEART WITHOUT ANGINA PECTORIS: ICD-10-CM

## 2022-03-14 DIAGNOSIS — I10 ESSENTIAL HYPERTENSION: ICD-10-CM

## 2022-03-14 DIAGNOSIS — R06.02 SOB (SHORTNESS OF BREATH): ICD-10-CM

## 2022-03-14 DIAGNOSIS — R94.31 ABNORMAL EKG: Primary | ICD-10-CM

## 2022-03-14 PROCEDURE — G8427 DOCREV CUR MEDS BY ELIG CLIN: HCPCS | Performed by: NURSE PRACTITIONER

## 2022-03-14 PROCEDURE — 1036F TOBACCO NON-USER: CPT | Performed by: NURSE PRACTITIONER

## 2022-03-14 PROCEDURE — G8417 CALC BMI ABV UP PARAM F/U: HCPCS | Performed by: NURSE PRACTITIONER

## 2022-03-14 PROCEDURE — G8484 FLU IMMUNIZE NO ADMIN: HCPCS | Performed by: NURSE PRACTITIONER

## 2022-03-14 PROCEDURE — 93000 ELECTROCARDIOGRAM COMPLETE: CPT | Performed by: NURSE PRACTITIONER

## 2022-03-14 PROCEDURE — 99214 OFFICE O/P EST MOD 30 MIN: CPT | Performed by: NURSE PRACTITIONER

## 2022-03-14 NOTE — PATIENT INSTRUCTIONS
Kidney ultrasound, call [phone]  Continue to follow with PCP for HTN  Stay active along with a healthy diet  Follow up in 3 months

## 2022-03-15 DIAGNOSIS — K59.01 SLOW TRANSIT CONSTIPATION: ICD-10-CM

## 2022-03-15 RX ORDER — SENNA AND DOCUSATE SODIUM 50; 8.6 MG/1; MG/1
2 TABLET, FILM COATED ORAL DAILY
Qty: 180 TABLET | Refills: 1 | Status: SHIPPED | OUTPATIENT
Start: 2022-03-15

## 2022-03-15 NOTE — TELEPHONE ENCOUNTER
Refill Request     Last Seen: Last Seen Department: 2/17/2022  Last Seen by PCP: 7/27/2021    Last Written: 1/31/2022    Next Appointment:   Future Appointments   Date Time Provider Clifford Perez   3/18/2022  8:00 AM [place] [facility], VASCULAR LAB ROOM [address] [place] [address] LYNNE [facility]   3/18/2022  9:30 AM Lissy [id number] Phillips [facility]   3/18/2022 11:30 AM MD JAN Longo  [place] - REESE   4/1/2022  3:45 PM MD JAN Longo  [place] - DYASHLEY       Future appointment scheduled      Requested Prescriptions     Pending Prescriptions Disp Refills    sennosides-docusate sodium (SENOKOT-S) 8.6-50 MG tablet 60 tablet 2     Sig: Take 2 tablets by mouth daily

## 2022-03-17 DIAGNOSIS — L65.9 HAIR LOSS: ICD-10-CM

## 2022-03-17 DIAGNOSIS — I10 PRIMARY HYPERTENSION: ICD-10-CM

## 2022-03-17 LAB — POTASSIUM SERPL-SCNC: 3.8 MMOL/L (ref 3.5–5.1)

## 2022-03-18 ENCOUNTER — TELEMEDICINE (OUTPATIENT)
Dept: PSYCHOLOGY | Age: 42
End: 2022-03-18
Payer: COMMERCIAL

## 2022-03-18 ENCOUNTER — OFFICE VISIT (OUTPATIENT)
Dept: FAMILY MEDICINE CLINIC | Age: 42
End: 2022-03-18
Payer: COMMERCIAL

## 2022-03-18 ENCOUNTER — HOSPITAL ENCOUNTER (OUTPATIENT)
Dept: VASCULAR LAB | Age: 42
Discharge: HOME | End: 2022-03-18
Payer: COMMERCIAL

## 2022-03-18 VITALS
HEART RATE: 97 BPM | WEIGHT: 213 LBS | OXYGEN SATURATION: 98 % | DIASTOLIC BLOOD PRESSURE: 100 MMHG | BODY MASS INDEX: 34.23 KG/M2 | SYSTOLIC BLOOD PRESSURE: 140 MMHG | HEIGHT: 66 IN

## 2022-03-18 DIAGNOSIS — I10 PRIMARY HYPERTENSION: ICD-10-CM

## 2022-03-18 DIAGNOSIS — Z01.818 PREOP EXAMINATION: Primary | ICD-10-CM

## 2022-03-18 DIAGNOSIS — I10 ESSENTIAL HYPERTENSION: ICD-10-CM

## 2022-03-18 DIAGNOSIS — F41.0 PANIC DISORDER: ICD-10-CM

## 2022-03-18 DIAGNOSIS — F33.2 SEVERE RECURRENT MAJOR DEPRESSION WITHOUT PSYCHOTIC FEATURES (HCC): ICD-10-CM

## 2022-03-18 DIAGNOSIS — R22.31 MASS OF SKIN OF RIGHT THUMB: ICD-10-CM

## 2022-03-18 DIAGNOSIS — F41.1 GAD (GENERALIZED ANXIETY DISORDER): Primary | ICD-10-CM

## 2022-03-18 PROCEDURE — G8427 DOCREV CUR MEDS BY ELIG CLIN: HCPCS | Performed by: FAMILY MEDICINE

## 2022-03-18 PROCEDURE — G8484 FLU IMMUNIZE NO ADMIN: HCPCS | Performed by: FAMILY MEDICINE

## 2022-03-18 PROCEDURE — 93975 VASCULAR STUDY: CPT

## 2022-03-18 PROCEDURE — G8417 CALC BMI ABV UP PARAM F/U: HCPCS | Performed by: FAMILY MEDICINE

## 2022-03-18 PROCEDURE — 99243 OFF/OP CNSLTJ NEW/EST LOW 30: CPT | Performed by: FAMILY MEDICINE

## 2022-03-18 PROCEDURE — 90791 PSYCH DIAGNOSTIC EVALUATION: CPT | Performed by: PSYCHOLOGIST

## 2022-03-18 RX ORDER — HYDROCHLOROTHIAZIDE 25 MG/1
25 TABLET ORAL EVERY MORNING
Qty: 30 TABLET | Refills: 5 | Status: SHIPPED | OUTPATIENT
Start: 2022-03-18 | End: 2022-07-25

## 2022-03-18 NOTE — PROGRESS NOTES
Preoperative Evaluation    Subjective:   Howard Leggett is a 37 y.o. y.o.  female who presents to the office today for a preoperative consultation. Surgeon: Dr. Jared Reyes     Location: [facility]  Performing mass removal of skin of right thumb    Date: April 7. Planned anesthesia is local/light sedation   The patient has the following known anesthesia issues: none   Patient has a bleeding risk of : no recent abnormal bleeding   Patient does not have objection to receiving blood products if needed. Denies any steroid use in the past 6 mo    Review of Systems   Pertinent items are noted in HPI.      Denies fevers, chills, diaphoresis, CP, SOB, dysphagia, abd pain, urinary complaints, new edema, rashes, cyanosis    Past Medical History:   Diagnosis Date    Anxiety     Arthritis     Cervical pain     Chronic back pain     Depression     Diabetes mellitus ([address]     lost 83 pounds    GERD (gastroesophageal reflux disease)     Hyperlipidemia     Hypertension     Infertility     took fertility medication    Kidney stone 03/2012    Migraine     Miscarriage     Polycystic ovarian syndrome 1999    PONV (postoperative nausea and vomiting)     Psoriasis     Stomach ulcer     Unspecified sleep apnea     didn't tolerate CPAP       Past Surgical History:   Procedure Laterality Date    BREAST SURGERY Right     for cellulitis     CHOLECYSTECTOMY  2011    [facility]  [facility]polyps    COLONOSCOPY N/A 6/18/2020    COLONOSCOPY WITH BIOPSY performed by Mauricio Neves MD at [facility] N/A 6/18/2020    COLONOSCOPY POLYPECTOMY SNARE performed by Mauricio Neves MD at [address] [facility] N/A 3/8/2022    NECK CYST EXCISION performed by Clari Zuluaga MD at [address] [facility] C/missed ab    SKIN BIOPSY      for psoriasis    SLEEVE GASTRECTOMY N/A 12/23/2020    LAPAROSCOPIC SLEEVE GASTRECTOMY -  ETHICON performed by Xin Hopkins MD at [address]  08/09/2016    tonsillectomy    UPPER GASTROINTESTINAL ENDOSCOPY  3/23/11    pyloretic    UPPER GASTROINTESTINAL ENDOSCOPY  10/7/2015    gastritis-[facility]    UPPER GASTROINTESTINAL ENDOSCOPY N/A 6/18/2020    EGD DIAGNOSTIC ONLY performed by Jolly Dakin, MD at [address] [facility] N/A 8/28/2020    EGD BIOPSY performed by Xin Hopkins MD at [address] History     Tobacco History     Smoking Status  Former Smoker Quit date  4/17/2019 Smoking Frequency  1 pack/day for 20 years (20 pk yrs) Smoking Tobacco Type  Cigarettes    Smokeless Tobacco Use  Never Used          Alcohol History     Alcohol Use Status  Not Currently Drinks/Week  0 Standard drinks or equivalent per week Amount  0.0 standard drinks of alcohol/wk          Drug Use     Drug Use Status  Yes Types  Marijuana (Weed) Frequency   3 times/week          Sexual Activity     Sexually Active  Yes Partners  Male                Family History   Problem Relation Age of Onset    Arthritis Mother     Miscarriages / Daralene Furrow Mother     High Blood Pressure Mother     Depression Mother     High Cholesterol Mother     Arthritis Father     High Blood Pressure Father     Cancer Father         colon    Hearing Loss Father     Heart Disease Father     High Cholesterol Father     High Blood Pressure Brother     High Cholesterol Brother     High Blood Pressure Maternal Aunt     High Cholesterol Maternal Aunt     Miscarriages / Stillbirths Maternal Aunt     Mental Retardation Maternal Uncle     High Blood Pressure Maternal Uncle     High Cholesterol Maternal Uncle     High Blood Pressure Paternal Aunt     High Cholesterol Paternal Aunt     Arthritis Paternal Uncle     Mental Illness Paternal Uncle     High Blood Pressure Paternal Uncle     Birth Defects Paternal Uncle     High Cholesterol Paternal Uncle     Learning Disabilities Paternal Uncle     Arthritis Maternal Grandmother     High Blood Pressure Maternal Grandmother     Diabetes Maternal Grandmother     High Cholesterol Maternal Grandmother     Miscarriages / Stillbirths Maternal Grandmother     Arthritis Maternal Grandfather     High Blood Pressure Maternal Grandfather     High Cholesterol Maternal Grandfather     Arthritis Paternal Grandmother     High Blood Pressure Paternal Grandmother     Heart Disease Paternal Grandmother     High Cholesterol Paternal Grandmother    [de-identified] / Stillbirths Paternal Grandmother     Stroke Paternal Grandmother     High Blood Pressure Paternal Grandfather     High Cholesterol Paternal Grandfather        Current Outpatient Medications   Medication Sig Dispense Refill    hydroCHLOROthiazide (HYDRODIURIL) 25 MG tablet Take 1 tablet by mouth every morning 30 tablet 5    sennosides-docusate sodium (SENOKOT-S) 8.6-50 MG tablet Take 2 tablets by mouth daily 180 tablet 1    busPIRone (BUSPAR) 10 MG tablet Take 1 tablet by mouth 2 times daily 60 tablet 1    spironolactone (ALDACTONE) 50 MG tablet Take 1 tablet by mouth daily 30 tablet 1    losartan-hydroCHLOROthiazide (HYZAAR) 100-25 MG per tablet Take 1 tablet by mouth daily      Biotin 5000 MCG CAPS Take 10,000 mcg by mouth daily      estrogens, conjugated, (PREMARIN) 0.3 MG tablet Take 0.3 mg by mouth daily      DULoxetine (CYMBALTA) 60 MG extended release capsule Take 1 capsule by mouth every day. 90 capsule 0    cimetidine (TAGAMET) 400 MG tablet Take 1 tablet by mouth twice daily.  180 tablet 1    ondansetron (ZOFRAN ODT) 4 MG disintegrating tablet Take 1 tablet by mouth every 8 hours as needed for Nausea 20 tablet 0    fluticasone (FLONASE) 50 MCG/ACT nasal spray Spray 1 spray into Each Nostril Every Day 3 each 1    Calcium Citrate-Vitamin D 200-250 MG-UNIT TABS Take 1 tablet by mouth twice daily. 60 tablet 0    terbinafine (LAMISIL) 250 MG tablet Take 250 mg by mouth daily      Multiple Vitamins-Minerals (THERAPEUTIC MULTIVITAMIN-MINERALS W/ IRON) TABS tablet Take 1 tablet by mouth daily [facility]      blood glucose monitor kit and supplies Dispense sufficient amount for indicated testing frequency plus additional to accommodate PRN testing needs. Dispense all needed supplies to include: monitor, strips, lancing device, lancets, control solutions, alcohol swabs. 1 kit 0    metFORMIN (GLUCOPHAGE-XR) 750 MG extended release tablet TAKE 2 TABLETS BY MOUTH EVERY MORNING 60 tablet 2    hydrOXYzine (ATARAX) 50 MG tablet 1 twice daily as needed anxiety. 30 tablet 1     No current facility-administered medications for this visit. Objective:   Vitals:    03/18/22 1138   BP: (!) 140/90   Pulse: 97   SpO2: 98%       Physical Exam   BP (!) 140/90   Pulse 97   SpO2 98%     General appearance: alert, appears stated age, and cooperative  Lungs: clear to auscultation bilaterally  Heart: regular rate and rhythm, S1, S2 normal, no murmur, click, rub or gallop  Abdomen: soft, non-tender; bowel sounds normal; no masses,  no organomegaly  Extremities: extremities normal, atraumatic, no cyanosis or edema  Pulses: 2+ and symmetric  Skin: Skin color, texture, turgor normal. No rashes or lesions  Neurologic: Grossly normal     Predictors of intubation difficulty:   Morbid obesity? There is no height or weight on file to calculate BMI.   Anatomically abnormal facies? no   Short, thick neck? no   Neck range of motion: normal     Cardiographics   ECG: not indicated today  Repeat on 3/14/22 reassuring, no acute changes, T wave inversions, ST changes  ATTACHED   Echocardiogram: 2020 imaging reviewed  Renal US ordered     Imaging   Chest X-Ray: not indicated     Lab Review   Orders Only on 03/17/2022   Component Date Value    Potassium 03/17/2022 3.8    Admission on 03/08/2022, Discharged on 03/08/2022   Component Date Value    SARS-CoV-2, NAAT 03/08/2022 Not Detected    Orders Only on 02/24/2022   Component Date Value    Potassium 02/24/2022 4.6         Assessment:   37 y.o. female with planned surgery as above. - Known risk factors for perioperative complications: Diabetes mellitus  LAURO, smoker   - Difficulty with intubation is not anticipated. - Current medications which may produce withdrawal symptoms if withheld perioperatively: Buspar, Cymbalta     Plan:   1. Preoperative workup as follows none   2. Change in medication regimen before surgery: discussed in detail   Pt instructed to avoid NSAIDs, ASA, MV, Vitamin E 1 week prior to surgery to decrease bleeding risk. 3. Prophylaxis for cardiac events with perioperative beta-blockers: not indicated   4. Invasive hemodynamic monitoring perioperatively: not indicated   5. Deep vein thrombosis prophylaxis postoperatively:regimen to be chosen by surgical team   6. Other measures: none    1. Preop examination  2. Mass of skin of right thumb      3. Primary hypertension  EKG from 3/15/22 attached, reassuring   Schedule renal US ordered by Cardiology  Echo from 2020 reviewed, no murmur today  C/w combination tablet, add HCTZ  K check next week  Monitor daily  F/u in 3-4 weeks for BP check   - hydroCHLOROthiazide (HYDRODIURIL) 25 MG tablet; Take 1 tablet by mouth every morning  Dispense: 30 tablet; Refill: 5  - Potassium; Future      [phone]    While assessing care for this patient, I have reviewed all pertinent lab work/imaging/ specialist notes and care in reference to those problems addressed above in detail. Appropriate medical decision making was based on this. Please note that portions of this note may have been completed with a voice recognition program. Efforts were made to edit the dictations but occasionally words are mis-transcribed.       Pt is at an acceptable risk for planned procedure    Please call with any questions  Zoie Arias MD/MS

## 2022-03-18 NOTE — PROGRESS NOTES
Behavioral Health Consultation  [facility], [address] [place] [address]  Psychologist  3/18/2022  9:27 AM EDT    Time spent with Patient: 30 minutes  This is patient's first [facility]FFAN[facility]INTOS[facility] appointment. Reason for Consult:    Chief Complaint   Patient presents with    Anxiety    Depression    Panic Attack     Referring Provider: Medina Gnosalez DO      Pt provided informed consent for the behavioral health program. Discussed with patient model of service to include the limits of confidentiality (i.e. abuse reporting, suicide intervention, etc.) and short-term intervention focused approach. Pt indicated understanding. Feedback given to PCP. TELEHEALTH VISIT -- Audio/Visual (During CPVRL-54 public health emergency)  }  Cammy Elders, was evaluated through a synchronous (real-time) audio-video encounter. The patient (or guardian if applicable) is aware that this is a billable service. The visit was conducted pursuant to the emergency declaration under the [address] [place] [address], [address] [place] [address] authority and the [employer] General Act. Patient identification was verified, and a caregiver was present when appropriate. The patient was located in a state where the provider was licensed to provide care. Conducted a risk-benefit analysis and determined that the patient's presenting problems are consistent with the use of telepsychology. Determined that the patient has sufficient knowledge and skills in the use of technology enabling them to adequately benefit from telepsychology. It was determined that this patient was able to be properly treated without an in-person session. Patient verified that they were currently located at the [facility] address that was provided during registration.       Verified the following information:  Patient's identification: Yes  Patient location: [address] [address]  Patient's call back number: [phone]  Patient's emergency contact's name and number, as well as permission to contact them if needed: Extended Emergency Contact Information  Primary Emergency Contact: Russell San  Address: [address] [place] [address] Phone: [phone]  Mobile Phone: [phone]  Relation: Spouse    Provider location: [place], [place]     S:    Pt reports she has anxiety, depression, and a lot of panic attacks lately. Quit working 2 years ago because anxiety got so bad. Went to [facility] for IOP and it was helpful but hasn't helped enough to get back to work. Was a  at [employer] for 18 years. Has had anxiety all her life. Anxiety had gotten much worse over the years. Has panic attacks often- sometimes several a day then will go a week or two without. Recent panic attack also had numbness which has never happened before. Lost 83 lbs recently so less pain in body. Cymbalta has helped depression as she can get out of bed more often. Still in bed often- kids comment about her being sick all the time. NEver thought about suicide, but has thought she would be fine with getting cancer and . Is [facility] and doesn't think suicide is an option. Sleep: \"alright\"- gets at least 8 hours, gets 12 hours on average, takes melatonin and tylenol PM to shut brain off at night  When having panic attacks tries to take a deep breath, talk it out with     Has seen Dr. Billie Champion in the past in 2015. After Arvin Joseph also saw a therapist for 12 weeks.          O:  MSE:    Attitude: cooperative and friendly  Consciousness: alert  Orientation: oriented to person, place, time, general circumstance  Memory: recent and remote memory intact  Attention/Concentration: intact during session  Speech: normal rate and volume, well-articulated  Mood: \"ok\"  Affect: euthymic, congruent  Perception: within normal limits  Thought Content: within normal limits  Thought Process: logical, coherent and goal-directed  Insight: good  Judgment: intact  Ability to understand instructions: Yes  Ability to respond meaningfully: Yes  Morbid Ideation: no   Suicide Assessment: no suicidal ideation, plan, or intent  Homicidal Ideation: no    History:    Medications:   Current Outpatient Medications   Medication Sig Dispense Refill    sennosides-docusate sodium (SENOKOT-S) 8.6-50 MG tablet Take 2 tablets by mouth daily 180 tablet 1    busPIRone (BUSPAR) 10 MG tablet Take 1 tablet by mouth 2 times daily 60 tablet 1    spironolactone (ALDACTONE) 50 MG tablet Take 1 tablet by mouth daily 30 tablet 1    losartan-hydroCHLOROthiazide (HYZAAR) 100-25 MG per tablet Take 1 tablet by mouth daily      Biotin 5000 MCG CAPS Take 10,000 mcg by mouth daily      estrogens, conjugated, (PREMARIN) 0.3 MG tablet Take 0.3 mg by mouth daily      DULoxetine (CYMBALTA) 60 MG extended release capsule Take 1 capsule by mouth every day. 90 capsule 0    cimetidine (TAGAMET) 400 MG tablet Take 1 tablet by mouth twice daily. 180 tablet 1    ondansetron (ZOFRAN ODT) 4 MG disintegrating tablet Take 1 tablet by mouth every 8 hours as needed for Nausea 20 tablet 0    fluticasone (FLONASE) 50 MCG/ACT nasal spray Spray 1 spray into Each Nostril Every Day 3 each 1    Calcium Citrate-Vitamin D 200-250 MG-UNIT TABS Take 1 tablet by mouth twice daily. 60 tablet 0    terbinafine (LAMISIL) 250 MG tablet Take 250 mg by mouth daily      Multiple Vitamins-Minerals (THERAPEUTIC MULTIVITAMIN-MINERALS W/ IRON) TABS tablet Take 1 tablet by mouth daily [facility]      blood glucose monitor kit and supplies Dispense sufficient amount for indicated testing frequency plus additional to accommodate PRN testing needs. Dispense all needed supplies to include: monitor, strips, lancing device, lancets, control solutions, alcohol swabs.  1 kit 0    metFORMIN (GLUCOPHAGE-XR) 750 MG extended release tablet TAKE 2 TABLETS BY MOUTH EVERY MORNING 60 tablet 2    hydrOXYzine (ATARAX) 50 MG tablet 1 twice daily as needed anxiety. 30 tablet 1     No current facility-administered medications for this visit. Social History:   Social History     Socioeconomic History    Marital status:      Spouse name: Sumanth Harley    Number of children: 3    Years of education: 12    Highest education level: Not on file   Occupational History    Not on file   Tobacco Use    Smoking status: Former Smoker     Packs/day: 1.00     Years: 20.00     Pack years: 20.00     Types: Cigarettes     Quit date: 2019     Years since quittin.9    Smokeless tobacco: Never Used   Vaping Use    Vaping Use: Never used   Substance and Sexual Activity    Alcohol use: Not Currently     Alcohol/week: 0.0 standard drinks    Drug use: Yes     Frequency: 3.0 times per week     Types: Marijuana Jud Laureano)    Sexual activity: Yes     Partners: Male   Other Topics Concern    Not on file   Social History Narrative    Not on file     Social Determinants of Health     Financial Resource Strain: Low Risk     Difficulty of Paying Living Expenses: Not hard at all   Food Insecurity: No Food Insecurity    Worried About Running Out of Food in the Last Year: Never true    Lisa of Food in the Last Year: Never true   Transportation Needs: No Transportation Needs    Lack of Transportation (Medical): No    Lack of Transportation (Non-Medical):  No   Physical Activity:     Days of Exercise per Week: Not on file    Minutes of Exercise per Session: Not on file   Stress:     Feeling of Stress : Not on file   Social Connections:     Frequency of Communication with Friends and Family: Not on file    Frequency of Social Gatherings with Friends and Family: Not on file    Attends Temple Services: Not on file    Active Member of Clubs or Organizations: Not on file    Attends Club or Organization Meetings: Not on file    Marital Status: Not on file   Intimate Partner Violence:     Fear of Current or Ex-Partner: Not on file    Emotionally Abused: Not on file    Physically Abused: Not on file    Sexually Abused: Not on file   Housing Stability: Low Risk     Unable to Pay for Housing in the Last Year: No    Number of Places Lived in the Last Year: 1    Unstable Housing in the Last Year: No     TOBACCO:   reports that she quit smoking about 2 years ago. Her smoking use included cigarettes. She has a 20.00 pack-year smoking history. She has never used smokeless tobacco.  ETOH:   reports previous alcohol use.   Family History:   Family History   Problem Relation Age of Onset    Arthritis Mother     [de-identified] / [place] Mother     High Blood Pressure Mother     Depression Mother     High Cholesterol Mother     Arthritis Father     High Blood Pressure Father     Cancer Father         colon    Hearing Loss Father     Heart Disease Father     High Cholesterol Father     High Blood Pressure Brother     High Cholesterol Brother     High Blood Pressure Maternal Aunt     High Cholesterol Maternal Aunt     Miscarriages / Stillbirths Maternal Aunt     Mental Retardation Maternal Uncle     High Blood Pressure Maternal Uncle     High Cholesterol Maternal Uncle     High Blood Pressure Paternal Aunt     High Cholesterol Paternal Aunt     Arthritis Paternal Uncle     Mental Illness Paternal Uncle     High Blood Pressure Paternal Uncle     Birth Defects Paternal Uncle     High Cholesterol Paternal Uncle     Learning Disabilities Paternal Uncle     Arthritis Maternal Grandmother     High Blood Pressure Maternal Grandmother     Diabetes Maternal Grandmother     High Cholesterol Maternal Grandmother     Miscarriages / Stillbirths Maternal Grandmother     Arthritis Maternal Grandfather     High Blood Pressure Maternal Grandfather     High Cholesterol Maternal Grandfather     Arthritis Paternal Grandmother     High Blood Pressure Paternal Grandmother     Heart Disease Paternal Grandmother  High Cholesterol Paternal Grandmother     Miscarriages / Stillbirths Paternal Grandmother     Stroke Paternal Grandmother     High Blood Pressure Paternal Grandfather     High Cholesterol Paternal Grandfather        A:  Ms. Faizan Gresham has a longstanding hx of anxiety, depression, and panic attacks. She engages in behaviors that reinforce her mood difficulties. She was active and engaged and responded positively to behavioral interventions. Diagnosis:    1. ESTELA (generalized anxiety disorder)    2. Severe recurrent major depression without psychotic features ([facility] [address]    3. Panic disorder          Plan:  Pt interventions:  Established rapport, Discussed [facility] model of care vs specialty mental health, Conducted functional assessment, [place]-setting to identify pt's primary goals for [facility] visit / overall health, Supportive techniques, Provided psychoeducation re: anxiety response, role of breathing on emotions, Discussed potential treatments for  depression, anxiety and panic disorder, Provided education on the use of medication to treat  depression, anxiety and panic disorder, Discussed various factors related to the development and maintenance of  depression, anxiety and panic disorder (including biological, cognitive, behavioral, and environmental factors), taught and practiced diaphragmatic breathing, taught about the role of sleep, exercise, diet, physical illness, and drug/etoh use on mood, discussed skill mastery vs skill generalization, ACT interventions, provided smartphone jennifer resources to practice skills and treatment planning    Pt Behavioral Change Plan:   Pt set goals to 1) practice diaphragmatic breathing 2x/day for 10 min when not anxious to work on skill mastery before skill generalization (download the free jennifer, [id number], to practice if needed) 2) begin to limit oversleeping 3)  Return in about 2 weeks (around 4/1/2022).

## 2022-03-21 ENCOUNTER — TELEPHONE (OUTPATIENT)
Dept: FAMILY MEDICINE CLINIC | Age: 42
End: 2022-03-21

## 2022-03-21 ENCOUNTER — OFFICE VISIT (OUTPATIENT)
Dept: FAMILY MEDICINE CLINIC | Age: 42
End: 2022-03-21
Payer: COMMERCIAL

## 2022-03-21 ENCOUNTER — NURSE TRIAGE (OUTPATIENT)
Dept: OTHER | Facility: CLINIC | Age: 42
End: 2022-03-21

## 2022-03-21 VITALS
BODY MASS INDEX: 34.42 KG/M2 | OXYGEN SATURATION: 98 % | WEIGHT: 214.2 LBS | SYSTOLIC BLOOD PRESSURE: 165 MMHG | HEART RATE: 97 BPM | HEIGHT: 66 IN | DIASTOLIC BLOOD PRESSURE: 100 MMHG

## 2022-03-21 DIAGNOSIS — L72.0 EPIDERMOID CYST: ICD-10-CM

## 2022-03-21 DIAGNOSIS — I10 PRIMARY HYPERTENSION: Primary | ICD-10-CM

## 2022-03-21 DIAGNOSIS — L65.9 HAIR LOSS: ICD-10-CM

## 2022-03-21 DIAGNOSIS — L03.221 CELLULITIS OF NECK: ICD-10-CM

## 2022-03-21 PROCEDURE — G8417 CALC BMI ABV UP PARAM F/U: HCPCS | Performed by: FAMILY MEDICINE

## 2022-03-21 PROCEDURE — 99213 OFFICE O/P EST LOW 20 MIN: CPT | Performed by: FAMILY MEDICINE

## 2022-03-21 PROCEDURE — 1036F TOBACCO NON-USER: CPT | Performed by: FAMILY MEDICINE

## 2022-03-21 PROCEDURE — G8482 FLU IMMUNIZE ORDER/ADMIN: HCPCS | Performed by: FAMILY MEDICINE

## 2022-03-21 PROCEDURE — G8427 DOCREV CUR MEDS BY ELIG CLIN: HCPCS | Performed by: FAMILY MEDICINE

## 2022-03-21 RX ORDER — AMOXICILLIN AND CLAVULANATE POTASSIUM 875; 125 MG/1; MG/1
1 TABLET, FILM COATED ORAL 2 TIMES DAILY
Qty: 20 TABLET | Refills: 0 | Status: SHIPPED | OUTPATIENT
Start: 2022-03-21 | End: 2022-03-31

## 2022-03-21 ASSESSMENT — ENCOUNTER SYMPTOMS
CHEST TIGHTNESS: 0
SHORTNESS OF BREATH: 0
COUGH: 0
WHEEZING: 0

## 2022-03-21 NOTE — PROGRESS NOTES
Subjective:      Patient ID: Amor Ferrell is a 37 y.o. female. HPI  Patient in for checkup on several medical issues. She had a cyst removed from the left occipital area a few weeks back. She states she feels like it is coming back and it is tendercheck media tab for photo. She has also had a problem with headaches mostly in the occipital area radiating to the top of her head. She was also complaining of thinning hair about that time and the dermatologist recommended spironolactone which she has started for approximately a month and has noticed some mild hair regrowth but that has not helped with her blood pressure that much. She is also on hydrochlorothiazide 25 and losartan 100/25. Blood pressure today 165/100. Review of Systems    Review of Systems   Constitutional: Negative for fever and unexpected weight change. Respiratory: Negative for cough, chest tightness, shortness of breath and wheezing. Cardiovascular: Negative for chest pain, palpitations and leg swelling. Genitourinary: Positive for frequency. Negative for flank pain. Skin: Negative for pallor and rash. Check the media tab for her photo. Neurological: Positive for headaches. Negative for dizziness, tremors and light-headedness. Psychiatric/Behavioral: Negative for sleep disturbance. The patient is nervous/anxious. The patient is not hyperactive. Objective:   Physical Exam      Physical Exam  Constitutional:       General: She is not in acute distress. Appearance: Normal appearance. She is obese. Neck:      Comments: There is a linear area raised, mild redness, mild tenderness along the left upper posterior cervical areano discharge from the area. Cardiovascular:      Rate and Rhythm: Normal rate and regular rhythm. Pulses: Normal pulses. Heart sounds: Normal heart sounds. Pulmonary:      Effort: Pulmonary effort is normal.      Breath sounds: Normal breath sounds.    Musculoskeletal: Right lower leg: No edema. Left lower leg: No edema. Lymphadenopathy:      Cervical: No cervical adenopathy. Skin:     Coloration: Skin is not pale. Comments: Check media tab for the photo of her cyst on her neck. Neurological:      Mental Status: She is alert. Psychiatric:         Mood and Affect: Mood normal.         Behavior: Behavior normal.         Thought Content: Thought content normal.         Judgment: Judgment normal.         Assessment:       Diagnosis Orders   1. Primary hypertension     2. Hair loss     3. Epidermoid cyst     4. Cellulitis of neck           Plan:      Doris Raymond was seen today for mass and headache. Diagnoses and all orders for this visit:    Primary hypertension  Continue medications and no added salt diet. Prescription was called in for verapamil 120 mg 1 daily in the eveningalways be looking to drop a few pounds by reducing carbs and increasing activity. Blood pressure today 165/100  Hair loss  Thinning of the hair on the top of her head. Epidermoid cyst  Probable cyst upper left posterior cervical area  Cellulitis of neck  Linear area of erythema and tenderness over the cyst.  Prescription called in for Augmentin 500 twice a day. Call the surgeon back in the next day or 2 for another evaluation. Other orders  -     verapamil (CALAN SR) 120 MG extended release tablet; Take 1 tablet by mouth nightly  -     amoxicillin-clavulanate (AUGMENTIN) 875-125 MG per tablet; Take 1 tablet by mouth 2 times daily for 10 days    See Dr. Roger Santoro early April. This is been approximately a 25-minute visit with the patient.         Sorin Santillan DO

## 2022-03-21 NOTE — TELEPHONE ENCOUNTER
Received call from Ethan Gonzalez at [facility] with [employer]. Subjective: Caller states \"I just had a cyst removed from the back of the neck that was causing headaches but for the last 3 days I have been having severe headaches and feel a ball that's getting larger and larger at the incision site\"     Current Symptoms: cyst near incision site that is growing in size, all over headaches but mainly up the back of the neck, pain in eye last night    Onset: 3 days ago; worsening    Associated Symptoms: NA    Pain Severity: 7/10; aching, throbbing; intermittent    Temperature: No     What has been tried: tylenol and motrin    LMP: few days ago Pregnant: No    Recommended disposition: See in Office Today or Tomorrow    Care advice provided, patient verbalizes understanding; denies any other questions or concerns; instructed to call back for any new or worsening symptoms. Patient/Caller agrees with recommended disposition; writer provided warm transfer to [facility] at [facility] for appointment scheduling     Attention Provider: Thank you for allowing me to participate in the care of your patient. The patient was connected to triage in response to information provided to the ECC/PSC. Please do not respond through this encounter as the response is not directed to a shared pool.           Reason for Disposition   Unexplained headache that is present > 24 hours    Protocols used: HEADACHE-ADULT-OH

## 2022-03-25 ENCOUNTER — OFFICE VISIT (OUTPATIENT)
Dept: SURGERY | Age: 42
End: 2022-03-25

## 2022-03-25 ENCOUNTER — HOSPITAL ENCOUNTER (OUTPATIENT)
Age: 42
Discharge: HOME | End: 2022-03-25
Payer: COMMERCIAL

## 2022-03-25 VITALS
WEIGHT: 214.6 LBS | TEMPERATURE: 98.4 F | DIASTOLIC BLOOD PRESSURE: 90 MMHG | BODY MASS INDEX: 34.49 KG/M2 | SYSTOLIC BLOOD PRESSURE: 154 MMHG | HEIGHT: 66 IN | HEART RATE: 91 BPM

## 2022-03-25 DIAGNOSIS — Z09 POSTOP CHECK: Primary | ICD-10-CM

## 2022-03-25 DIAGNOSIS — I10 PRIMARY HYPERTENSION: ICD-10-CM

## 2022-03-25 PROCEDURE — 84132 ASSAY OF SERUM POTASSIUM: CPT

## 2022-03-25 PROCEDURE — 36415 COLL VENOUS BLD VENIPUNCTURE: CPT

## 2022-03-25 PROCEDURE — 99024 POSTOP FOLLOW-UP VISIT: CPT | Performed by: SURGERY

## 2022-03-25 NOTE — PROGRESS NOTES
[facility]    PATIENT NAME: Danii Inman     TODAY'S DATE: 3/25/2022    CHIEF COMPLAINT: follow up on incision    INTERVAL HISTORY/HPI:    Danii Inman 37 y.o.  female with past surgical history of subcutaneous mass excision (on 3/8/2022) at posterior neck presents to the office to follow up on incision and complaining of new cyst in posterior neck. Patient stated that her PCP was concerned that the incision might be infected. Patient was started on Augmentin but has not taken yet because she was waiting on surgical evaluation. Patient denies any fever or chills or discharge. Patient also complained of new cyst appearing on right posterior neck and stated that it has been causing her headaches and ear pain. Patient stated that she has tried heating and cold packs and hasn't helped. Patient denies any SOB, cough, chest pain, abdominal pain, fevers, or N/V/D. OBJECTIVE:  VITALS:  BP (!) 154/90 (Site: Right Upper Arm, Position: Sitting, Cuff Size: Large Adult)   Pulse 91   Temp 98.4 °F (36.9 °C)   Ht 5' 5.98\" (1.676 m)   Wt 214 lb 9.6 oz (97.3 kg)   BMI 34.65 kg/m²       CONSTITUTIONAL:  awake and alert  LUNGS:  clear to auscultation  ABDOMEN:  normal bowel sounds, soft, non-distended, non-tender   Neck examination:  3 cm incision well healing, redness on incision line, no erythema, no discharge. 1cm x 1cm right posterior lump or tender lymph node, tender to touch,  no discharge, no opening, no redness. Data:  CBC: No results for input(s): WBC, HGB, HCT, PLT in the last 72 hours. BMP:  No results for input(s): NA, K, CL, CO2, BUN, CREATININE, GLUCOSE in the last 72 hours. Hepatic: No results for input(s): AST, ALT, ALB, BILITOT, ALKPHOS in the last 72 hours. Mag:    No results for input(s): MG in the last 72 hours. Phos:   No results for input(s): PHOS in the last 72 hours. INR: No results for input(s): INR in the last 72 hours.     Radiology Review: NA    ASSESSMENT AND PLAN:  Magen Tucker 37 y.o.  female with past surgical history of subcutaneous mass excision (on 3/8/2022) at posterior neck presents to the office to follow up on incision and complaining of new cyst in posterior neck. S/p occipital subcutaneous mass excision   - Incision well healing.  - no evidence of infection.  - No need for antibiotic therapy. - will continue monitoring.  - Return to office if symptoms got worse. 1cm x 1cm right posterior lump  - Unclear etiology for now, could be a tender lymph node post subcutaneous mass excision  - Surgical intervention not indicated at this time  - Will continue observing for now  - Apply cold packs for headache, tylenol as needed. - Return in 2-3 weeks to follow up. Electronically signed by DO Pau   3/25/2022     Surgery Staff    I have examined this patient, and read and agree with the note by DO Pau from today; more than half of the total time was spent by me on the encounter. Her prior neck incision is healing well, with mild focal hyperemia along the incision line. I don't feel this represents a wound infection. The separate subcutaneous nodule to the right of the incision is of unclear etiology; could be a reactive lymph node, or another enlarge sebaceous cyst.      I would not intervene on the second nodule at this time. Will reassess the healing of the wound and the state of the nodule in 2 weeks or as needed.     Fe Gutierrez MD

## 2022-03-26 LAB — POTASSIUM SERPL-SCNC: 4.3 MMOL/L (ref 3.5–5.1)

## 2022-03-31 RX ORDER — LOSARTAN POTASSIUM AND HYDROCHLOROTHIAZIDE 25; 100 MG/1; MG/1
TABLET ORAL
Qty: 90 TABLET | Refills: 0 | Status: SHIPPED | OUTPATIENT
Start: 2022-03-31 | End: 2022-06-24 | Stop reason: SDUPTHER

## 2022-03-31 NOTE — TELEPHONE ENCOUNTER
Refill Request     Last Seen: Last Seen Department: 3/21/2022  Last Seen by PCP: Visit date not found    Last Written: 01/03/2022 90 Tablet 0 Refills    Next Appointment:   Future Appointments   Date Time Provider Clifford Perez   4/4/2022 10:00 AM Lissy [address]   4/22/2022 11:30 AM MD JAN Garcia  [place] [facility]       Future appointment scheduled      Requested Prescriptions     Pending Prescriptions Disp Refills    losartan-hydroCHLOROthiazide (HYZAAR) 100-25 MG per tablet [Pharmacy Med Name: Losartan Potassium/Hydrochlorothiazide 100mg-25mg Tablet] 90 tablet      Sig: Take 1 tablet by mouth daily for high blood pressure.

## 2022-04-04 ENCOUNTER — TELEMEDICINE (OUTPATIENT)
Dept: PSYCHOLOGY | Age: 42
End: 2022-04-04
Payer: COMMERCIAL

## 2022-04-04 DIAGNOSIS — F41.1 GAD (GENERALIZED ANXIETY DISORDER): Primary | ICD-10-CM

## 2022-04-04 DIAGNOSIS — F41.0 PANIC DISORDER: ICD-10-CM

## 2022-04-04 DIAGNOSIS — F33.2 SEVERE RECURRENT MAJOR DEPRESSION WITHOUT PSYCHOTIC FEATURES (HCC): ICD-10-CM

## 2022-04-04 PROCEDURE — 99999 PR OFFICE/OUTPT VISIT,PROCEDURE ONLY: CPT | Performed by: PSYCHOLOGIST

## 2022-04-04 PROCEDURE — 90832 PSYTX W PT 30 MINUTES: CPT | Performed by: PSYCHOLOGIST

## 2022-04-04 NOTE — PROGRESS NOTES
Behavioral Health Consultation  [facility], [address] [place] [address]  Psychologist  4/4/2022  10:07 AM        Time spent with Patient: 30 minutes  This is patient's second [facility] appointment. Reason for Consult:    Chief Complaint   Patient presents with    Anxiety     Referring Provider: Harshad Gaines, DO        TELEHEALTH VISIT -- Audio/Visual (During [facility]- public health emergency)  }  Claudia Gatica, was evaluated through a synchronous (real-time) audio-video encounter. The patient (or guardian if applicable) is aware that this is a billable service. The visit was conducted pursuant to the emergency declaration under the [facility], [address] [place] [address] authority and the [employer] Act. Patient identification was verified, and a caregiver was present when appropriate. The patient was located in a state where the provider was licensed to provide care. Conducted a risk-benefit analysis and determined that the patient's presenting problems are consistent with the use of telepsychology. Determined that the patient has sufficient knowledge and skills in the use of technology enabling them to adequately benefit from telepsychology. It was determined that this patient was able to be properly treated without an in-person session. Patient verified that they were currently located at the [facility] address that was provided during registration.       Verified the following information:  Patient's identification: Yes  Patient location: [address] [address]  Patient's call back number: [phone]  Patient's emergency contact's name and number, as well as permission to contact them if needed: Extended Emergency Contact Information  Primary Emergency Contact: [facility]  Address: [address] [place] [address] Phone: [phone]  Mobile Phone: [phone]  Relation: Spouse    Provider location: [address]:    During the last visit pt set goals to 1) practice diaphragmatic breathing 2x/day for 10 min when not anxious to work on skill mastery before skill generalization (download the free jennifer, [id number], to practice if needed) 2) begin to limit oversleeping    Pt reports she has been doing a little better. Hasn't had as many panic attacks since last visit and hasn't had any severe ones. Has been practicing diaphragmatic breathing and at times feels its uncomfortable as she feels it is not as deep. Has been doing more volunteer work which has helped with confidence. Still so tired and is oversleeping. Working on building a bedroom now - currently in the living room. Worries often- worries she will forget appointments, worries about driving in highways, worries about things far in the future. Has a hard time managing her worry thoughts. O:  MSE:    Attitude: cooperative and friendly  Consciousness: alert  Orientation: oriented to person, place, time, general circumstance  Memory: recent and remote memory intact  Attention/Concentration: intact during session  Speech: normal rate and volume, well-articulated  Mood: \"a little better\"  Affect: euthymic, congruent  Perception: within normal limits  Thought Content: within normal limits  Thought Process: logical, coherent and goal-directed  Insight: good  Judgment: intact  Ability to understand instructions: Yes  Ability to respond meaningfully: Yes  Morbid Ideation: no   Suicide Assessment: no suicidal ideation, plan, or intent  Homicidal Ideation: no    History:    Medications:   Current Outpatient Medications   Medication Sig Dispense Refill    losartan-hydroCHLOROthiazide (HYZAAR) 100-25 MG per tablet Take 1 tablet by mouth daily for high blood pressure.  90 tablet 0    verapamil (CALAN SR) 120 MG extended release tablet Take 1 tablet by mouth nightly 30 tablet 1    hydroCHLOROthiazide (HYDRODIURIL) 25 MG tablet Take 1 tablet by mouth every morning 30 tablet 5    sennosides-docusate sodium (SENOKOT-S) 8.6-50 MG tablet Take 2 tablets by mouth daily 180 tablet 1    busPIRone (BUSPAR) 10 MG tablet Take 1 tablet by mouth 2 times daily 60 tablet 1    spironolactone (ALDACTONE) 50 MG tablet Take 1 tablet by mouth daily 30 tablet 1    Biotin 5000 MCG CAPS Take 10,000 mcg by mouth daily      estrogens, conjugated, (PREMARIN) 0.3 MG tablet Take 0.3 mg by mouth daily      DULoxetine (CYMBALTA) 60 MG extended release capsule Take 1 capsule by mouth every day. 90 capsule 0    cimetidine (TAGAMET) 400 MG tablet Take 1 tablet by mouth twice daily. 180 tablet 1    ondansetron (ZOFRAN ODT) 4 MG disintegrating tablet Take 1 tablet by mouth every 8 hours as needed for Nausea 20 tablet 0    fluticasone (FLONASE) 50 MCG/ACT nasal spray Spray 1 spray into Each Nostril Every Day 3 each 1    Calcium Citrate-Vitamin D 200-250 MG-UNIT TABS Take 1 tablet by mouth twice daily. 60 tablet 0    terbinafine (LAMISIL) 250 MG tablet Take 250 mg by mouth daily      Multiple Vitamins-Minerals (THERAPEUTIC MULTIVITAMIN-MINERALS W/ IRON) TABS tablet Take 1 tablet by mouth daily [facility]      blood glucose monitor kit and supplies Dispense sufficient amount for indicated testing frequency plus additional to accommodate PRN testing needs. Dispense all needed supplies to include: monitor, strips, lancing device, lancets, control solutions, alcohol swabs. 1 kit 0    metFORMIN (GLUCOPHAGE-XR) 750 MG extended release tablet TAKE 2 TABLETS BY MOUTH EVERY MORNING 60 tablet 2    hydrOXYzine (ATARAX) 50 MG tablet 1 twice daily as needed anxiety. 30 tablet 1     No current facility-administered medications for this visit.      Social History:   Social History     Socioeconomic History    Marital status:      Spouse name: Chato Labor    Number of children: 3    Years of education: 12    Highest education level: Not on file   Occupational History    Not on file   Tobacco Use    Smoking status: Former Smoker     Packs/day: 1.00     Years: 20.00     Pack years: 20.00     Types: Cigarettes     Quit date: 2019     Years since quittin.9    Smokeless tobacco: Never Used   Vaping Use    Vaping Use: Never used   Substance and Sexual Activity    Alcohol use: Not Currently     Alcohol/week: 0.0 standard drinks    Drug use: Yes     Frequency: 3.0 times per week     Types: Marijuana Darliss Meeter)    Sexual activity: Yes     Partners: Male   Other Topics Concern    Not on file   Social History Narrative    Not on file     Social Determinants of Health     Financial Resource Strain: Low Risk     Difficulty of Paying Living Expenses: Not hard at all   Food Insecurity: No Food Insecurity    Worried About Running Out of Food in the Last Year: Never true    Ilsa of Food in the Last Year: Never true   Transportation Needs: No Transportation Needs    Lack of Transportation (Medical): No    Lack of Transportation (Non-Medical): No   Physical Activity:     Days of Exercise per Week: Not on file    Minutes of Exercise per Session: Not on file   Stress:     Feeling of Stress : Not on file   Social Connections:     Frequency of Communication with Friends and Family: Not on file    Frequency of Social Gatherings with Friends and Family: Not on file    Attends Mu-ism Services: Not on file    Active Member of [address] [place] [address] or Organizations: Not on file    Attends Club or Organization Meetings: Not on file    Marital Status: Not on file   Intimate Partner Violence:     Fear of Current or Ex-Partner: Not on file    Emotionally Abused: Not on file    Physically Abused: Not on file    Sexually Abused: Not on file   Housing Stability: [address] Unable to Pay for Housing in the Last Year: No    Number of [place] in the Last Year: 1    Unstable Housing in the Last Year: No     TOBACCO:   reports that she quit smoking about 2 years ago.  Her smoking use included cigarettes. She has a 20.00 pack-year smoking history. She has never used smokeless tobacco.  ETOH:   reports previous alcohol use.   Family History:   Family History   Problem Relation Age of Onset    Arthritis Mother     Miscarriages / [place] Mother     High Blood Pressure Mother     Depression Mother     High Cholesterol Mother     Arthritis Father     High Blood Pressure Father     Cancer Father         colon    Hearing Loss Father     Heart Disease Father     High Cholesterol Father     High Blood Pressure Brother     High Cholesterol Brother     High Blood Pressure Maternal Aunt     High Cholesterol Maternal Aunt     Miscarriages / Stillbirths Maternal Aunt     Mental Retardation Maternal Uncle     High Blood Pressure Maternal Uncle     High Cholesterol Maternal Uncle     High Blood Pressure Paternal Aunt     High Cholesterol Paternal Aunt     Arthritis Paternal Uncle     Mental Illness Paternal Uncle     High Blood Pressure Paternal Uncle     Birth Defects Paternal Uncle     High Cholesterol Paternal Uncle     Learning Disabilities Paternal Uncle     Arthritis Maternal Grandmother     High Blood Pressure Maternal Grandmother     Diabetes Maternal Grandmother     High Cholesterol Maternal Grandmother     Miscarriages / Stillbirths Maternal Grandmother     Arthritis Maternal Grandfather     High Blood Pressure Maternal Grandfather     High Cholesterol Maternal Grandfather     Arthritis Paternal Grandmother     High Blood Pressure Paternal Grandmother     Heart Disease Paternal Grandmother     High Cholesterol Paternal Grandmother    Maryan Imus / Stillbirths Paternal Grandmother     Stroke Paternal Grandmother     High Blood Pressure Paternal Grandfather     High Cholesterol Paternal Grandfather        A:  Ms. Marxia Perkins continues to struggle with anxiety, depression, and panic attacks although reports less frequent and intense panic attacks since the last visit. She continues to beactive and engaged and responds positively to behavioral interventions. Diagnosis:    1. ESTELA (generalized anxiety disorder)    2. Panic disorder    3. Severe recurrent major depression without psychotic features ([facility] [address]          Plan:  Pt interventions:  Zillah-setting to identify pt's primary goals for TIFFANIE MCINTOSH [facility] visit / overall health, Supportive techniques, taught and practiced diaphragmatic breathing, reinforced pt's skill use, discussed skill mastery vs skill generalization, ACT interventions, CBT interventions and treatment planning    Pt Behavioral Change Plan:   Pt set goals to 1) practice diffusion of thoughts and feelings exercise 2) practice awareness and try to intentionally anticipate your next thought 3) continue to practice diaphragmatic breathing but increase to 2x/day with clarified directions 4) Return in about 3 weeks (around 4/25/2022).

## 2022-04-04 NOTE — PATIENT INSTRUCTIONS
\"The entire autonomic nervous system (and through it, our internal organs and glands) is largely driven by our breathing patterns. By changing our breathing we can influence millions of biochemical reactions in our body, producing more relaxing substances such as endorphins and fewer anxiety-producing ones like adrenaline and higher blood acidity. Mindfulness of the breath is so effective that it is common to all meditative and prayer traditions. \" Anxiety Fear & Breathing - Breathing. com    \"When overcoming high levels of anxiety, it is important to learn the techniques of correct breathing. Many people who live with high levels of anxiety are known to breathe through their chest. Shallow breathing through the chest means you are disrupting the balance of oxygen and carbon dioxide necessary to be in a relaxed state. This type of breathing will perpetuate the symptoms of anxiety. \" [employer]. com      Diaphragmatic Breathing  ( You can download the free jennifer,  [id number], to practice)           _____________________________________________________________________________  1. Sit in a comfortable position    2. Place one hand on your stomach and the other on your chest    3. Try to breathe so that only your stomach rises and falls    As you inhale, concentrate on your chest remaining relatively still while your stomach rises. It may be helpful for you to imagine that your pants are too big and you need to push your stomach out to hold them up. When exhaling, allow your stomach to fall in and the air to fully escape. Inhale slowly. You may choose to hold the air in for about a second. Exhale slowly. Dont push the air out, but just let the natural pressure of your body slowly move it out.     It is normal for this healthy method of breathing to feel a little awkward at first.  With practice, it will feel more natural.    4. Get your mind on your side    One other important factor in getting relaxed is your mind.  Your mind and body are connected. The mind influences the body and the body influences the mind. What you do with your mind when you are trying to relax is very important. The key is to avoid thinking about stressful things. You can think about      Neutral things (e.g., counting, saying a word like calm or relax)   Pleasant things (e.g., imagining a pleasant place)    5. It is recommended that you practice 2 times per day, 10 minutes each time.

## 2022-04-07 LAB
GLUCOSE BLD-MCNC: 92 MG/DL (ref 71–99)
GLUCOSE BLD-MCNC: 94 MG/DL (ref 71–99)
GLUCOSE BLD-MCNC: 95 MG/DL (ref 71–99)
HCG URINE: NEGATIVE

## 2022-04-08 LAB — PATHOLOGY REPORT: NORMAL

## 2022-04-22 ENCOUNTER — OFFICE VISIT (OUTPATIENT)
Dept: FAMILY MEDICINE CLINIC | Age: 42
End: 2022-04-22
Payer: COMMERCIAL

## 2022-04-22 VITALS
SYSTOLIC BLOOD PRESSURE: 144 MMHG | OXYGEN SATURATION: 98 % | BODY MASS INDEX: 34.56 KG/M2 | DIASTOLIC BLOOD PRESSURE: 100 MMHG | WEIGHT: 214 LBS | HEART RATE: 76 BPM

## 2022-04-22 DIAGNOSIS — I10 ESSENTIAL HYPERTENSION: Primary | ICD-10-CM

## 2022-04-22 PROCEDURE — G8427 DOCREV CUR MEDS BY ELIG CLIN: HCPCS | Performed by: FAMILY MEDICINE

## 2022-04-22 PROCEDURE — 1036F TOBACCO NON-USER: CPT | Performed by: FAMILY MEDICINE

## 2022-04-22 PROCEDURE — G8417 CALC BMI ABV UP PARAM F/U: HCPCS | Performed by: FAMILY MEDICINE

## 2022-04-22 PROCEDURE — 99214 OFFICE O/P EST MOD 30 MIN: CPT | Performed by: FAMILY MEDICINE

## 2022-04-22 RX ORDER — HYDROCODONE BITARTRATE AND ACETAMINOPHEN 5; 325 MG/1; MG/1
TABLET ORAL
COMMUNITY
Start: 2022-04-07 | End: 2022-11-02

## 2022-04-22 RX ORDER — LEVONORGESTREL AND ETHINYL ESTRADIOL 0.15-0.03
KIT ORAL
COMMUNITY
Start: 2022-03-21 | End: 2022-04-22 | Stop reason: ALTCHOICE

## 2022-04-22 ASSESSMENT — ENCOUNTER SYMPTOMS
CHEST TIGHTNESS: 0
COLOR CHANGE: 0
VOMITING: 0
SHORTNESS OF BREATH: 0
ABDOMINAL PAIN: 0
NAUSEA: 0

## 2022-04-22 NOTE — PROGRESS NOTES
4/22/2022    This is a 37 y.o. female who presents for  Chief Complaint   Patient presents with    Hypertension       HPI:     Hx of uncontrolled BP  Was on Losartan 100-HCTZ 25  Started on Spironolactone at the recommendation of her dermatologist for hair loss  HCTZ added to combination after to maximize    Saw Dr. Teddy Andino on 3/21 for HTN, BP 160s/110s at the time  Verapamil was added but she wasn't aware/didn't pick this up     HTN:  Taking medication(s) daily  Checking Bps at home? Not as much anymore  No new AEs to the medication(s)  No acute concerning Sxs: No CP, SOB, palpitations, dizziness, HA, etc.     + recurrent b/l hand swelling, no triggers or timing, hx of this but this episode is less severe.  No pain, cyanosis      Past Medical History:   Diagnosis Date    Anxiety     Arthritis     Cervical pain     Chronic back pain     Depression     Diabetes mellitus ([address]     lost 83 pounds    GERD (gastroesophageal reflux disease)     Hyperlipidemia     Hypertension     Infertility     took fertility medication    Kidney stone 03/2012    Migraine     Miscarriage     Polycystic ovarian syndrome 1999    PONV (postoperative nausea and vomiting)     Psoriasis     Stomach ulcer     Unspecified sleep apnea     didn't tolerate CPAP       Past Surgical History:   Procedure Laterality Date    BREAST SURGERY Right     for cellulitis     CHOLECYSTECTOMY  2011    COLONOS[facility]  10/7/2015    [facility]polyps    COLONOSCOPY N/A 6/18/2020    COLONOSCOPY WITH BIOPSY performed by Kvng Gonzalez MD at [address] [facility] N/A 6/18/2020    COLONOSCOPY POLYPECTOMY SNARE performed by Kvng Gonzalez MD at [address] [facility] N/A 3/8/2022    NECK CYST EXCISION performed by Glenetta Cockayne, MD at [address] [facility]      for psoriasis    SLEEVE GASTRECTOMY N/A 12/23/2020 LAPAROSCOPIC SLEEVE GASTRECTOMY -  ETHICON performed by Shannan Whitaker MD at [address]  08/09/2016    tonsillectomy    UPPER GASTROINTESTINAL ENDOSCOPY  3/23/11    pyloretic    UPPER GASTROINTESTINAL ENDOSCOPY  10/7/2015    gastritis-[address]    UPPER GASTROINTESTINAL ENDOSCOPY N/A 6/18/2020    EGD DIAGNOSTIC ONLY performed by Mainor Padron MD at [address] N/A 8/28/2020    EGD BIOPSY performed by Shannan Whitaker MD at [facility] [address] Marital status:      Spouse name: Jalen Glover    Number of children: 3    Years of education: 12    Highest education level: Not on file   Occupational History    Not on file   Tobacco Use    Smoking status: Former Smoker     Packs/day: 1.00     Years: 20.00     Pack years: 20.00     Types: Cigarettes     Quit date: 4/17/2019     Years since quitting: 3.0    Smokeless tobacco: Never Used   Vaping Use    Vaping Use: Never used   Substance and Sexual Activity    Alcohol use: Not Currently     Alcohol/week: 0.0 standard drinks    Drug use: Yes     Frequency: 3.0 times per week     Types: Marijuana Veryl Andrews)    Sexual activity: Yes     Partners: Male   Other Topics Concern    Not on file   Social History Narrative    Not on file     Social Determinants of Health     Financial Resource Strain: Low Risk     Difficulty of Paying Living Expenses: Not hard at all   Food Insecurity: No Food Insecurity    Worried About Running Out of Food in the Last Year: Never true    Lisa of Food in the Last Year: Never true   Transportation Needs: No Transportation Needs    Lack of Transportation (Medical): No    Lack of Transportation (Non-Medical):  No   Physical Activity:     Days of Exercise per Week: Not on file    Minutes of Exercise per Session: Not on file   Stress:     Feeling of Stress : Not on file   Social Connections:     Frequency of Communication with Friends and Family: Not on file    Frequency of Social Gatherings with Friends and Family: Not on file    Attends Rastafarian Services: Not on file    Active Member of Clubs or Organizations: Not on file    Attends Club or Organization Meetings: Not on file    Marital Status: Not on file   Intimate Partner Violence:     Fear of Current or Ex-Partner: Not on file    Emotionally Abused: Not on file    Physically Abused: Not on file    Sexually Abused: Not on file   Housing Stability: [address] Unable to Pay for Housing in the Last Year: No    Number of [place] in the Last Year: 1    Unstable Housing in the Last Year: No       Family History   Problem Relation Age of Onset    Arthritis Mother     Miscarriages / [place] Mother     High Blood Pressure Mother     Depression Mother     High Cholesterol Mother     Arthritis Father     High Blood Pressure Father     Cancer Father         colon    Hearing Loss Father     Heart Disease Father     High Cholesterol Father     High Blood Pressure Brother     High Cholesterol Brother     High Blood Pressure Maternal Aunt     High Cholesterol Maternal Aunt     Miscarriages / Stillbirths Maternal Aunt     Mental Retardation Maternal Uncle     High Blood Pressure Maternal Uncle     High Cholesterol Maternal Uncle     High Blood Pressure Paternal Aunt     High Cholesterol Paternal Aunt     Arthritis Paternal Uncle     Mental Illness Paternal Uncle     High Blood Pressure Paternal Uncle     Birth Defects Paternal Uncle     High Cholesterol Paternal Uncle     Learning Disabilities Paternal Uncle     Arthritis Maternal Grandmother     High Blood Pressure Maternal Grandmother     Diabetes Maternal Grandmother     High Cholesterol Maternal Grandmother     Miscarriages / Stillbirths Maternal Grandmother     Arthritis Maternal Grandfather     High Blood Pressure Maternal Grandfather     High Cholesterol Maternal Grandfather     Arthritis Paternal Grandmother     High Blood Pressure Paternal Grandmother     Heart Disease Paternal Grandmother     High Cholesterol Paternal Grandmother    Branda Bucky / Stillbirths Paternal Grandmother     Stroke Paternal Grandmother     High Blood Pressure Paternal Grandfather     High Cholesterol Paternal Grandfather        Current Outpatient Medications   Medication Sig Dispense Refill    HYDROcodone-acetaminophen (NORCO) 5-325 MG per tablet       losartan-hydroCHLOROthiazide (HYZAAR) 100-25 MG per tablet Take 1 tablet by mouth daily for high blood pressure. 90 tablet 0    verapamil (CALAN SR) 120 MG extended release tablet Take 1 tablet by mouth nightly 30 tablet 1    hydroCHLOROthiazide (HYDRODIURIL) 25 MG tablet Take 1 tablet by mouth every morning 30 tablet 5    sennosides-docusate sodium (SENOKOT-S) 8.6-50 MG tablet Take 2 tablets by mouth daily 180 tablet 1    spironolactone (ALDACTONE) 50 MG tablet Take 1 tablet by mouth daily 30 tablet 1    Biotin 5000 MCG CAPS Take 10,000 mcg by mouth daily      estrogens, conjugated, (PREMARIN) 0.3 MG tablet Take 0.3 mg by mouth daily      DULoxetine (CYMBALTA) 60 MG extended release capsule Take 1 capsule by mouth every day. 90 capsule 0    cimetidine (TAGAMET) 400 MG tablet Take 1 tablet by mouth twice daily. 180 tablet 1    ondansetron (ZOFRAN ODT) 4 MG disintegrating tablet Take 1 tablet by mouth every 8 hours as needed for Nausea 20 tablet 0    fluticasone (FLONASE) 50 MCG/ACT nasal spray Spray 1 spray into Each Nostril Every Day 3 each 1    Calcium Citrate-Vitamin D 200-250 MG-UNIT TABS Take 1 tablet by mouth twice daily.  60 tablet 0    terbinafine (LAMISIL) 250 MG tablet Take 250 mg by mouth daily      Multiple Vitamins-Minerals (THERAPEUTIC MULTIVITAMIN-MINERALS W/ IRON) TABS tablet Take 1 tablet by mouth daily [facility]      blood glucose monitor kit and supplies Dispense sufficient amount for indicated testing frequency plus additional to accommodate PRN testing needs. Dispense all needed supplies to include: monitor, strips, lancing device, lancets, control solutions, alcohol swabs. 1 kit 0    metFORMIN (GLUCOPHAGE-XR) 750 MG extended release tablet TAKE 2 TABLETS BY MOUTH EVERY MORNING (Patient taking differently: Indications: takes for PCOS TAKE 2 TABLETS BY MOUTH EVERY MORNING) 60 tablet 2    hydrOXYzine (ATARAX) 50 MG tablet 1 twice daily as needed anxiety. 30 tablet 1     No current facility-administered medications for this visit. Immunization History   Administered Date(s) Administered    COVID-19, Lee Ann Gore, Primary or Immunocompromised, PF, 100mcg/0.5mL 01/24/2022, 02/21/2022    DTP 1981, 02/20/1982, 04/24/1982, 11/23/1984    Influenza 11/29/2012    Influenza Virus Vaccine 10/28/2014, 10/14/2015, 01/24/2022    Influenza, Nima Starch, IM, PF (6 mo and older Fluzone, Flulaval, Fluarix, and 3 yrs and older Afluria) 10/28/2014, 09/18/2017, 12/18/2018, 09/05/2019, 11/24/2020    Influenza, Nima Starch, Recombinant, IM PF (Flublok 18 yrs and older) 01/24/2022    MMR 06/18/1983, 05/09/1994    Pneumococcal Conjugate 13-valent (Sobeida Mauricio) 10/14/2015    Polio OPV 1981, 02/20/1982, 04/24/1982, 11/23/1984    Rho (D) Immune Globulin 07/10/2012, 09/09/2012    Td (Adult), 5 Lf Tetanus Toxoid, Pf (Tenivac, Decavac) 05/09/1994    Td, unspecified formulation 06/24/2004    Tdap (Boostrix, Adacel) 1981, 02/20/1982, 04/24/1982, 11/23/1984, 11/29/2012       Allergies   Allergen Reactions    Tramadol Other (See Comments)     severe  SEVERE HEADACHE    Lisinopril Other (See Comments)     COUGH       Review of Systems   Constitutional: Negative for activity change, appetite change, chills, diaphoresis, fatigue and fever. Eyes: Negative for visual disturbance. Respiratory: Negative for chest tightness and shortness of breath. Cardiovascular: Negative for chest pain, palpitations and leg swelling. Gastrointestinal: Negative for abdominal pain, nausea and vomiting. Genitourinary: Negative for decreased urine volume. Skin: Negative for color change. Neurological: Negative for dizziness, weakness, light-headedness, numbness and headaches. Psychiatric/Behavioral: The patient is nervous/anxious. BP (!) 144/100   Pulse 76   Wt 214 lb (97.1 kg)   SpO2 98%   BMI 34.56 kg/m²     Physical Exam  Vitals and nursing note reviewed. Constitutional:       General: She is not in acute distress. Appearance: She is well-developed. She is not diaphoretic. HENT:      Head: Normocephalic and atraumatic. Eyes:      Conjunctiva/sclera: Conjunctivae normal.      Pupils: Pupils are equal, round, and reactive to light. Neck:      Vascular: No JVD. Cardiovascular:      Rate and Rhythm: Normal rate and regular rhythm. Heart sounds: Normal heart sounds. No murmur heard. No friction rub. No gallop. Pulmonary:      Effort: Pulmonary effort is normal. No respiratory distress. Breath sounds: Normal breath sounds. No wheezing or rales. Chest:      Chest wall: No tenderness. Abdominal:      Palpations: Abdomen is soft. Tenderness: There is no abdominal tenderness. Musculoskeletal:         General: Normal range of motion. Cervical back: Normal range of motion and neck supple. Skin:     General: Skin is warm and dry. Capillary Refill: Capillary refill takes 2 to 3 seconds. Findings: No erythema. Neurological:      Mental Status: She is alert and oriented to person, place, and time. Psychiatric:         Behavior: Behavior normal.         Thought Content: Thought content normal.         Judgment: Judgment normal.         Plan  1.  Essential hypertension  Hx of uncontrolled BP  Was on Losartan 100-HCTZ 25  Started on Spironolactone at the recommendation of her dermatologist for hair loss  HCTZ added to combination after to maximize    Saw Dr. Verónica Cannon on 3/21 for HTN, BP 160s/110s at the time  Verapamil was added but she wasn't aware/didn't pick this up   Will  today and start  Return 1 mo  DASH h/o given/discussed  Encouraged continued WL efforts and anxiety control with Dr. Santa Gibson consider BB if Verapamil is not helpful/there are AEs   ECHO 7/20 reviewed, no exam changes to warrant repeat    US for EDMOND neg in 3/22    While assessing care for this patient, I have reviewed all pertinent lab work/imaging/ specialist notes and care in reference to those problems addressed above in detail. Appropriate medical decision making was based on this. Please note that portions of this note may have been completed with a voice recognition program. Efforts were made to edit the dictations but occasionally words are mis-transcribed. Return in about 4 weeks (around 5/20/2022) for 30 minute visit, follow up blood pressure.

## 2022-04-22 NOTE — PATIENT INSTRUCTIONS
serving is 1 slice of bread, 1 ounce of dry cereal, or ½ cup of cooked rice, pasta, or cooked cereal. Try to choose whole-grain products as much as possible.  Limit lean meat, poultry, and fish to 2 servings each day. A serving is 3 ounces, about the size of a deck of cards.  Eat 4 to 5 servings of nuts, seeds, and legumes (cooked dried beans, lentils, and split peas) each week. A serving is 1/3 cup of nuts, 2 tablespoons of seeds, or ½ cup of cooked beans or peas.  Limit fats and oils to 2 to 3 servings each day. A serving is 1 teaspoon of vegetable oil or 2 tablespoons of salad dressing.  Limit sweets and added sugars to 5 servings or less a week. A serving is 1 tablespoon jelly or jam, ½ cup sorbet, or 1 cup of lemonade.  Eat less than 2,300 milligrams (mg) of sodium a day. If you limit your sodium to 1,500 mg a day, you can lower your blood pressure even more.  Be aware that all of these are the suggested number of servings for people who eat 1,800 to 2,000 calories a day. Your recommended number of servings may be different if you need more or fewer calories. Tips for success   Start small. Do not try to make dramatic changes to your diet all at once. You might feel that you are missing out on your favorite foods and then be more likely to not follow the plan. Make small changes, and stick with them. Once those changes become habit, add a few more changes.  Try some of the following:  ? Make it a goal to eat a fruit or vegetable at every meal and at snacks. This will make it easy to get the recommended amount of fruits and vegetables each day. ? Try yogurt topped with fruit and nuts for a snack or healthy dessert. ? Add lettuce, tomato, cucumber, and onion to sandwiches. ? Combine a ready-made pizza crust with low-fat mozzarella cheese and lots of vegetable toppings. Try using tomatoes, squash, spinach, broccoli, carrots, cauliflower, and onions. ?  Have a variety of cut-up vegetables with a (not smoked)   Fresh meats (not smoked or cured)   Nuts and nut butter, prepared without salt   Poultry, not packaged with sodium solution   Tofu, unseasoned   Tuna, canned without salt  Seasonings   Garlic   Herbs and spices   Lemon juice   Mustard   Olive oil   Salt-free seasoning mixes   Vinegar  Work with your doctor to find out how much of this nutrient you need. Dependingon your health, you may need more or less of it in your diet. Where can you learn more? Go to https://LiveyearbookpeNcube World.Antares Vision. org and sign in to your [employer] account. Enter X038 in the [facility] box to learn more about \"Learning About Low-Sodium Foods. \"     If you do not have an account, please click on the \"Sign Up Now\" link. Current as of: September 8, 2021               Content Version: 13.2  © 6402-6404 [employer]. Care instructions adapted under license by [facility] ([facility]). If you have questions about a medical condition or this instruction, always ask your healthcare professional. [place] [address] any warranty or liability for your use of this information. Patient Education        How to Read a Food Label to Limit Sodium: Care Instructions  Overview  Limiting sodium can be an important part of managing some health problems. Processed foods, fast food, and restaurant foods are the major sources of dietary sodium. The most common name for sodium is salt. Most packaged foods have a Nutrition Facts label. This will tell you how much sodium is in oneserving of food. Follow-up care is a key part of your treatment and safety. Be sure to make and go to all appointments, and call your doctor if you are having problems. It's also a good idea to know your test results and keep alist of the medicines you take. How can you care for yourself at home?   Read ingredient lists on food labels   Read the list of ingredients on food labels to help you find how much sodium is in a food. The label lists the ingredients in a food in descending order (from the most to the least). If salt or sodium is high on the list, there may be a lot of sodium in the food.  Know that sodium has different names. Sodium is also called monosodium glutamate (MSG), sodium citrate, sodium alginate, and sodium phosphate. Read Nutrition Facts labels   On most foods, there is a Nutrition Facts label. This will tell you how much sodium is in one serving of food. Look at both the serving size and the sodium amount. The serving size is located at the top of the label, usually right under the \"Nutrition Facts\" title. The amount of sodium is given in the list under the title. It is given in milligrams (mg). ? Check the serving size carefully. A single serving is often very small, and you may eat more than one serving. If this is the case, you will eat more sodium than listed on the label. For example, if the serving size for a canned soup is 1 cup and the sodium amount is 470 mg, if you have 2 cups you will eat 940 mg of sodium.  The nutrition facts for fresh fruits and vegetables are not listed on the food. They may be listed somewhere in the store. These foods usually have no sodium or low sodium.  The Nutrition Facts label also gives you the Percent Daily Value for sodium. This is how much of the recommended amount of sodium a serving contains. The daily value for sodium is 2,300 mg. So if the Percent Daily Value says 50%, this means one serving is giving you half of this, or 1,150 mg. Buy low-sodium foods   Look for foods that are made with less sodium. Watch for the following words on the label. ? \"Unsalted\" means there is no sodium added to the food. But there may be sodium already in the food naturally. ? \"Sodium-free\" means a serving has less than 5 mg of sodium. ? \"Very low sodium\" means a serving has 35 mg or less of sodium. ?  \"Low-sodium\" means a serving has 140 mg or less of sodium.  \"Reduced-sodium\" means that there is 25% less sodium than what the food normally has. This is still usually too much sodium.  Buy fresh vegetables, or frozen vegetables without added sauces. Buy low-sodium versions of canned vegetables, soups, and other canned goods. Where can you learn more? Go to https://Naverapepiceweb.Speedment. org and sign in to your [employer] account. Enter 26 [id number] in the [facility] box to learn more about \"How to Read a Food Label to Limit Sodium: Care Instructions. \"     If you do not have an account, please click on the \"Sign Up Now\" link. Current as of: September 8, 2021               Content Version: 13.2  © 8533-4965 [employer]wise[employer]. Care instructions adapted under license by [facility] ([facility]). If you have questions about a medical condition or this instruction, always ask your healthcare professional. [place] [address] any warranty or liability for your use of this information.

## 2022-04-25 ENCOUNTER — TELEMEDICINE (OUTPATIENT)
Dept: PSYCHOLOGY | Age: 42
End: 2022-04-25
Payer: COMMERCIAL

## 2022-04-25 DIAGNOSIS — F33.2 SEVERE RECURRENT MAJOR DEPRESSION WITHOUT PSYCHOTIC FEATURES (HCC): ICD-10-CM

## 2022-04-25 DIAGNOSIS — F41.1 GAD (GENERALIZED ANXIETY DISORDER): Primary | ICD-10-CM

## 2022-04-25 DIAGNOSIS — F41.0 PANIC DISORDER: ICD-10-CM

## 2022-04-25 DIAGNOSIS — I10 PRIMARY HYPERTENSION: ICD-10-CM

## 2022-04-25 DIAGNOSIS — L65.9 HAIR LOSS: ICD-10-CM

## 2022-04-25 PROCEDURE — 90832 PSYTX W PT 30 MINUTES: CPT | Performed by: PSYCHOLOGIST

## 2022-04-25 RX ORDER — BUSPIRONE HYDROCHLORIDE 10 MG/1
TABLET ORAL
Qty: 60 TABLET | Refills: 2 | Status: SHIPPED | OUTPATIENT
Start: 2022-04-25 | End: 2022-07-25

## 2022-04-25 RX ORDER — DULOXETIN HYDROCHLORIDE 60 MG/1
CAPSULE, DELAYED RELEASE ORAL
Qty: 90 CAPSULE | Refills: 0 | Status: SHIPPED | OUTPATIENT
Start: 2022-04-25 | End: 2022-07-25

## 2022-04-25 RX ORDER — SPIRONOLACTONE 50 MG/1
50 TABLET, FILM COATED ORAL DAILY
Qty: 30 TABLET | Refills: 2 | Status: SHIPPED | OUTPATIENT
Start: 2022-04-25 | End: 2022-06-24

## 2022-04-25 NOTE — PROGRESS NOTES
Behavioral Health Consultation  [address] [place] [address] Cande, [address] [place] [address]  Psychologist  4/25/2022  10:04 AM        Time spent with Patient: 30 minutes  This is patient's third [facility] appointment. Reason for Consult:    Chief Complaint   Patient presents with    Depression    Anxiety     Referring Provider: Kasey Porras, DO        TELEHEALTH VISIT -- Audio/Visual (During JWSKK-92 public health emergency)  }  Makayla Shukla, was evaluated through a synchronous (real-time) audio-video encounter. The patient (or guardian if applicable) is aware that this is a billable service. The visit was conducted pursuant to the emergency declaration under the [facility], [address] [place] [address] authority and the [employer] Act. Patient identification was verified, and a caregiver was present when appropriate. The patient was located in a state where the provider was licensed to provide care. Conducted a risk-benefit analysis and determined that the patient's presenting problems are consistent with the use of telepsychology. Determined that the patient has sufficient knowledge and skills in the use of technology enabling them to adequately benefit from telepsychology. It was determined that this patient was able to be properly treated without an in-person session. Patient verified that they were currently located at the [facility] address that was provided during registration.       Verified the following information:  Patient's identification: Yes  Patient location: [address] [address]  Patient's call back number: [phone]  Patient's emergency contact's name and number, as well as permission to contact them if needed: Extended Emergency Contact Information  Primary Emergency Contact: [facility]  Address: [address] [place] [address] Phone: [phone]  Mobile Phone: [phone]  Relation: Spouse    Provider location: [address]:    During the last visit Pt set goals to 1) practice diffusion of thoughts and feelings exercise 2) practice awareness and try to intentionally anticipate your next thought 3) continue to practice diaphragmatic breathing but increase to 2x/day with clarified directions    Pt reports she is \"good. \" ANxiety has wound down a bit but depression has increased. Gets overwehelming sadness. Has a had time pushing negative things out of her mind. Struggles with motivation- getting out of bed. Has practiced breathing more and finds it definitely helps with panic attacks and being able to not have them escalate. Will be setting new limits with kids and finds she is in the future and worrying about all the potential outcomes- likes to worry and exhaust all potential outcomes to feel more prepared. O:  MSE:    Attitude: cooperative and friendly  Consciousness: alert  Orientation: oriented to person, place, time, general circumstance  Memory: recent and remote memory intact  Attention/Concentration: intact during session  Speech: normal rate and volume, well-articulated  Mood: \"ok- depressed\"  Affect: euthymic, congruent  Perception: within normal limits  Thought Content: within normal limits  Thought Process: logical, coherent and goal-directed  Insight: good  Judgment: intact  Ability to understand instructions: Yes  Ability to respond meaningfully: Yes  Morbid Ideation: no   Suicide Assessment: no suicidal ideation, plan, or intent  Homicidal Ideation: no    History:    Medications:   Current Outpatient Medications   Medication Sig Dispense Refill    HYDROcodone-acetaminophen (NORCO) 5-325 MG per tablet       losartan-hydroCHLOROthiazide (HYZAAR) 100-25 MG per tablet Take 1 tablet by mouth daily for high blood pressure.  90 tablet 0    verapamil (CALAN SR) 120 MG extended release tablet Take 1 tablet by mouth nightly 30 tablet 1    hydroCHLOROthiazide (HYDRODIURIL) 25 MG tablet Take 1 tablet by mouth every morning 30 tablet 5    sennosides-docusate sodium (SENOKOT-S) 8.6-50 MG tablet Take 2 tablets by mouth daily 180 tablet 1    spironolactone (ALDACTONE) 50 MG tablet Take 1 tablet by mouth daily 30 tablet 1    Biotin 5000 MCG CAPS Take 10,000 mcg by mouth daily      estrogens, conjugated, (PREMARIN) 0.3 MG tablet Take 0.3 mg by mouth daily      DULoxetine (CYMBALTA) 60 MG extended release capsule Take 1 capsule by mouth every day. 90 capsule 0    cimetidine (TAGAMET) 400 MG tablet Take 1 tablet by mouth twice daily. 180 tablet 1    ondansetron (ZOFRAN ODT) 4 MG disintegrating tablet Take 1 tablet by mouth every 8 hours as needed for Nausea 20 tablet 0    fluticasone (FLONASE) 50 MCG/ACT nasal spray Spray 1 spray into Each Nostril Every Day 3 each 1    Calcium Citrate-Vitamin D 200-250 MG-UNIT TABS Take 1 tablet by mouth twice daily. 60 tablet 0    terbinafine (LAMISIL) 250 MG tablet Take 250 mg by mouth daily      Multiple Vitamins-Minerals (THERAPEUTIC MULTIVITAMIN-MINERALS W/ IRON) TABS tablet Take 1 tablet by mouth daily [facility]      blood glucose monitor kit and supplies Dispense sufficient amount for indicated testing frequency plus additional to accommodate PRN testing needs. Dispense all needed supplies to include: monitor, strips, lancing device, lancets, control solutions, alcohol swabs. 1 kit 0    metFORMIN (GLUCOPHAGE-XR) 750 MG extended release tablet TAKE 2 TABLETS BY MOUTH EVERY MORNING (Patient taking differently: Indications: takes for PCOS TAKE 2 TABLETS BY MOUTH EVERY MORNING) 60 tablet 2    hydrOXYzine (ATARAX) 50 MG tablet 1 twice daily as needed anxiety. 30 tablet 1     No current facility-administered medications for this visit.      Social History:   Social History     Socioeconomic History    Marital status:      Spouse name: Malgorzata Jogre    Number of children: 3    Years of education: 12    Highest education level: Not on file   Occupational History    Not on file   Tobacco Use    Smoking status: Former Smoker     Packs/day: 1.00     Years: 20.00     Pack years: 20.00     Types: Cigarettes     Quit date: 4/17/2019     Years since quitting: 3.0    Smokeless tobacco: Never Used   Vaping Use    Vaping Use: Never used   Substance and Sexual Activity    Alcohol use: Not Currently     Alcohol/week: 0.0 standard drinks    Drug use: Yes     Frequency: 3.0 times per week     Types: Marijuana Curry Divine)    Sexual activity: Yes     Partners: Male   Other Topics Concern    Not on file   Social History Narrative    Not on file     Social Determinants of Health     Financial Resource Strain: Low Risk     Difficulty of Paying Living Expenses: Not hard at all   Food Insecurity: No Food Insecurity    Worried About Running Out of Food in the Last Year: Never true    Lisa of Food in the Last Year: Never true   Transportation Needs: No Transportation Needs    Lack of Transportation (Medical): No    Lack of Transportation (Non-Medical):  No   Physical Activity:     Days of Exercise per Week: Not on file    Minutes of Exercise per Session: Not on file   Stress:     Feeling of Stress : Not on file   Social Connections:     Frequency of Communication with Friends and Family: Not on file    Frequency of Social Gatherings with Friends and Family: Not on file    Attends [employer] Services: Not on file    Active Member of Clubs or Organizations: Not on file    Attends Club or Organization Meetings: Not on file    Marital Status: Not on file   Intimate Partner Violence:     Fear of Current or Ex-Partner: Not on file    Emotionally Abused: Not on file    Physically Abused: Not on file    Sexually Abused: Not on file   Housing Stability: [address] Unable to Pay for Housing in the Last Year: No    Number of [place] in the Last Year: 1    Unstable Housing in the Last Year: No     TOBACCO:   reports that she quit smoking about 3 years ago. Her smoking use included cigarettes. She has a 20.00 pack-year smoking history. She has never used smokeless tobacco.  ETOH:   reports previous alcohol use.   Family History:   Family History   Problem Relation Age of Onset    Arthritis Mother    Geri Counts / [place] Mother     High Blood Pressure Mother     Depression Mother     High Cholesterol Mother     Arthritis Father     High Blood Pressure Father     Cancer Father         colon    Hearing Loss Father     Heart Disease Father     High Cholesterol Father     High Blood Pressure Brother     High Cholesterol Brother     High Blood Pressure Maternal Aunt     High Cholesterol Maternal Aunt     Miscarriages / Stillbirths Maternal Aunt     Mental Retardation Maternal Uncle     High Blood Pressure Maternal Uncle     High Cholesterol Maternal Uncle     High Blood Pressure Paternal Aunt     High Cholesterol Paternal Aunt     Arthritis Paternal Uncle     Mental Illness Paternal Uncle     High Blood Pressure Paternal Uncle     Birth Defects Paternal Uncle     High Cholesterol Paternal Uncle     Learning Disabilities Paternal Uncle     Arthritis Maternal Grandmother     High Blood Pressure Maternal Grandmother     Diabetes Maternal Grandmother     High Cholesterol Maternal Grandmother     Miscarriages / Stillbirths Maternal Grandmother     Arthritis Maternal Grandfather     High Blood Pressure Maternal Grandfather     High Cholesterol Maternal Grandfather     Arthritis Paternal Grandmother     High Blood Pressure Paternal Grandmother     Heart Disease Paternal Grandmother     High Cholesterol Paternal Grandmother    Geri Counts / Stillbirths Paternal Grandmother     Stroke Paternal Grandmother     High Blood Pressure Paternal Grandfather     High Cholesterol Paternal Grandfather        A:  Ms. Soco Morejon continues to struggle with anxiety, depression, and panic attacks although reports less frequent and intense panic attacks since the last visit. She continues to be active and engaged and responds positively to behavioral interventions. Diagnosis:    1. ESTELA (generalized anxiety disorder)    2. Panic disorder    3. Severe recurrent major depression without psychotic features ([facility] [address]          Plan:  Pt interventions:  [place]-setting to identify pt's primary goals for TIFFANIE MCINTOS[facility] visit / overall health, Supportive techniques, taught and practiced diaphragmatic breathing, reinforced pt's skill use, ACT interventions, CBT interventions and treatment planning    Pt Behavioral Change Plan:   Pt set goals to 1) continue to practice diffusion of thoughts and feelings exercise 2) be more consistent with practice of intentionally anticipate your next thought 3) continue to practice diaphragmatic breathing  4) practice mindfulness with parents 5) Return in about 2 weeks (around 5/9/2022).

## 2022-04-25 NOTE — TELEPHONE ENCOUNTER
Refill Request     Last Seen: Last Seen Department: 4/22/2022  Last Seen by PCP: 3/21/2022    Last Written: Leanne Che 3/9/22, CYMBALTA 1/31/22,    Next Appointment:   Future Appointments   Date Time Provider Clifford Perez   4/25/2022 10:00 AM Lissy [address] O[address]endo [facility]   5/23/2022 11:30 [facility]  [place] [facility]       Future appointment scheduled      Requested Prescriptions     Pending Prescriptions Disp Refills    spironolactone (ALDACTONE) 50 MG tablet [Pharmacy Med Name: Spironolactone 50mg Tablet] 30 tablet 0     Sig: Take 1 tablet by mouth daily.  busPIRone (BUSPAR) 10 MG tablet [Pharmacy Med Name: Buspirone Hydrochloride 10mg Tablet] 60 tablet 0     Sig: Take 1 tablet by mouth twice daily.  DULoxetine (CYMBALTA) 60 MG extended release capsule 90 capsule 0     Sig: Take 1 capsule by mouth every day.

## 2022-05-17 ENCOUNTER — TELEMEDICINE (OUTPATIENT)
Dept: PSYCHOLOGY | Age: 42
End: 2022-05-17
Payer: COMMERCIAL

## 2022-05-17 DIAGNOSIS — F41.1 GAD (GENERALIZED ANXIETY DISORDER): Primary | ICD-10-CM

## 2022-05-17 DIAGNOSIS — F41.0 PANIC DISORDER: ICD-10-CM

## 2022-05-17 DIAGNOSIS — F33.2 SEVERE RECURRENT MAJOR DEPRESSION WITHOUT PSYCHOTIC FEATURES (HCC): ICD-10-CM

## 2022-05-17 PROCEDURE — 90832 PSYTX W PT 30 MINUTES: CPT | Performed by: PSYCHOLOGIST

## 2022-05-17 NOTE — PATIENT INSTRUCTIONS
Notice:    5 things you can see  4 things you can feel  3 things you can hear  2 things you can smell  1 thing you can taste

## 2022-05-17 NOTE — PROGRESS NOTES
Behavioral Health Consultation  [facility], [address] [place] [address]  Psychologist  5/17/2022  9:09 AM        Time spent with Patient: 30 minutes  This is patient's third [facility] appointment. Reason for Consult:    Chief Complaint   Patient presents with    Stress    Anxiety     Referring Provider: Kasey Porras DO        TELEHEALTH VISIT -- Audio/Visual (During NBUAT-08 public health emergency)  }  Makayla Shukla, was evaluated through a synchronous (real-time) audio-video encounter. The patient (or guardian if applicable) is aware that this is a billable service. The visit was conducted pursuant to the emergency declaration under the [facility], [address] [place] [address] authority and the [employer] Act. Patient identification was verified, and a caregiver was present when appropriate. The patient was located in a state where the provider was licensed to provide care. Conducted a risk-benefit analysis and determined that the patient's presenting problems are consistent with the use of telepsychology. Determined that the patient has sufficient knowledge and skills in the use of technology enabling them to adequately benefit from telepsychology. It was determined that this patient was able to be properly treated without an in-person session. Patient verified that they were currently located at the [facility] address that was provided during registration.       Verified the following information:  Patient's identification: Yes  Patient location: [address] [address]  Patient's call back number: [phone]  Patient's emergency contact's name and number, as well as permission to contact them if needed: Extended Emergency Contact Information  Primary Emergency Contact: [facility]  Address: [address] [place] [address] Phone: [phone]  Mobile Phone: [phone]  Relation: Spouse    Provider location: [address]:    During the last visit Pt set goals to 1) continue to practice diffusion of thoughts and feelings exercise 2) be more consistent with practice of intentionally anticipate your next thought 3) continue to practice diaphragmatic breathing  4) practice mindfulness with parents     Pt reports she is \"ok. \" Has had a lot of stressors lately.  is having a breakdown lately and he has always been her rock. Similar to the breakdown she had 3 years ago. Hard on her. Uncle passed away suddenly too. Trying to grieve but be the \"strong one\" for her family. O:  MSE:    Attitude: cooperative and friendly  Consciousness: alert  Orientation: oriented to person, place, time, general circumstance  Memory: recent and remote memory intact  Attention/Concentration: intact during session  Speech: normal rate and volume, well-articulated  Mood: \"ok\"  Affect: euthymic, congruent  Perception: within normal limits  Thought Content: within normal limits  Thought Process: logical, coherent and goal-directed  Insight: good  Judgment: intact  Ability to understand instructions: Yes  Ability to respond meaningfully: Yes  Morbid Ideation: no   Suicide Assessment: no suicidal ideation, plan, or intent  Homicidal Ideation: no    History:    Medications:   Current Outpatient Medications   Medication Sig Dispense Refill    spironolactone (ALDACTONE) 50 MG tablet Take 1 tablet by mouth daily. 30 tablet 2    busPIRone (BUSPAR) 10 MG tablet Take 1 tablet by mouth twice daily. 60 tablet 2    DULoxetine (CYMBALTA) 60 MG extended release capsule Take 1 capsule by mouth every day. 90 capsule 0    HYDROcodone-acetaminophen (NORCO) 5-325 MG per tablet       losartan-hydroCHLOROthiazide (HYZAAR) 100-25 MG per tablet Take 1 tablet by mouth daily for high blood pressure.  90 tablet 0    verapamil (CALAN SR) 120 MG extended release tablet Take 1 tablet by mouth nightly 30 tablet 1    hydroCHLOROthiazide (HYDRODIURIL) 25 MG tablet Take 1 tablet by mouth every morning 30 tablet 5    sennosides-docusate sodium (SENOKOT-S) 8.6-50 MG tablet Take 2 tablets by mouth daily 180 tablet 1    Biotin 5000 MCG CAPS Take 10,000 mcg by mouth daily      estrogens, conjugated, (PREMARIN) 0.3 MG tablet Take 0.3 mg by mouth daily      cimetidine (TAGAMET) 400 MG tablet Take 1 tablet by mouth twice daily. 180 tablet 1    ondansetron (ZOFRAN ODT) 4 MG disintegrating tablet Take 1 tablet by mouth every 8 hours as needed for Nausea 20 tablet 0    fluticasone (FLONASE) 50 MCG/ACT nasal spray Spray 1 spray into Each Nostril Every Day 3 each 1    Calcium Citrate-Vitamin D 200-250 MG-UNIT TABS Take 1 tablet by mouth twice daily. 60 tablet 0    terbinafine (LAMISIL) 250 MG tablet Take 250 mg by mouth daily      Multiple Vitamins-Minerals (THERAPEUTIC MULTIVITAMIN-MINERALS W/ IRON) TABS tablet Take 1 tablet by mouth daily [facility]      blood glucose monitor kit and supplies Dispense sufficient amount for indicated testing frequency plus additional to accommodate PRN testing needs. Dispense all needed supplies to include: monitor, strips, lancing device, lancets, control solutions, alcohol swabs. 1 kit 0    metFORMIN (GLUCOPHAGE-XR) 750 MG extended release tablet TAKE 2 TABLETS BY MOUTH EVERY MORNING (Patient taking differently: Indications: takes for PCOS TAKE 2 TABLETS BY MOUTH EVERY MORNING) 60 tablet 2    hydrOXYzine (ATARAX) 50 MG tablet 1 twice daily as needed anxiety. 30 tablet 1     No current facility-administered medications for this visit.      Social History:   Social History     Socioeconomic History    Marital status:      Spouse name: Angelika Nair    Number of children: 3    Years of education: 12    Highest education level: Not on file   Occupational History    Not on file   Tobacco Use    Smoking status: Former Smoker     Packs/day: 1.00     Years: 20.00     Pack years: 20.00 Types: Cigarettes     Quit date: 4/17/2019     Years since quitting: 3.0    Smokeless tobacco: Never Used   Vaping Use    Vaping Use: Never used   Substance and Sexual Activity    Alcohol use: Not Currently     Alcohol/week: 0.0 standard drinks    Drug use: Yes     Frequency: 3.0 times per week     Types: Marijuana Zehra Clarke)    Sexual activity: Yes     Partners: Male   Other Topics Concern    Not on file   Social History Narrative    Not on file     Social Determinants of Health     Financial Resource Strain: Low Risk     Difficulty of Paying Living Expenses: Not hard at all   Food Insecurity: No Food Insecurity    Worried About Running Out of Food in the Last Year: Never true    Lisa of Food in the Last Year: Never true   Transportation Needs: No Transportation Needs    Lack of Transportation (Medical): No    Lack of Transportation (Non-Medical): No   Physical Activity:     Days of Exercise per Week: Not on file    Minutes of Exercise per Session: Not on file   Stress:     Feeling of Stress : Not on file   Social Connections:     Frequency of Communication with Friends and Family: Not on file    Frequency of Social Gatherings with Friends and Family: Not on file    Attends Islam Services: Not on file    Active Member of [address] [place] [address] or Organizations: Not on file    Attends Club or Organization Meetings: Not on file    Marital Status: Not on file   Intimate Partner Violence:     Fear of Current or Ex-Partner: Not on file    Emotionally Abused: Not on file    Physically Abused: Not on file    Sexually Abused: Not on file   Housing Stability: [address] Unable to Pay for Housing in the Last Year: No    Number of [place] in the Last Year: 1    Unstable Housing in the Last Year: No     TOBACCO:   reports that she quit smoking about 3 years ago. Her smoking use included cigarettes. She has a 20.00 pack-year smoking history.  She has never used smokeless tobacco.  ETOH:   reports previous alcohol use. Family History:   Family History   Problem Relation Age of Onset    Arthritis Mother     Miscarriages / Bettye Mew Mother     High Blood Pressure Mother     Depression Mother     High Cholesterol Mother     Arthritis Father     High Blood Pressure Father     Cancer Father         colon    Hearing Loss Father     Heart Disease Father     High Cholesterol Father     High Blood Pressure Brother     High Cholesterol Brother     High Blood Pressure Maternal Aunt     High Cholesterol Maternal Aunt     Miscarriages / Stillbirths Maternal Aunt     Mental Retardation Maternal Uncle     High Blood Pressure Maternal Uncle     High Cholesterol Maternal Uncle     High Blood Pressure Paternal Aunt     High Cholesterol Paternal Aunt     Arthritis Paternal Uncle     Mental Illness Paternal Uncle     High Blood Pressure Paternal Uncle     Birth Defects Paternal Uncle     High Cholesterol Paternal Uncle     Learning Disabilities Paternal Uncle     Arthritis Maternal Grandmother     High Blood Pressure Maternal Grandmother     Diabetes Maternal Grandmother     High Cholesterol Maternal Grandmother     Miscarriages / Stillbirths Maternal Grandmother     Arthritis Maternal Grandfather     High Blood Pressure Maternal Grandfather     High Cholesterol Maternal Grandfather     Arthritis Paternal Grandmother     High Blood Pressure Paternal Grandmother     Heart Disease Paternal Grandmother     High Cholesterol Paternal Grandmother    Mauricio Jory / Stillbirths Paternal Grandmother     Stroke Paternal Grandmother     High Blood Pressure Paternal Grandfather     High Cholesterol Paternal Grandfather        A:  Ms. Ministerio Williamson continues to struggle with anxiety, depression, and panic attacks recently exacerbated by stressors and grief. She continues to be active and engaged and responds positively to behavioral interventions. Diagnosis:    1. ESTELA (generalized anxiety disorder)    2. Panic disorder    3. Severe recurrent major depression without psychotic features ([facility] [address]          Plan:  Pt interventions:  [place]-setting to identify pt's primary goals for [facility] visit / overall health, Supportive techniques, reinforced pt's skill use, ACT interventions, CBT interventions and treatment planning, taught grounding    Pt Behavioral Change Plan:   Pt set goals to 1) practice grounding exercise (51886) PRN 2) continue to practice diffusion of thoughts and feelings exercise 3) Return in about 2 weeks (around 5/31/2022).

## 2022-05-23 ENCOUNTER — OFFICE VISIT (OUTPATIENT)
Dept: FAMILY MEDICINE CLINIC | Age: 42
End: 2022-05-23
Payer: COMMERCIAL

## 2022-05-23 VITALS
DIASTOLIC BLOOD PRESSURE: 90 MMHG | WEIGHT: 210.4 LBS | BODY MASS INDEX: 33.82 KG/M2 | HEIGHT: 66 IN | OXYGEN SATURATION: 100 % | HEART RATE: 99 BPM | SYSTOLIC BLOOD PRESSURE: 135 MMHG

## 2022-05-23 DIAGNOSIS — E11.69 DIABETES MELLITUS TYPE 2 IN OBESE (HCC): ICD-10-CM

## 2022-05-23 DIAGNOSIS — E66.9 DIABETES MELLITUS TYPE 2 IN OBESE (HCC): ICD-10-CM

## 2022-05-23 DIAGNOSIS — I10 ESSENTIAL HYPERTENSION: Primary | ICD-10-CM

## 2022-05-23 PROCEDURE — 99213 OFFICE O/P EST LOW 20 MIN: CPT | Performed by: FAMILY MEDICINE

## 2022-05-23 PROCEDURE — 1036F TOBACCO NON-USER: CPT | Performed by: FAMILY MEDICINE

## 2022-05-23 PROCEDURE — 3046F HEMOGLOBIN A1C LEVEL >9.0%: CPT | Performed by: FAMILY MEDICINE

## 2022-05-23 PROCEDURE — G8417 CALC BMI ABV UP PARAM F/U: HCPCS | Performed by: FAMILY MEDICINE

## 2022-05-23 PROCEDURE — G8427 DOCREV CUR MEDS BY ELIG CLIN: HCPCS | Performed by: FAMILY MEDICINE

## 2022-05-23 PROCEDURE — 2022F DILAT RTA XM EVC RTNOPTHY: CPT | Performed by: FAMILY MEDICINE

## 2022-05-23 RX ORDER — VERAPAMIL HYDROCHLORIDE 240 MG/1
240 TABLET, FILM COATED, EXTENDED RELEASE ORAL DAILY
Qty: 90 TABLET | Refills: 1 | Status: SHIPPED | OUTPATIENT
Start: 2022-05-23 | End: 2022-06-17 | Stop reason: SDUPTHER

## 2022-05-23 ASSESSMENT — ENCOUNTER SYMPTOMS
COUGH: 0
SHORTNESS OF BREATH: 0

## 2022-05-23 NOTE — PROGRESS NOTES
Subjective:      Patient ID: Alvarez Maldonado is a 37 y.o. female. HPI  Patient in today for recheck on blood pressure-she was started on verapamil 120 mg daily at the last visit which was approximately 4 to 6 weeks ago. She had gastric bypass surgery in the last year and I believe has lost about 50 pounds from her high. She denies any ill effects from verapamil. Review of Systems    Review of Systems   Respiratory: Negative for cough and shortness of breath. Cardiovascular: Negative for chest pain, palpitations and leg swelling. Neurological: Negative for dizziness, light-headedness and headaches. Psychiatric/Behavioral: The patient is nervous/anxious. Objective:   Physical Exam      Physical Exam  Constitutional:       General: She is not in acute distress. Appearance: Normal appearance. She is obese. She is not ill-appearing. HENT:      Mouth/Throat:      Pharynx: Oropharynx is clear. Cardiovascular:      Rate and Rhythm: Normal rate and regular rhythm. Heart sounds: Normal heart sounds. Pulmonary:      Effort: Pulmonary effort is normal.      Breath sounds: Normal breath sounds. Abdominal:      Palpations: Abdomen is soft. Tenderness: There is no abdominal tenderness. Musculoskeletal:      Right lower leg: No edema. Left lower leg: No edema. Skin:     Capillary Refill: Capillary refill takes less than 2 seconds. Neurological:      Mental Status: She is alert. Gait: Gait normal.   Psychiatric:         Mood and Affect: Mood normal.         Behavior: Behavior normal.         Thought Content: Thought content normal.         Judgment: Judgment normal.         Assessment:       Diagnosis Orders   1. Essential hypertension     2. Diabetes mellitus type 2 in obese [facility])           Plan:      Deangelo Paniagua was seen today for blood pressure check.     Diagnoses and all orders for this visit:    Essential hypertension  Continue verapamil but we will increase to 240 mg daily and I need you to check blood pressure at home twice a week and send me results in about 2 to 3 weeks-notify me if any persistent elevation in the blood pressure. Low-salt diet. Limit caffeine and preferably no alcohol. Diabetes mellitus type 2 in obese (HCC)  Continue medications. Other orders  -     verapamil (CALAN SR) 240 MG extended release tablet; Take 1 tablet by mouth daily    This is been a 20-minute visit with the patient.     See me 3 months        Sorin Santillan, DO

## 2022-06-03 ENCOUNTER — TELEMEDICINE (OUTPATIENT)
Dept: PSYCHOLOGY | Age: 42
End: 2022-06-03
Payer: COMMERCIAL

## 2022-06-03 DIAGNOSIS — F33.2 SEVERE RECURRENT MAJOR DEPRESSION WITHOUT PSYCHOTIC FEATURES (HCC): ICD-10-CM

## 2022-06-03 DIAGNOSIS — F41.1 GAD (GENERALIZED ANXIETY DISORDER): Primary | ICD-10-CM

## 2022-06-03 DIAGNOSIS — F41.0 PANIC DISORDER: ICD-10-CM

## 2022-06-03 PROCEDURE — 90832 PSYTX W PT 30 MINUTES: CPT | Performed by: PSYCHOLOGIST

## 2022-06-03 NOTE — PROGRESS NOTES
Behavioral Health Consultation  [facility], [address] [place] [address]  Psychologist  6/3/2022  9:03 AM        Time spent with Patient: 30 minutes  This is patient's fourth [facility] appointment. Reason for Consult:    Chief Complaint   Patient presents with    Stress    Anxiety     Referring Provider: Lowry Cranker, DO        TELEHEALTH VISIT -- Audio/Visual (During YLARY-14 public health emergency)  }  Reji Lozano, was evaluated through a synchronous (real-time) audio-video encounter. The patient (or guardian if applicable) is aware that this is a billable service. The visit was conducted pursuant to the emergency declaration under the [facility], [address] [place] [address] authority and the [employer] General Act. Patient identification was verified, and a caregiver was present when appropriate. The patient was located in a state where the provider was licensed to provide care. Conducted a risk-benefit analysis and determined that the patient's presenting problems are consistent with the use of telepsychology. Determined that the patient has sufficient knowledge and skills in the use of technology enabling them to adequately benefit from telepsychology. It was determined that this patient was able to be properly treated without an in-person session. Patient verified that they were currently located at the [facility] address that was provided during registration.       Verified the following information:  Patient's identification: Yes  Patient location: [address] [place] [id number]  Patient's call back number: [phone]  Patient's emergency contact's name and number, as well as permission to contact them if needed: Extended Emergency Contact Information  Primary Emergency Contact: [facility]  Address: [address] [place] [address] Phone: [phone]  Mobile Phone: [phone]  Relation: Spouse    Provider location: [address]:    During the last visit t set goals to 1) practice grounding exercise (51369) PRN 2) continue to practice diffusion of thoughts and feelings exercise     Pt reports she is \"ok. \" Still struggling with 's stress- he starts OP today. Hard to be helpful to him when she is struggling. Has been trying to practice awareness into pushing thoughts away. Has only has a couple starts to panic attacks and practiced belly breathing- didn't lead to a panic attack so has gone well. Worries about journaling and someone finding it. Has 5, 6, and 15 y/o kids- ADHD and two have ODD which adds to her stress . Tries to protect each other from the nagging behaviors- feels she has to or she is not doing anything at all. O:  MSE:    Attitude: cooperative and friendly  Consciousness: alert  Orientation: oriented to person, place, time, general circumstance  Memory: recent and remote memory intact  Attention/Concentration: intact during session  Speech: normal rate and volume, well-articulated  Mood: \"ok\"  Affect: euthymic, congruent  Perception: within normal limits  Thought Content: within normal limits  Thought Process: logical, coherent and goal-directed  Insight: good  Judgment: intact  Ability to understand instructions: Yes  Ability to respond meaningfully: Yes  Morbid Ideation: no   Suicide Assessment: no suicidal ideation, plan, or intent  Homicidal Ideation: no    History:    Medications:   Current Outpatient Medications   Medication Sig Dispense Refill    verapamil (CALAN SR) 240 MG extended release tablet Take 1 tablet by mouth daily 90 tablet 1    spironolactone (ALDACTONE) 50 MG tablet Take 1 tablet by mouth daily. 30 tablet 2    busPIRone (BUSPAR) 10 MG tablet Take 1 tablet by mouth twice daily. 60 tablet 2    DULoxetine (CYMBALTA) 60 MG extended release capsule Take 1 capsule by mouth every day.  90 capsule 0    HYDROcodone-acetaminophen (NORCO) 5-325 MG per tablet       losartan-hydroCHLOROthiazide (HYZAAR) 100-25 MG per tablet Take 1 tablet by mouth daily for high blood pressure. 90 tablet 0    hydroCHLOROthiazide (HYDRODIURIL) 25 MG tablet Take 1 tablet by mouth every morning 30 tablet 5    sennosides-docusate sodium (SENOKOT-S) 8.6-50 MG tablet Take 2 tablets by mouth daily 180 tablet 1    Biotin 5000 MCG CAPS Take 10,000 mcg by mouth daily      estrogens, conjugated, (PREMARIN) 0.3 MG tablet Take 0.3 mg by mouth daily      cimetidine (TAGAMET) 400 MG tablet Take 1 tablet by mouth twice daily. 180 tablet 1    ondansetron (ZOFRAN ODT) 4 MG disintegrating tablet Take 1 tablet by mouth every 8 hours as needed for Nausea 20 tablet 0    fluticasone (FLONASE) 50 MCG/ACT nasal spray Spray 1 spray into Each Nostril Every Day 3 each 1    Calcium Citrate-Vitamin D 200-250 MG-UNIT TABS Take 1 tablet by mouth twice daily. 60 tablet 0    terbinafine (LAMISIL) 250 MG tablet Take 250 mg by mouth daily      Multiple Vitamins-Minerals (THERAPEUTIC MULTIVITAMIN-MINERALS W/ IRON) TABS tablet Take 1 tablet by mouth daily [facility]      blood glucose monitor kit and supplies Dispense sufficient amount for indicated testing frequency plus additional to accommodate PRN testing needs. Dispense all needed supplies to include: monitor, strips, lancing device, lancets, control solutions, alcohol swabs. 1 kit 0    metFORMIN (GLUCOPHAGE-XR) 750 MG extended release tablet TAKE 2 TABLETS BY MOUTH EVERY MORNING (Patient taking differently: Indications: takes for PCOS TAKE 2 TABLETS BY MOUTH EVERY MORNING) 60 tablet 2    hydrOXYzine (ATARAX) 50 MG tablet 1 twice daily as needed anxiety. 30 tablet 1     No current facility-administered medications for this visit.      Social History:   Social History     Socioeconomic History    Marital status:      Spouse name: Harmony Segundo    Number of children: 3    Years of education: 12    Highest education level: Not on file Occupational History    Not on file   Tobacco Use    Smoking status: Former Smoker     Packs/day: 1.00     Years: 20.00     Pack years: 20.00     Types: Cigarettes     Quit date: 4/17/2019     Years since quitting: 3.1    Smokeless tobacco: Never Used   Vaping Use    Vaping Use: Never used   Substance and Sexual Activity    Alcohol use: Not Currently     Alcohol/week: 0.0 standard drinks    Drug use: Yes     Frequency: 3.0 times per week     Types: Marijuana Dianne Dinning)    Sexual activity: Yes     Partners: Male   Other Topics Concern    Not on file   Social History Narrative    Not on file     Social Determinants of Health     Financial Resource Strain: Low Risk     Difficulty of Paying Living Expenses: Not hard at all   Food Insecurity: No Food Insecurity    Worried About Running Out of Food in the Last Year: Never true    Lisa of Food in the Last Year: Never true   Transportation Needs: No Transportation Needs    Lack of Transportation (Medical): No    Lack of Transportation (Non-Medical):  No   Physical Activity:     Days of Exercise per Week: Not on file    Minutes of Exercise per Session: Not on file   Stress:     Feeling of Stress : Not on file   Social Connections:     Frequency of Communication with Friends and Family: Not on file    Frequency of Social Gatherings with Friends and Family: Not on file    Attends [employer] Services: Not on file    Active Member of [address] [place] [address] or Organizations: Not on file    Attends Club or Organization Meetings: Not on file    Marital Status: Not on file   Intimate Partner Violence:     Fear of Current or Ex-Partner: Not on file    Emotionally Abused: Not on file    Physically Abused: Not on file    Sexually Abused: Not on file   Housing Stability: [address] Unable to Pay for Housing in the Last Year: No    [facility]: 1    Unstable Housing in the Last Year: No     TOBACCO:   reports that she quit smoking about 3 years ago. Her smoking use included cigarettes. She has a 20.00 pack-year smoking history. She has never used smokeless tobacco.  ETOH:   reports previous alcohol use. Family History:   Family History   Problem Relation Age of Onset    Arthritis Mother     Miscarriages / Vianey Gip Mother     High Blood Pressure Mother     Depression Mother     High Cholesterol Mother     Arthritis Father     High Blood Pressure Father     Cancer Father         colon    Hearing Loss Father     Heart Disease Father     High Cholesterol Father     High Blood Pressure Brother     High Cholesterol Brother     High Blood Pressure Maternal Aunt     High Cholesterol Maternal Aunt     Miscarriages / Stillbirths Maternal Aunt     Mental Retardation Maternal Uncle     High Blood Pressure Maternal Uncle     High Cholesterol Maternal Uncle     High Blood Pressure Paternal Aunt     High Cholesterol Paternal Aunt     Arthritis Paternal Uncle     Mental Illness Paternal Uncle     High Blood Pressure Paternal Uncle     Birth Defects Paternal Uncle     High Cholesterol Paternal Uncle     Learning Disabilities Paternal Uncle     Arthritis Maternal Grandmother     High Blood Pressure Maternal Grandmother     Diabetes Maternal Grandmother     High Cholesterol Maternal Grandmother     Miscarriages / Stillbirths Maternal Grandmother     Arthritis Maternal Grandfather     High Blood Pressure Maternal Grandfather     High Cholesterol Maternal Grandfather     Arthritis Paternal Grandmother     High Blood Pressure Paternal Grandmother     Heart Disease Paternal Grandmother     High Cholesterol Paternal Grandmother    [de-identified] / Stillbirths Paternal Grandmother     Stroke Paternal Grandmother     High Blood Pressure Paternal Grandfather     High Cholesterol Paternal Grandfather        A:  Ms. Anitra Mauricio continues to struggle with anxiety, depression, and panic attacks exacerbated by stressors and grief.  She continues to struggle with coping without avoidance and would benefit from continued skills building. She continues to be active and engaged and responds positively to behavioral interventions. Diagnosis:    1. ESTELA (generalized anxiety disorder)    2. Panic disorder    3. Severe recurrent major depression without psychotic features ([facility] [address]          Plan:  Pt interventions:  Saffell-setting to identify pt's primary goals for TIFFANIE MCINTOS[facility] visit / overall health, Supportive techniques, reinforced pt's skill use, ACT interventions, CBT interventions and treatment planning       Pt Behavioral Change Plan:   Pt set goals to 1) practice taking time throughout the day to ground and relax using meditation, mindfulness, and relaxation strategies, even if for 5 min 2) continue to practice grounding exercise (59212) PRN 3) continue to practice diffusion of thoughts and feelings exercise 4) Return in about 3 weeks (around 6/24/2022).

## 2022-06-17 RX ORDER — VERAPAMIL HYDROCHLORIDE 240 MG/1
240 TABLET, FILM COATED, EXTENDED RELEASE ORAL DAILY
Qty: 90 TABLET | Refills: 1 | Status: SHIPPED | OUTPATIENT
Start: 2022-06-17

## 2022-06-22 ENCOUNTER — TELEMEDICINE (OUTPATIENT)
Dept: PSYCHOLOGY | Age: 42
End: 2022-06-22
Payer: COMMERCIAL

## 2022-06-22 DIAGNOSIS — F41.0 PANIC DISORDER: ICD-10-CM

## 2022-06-22 DIAGNOSIS — F33.2 SEVERE RECURRENT MAJOR DEPRESSION WITHOUT PSYCHOTIC FEATURES (HCC): ICD-10-CM

## 2022-06-22 DIAGNOSIS — F41.1 GAD (GENERALIZED ANXIETY DISORDER): Primary | ICD-10-CM

## 2022-06-22 PROCEDURE — 90832 PSYTX W PT 30 MINUTES: CPT | Performed by: PSYCHOLOGIST

## 2022-06-22 RX ORDER — MV-MIN/FOLIC/VIT K/LYCOP/COQ10 200-100MCG
CAPSULE ORAL
Qty: 90 CAPSULE | Refills: 3 | Status: SHIPPED | OUTPATIENT
Start: 2022-06-22

## 2022-06-22 RX ORDER — SENNA PLUS 8.6 MG/1
1 TABLET ORAL DAILY
Qty: 90 TABLET | Refills: 5 | Status: SHIPPED | OUTPATIENT
Start: 2022-06-22 | End: 2022-09-20

## 2022-06-22 NOTE — PROGRESS NOTES
Behavioral Health Consultation  [address] [place] [address] Cande, [address] [place] [address]  Psychologist  6/22/2022  9:34 AM        Time spent with Patient: 30 minutes  This is patient's fifth [facility] appointment. Reason for Consult:    Chief Complaint   Patient presents with    Stress    Anxiety     Referring Provider: Vinny Nunn DO        TELEHEALTH VISIT -- Audio/Visual (During MJTPZ-58 public health emergency)  }  Chloé Walden, was evaluated through a synchronous (real-time) audio-video encounter. The patient (or guardian if applicable) is aware that this is a billable service. The visit was conducted pursuant to the emergency declaration under the [facility], [address] [place] [address] authority and the [employer] and [employer] General Act. Patient identification was verified, and a caregiver was present when appropriate. The patient was located in a state where the provider was licensed to provide care. Conducted a risk-benefit analysis and determined that the patient's presenting problems are consistent with the use of telepsychology. Determined that the patient has sufficient knowledge and skills in the use of technology enabling them to adequately benefit from telepsychology. It was determined that this patient was able to be properly treated without an in-person session. Patient verified that they were currently located at the [facility] address that was provided during registration.       Verified the following information:  Patient's identification: Yes  Patient location: [place] [id number]  [place] [id number]  Patient's call back number: [phone]  Patient's emergency contact's name and number, as well as permission to contact them if needed: Extended Emergency Contact Information  Primary Emergency Contact: [facility]  Address: [address] [place] [address] Phone: [phone]  Mobile Phone: [phone]  Relation: Spouse    Provider location: [place]:    During the last visit Pt set goals to 1) practice taking time throughout the day to ground and relax using meditation, mindfulness, and relaxation strategies, even if for 5 min 2) continue to practice grounding exercise (87459) PRN 3) continue to practice diffusion of thoughts and feelings exercise     Pt reports she is \"ok. \" anxiety attacks are not too bad but still being affected on 's health. His therapy is triggering past events and they are working through. Trying to sort though her own emotions about what is being re-triggered. Feelings of mistrust and the unknown has been hard to navigate. Is walking 3 mi each morning with her son. Wants to quit marijuana use. Quit smoking 5 years ago on her own- wants to do this before kids become aware. O:  MSE:    Attitude: cooperative and friendly  Consciousness: alert  Orientation: oriented to person, place, time, general circumstance  Memory: recent and remote memory intact  Attention/Concentration: intact during session  Speech: normal rate and volume, well-articulated  Mood: \"ok\"  Affect: euthymic, congruent  Perception: within normal limits  Thought Content: within normal limits  Thought Process: logical, coherent and goal-directed  Insight: good  Judgment: intact  Ability to understand instructions: Yes  Ability to respond meaningfully: Yes  Morbid Ideation: no   Suicide Assessment: no suicidal ideation, plan, or intent  Homicidal Ideation: no    History:    Medications:   Current Outpatient Medications   Medication Sig Dispense Refill    Multiple Vitamins-Minerals (DAILY MULTIVITAMIN) CAPS 1 daily 90 capsule 3    senna (SENNA-LAX) 8.6 MG tablet Take 1 tablet by mouth daily 90 tablet 5    verapamil (CALAN SR) 240 MG extended release tablet Take 1 tablet by mouth daily 90 tablet 1    spironolactone (ALDACTONE) 50 MG tablet Take 1 tablet by mouth daily.  30 tablet 2    busPIRone (BUSPAR) 10 MG tablet Take 1 tablet by mouth twice daily. 60 tablet 2    DULoxetine (CYMBALTA) 60 MG extended release capsule Take 1 capsule by mouth every day. 90 capsule 0    HYDROcodone-acetaminophen (NORCO) 5-325 MG per tablet       losartan-hydroCHLOROthiazide (HYZAAR) 100-25 MG per tablet Take 1 tablet by mouth daily for high blood pressure. 90 tablet 0    hydroCHLOROthiazide (HYDRODIURIL) 25 MG tablet Take 1 tablet by mouth every morning 30 tablet 5    sennosides-docusate sodium (SENOKOT-S) 8.6-50 MG tablet Take 2 tablets by mouth daily 180 tablet 1    Biotin 5000 MCG CAPS Take 10,000 mcg by mouth daily      estrogens, conjugated, (PREMARIN) 0.3 MG tablet Take 0.3 mg by mouth daily      cimetidine (TAGAMET) 400 MG tablet Take 1 tablet by mouth twice daily. 180 tablet 1    ondansetron (ZOFRAN ODT) 4 MG disintegrating tablet Take 1 tablet by mouth every 8 hours as needed for Nausea 20 tablet 0    fluticasone (FLONASE) 50 MCG/ACT nasal spray Spray 1 spray into Each Nostril Every Day 3 each 1    Calcium Citrate-Vitamin D 200-250 MG-UNIT TABS Take 1 tablet by mouth twice daily. 60 tablet 0    terbinafine (LAMISIL) 250 MG tablet Take 250 mg by mouth daily      Multiple Vitamins-Minerals (THERAPEUTIC MULTIVITAMIN-MINERALS W/ IRON) TABS tablet Take 1 tablet by mouth daily [facility]      blood glucose monitor kit and supplies Dispense sufficient amount for indicated testing frequency plus additional to accommodate PRN testing needs. Dispense all needed supplies to include: monitor, strips, lancing device, lancets, control solutions, alcohol swabs. 1 kit 0    metFORMIN (GLUCOPHAGE-XR) 750 MG extended release tablet TAKE 2 TABLETS BY MOUTH EVERY MORNING (Patient taking differently: Indications: takes for PCOS TAKE 2 TABLETS BY MOUTH EVERY MORNING) 60 tablet 2    hydrOXYzine (ATARAX) 50 MG tablet 1 twice daily as needed anxiety. 30 tablet 1     No current facility-administered medications for this visit.      Social History:   Social History     Socioeconomic History    Marital status:      Spouse name: Franki Sam    Number of children: 3    Years of education: 12    Highest education level: Not on file   Occupational History    Not on file   Tobacco Use    Smoking status: Former Smoker     Packs/day: 1.00     Years: 20.00     Pack years: 20.00     Types: Cigarettes     Quit date: 4/17/2019     Years since quitting: 3.1    Smokeless tobacco: Never Used   Vaping Use    Vaping Use: Never used   Substance and Sexual Activity    Alcohol use: Not Currently     Alcohol/week: 0.0 standard drinks    Drug use: Yes     Frequency: 3.0 times per week     Types: Marijuana Norval Remak)    Sexual activity: Yes     Partners: Male   Other Topics Concern    Not on file   Social History Narrative    Not on file     Social Determinants of Health     Financial Resource Strain: Low Risk     Difficulty of Paying Living Expenses: Not hard at all   Food Insecurity: No Food Insecurity    Worried About Running Out of Food in the Last Year: Never true    Lisa of Food in the Last Year: Never true   Transportation Needs: No Transportation Needs    Lack of Transportation (Medical): No    Lack of Transportation (Non-Medical):  No   Physical Activity:     Days of Exercise per Week: Not on file    Minutes of Exercise per Session: Not on file   Stress:     Feeling of Stress : Not on file   Social Connections:     Frequency of Communication with Friends and Family: Not on file    Frequency of Social Gatherings with Friends and Family: Not on file    Attends Latter day Services: Not on file    Active Member of Clubs or Organizations: Not on file    Attends Club or Organization Meetings: Not on file    Marital Status: Not on file   Intimate Partner Violence:     Fear of Current or Ex-Partner: Not on file    Emotionally Abused: Not on file    Physically Abused: Not on file    Sexually Abused: Not on file   Housing Stability: Low Risk     Unable to Pay for Housing in the Last Year: No    Number of Places Lived in the Last Year: 1    Unstable Housing in the Last Year: No     TOBACCO:   reports that she quit smoking about 3 years ago. Her smoking use included cigarettes. She has a 20.00 pack-year smoking history. She has never used smokeless tobacco.  ETOH:   reports previous alcohol use.   Family History:   Family History   Problem Relation Age of Onset    Arthritis Mother     Gaila Medicus / [place] Mother     High Blood Pressure Mother     Depression Mother     High Cholesterol Mother     Arthritis Father     High Blood Pressure Father     Cancer Father         colon    Hearing Loss Father     Heart Disease Father     High Cholesterol Father     High Blood Pressure Brother     High Cholesterol Brother     High Blood Pressure Maternal Aunt     High Cholesterol Maternal Aunt     Miscarriages / Stillbirths Maternal Aunt     Mental Retardation Maternal Uncle     High Blood Pressure Maternal Uncle     High Cholesterol Maternal Uncle     High Blood Pressure Paternal Aunt     High Cholesterol Paternal Aunt     Arthritis Paternal Uncle     Mental Illness Paternal Uncle     High Blood Pressure Paternal Uncle     Birth Defects Paternal Uncle     High Cholesterol Paternal Uncle     Learning Disabilities Paternal Uncle     Arthritis Maternal Grandmother     High Blood Pressure Maternal Grandmother     Diabetes Maternal Grandmother     High Cholesterol Maternal Grandmother     Miscarriages / Stillbirths Maternal Grandmother     Arthritis Maternal Grandfather     High Blood Pressure Maternal Grandfather     High Cholesterol Maternal Grandfather     Arthritis Paternal Grandmother     High Blood Pressure Paternal Grandmother     Heart Disease Paternal Grandmother     High Cholesterol Paternal Grandmother     Miscarriages / Stillbirths Paternal Grandmother     Stroke Paternal Grandmother     High Blood Pressure Paternal Grandfather     High Cholesterol Paternal Grandfather        A:  Ms. Oscar Alonzo continues to struggle with anxiety, depression, and panic attacks exacerbated by stressors although reports overall anxiety has been acute since last visit. She and  are working through past events that have been triggered recently. She continues to be active and engaged and responds positively to behavioral interventions. Diagnosis:    1. ESTELA (generalized anxiety disorder)    2. Panic disorder    3. Severe recurrent major depression without psychotic features ([facility] [address]          Plan:  Pt interventions:  [place]-setting to identify pt's primary goals for [facility] visit / overall health, Supportive techniques, reinforced pt's skill use, ACT interventions, CBT interventions and treatment planning       Pt Behavioral Change Plan:   Pt set goals to 1) journal activity writing letter to neighbor- follow it with stress relieving activity 2) engage more intentionally each day in exercise and planting for more stress reduction 3) Return in about 3 weeks (around 7/13/2022).

## 2022-06-24 DIAGNOSIS — L65.9 HAIR LOSS: ICD-10-CM

## 2022-06-24 DIAGNOSIS — I10 PRIMARY HYPERTENSION: ICD-10-CM

## 2022-06-24 RX ORDER — CIMETIDINE 400 MG/1
TABLET, FILM COATED ORAL
Qty: 180 TABLET | Refills: 1 | Status: SHIPPED | OUTPATIENT
Start: 2022-06-24

## 2022-06-24 RX ORDER — LOSARTAN POTASSIUM AND HYDROCHLOROTHIAZIDE 25; 100 MG/1; MG/1
TABLET ORAL
Qty: 90 TABLET | Refills: 1 | Status: SHIPPED | OUTPATIENT
Start: 2022-06-24

## 2022-06-24 RX ORDER — SPIRONOLACTONE 50 MG/1
50 TABLET, FILM COATED ORAL DAILY
Qty: 90 TABLET | Refills: 1 | Status: SHIPPED | OUTPATIENT
Start: 2022-06-24

## 2022-06-24 NOTE — TELEPHONE ENCOUNTER
.  Refill Request     CONFIRM preferrred pharmacy with the patient. If Mail Order Rx - Pend for 90 day refill. Last Seen: Last Seen Department: 5/23/2022  Last Seen by PCP: 5/23/2022    Last Written: 4/25/22 30 with 2     Next Appointment:   Future Appointments   Date Time Provider Clifford Perez   7/14/2022 10:00 AM RACHEL Christiansen [facility]   8/31/2022 11:30 AM DO JAN Rodriguez  [place] [facility]         Requested Prescriptions     Pending Prescriptions Disp Refills    spironolactone (ALDACTONE) 50 MG tablet [Pharmacy Med Name: Spironolactone 50mg Tablet] 90 tablet 1     Sig: Take 1 tablet by mouth daily.

## 2022-07-14 ENCOUNTER — TELEMEDICINE (OUTPATIENT)
Dept: PSYCHOLOGY | Age: 42
End: 2022-07-14
Payer: COMMERCIAL

## 2022-07-14 DIAGNOSIS — F33.2 SEVERE RECURRENT MAJOR DEPRESSION WITHOUT PSYCHOTIC FEATURES (HCC): ICD-10-CM

## 2022-07-14 DIAGNOSIS — F41.1 GAD (GENERALIZED ANXIETY DISORDER): Primary | ICD-10-CM

## 2022-07-14 DIAGNOSIS — F41.0 PANIC DISORDER: ICD-10-CM

## 2022-07-14 PROCEDURE — 90832 PSYTX W PT 30 MINUTES: CPT | Performed by: PSYCHOLOGIST

## 2022-07-14 NOTE — PROGRESS NOTES
Behavioral Health Consultation  [facility], [address] [place] [address]  Psychologist  7/15/2022  9:21 AM        Time spent with Patient: 30 minutes  This is patient's sixth [facility] appointment. Reason for Consult:    Chief Complaint   Patient presents with    Anxiety    Depression       Referring Provider: Julia Hennessy DO        TELEHEALTH VISIT -- Audio/Visual ([facility])  }  Samra Castellon, was evaluated through a synchronous (real-time) audio-video encounter. The patient (or guardian if applicable) is aware that this is a billable service. The visit was conducted pursuant to the emergency declaration under the [address] [place] [address] authority and the [employer] Act. Patient identification was verified, and a caregiver was present when appropriate. The patient was located in a state where the provider was licensed to provide care. Conducted a risk-benefit analysis and determined that the patient's presenting problems are consistent with the use of telepsychology. Determined that the patient has sufficient knowledge and skills in the use of technology enabling them to adequately benefit from telepsychology. It was determined that this patient was able to be properly treated without an in-person session. Patient verified that they were currently located at the [facility] address that was provided during registration.       Verified the following information:  Patient's identification: Yes  Patient location: [address] [address]  Patient's call back number: [phone]  Patient's emergency contact's name and number, as well as permission to contact them if needed: Extended Emergency Contact Information  Primary Emergency Contact: [facility]  Address: [address] [place] [address] Phone: [phone]  Mobile Phone: [phone]  Relation: Spouse    Provider location: Barbara Sayres:    During the last visit Pt set goals to 1) journal activity writing letter to neighbor- follow it with stress relieving activity 2) engage more intentionally each day in exercise and planting for more stress reduction     Pt reports she is \"alright. \" Hasn't stopped smoking marijuana- high stress - feels overwhelmed.  still not doing well and starts [address] today. Did jounral for 2 hours and felt better but hasn't done it since then. Continues to garden as the one activity she finds enjoyable. Struggles with limited time but also feels general lack of interest in many activities. Knows she used to engage in activities but now just feels ambivalence about activities. Upon further investigation, pt is able to identify other activities she enjoys or used to enjoy. O:  MSE:    Attitude: cooperative and friendly  Consciousness: alert  Orientation: oriented to person, place, time, general circumstance  Memory: recent and remote memory intact  Attention/Concentration: intact during session  Speech: normal rate and volume, well-articulated  Mood: \"alright\"  Affect: euthymic, congruent  Perception: within normal limits  Thought Content: within normal limits  Thought Process: logical, coherent and goal-directed  Insight: good  Judgment: intact  Ability to understand instructions: Yes  Ability to respond meaningfully: Yes  Morbid Ideation: no   Suicide Assessment: no suicidal ideation, plan, or intent  Homicidal Ideation: no    History:    Medications:   Current Outpatient Medications   Medication Sig Dispense Refill    spironolactone (ALDACTONE) 50 MG tablet Take 1 tablet by mouth daily. 90 tablet 1    cimetidine (TAGAMET) 400 MG tablet Take 1 tablet by mouth twice daily. 180 tablet 1    losartan-hydroCHLOROthiazide (HYZAAR) 100-25 MG per tablet Take 1 tablet by mouth daily for high blood pressure.  90 tablet 1    Multiple Vitamins-Minerals (DAILY MULTIVITAMIN) CAPS 1 daily 90 capsule 3    senna (SENNA-LAX) 8.6 MG tablet Take 1 tablet by mouth daily 90 tablet 5    verapamil (CALAN SR) 240 MG extended release tablet Take 1 tablet by mouth daily 90 tablet 1    busPIRone (BUSPAR) 10 MG tablet Take 1 tablet by mouth twice daily. 60 tablet 2    DULoxetine (CYMBALTA) 60 MG extended release capsule Take 1 capsule by mouth every day. 90 capsule 0    HYDROcodone-acetaminophen (NORCO) 5-325 MG per tablet       hydroCHLOROthiazide (HYDRODIURIL) 25 MG tablet Take 1 tablet by mouth every morning 30 tablet 5    sennosides-docusate sodium (SENOKOT-S) 8.6-50 MG tablet Take 2 tablets by mouth daily 180 tablet 1    Biotin 5000 MCG CAPS Take 10,000 mcg by mouth daily      estrogens, conjugated, (PREMARIN) 0.3 MG tablet Take 0.3 mg by mouth daily      ondansetron (ZOFRAN ODT) 4 MG disintegrating tablet Take 1 tablet by mouth every 8 hours as needed for Nausea 20 tablet 0    fluticasone (FLONASE) 50 MCG/ACT nasal spray Spray 1 spray into Each Nostril Every Day 3 each 1    Calcium Citrate-Vitamin D 200-250 MG-UNIT TABS Take 1 tablet by mouth twice daily. 60 tablet 0    terbinafine (LAMISIL) 250 MG tablet Take 250 mg by mouth daily      Multiple Vitamins-Minerals (THERAPEUTIC MULTIVITAMIN-MINERALS W/ IRON) TABS tablet Take 1 tablet by mouth daily [facility]      blood glucose monitor kit and supplies Dispense sufficient amount for indicated testing frequency plus additional to accommodate PRN testing needs. Dispense all needed supplies to include: monitor, strips, lancing device, lancets, control solutions, alcohol swabs. 1 kit 0    metFORMIN (GLUCOPHAGE-XR) 750 MG extended release tablet TAKE 2 TABLETS BY MOUTH EVERY MORNING (Patient taking differently: Indications: takes for PCOS TAKE 2 TABLETS BY MOUTH EVERY MORNING) 60 tablet 2    hydrOXYzine (ATARAX) 50 MG tablet 1 twice daily as needed anxiety. 30 tablet 1     No current facility-administered medications for this visit. Social History:   Social History     Socioeconomic History    Marital status:      Spouse name: Scar Chadwick    Number of children: 3    Years of education: 16    Highest education level: Not on file   Occupational History    Not on file   Tobacco Use    Smoking status: Former     Packs/day: 1.00     Years: 20.00     Pack years: 20.00     Types: Cigarettes     Quit date: 4/17/2019     Years since quitting: 3.2    Smokeless tobacco: Never   Vaping Use    Vaping Use: Never used   Substance and Sexual Activity    Alcohol use: Not Currently     Alcohol/week: 0.0 standard drinks    Drug use: Yes     Frequency: 3.0 times per week     Types: Marijuana Juanetta Dunnigan)    Sexual activity: Yes     Partners: Male   Other Topics Concern    Not on file   Social History Narrative    Not on file     Social Determinants of Health     Financial Resource Strain: Low Risk     Difficulty of Paying Living Expenses: Not hard at all   Food Insecurity: No Food Insecurity    Worried About Running Out of Food in the Last Year: Never true    Ran Out of Food in the Last Year: Never true   Transportation Needs: No Transportation Needs    Lack of Transportation (Medical): No    Lack of Transportation (Non-Medical): No   Physical Activity: Not on file   Stress: Not on file   Social Connections: Not on file   Intimate Partner Violence: Not on file   Housing Stability: Low Risk     Unable to Pay for Housing in the Last Year: No    Number of Places Lived in the Last Year: 1    Unstable Housing in the Last Year: No     TOBACCO:   reports that she quit smoking about 3 years ago. Her smoking use included cigarettes. She has a 20.00 pack-year smoking history. She has never used smokeless tobacco.  ETOH:   reports that she does not currently use alcohol.   Family History:   Family History   Problem Relation Age of Onset    Arthritis Mother     Miscarriages / [place] Mother     High Blood Pressure Mother     Depression Mother     High Cholesterol primary goals for [facility] visit / overall health, Supportive techniques, reinforced pt's skill use, ACT interventions, CBT interventions and treatment planning       Pt Behavioral Change Plan:   Pt set goals to 1) continue to journal and revisit once weekly 2) continue to brainstorm activities that bring farzaneh/pleasure 3) continue to engage more intentionally each day in exercise and planting for more stress reduction 4) Return in about 2 weeks (around 7/28/2022).

## 2022-07-24 DIAGNOSIS — I10 PRIMARY HYPERTENSION: ICD-10-CM

## 2022-07-25 RX ORDER — BUSPIRONE HYDROCHLORIDE 10 MG/1
TABLET ORAL
Qty: 180 TABLET | Refills: 1 | Status: SHIPPED | OUTPATIENT
Start: 2022-07-25

## 2022-07-25 RX ORDER — DULOXETIN HYDROCHLORIDE 60 MG/1
CAPSULE, DELAYED RELEASE ORAL
Qty: 90 CAPSULE | Refills: 1 | Status: SHIPPED | OUTPATIENT
Start: 2022-07-25

## 2022-07-25 RX ORDER — HYDROCHLOROTHIAZIDE 25 MG/1
25 TABLET ORAL EVERY MORNING
Qty: 90 TABLET | Refills: 1 | Status: SHIPPED | OUTPATIENT
Start: 2022-07-25

## 2022-07-25 NOTE — TELEPHONE ENCOUNTER
.Refill Request     CONFIRM preferrred pharmacy with the patient. If Mail Order Rx - Pend for 90 day refill. Last Seen: Last Seen Department: 5/23/2022  Last Seen by PCP: 5/23/2022    Last Written: 4-25-22 90 with 0     Next Appointment:   Future Appointments   Date Time Provider Clifford Ana   7/28/2022 10:30 AM RACHEL Trejo   8/31/2022 11:30 AM DO JAN Rodriguez  [place] - [facility]       Future appointment scheduled      Requested Prescriptions     Pending Prescriptions Disp Refills    DULoxetine (CYMBALTA) 60 MG extended release capsule [Pharmacy Med Name: Duloxetine 60mg Delayed-Release Capsule] 90 capsule 1     Sig: Take 1 capsule by mouth daily. busPIRone (BUSPAR) 10 MG tablet [Pharmacy Med Name: Buspirone Hydrochloride 10mg Tablet] 180 tablet 1     Sig: Take 1 tablet by mouth twice daily.

## 2022-07-29 RX ORDER — FLUTICASONE PROPIONATE 50 MCG
SPRAY, SUSPENSION (ML) NASAL
Qty: 3 EACH | Refills: 1 | Status: SHIPPED | OUTPATIENT
Start: 2022-07-29

## 2022-07-29 NOTE — TELEPHONE ENCOUNTER
.Refill Request     CONFIRM preferrred pharmacy with the patient. If Mail Order Rx - Pend for 90 day refill. Last Seen: Last Seen Department: 5/23/2022  Last Seen by PCP: Visit date not found    Last Written: 10-4-21 3 with 1     Next Appointment:   Future Appointments   Date Time Provider Clifford Perez   8/31/2022 11:30 AM DO JAN Rodriguez  [place] - [facility]       Future appointment scheduled      Requested Prescriptions     Pending Prescriptions Disp Refills    fluticasone (FLONASE) 50 MCG/ACT nasal spray [Pharmacy Med Name: Fluticasone Propionate 50mcg/actuation Nasal Spray] 3 each 1     Sig: Instill 1 spray into each nostril daily.      10-4-

## 2022-08-12 ENCOUNTER — TELEPHONE (OUTPATIENT)
Dept: FAMILY MEDICINE CLINIC | Age: 42
End: 2022-08-12

## 2022-08-12 RX ORDER — MULTIVIT,CALC,MINS/IRON/FOLIC 9MG-400MCG
TABLET ORAL
Qty: 90 TABLET | Refills: 3 | Status: SHIPPED | OUTPATIENT
Start: 2022-08-12

## 2022-08-12 NOTE — TELEPHONE ENCOUNTER
[facility] pharmacy calling to check on a fax refill request that we dont have. . they are looking to see if the dr would send in Therems-m tablet OTC for the patient since her insurance covers it. Please advise?

## 2022-08-25 DIAGNOSIS — Z98.84 S/P LAPAROSCOPIC SLEEVE GASTRECTOMY: ICD-10-CM

## 2022-10-28 ENCOUNTER — TELEPHONE (OUTPATIENT)
Dept: FAMILY MEDICINE CLINIC | Age: 42
End: 2022-10-28

## 2022-11-02 ENCOUNTER — TELEMEDICINE (OUTPATIENT)
Dept: PRIMARY CARE CLINIC | Age: 42
End: 2022-11-02
Payer: COMMERCIAL

## 2022-11-02 DIAGNOSIS — B37.31 VAGINAL CANDIDIASIS: Primary | ICD-10-CM

## 2022-11-02 PROCEDURE — 99213 OFFICE O/P EST LOW 20 MIN: CPT | Performed by: NURSE PRACTITIONER

## 2022-11-02 PROCEDURE — G8427 DOCREV CUR MEDS BY ELIG CLIN: HCPCS | Performed by: NURSE PRACTITIONER

## 2022-11-02 RX ORDER — MICONAZOLE NITRATE 200 MG-2 %
KIT VAGINAL NIGHTLY
Qty: 1 KIT | Refills: 0 | Status: SHIPPED | OUTPATIENT
Start: 2022-11-02

## 2022-11-02 RX ORDER — LACTOBACILLUS RHAMNOSUS GG 10B CELL
1 CAPSULE ORAL DAILY
Qty: 30 CAPSULE | Refills: 0 | Status: SHIPPED | OUTPATIENT
Start: 2022-11-02

## 2022-11-02 RX ORDER — FLUCONAZOLE 150 MG/1
150 TABLET ORAL
Qty: 2 TABLET | Refills: 0 | Status: SHIPPED | OUTPATIENT
Start: 2022-11-02 | End: 2022-11-08

## 2022-11-02 ASSESSMENT — ENCOUNTER SYMPTOMS: ABDOMINAL PAIN: 0

## 2022-11-02 NOTE — PROGRESS NOTES
Antonio Morgan (:  1980) is a Established patient, here for evaluation of the following:    Vaginal Discharge (X 3 days)       Assessment & Plan:  Below is the assessment and plan developed based on review of pertinent history, physical exam, labs, studies, and medications. 1. Vaginal candidiasis  -     fluconazole (DIFLUCAN) 150 MG tablet; Take 1 tablet by mouth every 72 hours for 6 days, Disp-2 tablet, R-0Normal  -     lactobacillus (CULTURELLE) CAPS capsule; Take 1 capsule by mouth daily, Disp-30 capsule, R-0Normal  -     miconazole (MICONAZOLE 3 COMBO-SUPP) 200 & 2 MG-% (9GM) KIT kit; Place vaginally nightly, Disp-1 kit, R-0Normal  Probiotics and kefir recommended. Patient would like to have the diflucan as a back up if the miconazole does not work. Will call if no relief with either method and set up appt for in person evaluation. Return if symptoms worsen or fail to improve. Subjective: also with painful urination occasionally and had a negative home urine test. Just completed a round of antibiotics for a sinus infection. Vaginal Discharge  The patient's primary symptoms include genital itching and vaginal discharge. The patient's pertinent negatives include no genital odor or pelvic pain. This is a new problem. The current episode started in the past 7 days. The problem has been unchanged. The patient is experiencing no pain. Associated symptoms include dysuria. Pertinent negatives include no abdominal pain or hematuria. The vaginal discharge was thin and watery. The treatment provided no relief. She is sexually active. Review of Systems   Gastrointestinal:  Negative for abdominal pain. Genitourinary:  Positive for dysuria and vaginal discharge. Negative for hematuria and pelvic pain.      Objective:  Patient-Reported Vitals  No data recorded     Patient-Reported Vitals 2022   Patient-Reported Weight 213   Patient-Reported Height -   Patient-Reported Systolic -   Patient-Reported Diastolic -   Patient-Reported Pulse -   Patient-Reported Temperature -        Physical Exam:  [INSTRUCTIONS:  \"[x]\" Indicates a positive item  \"[]\" Indicates a negative item  -- DELETE ALL ITEMS NOT EXAMINED]    Constitutional: [x] Appears well-developed and well-nourished [x] No apparent distress      [] Abnormal -     Mental status: [x] Alert and awake  [x] Oriented to person/place/time [x] Able to follow commands    [] Abnormal -     Eyes:   EOM    [x]  Normal    [] Abnormal -   Sclera  [x]  Normal    [] Abnormal -          Discharge [x]  None visible   [] Abnormal -     HENT: [x] Normocephalic, atraumatic  [] Abnormal -   [x] Mouth/Throat: Mucous membranes are moist    External Ears [x] Normal  [] Abnormal -    Neck: [x] No visualized mass [] Abnormal -     Pulmonary/Chest: [x] Respiratory effort normal   [x] No visualized signs of difficulty breathing or respiratory distress        [] Abnormal -      Musculoskeletal:   [] Normal gait with no signs of ataxia         [x] Normal range of motion of neck        [] Abnormal -     Neurological:        [x] No Facial Asymmetry (Cranial nerve 7 motor function) (limited exam due to video visit)          [x] No gaze palsy        [] Abnormal -          Skin:        [x] No significant exanthematous lesions or discoloration noted on facial skin         [] Abnormal -            Psychiatric:       [x] Normal Affect [] Abnormal -        [] No Hallucinations    Other pertinent observable physical exam findings:-        On this date 11/2/2022 I have spent 18 minutes reviewing previous notes, test results and face to face (virtual) with the patient discussing the diagnosis and importance of compliance with the treatment plan as well as documenting on the day of the visit. Samira Parekh, was evaluated through a synchronous (real-time) audio-video encounter. The patient (or guardian if applicable) is aware that this is a billable service, which includes applicable co-pays. This Virtual Visit was conducted with patient's (and/or legal guardian's) consent. The visit was conducted pursuant to the emergency declaration under the [facility] [address] authority and the [employer] Act. Patient identification was verified, and a caregiver was present when appropriate. The patient was located at Home: [address] [place] [phone].    Provider was located at Home ([place] [address]): [address] [facility] [place]

## 2022-12-02 NOTE — TELEPHONE ENCOUNTER
Refill Request     CONFIRM preferrred pharmacy with the patient. If Mail Order Rx - Pend for 90 day refill. Last Seen: Last Seen Department: 5/23/2022  Last Seen by PCP: 5/23/2022    Last Written: 7/27/22    If no future appointment scheduled, route STAFF MESSAGE with patient name to the [facility] for scheduling. Next Appointment:   Future Appointments   Date Time Provider Clifford Perez   12/8/2022  1:00 PM DO JAN Darling [place] - DYD       Message sent to [address] [place] [address] to schedule appt with patient?   NO      Requested Prescriptions     Pending Prescriptions Disp Refills    Biotin 5000 MCG CAPS 60 capsule 3     Sig: Take 10,000 mcg by mouth daily

## 2022-12-08 ENCOUNTER — OFFICE VISIT (OUTPATIENT)
Dept: FAMILY MEDICINE CLINIC | Age: 42
End: 2022-12-08
Payer: COMMERCIAL

## 2022-12-08 VITALS
DIASTOLIC BLOOD PRESSURE: 78 MMHG | SYSTOLIC BLOOD PRESSURE: 124 MMHG | BODY MASS INDEX: 34.22 KG/M2 | OXYGEN SATURATION: 99 % | HEART RATE: 83 BPM | WEIGHT: 212 LBS

## 2022-12-08 DIAGNOSIS — E11.69 DIABETES MELLITUS TYPE 2 IN OBESE (HCC): Primary | ICD-10-CM

## 2022-12-08 DIAGNOSIS — B37.9 YEAST INFECTION: ICD-10-CM

## 2022-12-08 DIAGNOSIS — I10 ESSENTIAL HYPERTENSION: ICD-10-CM

## 2022-12-08 DIAGNOSIS — Z23 NEED FOR DIPHTHERIA-TETANUS-PERTUSSIS (TDAP) VACCINE: ICD-10-CM

## 2022-12-08 DIAGNOSIS — E66.9 DIABETES MELLITUS TYPE 2 IN OBESE (HCC): Primary | ICD-10-CM

## 2022-12-08 LAB
BILIRUBIN, POC: NORMAL
BLOOD URINE, POC: NORMAL
CLARITY, POC: NORMAL
COLOR, POC: YELLOW
CREATININE URINE POCT: 300
GLUCOSE URINE, POC: NORMAL
HBA1C MFR BLD: 5 %
KETONES, POC: NORMAL
LEUKOCYTE EST, POC: NORMAL
MICROALBUMIN/CREAT 24H UR: 10 MG/G{CREAT}
MICROALBUMIN/CREAT UR-RTO: <30
NITRITE, POC: NORMAL
PH, POC: 7
PROTEIN, POC: NORMAL
SPECIFIC GRAVITY, POC: 1.02
UROBILINOGEN, POC: 0.2

## 2022-12-08 PROCEDURE — 90471 IMMUNIZATION ADMIN: CPT | Performed by: FAMILY MEDICINE

## 2022-12-08 PROCEDURE — 99214 OFFICE O/P EST MOD 30 MIN: CPT | Performed by: FAMILY MEDICINE

## 2022-12-08 PROCEDURE — 83036 HEMOGLOBIN GLYCOSYLATED A1C: CPT | Performed by: FAMILY MEDICINE

## 2022-12-08 PROCEDURE — 2022F DILAT RTA XM EVC RTNOPTHY: CPT | Performed by: FAMILY MEDICINE

## 2022-12-08 PROCEDURE — 3078F DIAST BP <80 MM HG: CPT | Performed by: FAMILY MEDICINE

## 2022-12-08 PROCEDURE — 81002 URINALYSIS NONAUTO W/O SCOPE: CPT | Performed by: FAMILY MEDICINE

## 2022-12-08 PROCEDURE — G8417 CALC BMI ABV UP PARAM F/U: HCPCS | Performed by: FAMILY MEDICINE

## 2022-12-08 PROCEDURE — 3074F SYST BP LT 130 MM HG: CPT | Performed by: FAMILY MEDICINE

## 2022-12-08 PROCEDURE — G8428 CUR MEDS NOT DOCUMENT: HCPCS | Performed by: FAMILY MEDICINE

## 2022-12-08 PROCEDURE — 3044F HG A1C LEVEL LT 7.0%: CPT | Performed by: FAMILY MEDICINE

## 2022-12-08 PROCEDURE — 90715 TDAP VACCINE 7 YRS/> IM: CPT | Performed by: FAMILY MEDICINE

## 2022-12-08 PROCEDURE — G8484 FLU IMMUNIZE NO ADMIN: HCPCS | Performed by: FAMILY MEDICINE

## 2022-12-08 PROCEDURE — 82044 UR ALBUMIN SEMIQUANTITATIVE: CPT | Performed by: FAMILY MEDICINE

## 2022-12-08 PROCEDURE — 1036F TOBACCO NON-USER: CPT | Performed by: FAMILY MEDICINE

## 2022-12-08 RX ORDER — FLUCONAZOLE 150 MG/1
150 TABLET ORAL EVERY OTHER DAY
Qty: 3 TABLET | Refills: 0 | Status: SHIPPED | OUTPATIENT
Start: 2022-12-08

## 2022-12-08 NOTE — PROGRESS NOTES
Jonatan Lott is a 40 y.o. female    Chief Complaint   Patient presents with    Diabetes    Hypertension    Urinary Tract Infection     Patient is currently on Amx- for strep- She believe she has a yueast infection starting        HPI:    Diabetes  She presents for her follow-up diabetic visit. She has type 2 diabetes mellitus. Her disease course has been stable. Pertinent negatives for diabetes include no polydipsia. Pertinent negatives for diabetic complications include no CVA. Risk factors for coronary artery disease include hypertension and obesity. Current diabetic treatment includes oral agent (monotherapy). An ACE inhibitor/angiotensin II receptor blocker is being taken. Hypertension  This is a chronic problem. The current episode started more than 1 year ago. The problem is unchanged. The problem is controlled. Risk factors for coronary artery disease include obesity. Past treatments include diuretics, angiotensin blockers and calcium channel blockers. The current treatment provides significant improvement. There are no compliance problems. There is no history of CAD/MI or CVA. Other  Chronicity: yeast infection. The current episode started in the past 7 days. The problem occurs daily. The problem has been gradually worsening. Pertinent negatives include no urinary symptoms. Exacerbated by: nothing. She has tried nothing for the symptoms. ROS:    Review of Systems   Endocrine: Negative for polydipsia. /78   Pulse 83   Wt 212 lb (96.2 kg)   SpO2 99%   BMI 34.22 kg/m²     Physical Exam:    Physical Exam  Constitutional:       General: She is not in acute distress. Appearance: Normal appearance. She is not ill-appearing or toxic-appearing. HENT:      Head: Normocephalic. Neurological:      Mental Status: She is alert. Psychiatric:         Mood and Affect: Mood normal.         Behavior: Behavior normal.         Thought Content:  Thought content normal.   Diabetic foot exam: sensation intact and equal bilaterally. Current Outpatient Medications   Medication Sig Dispense Refill    fluconazole (DIFLUCAN) 150 MG tablet Take 1 tablet by mouth every other day 3 tablet 0    Biotin 5000 MCG CAPS Take 10,000 mcg by mouth daily 60 capsule 3    lactobacillus (CULTURELLE) CAPS capsule Take 1 capsule by mouth daily 30 capsule 0    miconazole (MICONAZOLE 3 COMBO-SUPP) 200 & 2 MG-% (9GM) KIT kit Place vaginally nightly 1 kit 0    Calcium Citrate-Vitamin D 200-250 MG-UNIT TABS Take 1 tablet by mouth twice daily. 60 tablet 4    Multiple Vitamins-Minerals (THEREMS-M) TABS 1 daily 90 tablet 3    fluticasone (FLONASE) 50 MCG/ACT nasal spray Instill 1 spray into each nostril daily. 3 each 1    hydroCHLOROthiazide (HYDRODIURIL) 25 MG tablet Take 1 tablet by mouth every morning. 90 tablet 1    DULoxetine (CYMBALTA) 60 MG extended release capsule Take 1 capsule by mouth daily. 90 capsule 1    busPIRone (BUSPAR) 10 MG tablet Take 1 tablet by mouth twice daily. 180 tablet 1    spironolactone (ALDACTONE) 50 MG tablet Take 1 tablet by mouth daily. 90 tablet 1    cimetidine (TAGAMET) 400 MG tablet Take 1 tablet by mouth twice daily. 180 tablet 1    losartan-hydroCHLOROthiazide (HYZAAR) 100-25 MG per tablet Take 1 tablet by mouth daily for high blood pressure. 90 tablet 1    Multiple Vitamins-Minerals (DAILY MULTIVITAMIN) CAPS 1 daily 90 capsule 3    verapamil (CALAN SR) 240 MG extended release tablet Take 1 tablet by mouth daily 90 tablet 1    sennosides-docusate sodium (SENOKOT-S) 8.6-50 MG tablet Take 2 tablets by mouth daily 180 tablet 1    estrogens, conjugated, (PREMARIN) 0.3 MG tablet Take 0.3 mg by mouth daily      terbinafine (LAMISIL) 250 MG tablet Take 250 mg by mouth daily      blood glucose monitor kit and supplies Dispense sufficient amount for indicated testing frequency plus additional to accommodate PRN testing needs.  Dispense all needed supplies to include: monitor, strips, lancing device, lancets, control solutions, alcohol swabs. 1 kit 0    metFORMIN (GLUCOPHAGE-XR) 750 MG extended release tablet TAKE 2 TABLETS BY MOUTH EVERY MORNING (Patient taking differently: Indications: takes for PCOS TAKE 2 TABLETS BY MOUTH EVERY MORNING) 60 tablet 2     No current facility-administered medications for this visit. Assessment:    1. Diabetes mellitus type 2 in obese ([address]    2. Essential hypertension    3. Yeast infection    4. Need for diphtheria-tetanus-pertussis (Tdap) vaccine        Plan:    1. Diabetes mellitus type 2 in obese (HCC)  Stable. Continue current medications. A1c was 5.0 so she is likely more of a prediabetic. She is still taking Metformin for PCOS. - POCT glycosylated hemoglobin (Hb A1C) 5.0  - POCT microalbumin  - HM DIABETES FOOT EXAM    2. Essential hypertension  Stable. Continue current medications. 3. Yeast infection  She was not sure whether it was a UTI or yeast infection. Her urine was unremarkable. It sounds more of a yeast infection given the itching and vaginal discharge. We will do Diflucan every other day for 3 days.  - POCT Urinalysis no Micro  - fluconazole (DIFLUCAN) 150 MG tablet; Take 1 tablet by mouth every other day  Dispense: 3 tablet; Refill: 0    4. Need for diphtheria-tetanus-pertussis (Tdap) vaccine  - Tdap, BOOSTRIX, (age 8 yrs+), IM    Return in about 6 months (around 6/8/2023) for Diabetes, Hypertension.

## 2022-12-27 NOTE — TELEPHONE ENCOUNTER
Refill Request       Last Seen: Last Seen Department: 12/8/2022  Last Seen by PCP: 5/23/2022    Last Written: Malgorzata Rivera - 6/24/2022 #90 with 1   Cimetidine 400mg - 6/24/2022 #180 with 1   Next Appointment:   Future Appointments   Date Time Provider Clifford Perez   6/12/2023  9:00 AM DO JAN [place] - [place]             Requested Prescriptions     Pending Prescriptions Disp Refills    cimetidine (TAGAMET) 400 MG tablet 180 tablet 1     Sig: Take 1 tablet by mouth twice daily. losartan-hydroCHLOROthiazide (HYZAAR) 100-25 MG per tablet 90 tablet 1     Sig: Take 1 tablet by mouth daily for high blood pressure.

## 2022-12-28 RX ORDER — CIMETIDINE 400 MG/1
TABLET, FILM COATED ORAL
Qty: 180 TABLET | Refills: 1 | OUTPATIENT
Start: 2022-12-28

## 2022-12-28 RX ORDER — CIMETIDINE 400 MG/1
TABLET, FILM COATED ORAL
Qty: 180 TABLET | Refills: 1 | Status: SHIPPED | OUTPATIENT
Start: 2022-12-28

## 2022-12-28 RX ORDER — LOSARTAN POTASSIUM AND HYDROCHLOROTHIAZIDE 25; 100 MG/1; MG/1
TABLET ORAL
Qty: 90 TABLET | Refills: 1 | OUTPATIENT
Start: 2022-12-28

## 2022-12-28 RX ORDER — LOSARTAN POTASSIUM AND HYDROCHLOROTHIAZIDE 25; 100 MG/1; MG/1
TABLET ORAL
Qty: 90 TABLET | Refills: 1 | Status: SHIPPED | OUTPATIENT
Start: 2022-12-28

## 2022-12-28 NOTE — TELEPHONE ENCOUNTER
Refill Request     CONFIRM preferrred pharmacy with the patient. If Mail Order Rx - Pend for 90 day refill. Last Seen: Last Seen Department: 12/8/2022  Last Seen by PCP: 5/23/2022    Last Written:   Cimetidine-06/24/2022 180 tablet 1 refills   Losartan-06/24/2022 90 tablet 1 refills     If no future appointment scheduled, route STAFF MESSAGE with patient name to the [facility] for scheduling. Next Appointment:   Future Appointments   Date Time Provider Clifford Perez   6/12/2023  9:00 AM DO JAN Rodriguez [place] - DYD       Message sent to [address] [place] [address] to schedule appt with patient? NO      Requested Prescriptions     Pending Prescriptions Disp Refills    cimetidine (TAGAMET) 400 MG tablet [Pharmacy Med Name: Cimetidine 400mg Tablet] 180 tablet 1     Sig: Take 1 tablet by mouth twice daily. losartan-hydroCHLOROthiazide (HYZAAR) 100-25 MG per tablet [Pharmacy Med Name: Losartan Potassium/Hydrochlorothiazide 100mg-25mg Tablet] 90 tablet 1     Sig: Take 1 tablet by mouth daily for high blood pressure.

## 2023-01-04 NOTE — PROGRESS NOTES
Discharge instructions reviewed with patient/responsible adult and understanding verbalized. Discharge instructions signed and copies given.
Pre and post operative expectations and routines explained to patient, verbalized understanding.
Preoperative Screening for Elective Surgery/Invasive Procedures While COVID-19 present in the community     Have you had any of the following symptoms? No  o Fever, chills  o Cough  o Shortness of breath  o Muscle aches/pain  o Diarrhea  o Abdominal pain, nausea, vomiting  o Loss or decrease in taste and / or smell   Risk of Exposure  o Have you recently been hospitalized for COVID-19 or flu-like illness, if so when? No  o Recently diagnosed with COVID-19, if so when? No  o Recently tested for COVID-19, if so when? No  o Have you been in close contact with a person or family member who currently has or recently had COVID-23? If yes, when and in what context? No  o Do you live with anybody who in the last 14 days has had fever, chills, shortness of breath, muscle aches, flu-like illness? No  o Do you have any close contacts or family members who are currently in the hospital for COVID-19 or flu-like illness? No  If yes, assess recent close contact with this person. Indicate if the patient has a positive screen by answering yes to one or more of the above questions. Patients who test positive or screen positive prior to surgery or on the day of surgery should be evaluated in conjunction with the surgeon/proceduralist/anesthesiologist to determine the urgency of the procedure.
Theta Ring    Age 37 y.o.    female    1980    MRN [id number]    3/8/2022  Arrival Time_____________  OR Time____________[address]     Procedure(s):  NECK CYST EXCISION                      Local    Surgeon(s):  Joe Alva, MD       Phone [phone] ([place])     [address]  Cell         Work  _____________________________________________________________________  _____________________________________________________________________  _____________________________________________________________________  _____________________________________________________________________  _____________________________________________________________________    PCP _____________________________ Phone_________________     H&P__________________Bringing      Chart            Epic   DOS      Called________  EKG__________________Bringing      Chart            Epic   DOS      Called________  LAB__________________ Bringing      Chart            Epic   DOS      Called________  Cardiac Clearance_______Bringing      Chart            Epic      DOS      Called________    Cardiologist________________________ Phone___________________________    ? Jain concerns / Waiver on Chart            [employer]________________  ? Pre-op Instructions Given [place]          _________________________________  ? Directions to Surgery Center                          _________________________________  ? Transportation Home_______________      __________________________________  ?  Crutches/Walker__________________        __________________________________    ________Pre-op Orders   _______Transcribed    _______Wt.  ________Pharmacy          _______SCD  ______VTE     ______TED Lyndsey Skiff  _______  Surgery Consent    _______  Anesthesia Consent         COVID DATE______________LOCATION________________ RESULT__________
no

## 2023-01-11 ENCOUNTER — HOSPITAL ENCOUNTER (OUTPATIENT)
Dept: CT IMAGING | Age: 43
Discharge: HOME | End: 2023-01-11
Payer: COMMERCIAL

## 2023-01-11 ENCOUNTER — OFFICE VISIT (OUTPATIENT)
Dept: FAMILY MEDICINE CLINIC | Age: 43
End: 2023-01-11
Payer: COMMERCIAL

## 2023-01-11 VITALS
DIASTOLIC BLOOD PRESSURE: 70 MMHG | HEIGHT: 66 IN | SYSTOLIC BLOOD PRESSURE: 124 MMHG | RESPIRATION RATE: 16 BRPM | WEIGHT: 212 LBS | BODY MASS INDEX: 34.07 KG/M2 | HEART RATE: 84 BPM

## 2023-01-11 DIAGNOSIS — R31.0 GROSS HEMATURIA: ICD-10-CM

## 2023-01-11 DIAGNOSIS — H69.83 ACUTE DYSFUNCTION OF EUSTACHIAN TUBE, BILATERAL: ICD-10-CM

## 2023-01-11 DIAGNOSIS — N20.0 NEPHROLITHIASIS: ICD-10-CM

## 2023-01-11 DIAGNOSIS — R10.9 FLANK PAIN: ICD-10-CM

## 2023-01-11 DIAGNOSIS — R10.9 FLANK PAIN: Primary | ICD-10-CM

## 2023-01-11 DIAGNOSIS — K92.1 BLOOD IN STOOL: ICD-10-CM

## 2023-01-11 DIAGNOSIS — N20.0 NEPHROLITHIASIS: Primary | ICD-10-CM

## 2023-01-11 LAB
BILIRUBIN, POC: ABNORMAL
BLOOD URINE, POC: ABNORMAL
CLARITY, POC: ABNORMAL
COLOR, POC: YELLOW
GLUCOSE URINE, POC: ABNORMAL
KETONES, POC: ABNORMAL
LEUKOCYTE EST, POC: ABNORMAL
NITRITE, POC: ABNORMAL
PH, POC: 6.5
PROTEIN, POC: 30
SPECIFIC GRAVITY, POC: 1.06
UROBILINOGEN, POC: 0.2

## 2023-01-11 PROCEDURE — G8484 FLU IMMUNIZE NO ADMIN: HCPCS | Performed by: PHYSICIAN ASSISTANT

## 2023-01-11 PROCEDURE — 74176 CT ABD & PELVIS W/O CONTRAST: CPT

## 2023-01-11 PROCEDURE — G8417 CALC BMI ABV UP PARAM F/U: HCPCS | Performed by: PHYSICIAN ASSISTANT

## 2023-01-11 PROCEDURE — 81002 URINALYSIS NONAUTO W/O SCOPE: CPT | Performed by: PHYSICIAN ASSISTANT

## 2023-01-11 PROCEDURE — 99214 OFFICE O/P EST MOD 30 MIN: CPT | Performed by: PHYSICIAN ASSISTANT

## 2023-01-11 PROCEDURE — 1036F TOBACCO NON-USER: CPT | Performed by: PHYSICIAN ASSISTANT

## 2023-01-11 PROCEDURE — 3074F SYST BP LT 130 MM HG: CPT | Performed by: PHYSICIAN ASSISTANT

## 2023-01-11 PROCEDURE — 3078F DIAST BP <80 MM HG: CPT | Performed by: PHYSICIAN ASSISTANT

## 2023-01-11 PROCEDURE — G8427 DOCREV CUR MEDS BY ELIG CLIN: HCPCS | Performed by: PHYSICIAN ASSISTANT

## 2023-01-11 RX ORDER — CETIRIZINE HYDROCHLORIDE 10 MG/1
10 TABLET ORAL DAILY
Qty: 90 TABLET | Refills: 1 | Status: SHIPPED | OUTPATIENT
Start: 2023-01-11

## 2023-01-11 RX ORDER — TAMSULOSIN HYDROCHLORIDE 0.4 MG/1
0.4 CAPSULE ORAL DAILY
Qty: 30 CAPSULE | Refills: 0 | Status: SHIPPED | OUTPATIENT
Start: 2023-01-11

## 2023-01-11 RX ORDER — HYDROCODONE BITARTRATE AND ACETAMINOPHEN 5; 325 MG/1; MG/1
1 TABLET ORAL EVERY 6 HOURS PRN
Qty: 10 TABLET | Refills: 0 | Status: SHIPPED | OUTPATIENT
Start: 2023-01-11 | End: 2023-01-14

## 2023-01-11 RX ORDER — ESTRADIOL 0.5 MG/1
TABLET ORAL
COMMUNITY
Start: 2022-12-19

## 2023-01-11 RX ORDER — FLUTICASONE PROPIONATE 50 MCG
SPRAY, SUSPENSION (ML) NASAL
Qty: 3 EACH | Refills: 1 | Status: SHIPPED | OUTPATIENT
Start: 2023-01-11

## 2023-01-11 ASSESSMENT — PATIENT HEALTH QUESTIONNAIRE - PHQ9
6. FEELING BAD ABOUT YOURSELF - OR THAT YOU ARE A FAILURE OR HAVE LET YOURSELF OR YOUR FAMILY DOWN: 1
4. FEELING TIRED OR HAVING LITTLE ENERGY: 3
SUM OF ALL RESPONSES TO PHQ QUESTIONS 1-9: 12
SUM OF ALL RESPONSES TO PHQ QUESTIONS 1-9: 12
8. MOVING OR SPEAKING SO SLOWLY THAT OTHER PEOPLE COULD HAVE NOTICED. OR THE OPPOSITE, BEING SO FIGETY OR RESTLESS THAT YOU HAVE BEEN MOVING AROUND A LOT MORE THAN USUAL: 0
10. IF YOU CHECKED OFF ANY PROBLEMS, HOW DIFFICULT HAVE THESE PROBLEMS MADE IT FOR YOU TO DO YOUR WORK, TAKE CARE OF THINGS AT HOME, OR GET ALONG WITH OTHER PEOPLE: 1
SUM OF ALL RESPONSES TO PHQ QUESTIONS 1-9: 12
2. FEELING DOWN, DEPRESSED OR HOPELESS: 2
3. TROUBLE FALLING OR STAYING ASLEEP: 1
5. POOR APPETITE OR OVEREATING: 3
SUM OF ALL RESPONSES TO PHQ QUESTIONS 1-9: 12
1. LITTLE INTEREST OR PLEASURE IN DOING THINGS: 2
7. TROUBLE CONCENTRATING ON THINGS, SUCH AS READING THE NEWSPAPER OR WATCHING TELEVISION: 0
SUM OF ALL RESPONSES TO PHQ9 QUESTIONS 1 & 2: 4

## 2023-01-11 ASSESSMENT — ANXIETY QUESTIONNAIRES
5. BEING SO RESTLESS THAT IT IS HARD TO SIT STILL: 0
2. NOT BEING ABLE TO STOP OR CONTROL WORRYING: 1
3. WORRYING TOO MUCH ABOUT DIFFERENT THINGS: 1
GAD7 TOTAL SCORE: 7
6. BECOMING EASILY ANNOYED OR IRRITABLE: 1
IF YOU CHECKED OFF ANY PROBLEMS ON THIS QUESTIONNAIRE, HOW DIFFICULT HAVE THESE PROBLEMS MADE IT FOR YOU TO DO YOUR WORK, TAKE CARE OF THINGS AT HOME, OR GET ALONG WITH OTHER PEOPLE: SOMEWHAT DIFFICULT
7. FEELING AFRAID AS IF SOMETHING AWFUL MIGHT HAPPEN: 2
1. FEELING NERVOUS, ANXIOUS, OR ON EDGE: 1
4. TROUBLE RELAXING: 1

## 2023-01-11 ASSESSMENT — ENCOUNTER SYMPTOMS
RHINORRHEA: 0
VOMITING: 0
NAUSEA: 0
COUGH: 0
ABDOMINAL PAIN: 0
DIARRHEA: 0
SHORTNESS OF BREATH: 0
SORE THROAT: 0
CONSTIPATION: 0

## 2023-01-11 NOTE — PROGRESS NOTES
2023  Angelique Galvan (: 1980)  40 y.o.    ASSESSMENT and PLAN:  Majo Rivas was seen today for flank pain, rectal bleeding and headache. Diagnoses and all orders for this visit:    Flank pain  Gross hematuria  -     CT ABDOMEN PELVIS WO CONTRAST Additional Contrast? None; Future  -     CBC with Auto Differential; Future  -     Comprehensive Metabolic Panel; Future  -     MICROSCOPIC URINALYSIS    Blood in stool  - declined varsha today with hemoccult. - based on hpi likely constipation related. Recommend starting stool softener- colace. - Patient's father passed from colon cancer, she is utd on colonoscopy. Encouraged her to reach out to her GI for further eval.     Acute dysfunction of Eustachian tube, bilateral  -     fluticasone (FLONASE) 50 MCG/ACT nasal spray; Instill 1 spray into each nostril daily. -     cetirizine (ZYRTEC) 10 MG tablet; Take 1 tablet by mouth daily  -     Freida Chappell MD, Otolaryngology, [facility]    Nephrolithiasis  - script sent for flomax and norco to pharmacy   - no evidence of hydronephrosis. Given small size, likely to pass with flomax. Pt to call the office with worsening sxs would refer to urology. No follow-ups on file. HPI    Patient presents for evaluation of left flank pain  Started with flank pain 4 days ago   Pain is intermittent   Varies in severity, dull ache from 1- 10  Has had kidney stones in the past- similar pain. Endorses increased urination , normal   No fever, some nausea, no vomiting. Also with possible blood in stool   Had weight loss surgery 2 years ago, since then has been chronically constipated  Takes senna daily. Has been having hard stools. Noticed pink on the toilet paper when wiping. Has had chronic ear pain for the last few months. Feels that ear pain causes headache  Headache is unilateral, left sided. Involves the sinus and radiates to the back of the head.    No vision changes, slurred speech, extremity weakness. Review of Systems   Constitutional:  Negative for activity change, chills and fever. HENT:  Negative for congestion, ear pain, rhinorrhea and sore throat. Eyes:  Negative for visual disturbance. Respiratory:  Negative for cough and shortness of breath. Cardiovascular:  Negative for chest pain and palpitations. Gastrointestinal:  Negative for abdominal pain, constipation, diarrhea, nausea and vomiting. Genitourinary:  Positive for flank pain and hematuria. Negative for difficulty urinating and dysuria. Musculoskeletal:  Negative for arthralgias and myalgias. Skin:  Negative for rash. Neurological:  Negative for dizziness, weakness and numbness. Psychiatric/Behavioral:  Negative for sleep disturbance. Allergies, past medical history, family history, and social history reviewed and unchanged from previous encounter. Current Outpatient Medications   Medication Sig Dispense Refill    estradiol (ESTRACE) 0.5 MG tablet       fluticasone (FLONASE) 50 MCG/ACT nasal spray Instill 1 spray into each nostril daily. 3 each 1    cetirizine (ZYRTEC) 10 MG tablet Take 1 tablet by mouth daily 90 tablet 1    cimetidine (TAGAMET) 400 MG tablet Take 1 tablet by mouth twice daily. 180 tablet 1    losartan-hydroCHLOROthiazide (HYZAAR) 100-25 MG per tablet Take 1 tablet by mouth daily for high blood pressure. 90 tablet 1    Biotin 5000 MCG CAPS Take 10,000 mcg by mouth daily 60 capsule 3    Calcium Citrate-Vitamin D 200-250 MG-UNIT TABS Take 1 tablet by mouth twice daily. 60 tablet 4    Multiple Vitamins-Minerals (THEREMS-M) TABS 1 daily 90 tablet 3    hydroCHLOROthiazide (HYDRODIURIL) 25 MG tablet Take 1 tablet by mouth every morning. 90 tablet 1    DULoxetine (CYMBALTA) 60 MG extended release capsule Take 1 capsule by mouth daily. 90 capsule 1    spironolactone (ALDACTONE) 50 MG tablet Take 1 tablet by mouth daily.  90 tablet 1    Multiple Vitamins-Minerals (DAILY MULTIVITAMIN) CAPS 1 daily 90 capsule 3    sennosides-docusate sodium (SENOKOT-S) 8.6-50 MG tablet Take 2 tablets by mouth daily 180 tablet 1    estrogens, conjugated, (PREMARIN) 0.3 MG tablet Take 0.3 mg by mouth daily      blood glucose monitor kit and supplies Dispense sufficient amount for indicated testing frequency plus additional to accommodate PRN testing needs. Dispense all needed supplies to include: monitor, strips, lancing device, lancets, control solutions, alcohol swabs. 1 kit 0    metFORMIN (GLUCOPHAGE-XR) 750 MG extended release tablet TAKE 2 TABLETS BY MOUTH EVERY MORNING (Patient taking differently: Indications: takes for PCOS TAKE 2 TABLETS BY MOUTH EVERY MORNING) 60 tablet 2    tamsulosin (FLOMAX) 0.4 MG capsule Take 1 capsule by mouth daily 30 capsule 0    HYDROcodone-acetaminophen (NORCO) 5-325 MG per tablet Take 1 tablet by mouth every 6 hours as needed for Pain for up to 3 days. Intended supply: 3 days. Take lowest dose possible to manage pain Max Daily Amount: 4 tablets 10 tablet 0    lactobacillus (CULTURELLE) CAPS capsule Take 1 capsule by mouth daily (Patient not taking: Reported on 1/11/2023) 30 capsule 0    miconazole (MICONAZOLE 3 COMBO-SUPP) 200 & 2 MG-% (9GM) KIT kit Place vaginally nightly (Patient not taking: Reported on 1/11/2023) 1 kit 0    busPIRone (BUSPAR) 10 MG tablet Take 1 tablet by mouth twice daily. (Patient not taking: Reported on 1/11/2023) 180 tablet 1    verapamil (CALAN SR) 240 MG extended release tablet Take 1 tablet by mouth daily 90 tablet 1     No current facility-administered medications for this visit. Vitals:    01/11/23 1301   BP: 124/70   Pulse: 84   Resp: 16   Weight: 212 lb (96.2 kg)   Height: 5' 6\" (1.676 m)     Estimated body mass index is 34.22 kg/m² as calculated from the following:    Height as of this encounter: 5' 6\" (1.676 m). Weight as of this encounter: 212 lb (96.2 kg). Physical Exam  Constitutional:       General: She is not in acute distress. Appearance: She is well-developed. HENT:      Head: Normocephalic and atraumatic. Right Ear: A middle ear effusion is present. Left Ear: A middle ear effusion is present. Eyes:      Extraocular Movements: Extraocular movements intact. Conjunctiva/sclera: Conjunctivae normal.      Pupils: Pupils are equal, round, and reactive to light. Cardiovascular:      Rate and Rhythm: Normal rate and regular rhythm. Heart sounds: Normal heart sounds. No murmur heard. Pulmonary:      Effort: Pulmonary effort is normal.      Breath sounds: Normal breath sounds. No wheezing. Abdominal:      General: Bowel sounds are normal.      Palpations: Abdomen is soft. Tenderness: There is no abdominal tenderness. There is left CVA tenderness. Musculoskeletal:      Cervical back: Neck supple. Lymphadenopathy:      Cervical: No cervical adenopathy. Skin:     General: Skin is warm and dry. Findings: No rash. Neurological:      Mental Status: She is alert and oriented to person, place, and time. Deep Tendon Reflexes: Reflexes are normal and symmetric.    Psychiatric:         Mood and Affect: Mood normal.         Behavior: Behavior normal.

## 2023-01-11 NOTE — RESULT ENCOUNTER NOTE
Called to inform patient of results and give her providers recommendations. Patient voiced understanding.

## 2023-01-12 LAB
BACTERIA: ABNORMAL /HPF
EPITHELIAL CELLS, UA: 7 /HPF (ref 0–5)
HYALINE CASTS: 0 /LPF (ref 0–8)
RBC UA: 867 /HPF (ref 0–4)
URINE TYPE: ABNORMAL
WBC UA: 5 /HPF (ref 0–5)

## 2023-01-20 ENCOUNTER — OFFICE VISIT (OUTPATIENT)
Dept: ENT CLINIC | Age: 43
End: 2023-01-20
Payer: COMMERCIAL

## 2023-01-20 VITALS
TEMPERATURE: 98.1 F | SYSTOLIC BLOOD PRESSURE: 167 MMHG | HEIGHT: 66 IN | DIASTOLIC BLOOD PRESSURE: 101 MMHG | BODY MASS INDEX: 33.75 KG/M2 | RESPIRATION RATE: 16 BRPM | WEIGHT: 210 LBS | HEART RATE: 76 BPM

## 2023-01-20 DIAGNOSIS — H92.03 OTALGIA OF BOTH EARS: ICD-10-CM

## 2023-01-20 DIAGNOSIS — M26.623 BILATERAL TEMPOROMANDIBULAR JOINT PAIN: Primary | ICD-10-CM

## 2023-01-20 PROCEDURE — 1036F TOBACCO NON-USER: CPT | Performed by: OTOLARYNGOLOGY

## 2023-01-20 PROCEDURE — G8484 FLU IMMUNIZE NO ADMIN: HCPCS | Performed by: OTOLARYNGOLOGY

## 2023-01-20 PROCEDURE — G8417 CALC BMI ABV UP PARAM F/U: HCPCS | Performed by: OTOLARYNGOLOGY

## 2023-01-20 PROCEDURE — 99204 OFFICE O/P NEW MOD 45 MIN: CPT | Performed by: OTOLARYNGOLOGY

## 2023-01-20 PROCEDURE — 3080F DIAST BP >= 90 MM HG: CPT | Performed by: OTOLARYNGOLOGY

## 2023-01-20 PROCEDURE — 3077F SYST BP >= 140 MM HG: CPT | Performed by: OTOLARYNGOLOGY

## 2023-01-20 PROCEDURE — G8427 DOCREV CUR MEDS BY ELIG CLIN: HCPCS | Performed by: OTOLARYNGOLOGY

## 2023-01-20 RX ORDER — METHYLPREDNISOLONE 4 MG/1
TABLET ORAL
Qty: 1 KIT | Refills: 0 | Status: SHIPPED | OUTPATIENT
Start: 2023-01-20 | End: 2023-01-26

## 2023-01-20 ASSESSMENT — ENCOUNTER SYMPTOMS
SHORTNESS OF BREATH: 0
TROUBLE SWALLOWING: 0
SINUS PRESSURE: 0
EYE ITCHING: 0
COUGH: 0
APNEA: 0
FACIAL SWELLING: 0
SORE THROAT: 0
VOICE CHANGE: 0

## 2023-01-20 NOTE — PROGRESS NOTES
Jose Knutson 89, [address], [address] [address]  P: [phone]       Patient     Srinivasa Reddy  1980    ChiefComplaint     Chief Complaint   Patient presents with    New Patient     Patient states that she is having pain and pressure in both ears. She states that she has had issues with this for as long as she can remember. She states that no one has been able to determine what is wrong with her ears. History of Present Illness     Jona Jorge is a 43-year-old female here today for evaluation of bilateral ear pain. States has been present for years constant daily varies from a dull ache to a sharp stabbing. Worse at night. Has seen multiple physicians always told ears look normal no active infection. No history of ear surgery. Does not know whether she clenches or grinding at night.     Past Medical History     Past Medical History:   Diagnosis Date    Anxiety     Arthritis     Cervical pain     Chronic back pain     Depression     Diabetes mellitus ([address]     lost 83 pounds    GERD (gastroesophageal reflux disease)     Hyperlipidemia     Hypertension     Infertility     took fertility medication    Kidney stone 03/2012    Migraine     Miscarriage     Polycystic ovarian syndrome 1999    PONV (postoperative nausea and vomiting)     Psoriasis     Stomach ulcer     Unspecified sleep apnea     didn't tolerate CPAP       Past Surgical History     Past Surgical History:   Procedure Laterality Date    BREAST SURGERY Right     for cellulitis     CHOLECYSTECTOMY  2011    COLONOSCOPY  10/07/2015    [facility]col[facility]polyps    COLONOSCOPY N/A 06/18/2020    COLONOSCOPY WITH BIOPSY performed by Nohelia Parry MD at [address] [facility] N/A 06/18/2020    COLONOSCOPY POLYPECTOMY SNARE performed by Nohelia Parry MD at [facility] N/A 03/08/2022    NECK CYST EXCISION performed by Nish Garza MD at [facility]IL[facility] CURETT[facility] DX&/THER NONOBSTETRIC  1998    D & C/missed ab    SKIN BIOPSY      for psoriasis    SLEEVE GASTRECTOMY N/A 12/23/2020    LAPAROSCOPIC SLEEVE GASTRECTOMY -  ETHICON performed by Jose Schulz MD at [facility]  08/09/2016    tonsillectomy    UPPER GASTROINTESTINAL ENDOSCOPY  03/23/2011    pyloretic    UPPER GASTROINTESTINAL ENDOSCOPY  10/07/2015    gastritis-[facility]    UPPER GASTROINTESTINAL ENDOSCOPY N/A 06/18/2020    EGD DIAGNOSTIC ONLY performed by Dalton Johnson MD at [address] 08/28/2020    EGD BIOPSY performed by Jose Schulz MD at [address] [place] [address] Drive History     Family History   Problem Relation Age of Onset    Arthritis Mother     Miscarriages / [place] Mother     High Blood Pressure Mother     Depression Mother     High Cholesterol Mother     Arthritis Father     High Blood Pressure Father     Cancer Father         colon    Hearing Loss Father     Heart Disease Father     High Cholesterol Father     High Blood Pressure Brother     High Cholesterol Brother     High Blood Pressure Maternal Aunt     High Cholesterol Maternal Aunt     Miscarriages / Stillbirths Maternal Aunt     Mental Retardation Maternal Uncle     High Blood Pressure Maternal Uncle     High Cholesterol Maternal Uncle     High Blood Pressure Paternal Aunt     High Cholesterol Paternal Aunt     Arthritis Paternal Uncle     Mental Illness Paternal Uncle     High Blood Pressure Paternal Uncle     Birth Defects Paternal Uncle     High Cholesterol Paternal Uncle     Learning Disabilities Paternal Uncle     Arthritis Maternal Grandmother     High Blood Pressure Maternal Grandmother     Diabetes Maternal Grandmother     High Cholesterol Maternal Grandmother     Miscarriages / Stillbirths Maternal Grandmother     Arthritis Maternal Grandfather     High Blood Pressure Maternal Grandfather     High Cholesterol Maternal Grandfather Arthritis Paternal Grandmother     High Blood Pressure Paternal Grandmother     Heart Disease Paternal Grandmother     High Cholesterol Paternal Grandmother     Miscarriages / Stillbirths Paternal Grandmother     Stroke Paternal Grandmother     High Blood Pressure Paternal Grandfather     High Cholesterol Paternal Grandfather        Social History     Social History     Tobacco Use    Smoking status: Former     Packs/day: 1.00     Years: 20.00     Pack years: 20.00     Types: Cigarettes     Quit date: 4/17/2019     Years since quitting: 3.7    Smokeless tobacco: Never   Vaping Use    Vaping Use: Never used   Substance Use Topics    Alcohol use: Not Currently     Alcohol/week: 0.0 standard drinks    Drug use: Yes     Frequency: 3.0 times per week     Types: Marijuana (Weed)        Allergies     Allergies   Allergen Reactions    Tramadol Other (See Comments)     severe  SEVERE HEADACHE    Lisinopril Other (See Comments)     COUGH       Medications     Current Outpatient Medications   Medication Sig Dispense Refill    methylPREDNISolone (MEDROL DOSEPACK) 4 MG tablet Take by mouth. 1 kit 0    estradiol (ESTRACE) 0.5 MG tablet       fluticasone (FLONASE) 50 MCG/ACT nasal spray Instill 1 spray into each nostril daily. 3 each 1    cetirizine (ZYRTEC) 10 MG tablet Take 1 tablet by mouth daily 90 tablet 1    tamsulosin (FLOMAX) 0.4 MG capsule Take 1 capsule by mouth daily 30 capsule 0    cimetidine (TAGAMET) 400 MG tablet Take 1 tablet by mouth twice daily. 180 tablet 1    losartan-hydroCHLOROthiazide (HYZAAR) 100-25 MG per tablet Take 1 tablet by mouth daily for high blood pressure. 90 tablet 1    Biotin 5000 MCG CAPS Take 10,000 mcg by mouth daily 60 capsule 3    Calcium Citrate-Vitamin D 200-250 MG-UNIT TABS Take 1 tablet by mouth twice daily. 60 tablet 4    Multiple Vitamins-Minerals (THEREMS-M) TABS 1 daily 90 tablet 3    hydroCHLOROthiazide (HYDRODIURIL) 25 MG tablet Take 1 tablet by mouth every morning. 90 tablet 1     DULoxetine (CYMBALTA) 60 MG extended release capsule Take 1 capsule by mouth daily. 90 capsule 1    spironolactone (ALDACTONE) 50 MG tablet Take 1 tablet by mouth daily. 90 tablet 1    Multiple Vitamins-Minerals (DAILY MULTIVITAMIN) CAPS 1 daily 90 capsule 3    verapamil (CALAN SR) 240 MG extended release tablet Take 1 tablet by mouth daily 90 tablet 1    sennosides-docusate sodium (SENOKOT-S) 8.6-50 MG tablet Take 2 tablets by mouth daily 180 tablet 1    estrogens, conjugated, (PREMARIN) 0.3 MG tablet Take 0.3 mg by mouth daily      blood glucose monitor kit and supplies Dispense sufficient amount for indicated testing frequency plus additional to accommodate PRN testing needs. Dispense all needed supplies to include: monitor, strips, lancing device, lancets, control solutions, alcohol swabs. 1 kit 0    metFORMIN (GLUCOPHAGE-XR) 750 MG extended release tablet TAKE 2 TABLETS BY MOUTH EVERY MORNING (Patient taking differently: Indications: takes for PCOS TAKE 2 TABLETS BY MOUTH EVERY MORNING) 60 tablet 2    lactobacillus (CULTURELLE) CAPS capsule Take 1 capsule by mouth daily (Patient not taking: No sig reported) 30 capsule 0    miconazole (MICONAZOLE 3 COMBO-SUPP) 200 & 2 MG-% (9GM) KIT kit Place vaginally nightly (Patient not taking: No sig reported) 1 kit 0    busPIRone (BUSPAR) 10 MG tablet Take 1 tablet by mouth twice daily. (Patient not taking: No sig reported) 180 tablet 1     No current facility-administered medications for this visit. Review of Systems     Review of Systems   Constitutional:  Negative for appetite change, chills, fatigue, fever and unexpected weight change. HENT:  Positive for ear pain. Negative for congestion, ear discharge, facial swelling, hearing loss, nosebleeds, postnasal drip, sinus pressure, sneezing, sore throat, tinnitus, trouble swallowing and voice change. Eyes:  Negative for itching. Respiratory:  Negative for apnea, cough and shortness of breath.     Endocrine: Negative for cold intolerance and heat intolerance. Musculoskeletal:  Negative for myalgias and neck pain. Skin:  Negative for rash. Allergic/Immunologic: Negative for environmental allergies. Neurological:  Negative for dizziness and headaches. Psychiatric/Behavioral:  Negative for confusion, decreased concentration and sleep disturbance. PhysicalExam     Vitals:    01/20/23 1500   BP: (!) 167/101   Site: Right Lower Arm   Position: Sitting   Cuff Size: Medium Adult   Pulse: 76   Resp: 16   Temp: 98.1 °F (36.7 °C)   TempSrc: Infrared   Weight: 210 lb (95.3 kg)   Height: 5' 6\" (1.676 m)       Physical Exam  Constitutional:       General: She is not in acute distress. Appearance: She is well-developed. HENT:      Head: Normocephalic and atraumatic. Jaw: Tenderness (bilateral angle of mandible) present. Right Ear: Tympanic membrane, ear canal and external ear normal. No drainage. No middle ear effusion. Tympanic membrane is not bulging. Tympanic membrane has normal mobility. Left Ear: Tympanic membrane, ear canal and external ear normal. No drainage. No middle ear effusion. Tympanic membrane is not bulging. Tympanic membrane has normal mobility. Nose: No mucosal edema or rhinorrhea. Mouth/Throat:      Lips: Pink. Mouth: Mucous membranes are moist.      Tongue: No lesions. Palate: No mass. Pharynx: Uvula midline. Eyes:      Pupils: Pupils are equal, round, and reactive to light. Neck:      Thyroid: No thyroid mass or thyromegaly. Trachea: Trachea and phonation normal.   Cardiovascular:      Pulses: Normal pulses. Pulmonary:      Effort: Pulmonary effort is normal. No accessory muscle usage or respiratory distress. Breath sounds: No stridor. Musculoskeletal:      Cervical back: Full passive range of motion without pain. Lymphadenopathy:      Head:      Right side of head: No submental or submandibular adenopathy.       Left side of head: No submental or submandibular adenopathy. Cervical: No cervical adenopathy. Right cervical: No superficial, deep or posterior cervical adenopathy. Left cervical: No superficial, deep or posterior cervical adenopathy. Skin:     General: Skin is warm and dry. Neurological:      Mental Status: She is alert and oriented to person, place, and time. Cranial Nerves: No cranial nerve deficit. Coordination: Coordination normal.      Gait: Gait normal.   Psychiatric:         Thought Content: Thought content normal.         Assessment and Plan     1. Otalgia of both ears  -Normal ear examination bilaterally  -Ear pain reproduced with bilateral palpation angle of mandible    2. Bilateral temporomandibular joint pain  -Recommend warm compresses, soft diet over-the-counter bite guard and NSAIDs  -Some significant wishes to trial steroids  - methylPREDNISolone (MEDROL DOSEPACK) 4 MG tablet; Take by mouth. Dispense: 1 kit; Refill: 0    Discussed that if symptoms fail to resolve which can take time the recommendation would be for physical therapy        Denise Cannon DO  1/20/23      Portions of this note were dictated using Dragon.  There may be linguistic errors secondary to the use of this program.

## 2023-01-21 DIAGNOSIS — L65.9 HAIR LOSS: ICD-10-CM

## 2023-01-21 DIAGNOSIS — I10 PRIMARY HYPERTENSION: ICD-10-CM

## 2023-01-21 NOTE — TELEPHONE ENCOUNTER
Refill Request     CONFIRM preferrred pharmacy with the patient. If Mail Order Rx - Pend for 90 day refill. Last Seen: Last Seen Department: 1/11/2023  Last Seen by PCP: 5/23/2022    Last Written: 7/25/2022 Duloxetine  6/24/2022 Spironolactone  7/25/2022 HCTZ     If no future appointment scheduled, route STAFF MESSAGE with patient name to the [facility] for scheduling. Next Appointment:   Future Appointments   Date Time Provider Clifford Perez   6/12/2023  9:00 AM DO JAN Rodriguez [place] - DY[place]       Message sent to [employer] to schedule appt with patient? NO      Requested Prescriptions     Pending Prescriptions Disp Refills    DULoxetine (CYMBALTA) 60 MG extended release capsule [Pharmacy Med Name: Duloxetine 60mg Delayed-Release Capsule] 90 capsule 1     Sig: Take 1 capsule by mouth daily. spironolactone (ALDACTONE) 50 MG tablet [Pharmacy Med Name: Spironolactone 50mg Tablet] 90 tablet 1     Sig: Take 1 tablet by mouth daily. hydroCHLOROthiazide (HYDRODIURIL) 25 MG tablet [Pharmacy Med Name: Hydrochlorothiazide 25mg Tablet] 90 tablet 1     Sig: Take 1 tablet by mouth every morning.

## 2023-01-22 RX ORDER — HYDROCHLOROTHIAZIDE 25 MG/1
25 TABLET ORAL EVERY MORNING
Qty: 90 TABLET | Refills: 1 | Status: SHIPPED | OUTPATIENT
Start: 2023-01-22

## 2023-01-22 RX ORDER — SPIRONOLACTONE 50 MG/1
50 TABLET, FILM COATED ORAL DAILY
Qty: 90 TABLET | Refills: 1 | Status: SHIPPED | OUTPATIENT
Start: 2023-01-22

## 2023-01-22 RX ORDER — DULOXETIN HYDROCHLORIDE 60 MG/1
CAPSULE, DELAYED RELEASE ORAL
Qty: 90 CAPSULE | Refills: 1 | Status: SHIPPED | OUTPATIENT
Start: 2023-01-22

## 2023-01-25 DIAGNOSIS — Z98.84 S/P LAPAROSCOPIC SLEEVE GASTRECTOMY: Primary | ICD-10-CM

## 2023-02-06 RX ORDER — TAMSULOSIN HYDROCHLORIDE 0.4 MG/1
CAPSULE ORAL
Qty: 30 CAPSULE | Refills: 0 | Status: SHIPPED | OUTPATIENT
Start: 2023-02-06

## 2023-02-06 NOTE — TELEPHONE ENCOUNTER
Refill Request     CONFIRM preferrred pharmacy with the patient. If Mail Order Rx - Pend for 90 day refill. Last Seen: Last Seen Department: 1/11/2023  Last Seen by PCP: 1/11/2023    Last Written: 01/11/2023 30 capsule 0 refills     If no future appointment scheduled, route STAFF MESSAGE with patient name to the [facility] for scheduling. Next Appointment:   Future Appointments   Date Time Provider Clifford Perez   6/12/2023  9:00 AM DO JAN Rodriguez - DYD       Message sent to [address] [place] [address] to schedule appt with patient?   NO      Requested Prescriptions     Pending Prescriptions Disp Refills    tamsulosin (FLOMAX) 0.4 MG capsule [Pharmacy Med Name: TAMSULOSIN HCL 0.4 MG CAPSULE] 30 capsule 0     Sig: TAKE ONE CAPSULE BY MOUTH DAILY

## 2023-02-16 NOTE — PROGRESS NOTES
Thea Kaplan lost 1.8 lbs over past month. Due to the COVID-19 restrictions on close contact interactions the patient's visit was conducted via telephone in carmenza of a face to face visit. The patient is here through telemedicine for their 3rd pre surg. Breakfast: 2 eggs and slice of toast     Snack: handful of trail mix - sesame seeds/cashews/almonds/dark chocolate/dried fruit     Lunch: lunchmeat (ham/salami) with cheese/tomato sandwich OR leftovers    Snack: handful of trail mix - sesame seeds/cashews/almonds/dark chocolate/dried fruit     Dinner: crockpot chicken with rice and broccoli     Snack: struggling at night time - leftovers from dinner/frozen pizza     Fluids: mostly water, juice    Is pt eating at least 4 times everyday? Yes    Is pt eating a lean protein source with all meals and snacks? Mostly     Has pt decreased their portions using the plate method? Still needs to work on this - still feels hungry after eating off 9 inch plate     Is pt choosing low fat/sugar free options? No - pizza     Is pt drinking at least 64 oz of clear liquids everyday? Yes     Has pt stopped drinking carbonation, caffeinated, and sugar sweetened beverages? Juice, coffee    Has pt sampled Unjury and/or Nectar protein? Not yet     Participating in intentional exercise?  Swimming in pool with kids     Plan/Recommendations:   Decrease portions   Avoid high fat meats - salami/pizza  Decrease trail mix and focus on protein and produce   Avoid juice / switch to decaf coffee   Try protein powder   Continue swimming and include walking weather permitting   Attend SG - information emailed to pt     Handouts: Portion control SG     Ewa Josue Quality 130: Documentation Of Current Medications In The Medical Record: Current Medications Documented Quality 110: Preventive Care And Screening: Influenza Immunization: Influenza Immunization Administered during Influenza season Quality 226: Preventive Care And Screening: Tobacco Use: Screening And Cessation Intervention: Patient screened for tobacco use and is an ex/non-smoker Detail Level: Detailed

## 2023-03-07 DIAGNOSIS — H69.83 ACUTE DYSFUNCTION OF EUSTACHIAN TUBE, BILATERAL: ICD-10-CM

## 2023-03-07 RX ORDER — CETIRIZINE HYDROCHLORIDE 10 MG/1
10 TABLET ORAL DAILY
Qty: 90 TABLET | Refills: 1 | Status: SHIPPED | OUTPATIENT
Start: 2023-03-07

## 2023-03-07 NOTE — TELEPHONE ENCOUNTER
Refill Request     CONFIRM preferrred pharmacy with the patient. If Mail Order Rx - Pend for 90 day refill. Last Seen: Last Seen Department: 1/11/2023  Last Seen by PCP: 5/23/2022    Last Written: 90 with 1\ 1/11/2023     If no future appointment scheduled, route STAFF MESSAGE with patient name to the [facility] for scheduling. Next Appointment:   Future Appointments   Date Time Provider Clifford Perez   6/12/2023  9:00 AM DO JAN Rodriguez - RE[facility]       Message sent to [employer] to schedule appt with patient?   NO      Requested Prescriptions     Pending Prescriptions Disp Refills    cetirizine (ZYRTEC) 10 MG tablet 90 tablet 1     Sig: Take 1 tablet by mouth daily

## 2023-03-13 RX ORDER — TAMSULOSIN HYDROCHLORIDE 0.4 MG/1
CAPSULE ORAL
Qty: 30 CAPSULE | Refills: 0 | Status: SHIPPED | OUTPATIENT
Start: 2023-03-13

## 2023-03-13 NOTE — TELEPHONE ENCOUNTER
Refill Request     CONFIRM preferrred pharmacy with the patient. If Mail Order Rx - Pend for 90 day refill. Last Seen: Last Seen Department: 1/11/2023  Last Seen by PCP: 5/23/2022    Last Written: 2/6/23 0 refill    If no future appointment scheduled, route STAFF MESSAGE with patient name to the [facility] for scheduling. Next Appointment:   Future Appointments   Date Time Provider Clifford Perez   6/12/2023  9:00 AM DO JAN Rodriguez - [place]       Message sent to [address] [place] [address] to schedule appt with patient?   NO      Requested Prescriptions     Pending Prescriptions Disp Refills    tamsulosin (FLOMAX) 0.4 MG capsule 30 capsule 0     Sig: TAKE ONE CAPSULE BY MOUTH DAILY

## 2023-03-26 DIAGNOSIS — I10 ESSENTIAL HYPERTENSION: Primary | ICD-10-CM

## 2023-03-26 RX ORDER — VERAPAMIL HYDROCHLORIDE 240 MG/1
240 TABLET, FILM COATED, EXTENDED RELEASE ORAL DAILY
Qty: 90 TABLET | Refills: 0 | Status: SHIPPED | OUTPATIENT
Start: 2023-03-26

## 2023-03-31 RX ORDER — TAMSULOSIN HYDROCHLORIDE 0.4 MG/1
CAPSULE ORAL
Qty: 30 CAPSULE | Refills: 1 | Status: SHIPPED | OUTPATIENT
Start: 2023-03-31

## 2023-03-31 NOTE — TELEPHONE ENCOUNTER
Refill Request     CONFIRM preferred pharmacy with the patient. If Mail Order Rx - Pend for 90 day refill. Last Seen: Last Seen Department: 1/11/2023  Last Seen by PCP: 5/23/2022    Last Written: 12/2/2022    If no future appointment scheduled:  Review the last OV with PCP and review information for follow-up visit,  Route STAFF MESSAGE with patient name to the [facility] for scheduling with the following information:            -  Timing of next visit           -  Visit type ie Physical, OV, etc           -  Diagnoses/Reason ie. COPD, HTN - Do not use MEDICATION, Follow-up or CHECK UP - Give reason for visit      Next Appointment:   Future Appointments   Date Time Provider Clifford Perez   6/12/2023  9:00 AM Sorin Santillan, DO J[facility] - [facility]       Message sent to [employer] to schedule appt with patient?   NO      Requested Prescriptions     Pending Prescriptions Disp Refills    Biotin 5000 MCG CAPS 60 capsule 3     Sig: Take 10,000 mcg by mouth daily

## 2023-05-18 ENCOUNTER — PATIENT MESSAGE (OUTPATIENT)
Dept: FAMILY MEDICINE CLINIC | Age: 43
End: 2023-05-18

## 2023-05-18 ENCOUNTER — OFFICE VISIT (OUTPATIENT)
Dept: FAMILY MEDICINE CLINIC | Age: 43
End: 2023-05-18
Payer: COMMERCIAL

## 2023-05-18 ENCOUNTER — HOSPITAL ENCOUNTER (OUTPATIENT)
Dept: CT IMAGING | Age: 43
Discharge: HOME | End: 2023-05-18
Payer: COMMERCIAL

## 2023-05-18 VITALS
WEIGHT: 233 LBS | SYSTOLIC BLOOD PRESSURE: 126 MMHG | HEART RATE: 85 BPM | HEIGHT: 66 IN | BODY MASS INDEX: 37.45 KG/M2 | DIASTOLIC BLOOD PRESSURE: 90 MMHG | TEMPERATURE: 100.2 F | OXYGEN SATURATION: 97 %

## 2023-05-18 DIAGNOSIS — Z87.442 HISTORY OF KIDNEY STONES: ICD-10-CM

## 2023-05-18 DIAGNOSIS — R10.9 ABDOMINAL PAIN, UNSPECIFIED ABDOMINAL LOCATION: ICD-10-CM

## 2023-05-18 DIAGNOSIS — R10.9 FLANK PAIN: ICD-10-CM

## 2023-05-18 DIAGNOSIS — R11.2 NAUSEA AND VOMITING, UNSPECIFIED VOMITING TYPE: ICD-10-CM

## 2023-05-18 DIAGNOSIS — N30.01 ACUTE CYSTITIS WITH HEMATURIA: Primary | ICD-10-CM

## 2023-05-18 DIAGNOSIS — N30.01 ACUTE CYSTITIS WITH HEMATURIA: ICD-10-CM

## 2023-05-18 LAB
BILIRUBIN, POC: NEGATIVE
BLOOD URINE, POC: NEGATIVE
CLARITY, POC: NORMAL
COLOR, POC: NORMAL
GLUCOSE URINE, POC: NEGATIVE
KETONES, POC: NEGATIVE
LEUKOCYTE EST, POC: NEGATIVE
NITRITE, POC: POSITIVE
PH, POC: 6
PROTEIN, POC: NEGATIVE
SPECIFIC GRAVITY, POC: 1.01
UROBILINOGEN, POC: NORMAL

## 2023-05-18 PROCEDURE — G8417 CALC BMI ABV UP PARAM F/U: HCPCS | Performed by: NURSE PRACTITIONER

## 2023-05-18 PROCEDURE — 74176 CT ABD & PELVIS W/O CONTRAST: CPT

## 2023-05-18 PROCEDURE — 81002 URINALYSIS NONAUTO W/O SCOPE: CPT | Performed by: NURSE PRACTITIONER

## 2023-05-18 PROCEDURE — 3074F SYST BP LT 130 MM HG: CPT | Performed by: NURSE PRACTITIONER

## 2023-05-18 PROCEDURE — G8427 DOCREV CUR MEDS BY ELIG CLIN: HCPCS | Performed by: NURSE PRACTITIONER

## 2023-05-18 PROCEDURE — 99214 OFFICE O/P EST MOD 30 MIN: CPT | Performed by: NURSE PRACTITIONER

## 2023-05-18 PROCEDURE — 3080F DIAST BP >= 90 MM HG: CPT | Performed by: NURSE PRACTITIONER

## 2023-05-18 PROCEDURE — 1036F TOBACCO NON-USER: CPT | Performed by: NURSE PRACTITIONER

## 2023-05-18 RX ORDER — CIPROFLOXACIN 500 MG/1
500 TABLET, FILM COATED ORAL 2 TIMES DAILY
Qty: 14 TABLET | Refills: 0 | Status: SHIPPED | OUTPATIENT
Start: 2023-05-18 | End: 2023-05-25

## 2023-05-18 RX ORDER — ONDANSETRON 4 MG/1
4 TABLET, ORALLY DISINTEGRATING ORAL 3 TIMES DAILY PRN
Qty: 21 TABLET | Refills: 0 | Status: SHIPPED | OUTPATIENT
Start: 2023-05-18

## 2023-05-18 SDOH — ECONOMIC STABILITY: FOOD INSECURITY: WITHIN THE PAST 12 MONTHS, YOU WORRIED THAT YOUR FOOD WOULD RUN OUT BEFORE YOU GOT MONEY TO BUY MORE.: SOMETIMES TRUE

## 2023-05-18 SDOH — ECONOMIC STABILITY: FOOD INSECURITY: WITHIN THE PAST 12 MONTHS, THE FOOD YOU BOUGHT JUST DIDN'T LAST AND YOU DIDN'T HAVE MONEY TO GET MORE.: SOMETIMES TRUE

## 2023-05-18 SDOH — ECONOMIC STABILITY: INCOME INSECURITY: HOW HARD IS IT FOR YOU TO PAY FOR THE VERY BASICS LIKE FOOD, HOUSING, MEDICAL CARE, AND HEATING?: SOMEWHAT HARD

## 2023-05-18 ASSESSMENT — ENCOUNTER SYMPTOMS
VOMITING: 1
ABDOMINAL PAIN: 1
DIARRHEA: 0
NAUSEA: 1
CONSTIPATION: 0
SHORTNESS OF BREATH: 0

## 2023-05-18 NOTE — PROGRESS NOTES
Meir Saini (:  1980) is a 40 y.o. female,Established patient, here for evaluation of the following chief complaint(s):  Flank Pain (Sxs 5/15/2023), Abdominal Pain, Nausea & Vomiting, Fatigue, and Urinary Frequency (Sxs 2 weeks)         ASSESSMENT/PLAN:  1. Acute cystitis with hematuria  -     POCT Urinalysis no Micro  -     ciprofloxacin (CIPRO) 500 MG tablet; Take 1 tablet by mouth 2 times daily for 7 days, Disp-14 tablet, R-0Normal  -     CT ABDOMEN PELVIS WO CONTRAST Additional Contrast? Radiologist Recommendation; Future  -     Culture, Urine  2. History of kidney stones  -     CT ABDOMEN PELVIS WO CONTRAST Additional Contrast? Radiologist Recommendation; Future  3. Flank pain  -     POCT Urinalysis no Micro  -     CT ABDOMEN PELVIS WO CONTRAST Additional Contrast? Radiologist Recommendation; Future  -     Culture, Urine  4. Abdominal pain, unspecified abdominal location  -     CT ABDOMEN PELVIS WO CONTRAST Additional Contrast? Radiologist Recommendation; Future  5. Nausea and vomiting, unspecified vomiting type  -     ondansetron (ZOFRAN-ODT) 4 MG disintegrating tablet; Take 1 tablet by mouth 3 times daily as needed for Nausea or Vomiting, Disp-21 tablet, R-0Normal    -Ct shows only punctate stone in left kidney. No obstructing stones or hydronephrosis. -Urine dip positive, culture pending  -Reviewed allergies, discussed risks/benefits/side effects of antibiotic. She verbalized understanding  -Monitor symptoms, detailed parameters for going to Er given to patient and her . They verbalized understanding   -Drink plenty of fluids   -Notify office if symptoms worsen or do not improve     Return if symptoms worsen or fail to improve.          Subjective   SUBJECTIVE/OBJECTIVE:  Symptoms started for two weeks with urinary frequency and left sided flank pain that wraps around to the front   This week symptoms worsened, reports some dark blood in the toilet and had nausea/vomiting   Today she

## 2023-05-20 LAB — BACTERIA UR CULT: NORMAL

## 2023-05-25 DIAGNOSIS — K59.01 SLOW TRANSIT CONSTIPATION: ICD-10-CM

## 2023-05-25 RX ORDER — SENNA AND DOCUSATE SODIUM 50; 8.6 MG/1; MG/1
2 TABLET, FILM COATED ORAL DAILY
Qty: 180 TABLET | Refills: 1 | Status: SHIPPED | OUTPATIENT
Start: 2023-05-25

## 2023-05-25 NOTE — TELEPHONE ENCOUNTER
Refill Request     CONFIRM preferred pharmacy with the patient. If Mail Order Rx - Pend for 90 day refill. Last Seen: Last Seen Department: 5/18/2023  Last Seen by PCP: 5/23/2022    Last Written: senokot 03/15/2022 180 tablet 1 refill     If no future appointment scheduled:  Review the last OV with PCP and review information for follow-up visit,  Route STAFF MESSAGE with patient name to the [facility] for scheduling with the following information:            -  Timing of next visit           -  Visit type ie Physical, OV, etc           -  Diagnoses/Reason ie. COPD, HTN - Do not use MEDICATION, Follow-up or CHECK UP - Give reason for visit      Next Appointment:   Future Appointments   Date Time Provider Clifford Perez   6/12/2023  9:00 AM Sorin Santillan, DO [facility] - [facility]       Message sent to [employer] to schedule appt with patient?   NO      Requested Prescriptions     Pending Prescriptions Disp Refills    sennosides-docusate sodium (SENOKOT-S) 8.6-50 MG tablet 180 tablet 1     Sig: Take 2 tablets by mouth daily

## 2023-05-31 ENCOUNTER — PATIENT MESSAGE (OUTPATIENT)
Dept: BARIATRICS/WEIGHT MGMT | Age: 43
End: 2023-05-31

## 2023-05-31 NOTE — TELEPHONE ENCOUNTER
Attempt to contact for Bariatric Follow Up.  [employer] message sent and letter mailed to address on file in [facility]

## 2023-06-09 ENCOUNTER — TELEPHONE (OUTPATIENT)
Dept: FAMILY MEDICINE CLINIC | Age: 43
End: 2023-06-09

## 2023-06-19 DIAGNOSIS — M54.16 LUMBAR RADICULOPATHY, CHRONIC: Primary | ICD-10-CM

## 2023-06-22 ENCOUNTER — TELEPHONE (OUTPATIENT)
Dept: FAMILY MEDICINE CLINIC | Age: 43
End: 2023-06-22

## 2023-06-22 DIAGNOSIS — M47.819 FACET ARTHROPATHY OF SPINE: Primary | ICD-10-CM

## 2023-06-22 NOTE — TELEPHONE ENCOUNTER
Central scheduling called in and needs lab orders placed for BUN and Creatine for her MRI lumbar spine.  Please advise and if any questions call central scheduling back at [phone]

## 2023-06-24 DIAGNOSIS — H69.83 ACUTE DYSFUNCTION OF EUSTACHIAN TUBE, BILATERAL: ICD-10-CM

## 2023-06-24 NOTE — TELEPHONE ENCOUNTER
Refill Request     CONFIRM preferred pharmacy with the patient. If Mail Order Rx - Pend for 90 day refill. Last Seen: Last Seen Department: 6/12/2023  Last Seen by PCP: 6/12/2023    Last Written: 1/11/2023 Nasal Spray  12/28/2022 Tagamet  12/28/2022 Losartan- HCTZ    If no future appointment scheduled:  Review the last OV with PCP and review information for follow-up visit,  Route STAFF MESSAGE with patient name to the [facility] for scheduling with the following information:            -  Timing of next visit           -  Visit type ie Physical, OV, etc           -  Diagnoses/Reason ie. COPD, HTN - Do not use MEDICATION, Follow-up or CHECK UP - Give reason for visit      Next Appointment:   Future Appointments   Date Time Provider Clifford Perez   6/29/2023 10:00 AM [facility]  2 [facility] [place] [facility]   8/10/2023 10:30 AM [facility]       Message sent to [address] [place] [address] to schedule appt with patient? NO      Requested Prescriptions     Pending Prescriptions Disp Refills    fluticasone (FLONASE) 50 MCG/ACT nasal spray [Pharmacy Med Name: Fluticasone Propionate 50mcg/actuation Nasal Spray] 3 each 1     Sig: Instill 1 spray into each nostril daily. cimetidine (TAGAMET) 400 MG tablet [Pharmacy Med Name: Cimetidine 400mg Tablet] 180 tablet 1     Sig: Take 1 tablet by mouth twice daily. losartan-hydroCHLOROthiazide (HYZAAR) 100-25 MG per tablet [Pharmacy Med Name: Losartan Potassium/Hydrochlorothiazide 100mg-25mg Tablet] 90 tablet 1     Sig: Take 1 tablet by mouth daily for high blood pressure.

## 2023-06-25 RX ORDER — FLUTICASONE PROPIONATE 50 MCG
SPRAY, SUSPENSION (ML) NASAL
Qty: 3 EACH | Refills: 1 | Status: SHIPPED | OUTPATIENT
Start: 2023-06-25

## 2023-06-25 RX ORDER — CIMETIDINE 400 MG/1
TABLET, FILM COATED ORAL
Qty: 180 TABLET | Refills: 3 | Status: SHIPPED | OUTPATIENT
Start: 2023-06-25

## 2023-06-25 RX ORDER — LOSARTAN POTASSIUM AND HYDROCHLOROTHIAZIDE 25; 100 MG/1; MG/1
TABLET ORAL
Qty: 90 TABLET | Refills: 3 | Status: SHIPPED | OUTPATIENT
Start: 2023-06-25

## 2023-06-29 ENCOUNTER — HOSPITAL ENCOUNTER (OUTPATIENT)
Dept: MRI IMAGING | Age: 43
Discharge: HOME | End: 2023-06-29
Attending: FAMILY MEDICINE
Payer: COMMERCIAL

## 2023-06-29 DIAGNOSIS — M54.16 LUMBAR RADICULOPATHY, CHRONIC: ICD-10-CM

## 2023-06-29 PROCEDURE — 72148 MRI LUMBAR SPINE W/O DYE: CPT

## 2023-07-06 ENCOUNTER — HOSPITAL ENCOUNTER (OUTPATIENT)
Age: 43
Setting detail: OUTPATIENT SURGERY
Discharge: HOME | End: 2023-07-06
Attending: INTERNAL MEDICINE | Admitting: INTERNAL MEDICINE
Payer: COMMERCIAL

## 2023-07-06 ENCOUNTER — ANESTHESIA EVENT (OUTPATIENT)
Dept: ENDOSCOPY | Age: 43
End: 2023-07-06
Payer: COMMERCIAL

## 2023-07-06 ENCOUNTER — ANESTHESIA (OUTPATIENT)
Dept: ENDOSCOPY | Age: 43
End: 2023-07-06
Payer: COMMERCIAL

## 2023-07-06 VITALS
OXYGEN SATURATION: 93 % | TEMPERATURE: 98.2 F | RESPIRATION RATE: 15 BRPM | WEIGHT: 230 LBS | HEART RATE: 75 BPM | HEIGHT: 66 IN | SYSTOLIC BLOOD PRESSURE: 112 MMHG | BODY MASS INDEX: 36.96 KG/M2 | DIASTOLIC BLOOD PRESSURE: 79 MMHG

## 2023-07-06 DIAGNOSIS — Z86.010 HISTORY OF COLON POLYPS: ICD-10-CM

## 2023-07-06 LAB
GLUCOSE BLD-MCNC: 103 MG/DL (ref 70–99)
HCG UR QL: NEGATIVE
PERFORMED ON: ABNORMAL

## 2023-07-06 PROCEDURE — 2580000003 HC RX 258: Performed by: ANESTHESIOLOGY

## 2023-07-06 PROCEDURE — 3700000000 HC ANESTHESIA ATTENDED CARE: Performed by: INTERNAL MEDICINE

## 2023-07-06 PROCEDURE — 3700000001 HC ADD 15 MINUTES (ANESTHESIA): Performed by: INTERNAL MEDICINE

## 2023-07-06 PROCEDURE — 3609010600 HC COLONOSCOPY POLYPECTOMY SNARE/COLD BIOPSY: Performed by: INTERNAL MEDICINE

## 2023-07-06 PROCEDURE — 84703 CHORIONIC GONADOTROPIN ASSAY: CPT

## 2023-07-06 PROCEDURE — 7100000011 HC PHASE II RECOVERY - ADDTL 15 MIN: Performed by: INTERNAL MEDICINE

## 2023-07-06 PROCEDURE — 2709999900 HC NON-CHARGEABLE SUPPLY: Performed by: INTERNAL MEDICINE

## 2023-07-06 PROCEDURE — 88305 TISSUE EXAM BY PATHOLOGIST: CPT

## 2023-07-06 PROCEDURE — 2500000003 HC RX 250 WO HCPCS: Performed by: NURSE ANESTHETIST, CERTIFIED REGISTERED

## 2023-07-06 PROCEDURE — 7100000010 HC PHASE II RECOVERY - FIRST 15 MIN: Performed by: INTERNAL MEDICINE

## 2023-07-06 PROCEDURE — 6360000002 HC RX W HCPCS: Performed by: NURSE ANESTHETIST, CERTIFIED REGISTERED

## 2023-07-06 RX ORDER — SODIUM CHLORIDE 0.9 % (FLUSH) 0.9 %
5-40 SYRINGE (ML) INJECTION PRN
Status: DISCONTINUED | OUTPATIENT
Start: 2023-07-06 | End: 2023-07-06 | Stop reason: HOSPADM

## 2023-07-06 RX ORDER — SODIUM CHLORIDE 9 MG/ML
INJECTION, SOLUTION INTRAVENOUS PRN
Status: DISCONTINUED | OUTPATIENT
Start: 2023-07-06 | End: 2023-07-06 | Stop reason: HOSPADM

## 2023-07-06 RX ORDER — ONDANSETRON 2 MG/ML
4 INJECTION INTRAMUSCULAR; INTRAVENOUS
Status: DISCONTINUED | OUTPATIENT
Start: 2023-07-06 | End: 2023-07-06 | Stop reason: HOSPADM

## 2023-07-06 RX ORDER — SODIUM CHLORIDE 0.9 % (FLUSH) 0.9 %
5-40 SYRINGE (ML) INJECTION EVERY 12 HOURS SCHEDULED
Status: DISCONTINUED | OUTPATIENT
Start: 2023-07-06 | End: 2023-07-06 | Stop reason: HOSPADM

## 2023-07-06 RX ORDER — LIDOCAINE HYDROCHLORIDE 10 MG/ML
0.3 INJECTION, SOLUTION EPIDURAL; INFILTRATION; INTRACAUDAL; PERINEURAL
Status: DISCONTINUED | OUTPATIENT
Start: 2023-07-06 | End: 2023-07-06 | Stop reason: HOSPADM

## 2023-07-06 RX ORDER — SODIUM CHLORIDE, SODIUM LACTATE, POTASSIUM CHLORIDE, CALCIUM CHLORIDE 600; 310; 30; 20 MG/100ML; MG/100ML; MG/100ML; MG/100ML
INJECTION, SOLUTION INTRAVENOUS CONTINUOUS
Status: DISCONTINUED | OUTPATIENT
Start: 2023-07-06 | End: 2023-07-06 | Stop reason: HOSPADM

## 2023-07-06 RX ORDER — ONDANSETRON 2 MG/ML
INJECTION INTRAMUSCULAR; INTRAVENOUS PRN
Status: DISCONTINUED | OUTPATIENT
Start: 2023-07-06 | End: 2023-07-06 | Stop reason: SDUPTHER

## 2023-07-06 RX ORDER — MEPERIDINE HYDROCHLORIDE 50 MG/ML
12.5 INJECTION INTRAMUSCULAR; INTRAVENOUS; SUBCUTANEOUS EVERY 5 MIN PRN
Status: DISCONTINUED | OUTPATIENT
Start: 2023-07-06 | End: 2023-07-06 | Stop reason: HOSPADM

## 2023-07-06 RX ORDER — OXYCODONE HYDROCHLORIDE 5 MG/1
10 TABLET ORAL PRN
Status: DISCONTINUED | OUTPATIENT
Start: 2023-07-06 | End: 2023-07-06 | Stop reason: HOSPADM

## 2023-07-06 RX ORDER — PROPOFOL 10 MG/ML
INJECTION, EMULSION INTRAVENOUS PRN
Status: DISCONTINUED | OUTPATIENT
Start: 2023-07-06 | End: 2023-07-06 | Stop reason: SDUPTHER

## 2023-07-06 RX ORDER — DIPHENHYDRAMINE HYDROCHLORIDE 50 MG/ML
12.5 INJECTION INTRAMUSCULAR; INTRAVENOUS
Status: DISCONTINUED | OUTPATIENT
Start: 2023-07-06 | End: 2023-07-06 | Stop reason: HOSPADM

## 2023-07-06 RX ORDER — OXYCODONE HYDROCHLORIDE 5 MG/1
5 TABLET ORAL PRN
Status: DISCONTINUED | OUTPATIENT
Start: 2023-07-06 | End: 2023-07-06 | Stop reason: HOSPADM

## 2023-07-06 RX ORDER — LABETALOL HYDROCHLORIDE 5 MG/ML
5 INJECTION, SOLUTION INTRAVENOUS EVERY 10 MIN PRN
Status: DISCONTINUED | OUTPATIENT
Start: 2023-07-06 | End: 2023-07-06 | Stop reason: HOSPADM

## 2023-07-06 RX ORDER — LIDOCAINE HYDROCHLORIDE 20 MG/ML
INJECTION, SOLUTION INFILTRATION; PERINEURAL PRN
Status: DISCONTINUED | OUTPATIENT
Start: 2023-07-06 | End: 2023-07-06 | Stop reason: SDUPTHER

## 2023-07-06 RX ADMIN — PROPOFOL 50 MG: 10 INJECTION, EMULSION INTRAVENOUS at 10:50

## 2023-07-06 RX ADMIN — PROPOFOL 100 MG: 10 INJECTION, EMULSION INTRAVENOUS at 10:43

## 2023-07-06 RX ADMIN — PROPOFOL 50 MG: 10 INJECTION, EMULSION INTRAVENOUS at 10:45

## 2023-07-06 RX ADMIN — PROPOFOL 50 MG: 10 INJECTION, EMULSION INTRAVENOUS at 10:52

## 2023-07-06 RX ADMIN — PROPOFOL 50 MG: 10 INJECTION, EMULSION INTRAVENOUS at 10:48

## 2023-07-06 RX ADMIN — LIDOCAINE HYDROCHLORIDE 50 MG: 20 INJECTION, SOLUTION INFILTRATION; PERINEURAL at 10:43

## 2023-07-06 RX ADMIN — PROPOFOL 50 MG: 10 INJECTION, EMULSION INTRAVENOUS at 10:54

## 2023-07-06 RX ADMIN — ONDANSETRON 4 MG: 2 INJECTION INTRAMUSCULAR; INTRAVENOUS at 10:43

## 2023-07-06 RX ADMIN — SODIUM CHLORIDE, POTASSIUM CHLORIDE, SODIUM LACTATE AND CALCIUM CHLORIDE: 600; 310; 30; 20 INJECTION, SOLUTION INTRAVENOUS at 10:06

## 2023-07-06 RX ADMIN — PROPOFOL 50 MG: 10 INJECTION, EMULSION INTRAVENOUS at 10:56

## 2023-07-06 ASSESSMENT — PAIN - FUNCTIONAL ASSESSMENT: PAIN_FUNCTIONAL_ASSESSMENT: 0-10

## 2023-07-06 NOTE — ANESTHESIA PRE PROCEDURE
Department of Anesthesiology  Preprocedure Note       Name:  Tiff Vargas   Age:  40 y.o.  :  1980                                          MRN:  [id number]         Date:  2023      Surgeon: Giana Ponce):  Marye Cowden, MD    Procedure: Procedure(s):  COLONOSCOPY    Medications prior to admission:   Prior to Admission medications    Medication Sig Start Date End Date Taking? Authorizing Provider   CALCIUM-VITAMIN D PO Take by mouth daily   Yes Historical Provider, MD   fluticasone (FLONASE) 50 MCG/ACT nasal spray Instill 1 spray into each nostril daily. Patient taking differently: as needed Instill 1 spray into each nostril daily. 23   Sorin Santillan DO   cimetidine (TAGAMET) 400 MG tablet Take 1 tablet by mouth twice daily. 23   Sorin Santillan DO   losartan-hydroCHLOROthiazide (HYZAAR) 100-25 MG per tablet Take 1 tablet by mouth daily for high blood pressure. 23   Sorin Santillan DO   sennosides-docusate sodium (SENOKOT-S) 8.6-50 MG tablet Take 2 tablets by mouth daily 23   Sorin Santillan DO   ondansetron (ZOFRAN-ODT) 4 MG disintegrating tablet Take 1 tablet by mouth 3 times daily as needed for Nausea or Vomiting 23   DIANA Deleon CNP   tamsulosin (FLOMAX) 0.4 MG capsule Take 1 capsule by mouth daily. 3/31/23   Sorin Santillan DO   Biotin 5000 MCG CAPS Take 10,000 mcg by mouth daily 3/31/23   Sorin Santillan DO   verapamil (CALAN SR) 240 MG extended release tablet Take 1 tablet by mouth daily. 3/26/23   Sorin Santillan DO   cetirizine (ZYRTEC) 10 MG tablet Take 1 tablet by mouth daily 3/7/23   Sorin Santillan DO   Calcium Citrate-Vitamin D 250-5 MG-MCG TABS Take 1 tablet by mouth in the morning and at bedtime 23  DIANA Patel CNP   DULoxetine (CYMBALTA) 60 MG extended release capsule Take 1 capsule by mouth daily. 23   Sorin Santillan DO   spironolactone (ALDACTONE) 50 MG tablet Take 1 tablet by mouth daily.  23   Sorin Santillan DO

## 2023-07-06 NOTE — PROGRESS NOTES
to bedside updated with no questions. Discharge instructions reviewed with patient and responsible adult. Discharge instructions signed and copy given with no additional questions. Patient to be discharged home with belongings.

## 2023-07-06 NOTE — H&P
Gastroenterology Note             Pre-operative History and Physical    Patient: Telly Washington  : 1980  CSN:     History Obtained From:  patient and/or guardian. HISTORY OF PRESENT ILLNESS:    The patient is a 40 y.o. female  here for colonoscopy.      Past Medical History:    Past Medical History:   Diagnosis Date    Anxiety     Arthritis     Cervical pain     Chronic back pain     Depression     Diabetes mellitus ([address])     lost 80 pounds-no longer a problem    GERD (gastroesophageal reflux disease)     Hyperlipidemia     Hypertension     Infertility     took fertility medication    Kidney stone 2012    Migraine     Miscarriage     Polycystic ovarian syndrome     PONV (postoperative nausea and vomiting)     Psoriasis     Stomach ulcer     Unspecified sleep apnea     didn't tolerate CPAP     Past Surgical History:    Past Surgical History:   Procedure Laterality Date    BREAST SURGERY Right     for cellulitis     CHOLECYSTECTOMY      COLONOSCOPY  10/07/2015    [facility]-colitis/polyps    COLONOSCOPY N/A 2020    COLONOSCOPY WITH BIOPSY performed by Syeda Buneo MD at \[facility]"" N/A 2020    COLONOSCOPY POLYPECTOMY SNARE performed by Syeda Bueno MD at [address] [facility] N/A 2022    NECK CYST EXCISION performed by David Cagle MD at [address] DX&/THER NONOBSTETRIC      D & C/missed ab    SKIN BIOPSY      for psoriasis    SLEEVE GASTRECTOMY N/A 2020    LAPAROSCOPIC SLEEVE GASTRECTOMY -  ETHICON performed by Amy Holder MD at [address]  2016    tonsillectomy    UPPER GASTROINTESTINAL ENDOSCOPY  2011    pyloretic    UPPER GASTROINTESTINAL ENDOSCOPY  10/07/2015    gastritis-[facility]    UPPER GASTROINTESTINAL ENDOSCOPY N/A 2020    EGD DIAGNOSTIC ONLY performed by Syeda Bueno MD at [address] [facility]

## 2023-07-24 DIAGNOSIS — I10 ESSENTIAL HYPERTENSION: ICD-10-CM

## 2023-07-24 DIAGNOSIS — I10 PRIMARY HYPERTENSION: ICD-10-CM

## 2023-07-24 DIAGNOSIS — L65.9 HAIR LOSS: ICD-10-CM

## 2023-07-24 RX ORDER — SPIRONOLACTONE 50 MG/1
50 TABLET, FILM COATED ORAL DAILY
Qty: 90 TABLET | Refills: 3 | Status: SHIPPED | OUTPATIENT
Start: 2023-07-24

## 2023-07-24 RX ORDER — VERAPAMIL HYDROCHLORIDE 240 MG/1
240 TABLET, FILM COATED, EXTENDED RELEASE ORAL DAILY
Qty: 90 TABLET | Refills: 1 | Status: SHIPPED | OUTPATIENT
Start: 2023-07-24

## 2023-07-24 RX ORDER — DULOXETIN HYDROCHLORIDE 60 MG/1
CAPSULE, DELAYED RELEASE ORAL
Qty: 90 CAPSULE | Refills: 1 | Status: SHIPPED | OUTPATIENT
Start: 2023-07-24

## 2023-07-24 RX ORDER — HYDROCHLOROTHIAZIDE 25 MG/1
25 TABLET ORAL EVERY MORNING
Qty: 90 TABLET | Refills: 3 | Status: SHIPPED | OUTPATIENT
Start: 2023-07-24

## 2023-07-24 RX ORDER — MULTIVIT,CALC,MINS/IRON/FOLIC 9MG-400MCG
TABLET ORAL
Qty: 90 TABLET | Refills: 3 | Status: SHIPPED | OUTPATIENT
Start: 2023-07-24

## 2023-07-24 NOTE — TELEPHONE ENCOUNTER
.Refill Request     CONFIRM preferred pharmacy with the patient. If Mail Order Rx - Pend for 90 day refill. Last Seen: Last Seen Department: 6/12/2023  Last Seen by PCP: 6/12/2023    Last Written: 3-26-23 90 with 1     If no future appointment scheduled:  Review the last OV with PCP and review information for follow-up visit,  Route STAFF MESSAGE with patient name to the [facility] for scheduling with the following information:            -  Timing of next visit           -  Visit type ie Physical, OV, etc           -  Diagnoses/Reason ie. COPD, HTN - Do not use MEDICATION, Follow-up or CHECK UP - Give reason for visit      Next Appointment:   Future Appointments   Date Time Provider [address]   8/10/2023 10:30 AM Werner Boxer, MD [facility] [employer] [employer]       Message sent to [address] [place] [address] to schedule appt with patient? YES      Requested Prescriptions     Pending Prescriptions Disp Refills    Multiple Vitamins-Minerals (THEREMS-M) TABS [Pharmacy Med Name: Therems-M Tablet] 90 tablet 0     Sig: Take 1 tablet by mouth daily. hydroCHLOROthiazide (HYDRODIURIL) 25 MG tablet [Pharmacy Med Name: Hydrochlorothiazide 25mg Tablet] 90 tablet 0     Sig: Take 1 tablet by mouth every morning. spironolactone (ALDACTONE) 50 MG tablet [Pharmacy Med Name: Spironolactone 50mg Tablet] 90 tablet 0     Sig: Take 1 tablet by mouth daily. DULoxetine (CYMBALTA) 60 MG extended release capsule [Pharmacy Med Name: Duloxetine 60mg Delayed-Release Capsule] 90 capsule 0     Sig: Take 1 capsule by mouth daily. verapamil (CALAN SR) 240 MG extended release tablet [Pharmacy Med Name: Verapamil Hydrochloride 240mg Extended-Release Tablet] 90 tablet 0     Sig: Take 1 tablet by mouth daily.

## 2023-07-25 NOTE — TELEPHONE ENCOUNTER
Refill Request     CONFIRM preferred pharmacy with the patient. If Mail Order Rx - Pend for 90 day refill. Last Seen: Last Seen Department: 6/12/2023  Last Seen by PCP: 6/12/2023    Last Written: 3/31/2023    If no future appointment scheduled:  Review the last OV with PCP and review information for follow-up visit,  Route STAFF MESSAGE with patient name to the [facility] for scheduling with the following information:            -  Timing of next visit           -  Visit type ie Physical, OV, etc           -  Diagnoses/Reason ie. COPD, HTN - Do not use MEDICATION, Follow-up or CHECK UP - Give reason for visit      Next Appointment:   Future Appointments   Date Time Provider [address]   8/10/2023 10:30 AM Werner Boxer, MD Giovanni [facility]ose [facility]       Message sent to [address] [place] [address] to schedule appt with patient?   NO      Requested Prescriptions     Pending Prescriptions Disp Refills    Biotin 5000 MCG CAPS [Pharmacy Med Name: BIOTIN 5000 MCG CAP] 60 capsule 3     Sig: Take 2 capsules by mouth daily

## 2023-07-27 DIAGNOSIS — M25.552 HIP PAIN, LEFT: Primary | ICD-10-CM

## 2023-08-04 NOTE — TELEPHONE ENCOUNTER
Refill Request     CONFIRM preferred pharmacy with the patient. If Mail Order Rx - Pend for 90 day refill. Last Seen: Last Seen Department: 6/12/2023  Last Seen by PCP: 6/12/2023    Last Written: 7/25/2023,#180, 3 refills    If no future appointment scheduled:  Review the last OV with PCP and review information for follow-up visit,  Route STAFF MESSAGE with patient name to the [facility] for scheduling with the following information:            -  Timing of next visit           -  Visit type ie Physical, OV, etc           -  Diagnoses/Reason ie. COPD, HTN - Do not use MEDICATION, Follow-up or CHECK UP - Give reason for visit      Next Appointment:   Future Appointments   Date Time Provider [address] [place] [address]   8/10/2023 10:30 AM MD Patrick Mendoza [facility]       Message sent to [address] [place] [address] to schedule appt with patient?   N/A      Requested Prescriptions     Pending Prescriptions Disp Refills    Biotin 5000 MCG CAPS 180 capsule 3     Sig: Take 2 capsules by mouth daily

## 2023-08-10 ENCOUNTER — HOSPITAL ENCOUNTER (OUTPATIENT)
Dept: GENERAL RADIOLOGY | Age: 43
Discharge: HOME | End: 2023-08-10
Payer: COMMERCIAL

## 2023-08-10 ENCOUNTER — HOSPITAL ENCOUNTER (OUTPATIENT)
Age: 43
Discharge: HOME | End: 2023-08-10
Payer: COMMERCIAL

## 2023-08-10 ENCOUNTER — OFFICE VISIT (OUTPATIENT)
Dept: BARIATRICS/WEIGHT MGMT | Age: 43
End: 2023-08-10
Payer: COMMERCIAL

## 2023-08-10 VITALS
HEIGHT: 66 IN | RESPIRATION RATE: 18 BRPM | BODY MASS INDEX: 36.96 KG/M2 | WEIGHT: 230 LBS | SYSTOLIC BLOOD PRESSURE: 128 MMHG | OXYGEN SATURATION: 99 % | HEART RATE: 80 BPM | DIASTOLIC BLOOD PRESSURE: 80 MMHG

## 2023-08-10 DIAGNOSIS — Z98.84 S/P LAPAROSCOPIC SLEEVE GASTRECTOMY: ICD-10-CM

## 2023-08-10 DIAGNOSIS — M25.552 HIP PAIN, LEFT: ICD-10-CM

## 2023-08-10 DIAGNOSIS — G47.33 OBSTRUCTIVE SLEEP APNEA: ICD-10-CM

## 2023-08-10 DIAGNOSIS — E66.9 OBESITY (BMI 35.0-39.9 WITHOUT COMORBIDITY): ICD-10-CM

## 2023-08-10 DIAGNOSIS — I10 ESSENTIAL HYPERTENSION: ICD-10-CM

## 2023-08-10 DIAGNOSIS — E66.9 OBESITY (BMI 35.0-39.9 WITHOUT COMORBIDITY): Primary | ICD-10-CM

## 2023-08-10 PROCEDURE — 99214 OFFICE O/P EST MOD 30 MIN: CPT | Performed by: SURGERY

## 2023-08-10 PROCEDURE — 1036F TOBACCO NON-USER: CPT | Performed by: SURGERY

## 2023-08-10 PROCEDURE — 3079F DIAST BP 80-89 MM HG: CPT | Performed by: SURGERY

## 2023-08-10 PROCEDURE — 3074F SYST BP LT 130 MM HG: CPT | Performed by: SURGERY

## 2023-08-10 PROCEDURE — G8417 CALC BMI ABV UP PARAM F/U: HCPCS | Performed by: SURGERY

## 2023-08-10 PROCEDURE — G8427 DOCREV CUR MEDS BY ELIG CLIN: HCPCS | Performed by: SURGERY

## 2023-08-10 PROCEDURE — 73502 X-RAY EXAM HIP UNI 2-3 VIEWS: CPT

## 2023-08-10 NOTE — PROGRESS NOTES
Dietary Assessment Note      Vitals:   Vitals:    08/10/23 1045   BP: 128/80   Pulse: 80   Resp: 18   SpO2: 99%   Weight: 230 lb (104.3 kg)   Height: 5' 6\" (1.676 m)    Patient gained 33 lbs over 21 months. Labs reviewed: no lab studies available for review at time of visit   Latest Reference Range & Units 07/06/23 09:58   POC Glucose 70 - 99 mg/dl 103 (H)   (H): Data is abnormally high  Protein intake: Patient not tracking     Fluid intake: 48-64 oz/day  Etoh -rare      Multivitamin/mineral intake:  Rx MVI    Calcium intake:  citrate 400mg per day    Other: vitamin D & biotin    Exercise:  horrible back/lumbar/buttock pain. Doing a lot of yard work. Nutrition Assessment: 3 yr 9 mo post-op visit. Pt last seen in 2021. Breakfast: protein shake nectar & coffee w splenda with coffee mate powdered creamer     Snack: no    Lunch: skip or salad made with veg, egg, cheese, grilled chix, pork hope & regular ranch (does not measure)    Snack:      Dinner: 4pm starving and if skipped lunch may be getting sick  Salad same as lunch   Hungry and cravings  Snack: 7pm crackers 2 string cheese and berries/grapes  Spouse eats candy in front of her - he is having a midlife crisis   Going to bed  at 930pm     Amount able to eat per sitting: not measuring portions     Following 30/30/30 rule: no     Food Intolerances/issues: none    Client Concerns: long term goal weight is 175    Goals/Plan:   Recommend meet with behaviorist - regarding support from others & removing trigger foods  Ensure getting 1000mg calcium citrate per day divided into 2 doses  Set reminders to eat protein based meals/snacksevery 3-4 hours  Check labels - choose low fat items ie.   [employer] yogurt ranch  Handouts: frozen meals  Kwan Medina MS, RD, LD       Plan: F/U per Provider     Kwan Medina MS, RD, LD

## 2023-08-10 NOTE — PATIENT INSTRUCTIONS
Patient received dietary handouts and education. Recommend meet with behaviorist - regarding support from others, removing trigger foods from house. Ensure getting 1000mg calcium citrate per day divided into 2 doses  Set reminders to eat protein based meals/snacksevery 3-4 hours  Check labels - choose low fat items ie.   [employer] yogurt ranch vs regular ranch  Handouts: frozen meals

## 2023-08-18 LAB
EKG ATRIAL RATE: 81 BPM
EKG DIAGNOSIS: NORMAL
EKG P AXIS: 59 DEGREES
EKG P-R INTERVAL: 150 MS
EKG Q-T INTERVAL: 380 MS
EKG QRS DURATION: 92 MS
EKG QTC CALCULATION (BAZETT): 441 MS
EKG R AXIS: 33 DEGREES
EKG T AXIS: 41 DEGREES
EKG VENTRICULAR RATE: 81 BPM

## 2023-08-21 DIAGNOSIS — H69.83 ACUTE DYSFUNCTION OF EUSTACHIAN TUBE, BILATERAL: ICD-10-CM

## 2023-08-21 RX ORDER — CETIRIZINE HYDROCHLORIDE 10 MG/1
10 TABLET ORAL DAILY
Qty: 90 TABLET | Refills: 1 | Status: SHIPPED | OUTPATIENT
Start: 2023-08-21

## 2023-08-21 NOTE — TELEPHONE ENCOUNTER
Refill Request     CONFIRM preferred pharmacy with the patient. If Mail Order Rx - Pend for 90 day refill. Last Seen: Last Seen Department: 6/12/2023  Last Seen by PCP: 6/12/2023    Last Written: 3/07/2023, 390, 1 refill    If no future appointment scheduled:  Review the last OV with PCP and review information for follow-up visit,  Route STAFF MESSAGE with patient name to the [facility] for scheduling with the following information:            -  Timing of next visit           -  Visit type ie Physical, OV, etc           -  Diagnoses/Reason ie. COPD, HTN - Do not use MEDICATION, Follow-up or CHECK UP - Give reason for visit      Next Appointment:   Future Appointments   Date Time Provider [address]   9/5/2023  4:30 PM Duncan Lopez MD [facility]   11/16/2023  3:15 PM MD Maggi Gregorio [facility]       Message sent to [address] [place] [address] to schedule appt with patient? N/A      Requested Prescriptions     Pending Prescriptions Disp Refills    cetirizine (ZYRTEC) 10 MG tablet [Pharmacy Med Name: Cetirizine Hydrochloride 10mg 24 Hour Allergy Relief Tablet] 90 tablet 0     Sig: Take 1 tablet by mouth daily.

## 2023-09-01 DIAGNOSIS — Z98.84 S/P LAPAROSCOPIC SLEEVE GASTRECTOMY: ICD-10-CM

## 2023-09-01 DIAGNOSIS — E66.9 OBESITY (BMI 35.0-39.9 WITHOUT COMORBIDITY): ICD-10-CM

## 2023-09-01 DIAGNOSIS — G47.33 OBSTRUCTIVE SLEEP APNEA: ICD-10-CM

## 2023-09-01 DIAGNOSIS — I10 ESSENTIAL HYPERTENSION: ICD-10-CM

## 2023-09-01 LAB
25(OH)D3 SERPL-MCNC: 29.8 NG/ML
ALBUMIN SERPL-MCNC: 4.4 G/DL (ref 3.4–5)
ALBUMIN/GLOB SERPL: 2.9 {RATIO} (ref 1.1–2.2)
ALP SERPL-CCNC: 70 U/L (ref 40–129)
ALT SERPL-CCNC: 14 U/L (ref 10–40)
ANION GAP SERPL CALCULATED.3IONS-SCNC: 9 MMOL/L (ref 3–16)
AST SERPL-CCNC: 15 U/L (ref 15–37)
BASOPHILS # BLD: 0.1 K/UL (ref 0–0.2)
BASOPHILS NFR BLD: 2 %
BILIRUB SERPL-MCNC: <0.2 MG/DL (ref 0–1)
BUN SERPL-MCNC: 15 MG/DL (ref 7–20)
CALCIUM SERPL-MCNC: 9 MG/DL (ref 8.3–10.6)
CHLORIDE SERPL-SCNC: 106 MMOL/L (ref 99–110)
CHOLEST SERPL-MCNC: 189 MG/DL (ref 0–199)
CO2 SERPL-SCNC: 27 MMOL/L (ref 21–32)
CREAT SERPL-MCNC: 0.5 MG/DL (ref 0.6–1.1)
DEPRECATED RDW RBC AUTO: 14.1 % (ref 12.4–15.4)
EOSINOPHIL # BLD: 0.2 K/UL (ref 0–0.6)
EOSINOPHIL NFR BLD: 4.4 %
FOLATE SERPL-MCNC: >20 NG/ML (ref 4.78–24.2)
GFR SERPLBLD CREATININE-BSD FMLA CKD-EPI: >60 ML/MIN/{1.73_M2}
GLUCOSE SERPL-MCNC: 115 MG/DL (ref 70–99)
HCT VFR BLD AUTO: 38.7 % (ref 36–48)
HDLC SERPL-MCNC: 49 MG/DL (ref 40–60)
HGB BLD-MCNC: 13.2 G/DL (ref 12–16)
INR PPP: 0.89 (ref 0.84–1.16)
IRON SATN MFR SERPL: 13 % (ref 15–50)
IRON SERPL-MCNC: 54 UG/DL (ref 37–145)
LDLC SERPL CALC-MCNC: 117 MG/DL
LYMPHOCYTES # BLD: 1.7 K/UL (ref 1–5.1)
LYMPHOCYTES NFR BLD: 37.7 %
MCH RBC QN AUTO: 29.8 PG (ref 26–34)
MCHC RBC AUTO-ENTMCNC: 34.2 G/DL (ref 31–36)
MCV RBC AUTO: 87.1 FL (ref 80–100)
MONOCYTES # BLD: 0.4 K/UL (ref 0–1.3)
MONOCYTES NFR BLD: 8.5 %
NEUTROPHILS # BLD: 2.2 K/UL (ref 1.7–7.7)
NEUTROPHILS NFR BLD: 47.4 %
PLATELET # BLD AUTO: 332 K/UL (ref 135–450)
PMV BLD AUTO: 8.1 FL (ref 5–10.5)
POTASSIUM SERPL-SCNC: 4.2 MMOL/L (ref 3.5–5.1)
PROT SERPL-MCNC: 5.9 G/DL (ref 6.4–8.2)
PROTHROMBIN TIME: 12.1 SEC (ref 11.5–14.8)
RBC # BLD AUTO: 4.44 M/UL (ref 4–5.2)
SODIUM SERPL-SCNC: 142 MMOL/L (ref 136–145)
TIBC SERPL-MCNC: 407 UG/DL (ref 260–445)
TRIGL SERPL-MCNC: 113 MG/DL (ref 0–150)
TSH SERPL DL<=0.005 MIU/L-ACNC: 1.83 UIU/ML (ref 0.27–4.2)
VIT B12 SERPL-MCNC: 1012 PG/ML (ref 211–911)
VLDLC SERPL CALC-MCNC: 23 MG/DL
WBC # BLD AUTO: 4.6 K/UL (ref 4–11)

## 2023-09-02 DIAGNOSIS — H69.83 ACUTE DYSFUNCTION OF EUSTACHIAN TUBE, BILATERAL: ICD-10-CM

## 2023-09-02 LAB
EST. AVERAGE GLUCOSE BLD GHB EST-MCNC: 111.2 MG/DL
HBA1C MFR BLD: 5.5 %

## 2023-09-03 RX ORDER — CETIRIZINE HYDROCHLORIDE 10 MG/1
10 TABLET ORAL DAILY
Qty: 90 TABLET | Refills: 1 | OUTPATIENT
Start: 2023-09-03

## 2023-09-05 ENCOUNTER — TELEMEDICINE (OUTPATIENT)
Dept: BARIATRICS/WEIGHT MGMT | Age: 43
End: 2023-09-05
Payer: COMMERCIAL

## 2023-09-05 DIAGNOSIS — Z71.3 DIETARY COUNSELING AND SURVEILLANCE: ICD-10-CM

## 2023-09-05 DIAGNOSIS — E66.9 CLASS 2 OBESITY: Primary | ICD-10-CM

## 2023-09-05 LAB
A-TOCOPHEROL VIT E SERPL-MCNC: 9.5 MG/L (ref 5.5–18)
ANNOTATION COMMENT IMP: NORMAL
BETA+GAMMA TOCOPHEROL SERPL-MCNC: 0.6 MG/L (ref 0–6)
RETINYL PALMITATE SERPL-MCNC: 0.03 MG/L (ref 0–0.1)
VIT A SERPL-MCNC: 0.74 MG/L (ref 0.3–1.2)

## 2023-09-05 PROCEDURE — G8427 DOCREV CUR MEDS BY ELIG CLIN: HCPCS | Performed by: FAMILY MEDICINE

## 2023-09-05 PROCEDURE — 99204 OFFICE O/P NEW MOD 45 MIN: CPT | Performed by: FAMILY MEDICINE

## 2023-09-05 ASSESSMENT — ENCOUNTER SYMPTOMS
CHOKING: 0
DIARRHEA: 0
PHOTOPHOBIA: 0
BLOOD IN STOOL: 0
VOMITING: 0
CONSTIPATION: 0
EYE PAIN: 0
WHEEZING: 0
ABDOMINAL DISTENTION: 0
ABDOMINAL PAIN: 0
CHEST TIGHTNESS: 0
COUGH: 0
SHORTNESS OF BREATH: 0
NAUSEA: 0
APNEA: 0

## 2023-09-05 NOTE — PROGRESS NOTES
questions answered. Encouraged patient to keep a food journal and to bring it to her next visit. Discussed available treatment options in addition to lifestyle changes including medications or OPTIFAST. Explained that medications/meal replacements are most effective as part of a comprehensive treatment plan that includes proper nutrition, physical activity, and behavior modification. The patient understands that she will need close follow-ups every 2-4 weeks if she starts treatment. She would like to continue to work on lifestyle management for now. Not interested in tx that is not covered by her insurance. Refer to behaviorist to help with stress management. 2. Dietary counseling and surveillance  Start low carb/davian meal plan as prescribed. Avoid skipping meals. Avoid evening/nighttime snacking. Use distraction techniques to avoid boredom/stress eating. Plan/prep meals ahead of time. Avoid soda/juice/sugary/carbonated drinks. Be mindful of portion sizes. Take bariatric MVIs as recommended. Nutrition:  [] LCHF/Ketogenic [x] Low carb/low-calorie diet [] Low-calorie diet     []Maintenance        FITTE:   [x] Cardio [] Resistance/stength exercises   [x] ACSM recommendations (150 minutes/week)           Behavior:   [x] Motivational interviewing performed    [] Referral for counseling  [x] Discussed strategies to overcome habits/challenges for focus      [x] Stress management   [x] Stimulus control  [x] Sleep hygiene      No orders of the defined types were placed in this encounter. Return if symptoms worsen or fail to improve, for MWM, diet and exercise only. Estevan Pastrana is a 40 y.o. female being evaluated by a Virtual Visit (video visit) encounter to address concerns as mentioned above. A caregiver was present when appropriate.  Due to this being a TeleHealth encounter, evaluation of the following organ systems was limited:

## 2023-09-06 ENCOUNTER — TELEPHONE (OUTPATIENT)
Dept: BARIATRICS/WEIGHT MGMT | Age: 43
End: 2023-09-06

## 2023-09-06 LAB — VIT B1 BLD-MCNC: 170 NMOL/L (ref 70–180)

## 2023-09-06 NOTE — TELEPHONE ENCOUNTER
----- Message from Judy Elias MD sent at 2023  4:43 PM EDT -----  Regardin-Tarun/LCMP  Please email 1200-Tarun/LCMP. Thank you.

## 2023-09-06 NOTE — TELEPHONE ENCOUNTER
I have attempted without success to contact this patient by phone to discuss [phone]. Unable to leave a voice message. Sent Reniac message.

## 2023-09-11 ENCOUNTER — TELEMEDICINE (OUTPATIENT)
Dept: PRIMARY CARE CLINIC | Age: 43
End: 2023-09-11
Payer: COMMERCIAL

## 2023-09-11 DIAGNOSIS — U07.1 COVID: Primary | ICD-10-CM

## 2023-09-11 PROCEDURE — 99213 OFFICE O/P EST LOW 20 MIN: CPT | Performed by: NURSE PRACTITIONER

## 2023-09-11 PROCEDURE — G8427 DOCREV CUR MEDS BY ELIG CLIN: HCPCS | Performed by: NURSE PRACTITIONER

## 2023-09-11 ASSESSMENT — ENCOUNTER SYMPTOMS
SINUS PAIN: 1
SHORTNESS OF BREATH: 0
SORE THROAT: 1
CHEST TIGHTNESS: 0
VOMITING: 0
COUGH: 1
WHEEZING: 0
CHANGE IN BOWEL HABIT: 0
RHINORRHEA: 1
NAUSEA: 1
SINUS PRESSURE: 1

## 2023-09-11 NOTE — PROGRESS NOTES
sinus pain and sore throat. Negative for postnasal drip. Canker sores in her mouth   Respiratory:  Positive for cough. Negative for chest tightness, shortness of breath and wheezing. Cardiovascular:  Negative for chest pain. Gastrointestinal:  Positive for nausea. Negative for change in bowel habit and vomiting. Musculoskeletal:  Positive for myalgias. Skin:  Negative for rash. Neurological:  Positive for headaches.        Objective:  Patient-Reported Vitals  No data recorded         9/5/2023     4:33 PM   Patient-Reported Vitals   Patient-Reported Weight 230   Patient-Reported Height 5'6        Physical Exam:  [INSTRUCTIONS:  \"[x]\" Indicates a positive item  \"[]\" Indicates a negative item  -- DELETE ALL ITEMS NOT EXAMINED]    Constitutional: [x] Appears well-developed and well-nourished [x] No apparent distress      [] Abnormal -     Mental status: [x] Alert and awake  [x] Oriented to person/place/time [x] Able to follow commands    [] Abnormal -     Eyes:   EOM    [x]  Normal    [] Abnormal -   Sclera  [x]  Normal    [] Abnormal -          Discharge [x]  None visible   [] Abnormal -     HENT: [x] Normocephalic, atraumatic  [] Abnormal -   [x] Mouth/Throat: Mucous membranes are moist    External Ears [x] Normal  [] Abnormal -    Neck: [x] No visualized mass [] Abnormal -     Pulmonary/Chest: [x] Respiratory effort normal   [x] No visualized signs of difficulty breathing or respiratory distress        [] Abnormal -      Musculoskeletal:   [] Normal gait with no signs of ataxia         [x] Normal range of motion of neck        [] Abnormal -     Neurological:        [x] No Facial Asymmetry (Cranial nerve 7 motor function) (limited exam due to video visit)          [x] No gaze palsy        [] Abnormal -          Skin:        [x] No significant exanthematous lesions or discoloration noted on facial skin         [] Abnormal -            Psychiatric:       [x] Normal Affect [] Abnormal -        [] No

## 2023-09-22 NOTE — PROGRESS NOTES
Edgar Edgar    Age 40 y.o.    female    1980    MRN [id number]    10/13/2023  Arrival Time_____________  OR Time____________[address]     Procedure(s):  ROBOTIC ASSISTED TOTAL LAPAROSCOPIC HYSTERECTOMY, BILATERAL SALPINGECTOMY, LEFT OOPHORECTOMY                      General   Surgeon(s):  Savannah Turcios, MD      DAY ADMIT ___  SDS/OP ___  OUTPT IN BED ___        Phone [phone] (home)     PCP _____________________ Phone_________________ Epic ( ) Epic CE ( ) Appt ________    ADDITIONAL INFO __________________________________ Cardio/Consult _____________    NOTES _____________________________________________________________________    ____________________________________________________________________________    PAT APPT DATE:________ TIME: ________  FAXED QAD: _______  (__) H&P w/ Hospitalist  __________________________________________________________________________  Preop Nurse phone screen complete: _____________  (__) CBC     (__) W/ DIFF ___________     (__) Hgb A1C    ___________  (__) CHEST X RAY   __________  (__) LIPID PROFILE  ___________  (__) EKG   __________  (__) PT-INR / APTT  ___________  (__) PFT's   __________  (__) BMP   ___________  (__) CAROTIDS  __________  (__) CMP   ___________  (__) VEIN MAPPING  __________  (__) U/A   ___________  (__) HISTORY & PHYSICAL __________  (__) URINE C & S  ___________  (__) CARDIAC CLEARANCE __________  (__) U/A W/ FLEX  ___________  (__) PULM.  CLEARANCE __________  (__) SERUM PREGNANCY ___________  (__) Check Epic DOS orders __________  (__) TYPE & SCREEN __________repeat ( ) (__)  __________________ __________  (__) Albumin / Prealbumin ___________  (__)  __________________ __________  (__) TRANSFERRIN  ___________  (__)  __________________ __________  (__) LIVER PROFILE  ___________  (__)  __________________ __________  (__) MRSA NASAL SWAB ___________  (__) URINE PREG DOS __________  (__) SED RATE  ___________  (__) BLOOD SUGAR DOS __________  (__) C-REACTIVE PROTEIN ___________    (__) VITAMIN D HYDROXY ___________  (__) [address]    ________________________    (__) ACE/ ARBS: _____________________     (__) BETABLOCKERS __________________    Ride home/Contact #__________________________

## 2023-09-26 NOTE — PROGRESS NOTES
Ari Hidalgo    Age 40 y.o.    female    1980    MRN [id number]    10/13/2023  Arrival Time_____________  OR Time____________[address]     Procedure(s):  ROBOTIC ASSISTED TOTAL LAPAROSCOPIC HYSTERECTOMY, BILATERAL SALPINGECTOMY, LEFT OOPHORECTOMY                      General   Surgeon(s):  Dillon Valdes, MD      DAY ADMIT ___  SDS/OP ___  OUTPT IN BED ___        Phone [phone] (home)     PCP _____________________ Phone_________________ Epic ( ) Epic CE ( ) Appt ________    ADDITIONAL INFO __________________________________ Cardio/Consult _____________    NOTES _____________________________________________________________________    ____________________________________________________________________________    PAT APPT DATE:________ TIME: ________  FAXED QAD: _______  (__) H&P w/ Hospitalist  __________________________________________________________________________  Preop Nurse phone screen complete: _____________  (__) CBC     (__) W/ DIFF ___________     (__) Hgb A1C    ___________  (__) CHEST X RAY   __________  (__) LIPID PROFILE  ___________  (__) EKG   __________  (__) PT-INR / APTT  ___________  (__) PFT's   __________  (__) BMP   ___________  (__) CAROTIDS  __________  (__) CMP   ___________  (__) VEIN MAPPING  __________  (__) U/A   ___________  (__) HISTORY & PHYSICAL __________  (__) URINE C & S  ___________  (__) CARDIAC CLEARANCE __________  (__) U/A W/ FLEX  ___________  (__) PULM.  CLEARANCE __________  (__) SERUM PREGNANCY ___________  (__) Check Epic DOS orders __________  (__) TYPE & SCREEN __________repeat ( ) (__)  __________________ __________  (__) Albumin / Prealbumin ___________  (__)  __________________ __________  (__) TRANSFERRIN  ___________  (__)  __________________ __________  (__) LIVER PROFILE  ___________  (__)  __________________ __________  (__) MRSA NASAL SWAB ___________  (__) URINE PREG DOS __________  (__) SED RATE  ___________  (__) BLOOD SUGAR

## 2023-09-27 ENCOUNTER — HOSPITAL ENCOUNTER (EMERGENCY)
Age: 43
Discharge: HOME | End: 2023-09-27
Payer: COMMERCIAL

## 2023-09-27 ENCOUNTER — APPOINTMENT (OUTPATIENT)
Dept: GENERAL RADIOLOGY | Age: 43
End: 2023-09-27
Payer: COMMERCIAL

## 2023-09-27 VITALS
DIASTOLIC BLOOD PRESSURE: 85 MMHG | BODY MASS INDEX: 36.96 KG/M2 | TEMPERATURE: 98.5 F | RESPIRATION RATE: 15 BRPM | HEIGHT: 66 IN | WEIGHT: 230 LBS | HEART RATE: 85 BPM | OXYGEN SATURATION: 93 % | SYSTOLIC BLOOD PRESSURE: 125 MMHG

## 2023-09-27 DIAGNOSIS — J18.9 PNEUMONIA OF LEFT LOWER LOBE DUE TO INFECTIOUS ORGANISM: Primary | ICD-10-CM

## 2023-09-27 DIAGNOSIS — R05.1 ACUTE COUGH: ICD-10-CM

## 2023-09-27 LAB
ALBUMIN SERPL-MCNC: 4.2 G/DL (ref 3.4–5)
ALBUMIN/GLOB SERPL: 1.4 {RATIO} (ref 1.1–2.2)
ALP SERPL-CCNC: 85 U/L (ref 40–129)
ALT SERPL-CCNC: 12 U/L (ref 10–40)
ANION GAP SERPL CALCULATED.3IONS-SCNC: 13 MMOL/L (ref 3–16)
AST SERPL-CCNC: 17 U/L (ref 15–37)
BACTERIA URNS QL MICRO: ABNORMAL /HPF
BASOPHILS # BLD: 0 K/UL (ref 0–0.2)
BASOPHILS NFR BLD: 0.5 %
BILIRUB SERPL-MCNC: 0.3 MG/DL (ref 0–1)
BILIRUB UR QL STRIP.AUTO: ABNORMAL
BUN SERPL-MCNC: 10 MG/DL (ref 7–20)
CALCIUM SERPL-MCNC: 9 MG/DL (ref 8.3–10.6)
CHLORIDE SERPL-SCNC: 97 MMOL/L (ref 99–110)
CLARITY UR: ABNORMAL
CO2 SERPL-SCNC: 27 MMOL/L (ref 21–32)
COLOR UR: YELLOW
CREAT SERPL-MCNC: <0.5 MG/DL (ref 0.6–1.1)
DEPRECATED RDW RBC AUTO: 13.4 % (ref 12.4–15.4)
EKG ATRIAL RATE: 118 BPM
EKG DIAGNOSIS: NORMAL
EKG P AXIS: 48 DEGREES
EKG P-R INTERVAL: 154 MS
EKG Q-T INTERVAL: 320 MS
EKG QRS DURATION: 90 MS
EKG QTC CALCULATION (BAZETT): 448 MS
EKG R AXIS: 52 DEGREES
EKG T AXIS: 44 DEGREES
EKG VENTRICULAR RATE: 118 BPM
EOSINOPHIL # BLD: 0 K/UL (ref 0–0.6)
EOSINOPHIL NFR BLD: 0.5 %
EPI CELLS #/AREA URNS HPF: ABNORMAL /HPF (ref 0–5)
FLUAV RNA RESP QL NAA+PROBE: NOT DETECTED
FLUBV RNA RESP QL NAA+PROBE: NOT DETECTED
GFR SERPLBLD CREATININE-BSD FMLA CKD-EPI: >60 ML/MIN/{1.73_M2}
GLUCOSE SERPL-MCNC: 114 MG/DL (ref 70–99)
GLUCOSE UR STRIP.AUTO-MCNC: NEGATIVE MG/DL
HCG SERPL QL: NEGATIVE
HCT VFR BLD AUTO: 38.6 % (ref 36–48)
HGB BLD-MCNC: 13.3 G/DL (ref 12–16)
HGB UR QL STRIP.AUTO: NEGATIVE
KETONES UR STRIP.AUTO-MCNC: ABNORMAL MG/DL
LACTATE BLDV-SCNC: 1.7 MMOL/L (ref 0.4–2)
LEUKOCYTE ESTERASE UR QL STRIP.AUTO: NEGATIVE
LYMPHOCYTES # BLD: 1.1 K/UL (ref 1–5.1)
LYMPHOCYTES NFR BLD: 13.6 %
MCH RBC QN AUTO: 29.6 PG (ref 26–34)
MCHC RBC AUTO-ENTMCNC: 34.5 G/DL (ref 31–36)
MCV RBC AUTO: 85.7 FL (ref 80–100)
MONOCYTES # BLD: 0.9 K/UL (ref 0–1.3)
MONOCYTES NFR BLD: 11.3 %
NEUTROPHILS # BLD: 5.8 K/UL (ref 1.7–7.7)
NEUTROPHILS NFR BLD: 74.1 %
NITRITE UR QL STRIP.AUTO: NEGATIVE
NT-PROBNP SERPL-MCNC: 62 PG/ML (ref 0–124)
PH UR STRIP.AUTO: 6.5 [PH] (ref 5–8)
PLATELET # BLD AUTO: 304 K/UL (ref 135–450)
PMV BLD AUTO: 7.1 FL (ref 5–10.5)
POTASSIUM SERPL-SCNC: 3.7 MMOL/L (ref 3.5–5.1)
PROT SERPL-MCNC: 7.3 G/DL (ref 6.4–8.2)
PROT UR STRIP.AUTO-MCNC: 30 MG/DL
RBC # BLD AUTO: 4.5 M/UL (ref 4–5.2)
RBC #/AREA URNS HPF: ABNORMAL /HPF (ref 0–4)
RENAL EPI CELLS #/AREA UR COMP ASSIST: ABNORMAL /HPF (ref 0–1)
SARS-COV-2 RNA RESP QL NAA+PROBE: NOT DETECTED
SODIUM SERPL-SCNC: 137 MMOL/L (ref 136–145)
SP GR UR STRIP.AUTO: 1.01 (ref 1–1.03)
TROPONIN, HIGH SENSITIVITY: <6 NG/L (ref 0–14)
UA COMPLETE W REFLEX CULTURE PNL UR: ABNORMAL
UA DIPSTICK W REFLEX MICRO PNL UR: YES
URN SPEC COLLECT METH UR: ABNORMAL
UROBILINOGEN UR STRIP-ACNC: 0.2 E.U./DL
WBC # BLD AUTO: 7.8 K/UL (ref 4–11)
WBC #/AREA URNS HPF: ABNORMAL /HPF (ref 0–5)

## 2023-09-27 PROCEDURE — 96375 TX/PRO/DX INJ NEW DRUG ADDON: CPT

## 2023-09-27 PROCEDURE — 84703 CHORIONIC GONADOTROPIN ASSAY: CPT

## 2023-09-27 PROCEDURE — 87636 SARSCOV2 & INF A&B AMP PRB: CPT

## 2023-09-27 PROCEDURE — 36415 COLL VENOUS BLD VENIPUNCTURE: CPT

## 2023-09-27 PROCEDURE — 6370000000 HC RX 637 (ALT 250 FOR IP): Performed by: NURSE PRACTITIONER

## 2023-09-27 PROCEDURE — 6360000002 HC RX W HCPCS: Performed by: NURSE PRACTITIONER

## 2023-09-27 PROCEDURE — 83605 ASSAY OF LACTIC ACID: CPT

## 2023-09-27 PROCEDURE — 71046 X-RAY EXAM CHEST 2 VIEWS: CPT

## 2023-09-27 PROCEDURE — 83880 ASSAY OF NATRIURETIC PEPTIDE: CPT

## 2023-09-27 PROCEDURE — 93005 ELECTROCARDIOGRAM TRACING: CPT | Performed by: EMERGENCY MEDICINE

## 2023-09-27 PROCEDURE — 85025 COMPLETE CBC W/AUTO DIFF WBC: CPT

## 2023-09-27 PROCEDURE — 93010 ELECTROCARDIOGRAM REPORT: CPT | Performed by: INTERNAL MEDICINE

## 2023-09-27 PROCEDURE — 81001 URINALYSIS AUTO W/SCOPE: CPT

## 2023-09-27 PROCEDURE — 99285 EMERGENCY DEPT VISIT HI MDM: CPT

## 2023-09-27 PROCEDURE — 96374 THER/PROPH/DIAG INJ IV PUSH: CPT

## 2023-09-27 PROCEDURE — 84484 ASSAY OF TROPONIN QUANT: CPT

## 2023-09-27 PROCEDURE — 2580000003 HC RX 258: Performed by: NURSE PRACTITIONER

## 2023-09-27 PROCEDURE — 80053 COMPREHEN METABOLIC PANEL: CPT

## 2023-09-27 RX ORDER — IPRATROPIUM BROMIDE AND ALBUTEROL SULFATE 2.5; .5 MG/3ML; MG/3ML
1 SOLUTION RESPIRATORY (INHALATION) ONCE
Status: COMPLETED | OUTPATIENT
Start: 2023-09-27 | End: 2023-09-27

## 2023-09-27 RX ORDER — AZITHROMYCIN 250 MG/1
TABLET, FILM COATED ORAL
Qty: 1 PACKET | Refills: 0 | Status: SHIPPED | OUTPATIENT
Start: 2023-09-27 | End: 2023-10-07

## 2023-09-27 RX ORDER — ONDANSETRON 2 MG/ML
4 INJECTION INTRAMUSCULAR; INTRAVENOUS EVERY 30 MIN PRN
Status: DISCONTINUED | OUTPATIENT
Start: 2023-09-27 | End: 2023-09-27 | Stop reason: HOSPADM

## 2023-09-27 RX ORDER — ACETAMINOPHEN 500 MG
1000 TABLET ORAL ONCE
Status: COMPLETED | OUTPATIENT
Start: 2023-09-27 | End: 2023-09-27

## 2023-09-27 RX ORDER — PREDNISONE 20 MG/1
60 TABLET ORAL ONCE
Status: COMPLETED | OUTPATIENT
Start: 2023-09-27 | End: 2023-09-27

## 2023-09-27 RX ORDER — PREDNISONE 10 MG/1
40 TABLET ORAL DAILY
Qty: 16 TABLET | Refills: 0 | Status: SHIPPED | OUTPATIENT
Start: 2023-09-27 | End: 2023-10-01

## 2023-09-27 RX ORDER — PROMETHAZINE HYDROCHLORIDE AND CODEINE PHOSPHATE 6.25; 1 MG/5ML; MG/5ML
5 SYRUP ORAL 4 TIMES DAILY PRN
Qty: 120 ML | Refills: 0 | Status: SHIPPED | OUTPATIENT
Start: 2023-09-27 | End: 2023-10-04

## 2023-09-27 RX ORDER — KETOROLAC TROMETHAMINE 30 MG/ML
15 INJECTION, SOLUTION INTRAMUSCULAR; INTRAVENOUS ONCE
Status: COMPLETED | OUTPATIENT
Start: 2023-09-27 | End: 2023-09-27

## 2023-09-27 RX ORDER — PROMETHAZINE HYDROCHLORIDE AND CODEINE PHOSPHATE 6.25; 1 MG/5ML; MG/5ML
5 SYRUP ORAL ONCE
Status: COMPLETED | OUTPATIENT
Start: 2023-09-27 | End: 2023-09-27

## 2023-09-27 RX ORDER — SODIUM CHLORIDE, SODIUM LACTATE, POTASSIUM CHLORIDE, AND CALCIUM CHLORIDE .6; .31; .03; .02 G/100ML; G/100ML; G/100ML; G/100ML
1000 INJECTION, SOLUTION INTRAVENOUS ONCE
Status: COMPLETED | OUTPATIENT
Start: 2023-09-27 | End: 2023-09-27

## 2023-09-27 RX ADMIN — ACETAMINOPHEN 1000 MG: 500 TABLET ORAL at 12:28

## 2023-09-27 RX ADMIN — IPRATROPIUM BROMIDE AND ALBUTEROL SULFATE 1 DOSE: 2.5; .5 SOLUTION RESPIRATORY (INHALATION) at 12:54

## 2023-09-27 RX ADMIN — KETOROLAC TROMETHAMINE 15 MG: 30 INJECTION, SOLUTION INTRAMUSCULAR; INTRAVENOUS at 14:40

## 2023-09-27 RX ADMIN — PREDNISONE 60 MG: 20 TABLET ORAL at 14:43

## 2023-09-27 RX ADMIN — SODIUM CHLORIDE, POTASSIUM CHLORIDE, SODIUM LACTATE AND CALCIUM CHLORIDE 1000 ML: 600; 310; 30; 20 INJECTION, SOLUTION INTRAVENOUS at 12:28

## 2023-09-27 RX ADMIN — ALBUTEROL SULFATE 5 MG: 2.5 SOLUTION RESPIRATORY (INHALATION) at 12:54

## 2023-09-27 RX ADMIN — ONDANSETRON 4 MG: 2 INJECTION INTRAMUSCULAR; INTRAVENOUS at 12:29

## 2023-09-27 RX ADMIN — Medication 5 ML: at 13:01

## 2023-09-27 ASSESSMENT — PAIN DESCRIPTION - DESCRIPTORS: DESCRIPTORS: ACHING

## 2023-09-27 ASSESSMENT — PAIN SCALES - GENERAL
PAINLEVEL_OUTOF10: 5
PAINLEVEL_OUTOF10: 8

## 2023-09-27 ASSESSMENT — PAIN DESCRIPTION - PAIN TYPE: TYPE: ACUTE PAIN

## 2023-09-27 ASSESSMENT — PAIN - FUNCTIONAL ASSESSMENT: PAIN_FUNCTIONAL_ASSESSMENT: 0-10

## 2023-09-27 ASSESSMENT — PAIN DESCRIPTION - LOCATION: LOCATION: HEAD

## 2023-09-27 NOTE — ED PROVIDER NOTES
Anesthesia Evaluation                  Airway   Mallampati: III  TM distance: >3 FB  Neck ROM: full  Possible difficult intubation  Dental - normal exam     Pulmonary - normal exam   (+) sleep apnea,   Cardiovascular - normal exam    (+) hypertension, hyperlipidemia,       Neuro/Psych  (+) psychiatric history,     GI/Hepatic/Renal/Endo    (+)  GERD,      Musculoskeletal     Abdominal    Substance History      OB/GYN          Other   (+) arthritis                     Anesthesia Plan    ASA 3     general     intravenous induction   Anesthetic plan, all risks, benefits, and alternatives have been provided, discussed and informed consent has been obtained with: patient.      
I was not asked to see this patient. I was available for consultation if deemed necessary. I was only asked to interpret the EKG. Please see NP John's note for care of the patient while in the emergency department and for final disposition. The Ekg interpreted by me shows  sinus tachycardia, etnh=273  Axis is   Normal  QTc is  normal  Intervals and Durations are unremarkable.       ST Segments: no acute change and nonspecific changes  No significant change from prior EKG dated - 8/10/23  No STEMI, patient is now tachycardic today which is new compared to old EKG           Fady Mazariegos MD  09/27/23 0396
Father     High Blood Pressure Brother     High Cholesterol Brother     High Blood Pressure Maternal Aunt     High Cholesterol Maternal Aunt     Miscarriages / Stillbirths Maternal Aunt     Mental Retardation Maternal Uncle     High Blood Pressure Maternal Uncle     High Cholesterol Maternal Uncle     High Blood Pressure Paternal Aunt     High Cholesterol Paternal Aunt     Arthritis Paternal Uncle     Mental Illness Paternal Uncle     High Blood Pressure Paternal Uncle     Birth Defects Paternal Uncle     High Cholesterol Paternal Uncle     Learning Disabilities Paternal Uncle     Arthritis Maternal Grandmother     High Blood Pressure Maternal Grandmother     Diabetes Maternal Grandmother     High Cholesterol Maternal Grandmother     Miscarriages / Stillbirths Maternal Grandmother     Arthritis Maternal Grandfather     High Blood Pressure Maternal Grandfather     High Cholesterol Maternal Grandfather     Arthritis Paternal Grandmother     High Blood Pressure Paternal Grandmother     Heart Disease Paternal Grandmother     High Cholesterol Paternal Grandmother     Miscarriages / Stillbirths Paternal Grandmother     Stroke Paternal Grandmother     High Blood Pressure Paternal Grandfather     High Cholesterol Paternal Grandfather         SOCIAL HISTORY       Social History     Tobacco Use    Smoking status: Former     Packs/day: 1.00     Years: 20.00     Additional pack years: 0.00     Total pack years: 20.00     Types: Cigarettes     Quit date: 2019     Years since quittin.4    Smokeless tobacco: Never   Vaping Use    Vaping Use: Never used   Substance Use Topics    Alcohol use: Not Currently     Alcohol/week: 0.0 standard drinks of alcohol    Drug use: Yes     Frequency: 4.0 times per week     Types: Marijuana (Weed)       SCREENINGS        [place] Coma Scale  Eye Opening: Spontaneous  Best Verbal Response: Oriented  Best Motor Response: Obeys commands  [place] Coma Scale Score: 15                EB

## 2023-09-27 NOTE — ED NOTES
Pt able to ambulate on room air, O2 between 92-95%. HR stayed in the 120s while ambulating.       Mirta Washburn RN  09/27/23 4462

## 2023-10-09 NOTE — PROGRESS NOTES
Surgery Date and Time: 10/13/23 @ 09:30 am     Arrival Time:  07:30 am    The instructions given when and if a patient needs to stop oral intake prior to surgery varies. Follow the instructions you were given by your surgeon or RN during   Pre-op call. __X__Nothing to eat or to drink after Midnight the night before the surgery. NO gum, mints, candy or ice chips day of surgery. Hold the following medications (per anesthesia or surgeon request):               Last Dose:  losartan-hctz last dose 10/12/23 in am      Aspirin, Ibuprofen, Advil, Naproxen, Vitamin E and other Anti-inflammatory products and supplements should be stopped for 5 -7days before surgery or as  directed     by your physician. If you take a long acting-insulin, please ask your Primary Care Physician if you should decrease your dose the night before surgery. Do not smoke or vape, and do not drink any alcoholic beverages 24 hours prior to surgery, this includes NA Beer. Refrain from using any recreational drugs,    including non-prescribed prescription drugs. You may brush your teeth and gargle the morning of surgery. DO NOT SWALLOW WATER. You MUST plan for a responsible adult to stay on site while you are here and take you home after your surgery. You will not be allowed to leave alone or drive               yourself home. It is requested someone stay with you the first 24 hrs. Your surgery will be cancelled if you do not have a ride home with a responsible adult. A parent/legal guardian must accompany a child scheduled for surgery and plan to stay at the hospital until the child is discharged. Please do not bring other               children with you. Please wear simple, loose-fitting clothing to the hospital. Chelsi Velezs not bring valuables (money, credit cards, checkbooks, etc.) Do not wear any makeup (including NO eye               makeup) and no nail polish if applicable. DO NOT wear any jewelry or body piercings day of surgery. All body piercing jewelry must be removed. If you have dentures they will be removed before going to the OR; we will provide a container. If you wear contact lenses or glasses, they will be removed; please               bring a case for them or wear glasses day of surgery. Please see your family doctor/pediatrician for a Preop History & Physical and/or concerning medications within 30 days of the surgery date. Non-[facility]               physicians can fax H&P/test results to [phone]. You may need cardiac clearance if you see a cardiologist, check with PCP or surgeon. If you have a Living Will and Durable Power of  for Healthcare, please bring in a copy to be scanned at registration. Notify your Surgeon if you develop any illness between now and the surgery date, cough, cold, fever, sore throat, nausea, vomiting, etc.  Please notify your surgeon              if you experience dizziness, shortness of breath or blurred vision between now & the time of your surgery. DO NOT shave your operative site less than 4 days prior to surgery. For face & neck surgery, men may use an electric razor up to 2 days prior to surgery. To help prevent infection, change your sheets the night before surgery. Also, shower the night before & morning of surgery using an antibacterial soap (Dial    or Safeguard) Do not apply lotion after shower or day of surgery. To provide excellent care visitors will be limited to two per room at any given time. Please bring your picture ID and insurance card for registration prior to arriving to second floor surgery department. If you use a CPAP/BiPAP please bring with you on the day of the surgery. If you use oxygen, please bring portable tank with you.                 For your convenience [address] [place] [address] has an outpatient

## 2023-10-10 ENCOUNTER — OFFICE VISIT (OUTPATIENT)
Dept: FAMILY MEDICINE CLINIC | Age: 43
End: 2023-10-10
Payer: COMMERCIAL

## 2023-10-10 VITALS
SYSTOLIC BLOOD PRESSURE: 130 MMHG | WEIGHT: 227 LBS | OXYGEN SATURATION: 98 % | BODY MASS INDEX: 36.64 KG/M2 | HEART RATE: 97 BPM | DIASTOLIC BLOOD PRESSURE: 68 MMHG

## 2023-10-10 DIAGNOSIS — H92.01 RIGHT EAR PAIN: ICD-10-CM

## 2023-10-10 DIAGNOSIS — Z01.818 PREOPERATIVE EXAMINATION: Primary | ICD-10-CM

## 2023-10-10 PROCEDURE — G8417 CALC BMI ABV UP PARAM F/U: HCPCS | Performed by: FAMILY MEDICINE

## 2023-10-10 PROCEDURE — G8484 FLU IMMUNIZE NO ADMIN: HCPCS | Performed by: FAMILY MEDICINE

## 2023-10-10 PROCEDURE — 3075F SYST BP GE 130 - 139MM HG: CPT | Performed by: FAMILY MEDICINE

## 2023-10-10 PROCEDURE — G8427 DOCREV CUR MEDS BY ELIG CLIN: HCPCS | Performed by: FAMILY MEDICINE

## 2023-10-10 PROCEDURE — 1036F TOBACCO NON-USER: CPT | Performed by: FAMILY MEDICINE

## 2023-10-10 PROCEDURE — 3078F DIAST BP <80 MM HG: CPT | Performed by: FAMILY MEDICINE

## 2023-10-10 PROCEDURE — 99214 OFFICE O/P EST MOD 30 MIN: CPT | Performed by: FAMILY MEDICINE

## 2023-10-10 NOTE — PROGRESS NOTES
Preoperative Consultation      Stephane Castro  YOB: 1980    Date of Service:  10/10/2023    Vitals:    10/10/23 1112   BP: 130/68   Pulse: 97   SpO2: 98%   Weight: 227 lb (103 kg)      Wt Readings from Last 2 Encounters:   10/10/23 227 lb (103 kg)   09/27/23 230 lb (104.3 kg)     BP Readings from Last 3 Encounters:   10/10/23 130/68   09/27/23 125/85   08/10/23 128/80        Chief Complaint   Patient presents with    Pre-op Exam     Hysterectomy     Allergies   Allergen Reactions    Tramadol Other (See Comments)     severe  SEVERE HEADACHE  Other reaction(s): Unknown    Seasonal     Lisinopril Other (See Comments)     COUGH  Other reaction(s): Unknown     Outpatient Medications Marked as Taking for the 10/10/23 encounter (Office Visit) with June Cantu, DO   Medication Sig Dispense Refill    albuterol-ipratropium (COMBIVENT RESPIMAT)  MCG/ACT AERS inhaler Inhale 1 puff into the lungs every 6 hours as needed for Wheezing or Shortness of Breath 1 each 0    cetirizine (ZYRTEC) 10 MG tablet Take 1 tablet by mouth daily. 90 tablet 1    Calcium Citrate-Vitamin D 250-5 MG-MCG TABS Take 1 tablet by mouth in the morning and at bedtime 90 tablet 1    Biotin 5000 MCG CAPS Take 2 capsules by mouth daily 180 capsule 3    Multiple Vitamins-Minerals (THEREMS-M) TABS Take 1 tablet by mouth daily. 90 tablet 3    hydroCHLOROthiazide (HYDRODIURIL) 25 MG tablet Take 1 tablet by mouth every morning. 90 tablet 3    spironolactone (ALDACTONE) 50 MG tablet Take 1 tablet by mouth daily. 90 tablet 3    DULoxetine (CYMBALTA) 60 MG extended release capsule Take 1 capsule by mouth daily. (Patient taking differently: Take 1 capsule by mouth daily Take 1 capsule by mouth daily.) 90 capsule 1    verapamil (CALAN SR) 240 MG extended release tablet Take 1 tablet by mouth daily.  90 tablet 1    CALCIUM-VITAMIN D PO Take 1 tablet by mouth daily      fluticasone (FLONASE) 50 MCG/ACT nasal spray Instill 1 spray into each Dino Oscar

## 2023-10-12 ENCOUNTER — ANESTHESIA EVENT (OUTPATIENT)
Dept: OPERATING ROOM | Age: 43
End: 2023-10-12
Payer: COMMERCIAL

## 2023-10-13 ENCOUNTER — ANESTHESIA (OUTPATIENT)
Dept: OPERATING ROOM | Age: 43
End: 2023-10-13
Payer: COMMERCIAL

## 2023-10-13 ENCOUNTER — HOSPITAL ENCOUNTER (OUTPATIENT)
Age: 43
Setting detail: OUTPATIENT SURGERY
Discharge: HOME | End: 2023-10-13
Attending: OBSTETRICS & GYNECOLOGY | Admitting: OBSTETRICS & GYNECOLOGY
Payer: COMMERCIAL

## 2023-10-13 VITALS
BODY MASS INDEX: 36.69 KG/M2 | SYSTOLIC BLOOD PRESSURE: 125 MMHG | OXYGEN SATURATION: 95 % | HEIGHT: 66 IN | DIASTOLIC BLOOD PRESSURE: 81 MMHG | RESPIRATION RATE: 14 BRPM | HEART RATE: 100 BPM | WEIGHT: 228.31 LBS | TEMPERATURE: 97.4 F

## 2023-10-13 DIAGNOSIS — N94.6 DYSMENORRHEA: ICD-10-CM

## 2023-10-13 DIAGNOSIS — R10.2 PELVIC PAIN IN FEMALE: ICD-10-CM

## 2023-10-13 DIAGNOSIS — N83.202 LEFT OVARIAN CYST: ICD-10-CM

## 2023-10-13 DIAGNOSIS — Z90.710 S/P HYSTERECTOMY: Primary | ICD-10-CM

## 2023-10-13 LAB
ABO + RH BLD: NORMAL
BLD GP AB SCN SERPL QL: NORMAL
GLUCOSE BLD-MCNC: 103 MG/DL (ref 70–99)
HCG UR QL: NEGATIVE
PERFORMED ON: ABNORMAL

## 2023-10-13 PROCEDURE — 7100000010 HC PHASE II RECOVERY - FIRST 15 MIN: Performed by: OBSTETRICS & GYNECOLOGY

## 2023-10-13 PROCEDURE — 6360000002 HC RX W HCPCS: Performed by: NURSE ANESTHETIST, CERTIFIED REGISTERED

## 2023-10-13 PROCEDURE — 86900 BLOOD TYPING SEROLOGIC ABO: CPT

## 2023-10-13 PROCEDURE — 3700000001 HC ADD 15 MINUTES (ANESTHESIA): Performed by: OBSTETRICS & GYNECOLOGY

## 2023-10-13 PROCEDURE — 2500000003 HC RX 250 WO HCPCS: Performed by: OBSTETRICS & GYNECOLOGY

## 2023-10-13 PROCEDURE — 6370000000 HC RX 637 (ALT 250 FOR IP): Performed by: ANESTHESIOLOGY

## 2023-10-13 PROCEDURE — 86901 BLOOD TYPING SEROLOGIC RH(D): CPT

## 2023-10-13 PROCEDURE — S2900 ROBOTIC SURGICAL SYSTEM: HCPCS | Performed by: OBSTETRICS & GYNECOLOGY

## 2023-10-13 PROCEDURE — 2580000003 HC RX 258: Performed by: NURSE ANESTHETIST, CERTIFIED REGISTERED

## 2023-10-13 PROCEDURE — 6360000002 HC RX W HCPCS: Performed by: ANESTHESIOLOGY

## 2023-10-13 PROCEDURE — 84703 CHORIONIC GONADOTROPIN ASSAY: CPT

## 2023-10-13 PROCEDURE — 3600000009 HC SURGERY ROBOT BASE: Performed by: OBSTETRICS & GYNECOLOGY

## 2023-10-13 PROCEDURE — 88307 TISSUE EXAM BY PATHOLOGIST: CPT

## 2023-10-13 PROCEDURE — 2720000010 HC SURG SUPPLY STERILE: Performed by: OBSTETRICS & GYNECOLOGY

## 2023-10-13 PROCEDURE — 2580000003 HC RX 258: Performed by: ANESTHESIOLOGY

## 2023-10-13 PROCEDURE — 7100000001 HC PACU RECOVERY - ADDTL 15 MIN: Performed by: OBSTETRICS & GYNECOLOGY

## 2023-10-13 PROCEDURE — 86850 RBC ANTIBODY SCREEN: CPT

## 2023-10-13 PROCEDURE — 7100000011 HC PHASE II RECOVERY - ADDTL 15 MIN: Performed by: OBSTETRICS & GYNECOLOGY

## 2023-10-13 PROCEDURE — 2500000003 HC RX 250 WO HCPCS: Performed by: NURSE ANESTHETIST, CERTIFIED REGISTERED

## 2023-10-13 PROCEDURE — 2709999900 HC NON-CHARGEABLE SUPPLY: Performed by: OBSTETRICS & GYNECOLOGY

## 2023-10-13 PROCEDURE — 3700000000 HC ANESTHESIA ATTENDED CARE: Performed by: OBSTETRICS & GYNECOLOGY

## 2023-10-13 PROCEDURE — 6360000002 HC RX W HCPCS: Performed by: OBSTETRICS & GYNECOLOGY

## 2023-10-13 PROCEDURE — 3600000019 HC SURGERY ROBOT ADDTL 15MIN: Performed by: OBSTETRICS & GYNECOLOGY

## 2023-10-13 PROCEDURE — 7100000000 HC PACU RECOVERY - FIRST 15 MIN: Performed by: OBSTETRICS & GYNECOLOGY

## 2023-10-13 RX ORDER — SODIUM CHLORIDE, SODIUM LACTATE, POTASSIUM CHLORIDE, CALCIUM CHLORIDE 600; 310; 30; 20 MG/100ML; MG/100ML; MG/100ML; MG/100ML
INJECTION, SOLUTION INTRAVENOUS CONTINUOUS
Status: DISCONTINUED | OUTPATIENT
Start: 2023-10-13 | End: 2023-10-13 | Stop reason: HOSPADM

## 2023-10-13 RX ORDER — SODIUM CHLORIDE 9 MG/ML
INJECTION, SOLUTION INTRAVENOUS PRN
Status: DISCONTINUED | OUTPATIENT
Start: 2023-10-13 | End: 2023-10-13 | Stop reason: HOSPADM

## 2023-10-13 RX ORDER — SODIUM CHLORIDE, SODIUM LACTATE, POTASSIUM CHLORIDE, AND CALCIUM CHLORIDE .6; .31; .03; .02 G/100ML; G/100ML; G/100ML; G/100ML
IRRIGANT IRRIGATION PRN
Status: DISCONTINUED | OUTPATIENT
Start: 2023-10-13 | End: 2023-10-13 | Stop reason: ALTCHOICE

## 2023-10-13 RX ORDER — KETOROLAC TROMETHAMINE 30 MG/ML
INJECTION, SOLUTION INTRAMUSCULAR; INTRAVENOUS PRN
Status: DISCONTINUED | OUTPATIENT
Start: 2023-10-13 | End: 2023-10-13 | Stop reason: SDUPTHER

## 2023-10-13 RX ORDER — SODIUM CHLORIDE 0.9 % (FLUSH) 0.9 %
5-40 SYRINGE (ML) INJECTION PRN
Status: DISCONTINUED | OUTPATIENT
Start: 2023-10-13 | End: 2023-10-13 | Stop reason: HOSPADM

## 2023-10-13 RX ORDER — PROCHLORPERAZINE EDISYLATE 5 MG/ML
5 INJECTION INTRAMUSCULAR; INTRAVENOUS
Status: DISCONTINUED | OUTPATIENT
Start: 2023-10-13 | End: 2023-10-13 | Stop reason: HOSPADM

## 2023-10-13 RX ORDER — PROPOFOL 10 MG/ML
INJECTION, EMULSION INTRAVENOUS PRN
Status: DISCONTINUED | OUTPATIENT
Start: 2023-10-13 | End: 2023-10-13 | Stop reason: SDUPTHER

## 2023-10-13 RX ORDER — IPRATROPIUM BROMIDE AND ALBUTEROL SULFATE 2.5; .5 MG/3ML; MG/3ML
1 SOLUTION RESPIRATORY (INHALATION)
Status: DISCONTINUED | OUTPATIENT
Start: 2023-10-13 | End: 2023-10-13 | Stop reason: HOSPADM

## 2023-10-13 RX ORDER — OXYCODONE HYDROCHLORIDE 5 MG/1
10 TABLET ORAL PRN
Status: DISCONTINUED | OUTPATIENT
Start: 2023-10-13 | End: 2023-10-13 | Stop reason: HOSPADM

## 2023-10-13 RX ORDER — APREPITANT 40 MG/1
40 CAPSULE ORAL ONCE
Status: COMPLETED | OUTPATIENT
Start: 2023-10-13 | End: 2023-10-13

## 2023-10-13 RX ORDER — CEFAZOLIN SODIUM IN 0.9 % NACL 2 G/100 ML
2000 PLASTIC BAG, INJECTION (ML) INTRAVENOUS
Status: COMPLETED | OUTPATIENT
Start: 2023-10-13 | End: 2023-10-13

## 2023-10-13 RX ORDER — OXYCODONE HYDROCHLORIDE AND ACETAMINOPHEN 5; 325 MG/1; MG/1
2 TABLET ORAL ONCE
Status: DISCONTINUED | OUTPATIENT
Start: 2023-10-13 | End: 2023-10-13 | Stop reason: HOSPADM

## 2023-10-13 RX ORDER — IBUPROFEN 800 MG/1
800 TABLET ORAL 4 TIMES DAILY PRN
Qty: 120 TABLET | Refills: 0 | Status: SHIPPED | OUTPATIENT
Start: 2023-10-13

## 2023-10-13 RX ORDER — OXYCODONE HYDROCHLORIDE 5 MG/1
5 TABLET ORAL
Status: DISCONTINUED | OUTPATIENT
Start: 2023-10-13 | End: 2023-10-13 | Stop reason: HOSPADM

## 2023-10-13 RX ORDER — MIDAZOLAM HYDROCHLORIDE 1 MG/ML
2 INJECTION INTRAMUSCULAR; INTRAVENOUS ONCE
Status: DISCONTINUED | OUTPATIENT
Start: 2023-10-13 | End: 2023-10-13 | Stop reason: HOSPADM

## 2023-10-13 RX ORDER — DEXAMETHASONE SODIUM PHOSPHATE 4 MG/ML
INJECTION, SOLUTION INTRA-ARTICULAR; INTRALESIONAL; INTRAMUSCULAR; INTRAVENOUS; SOFT TISSUE PRN
Status: DISCONTINUED | OUTPATIENT
Start: 2023-10-13 | End: 2023-10-13 | Stop reason: SDUPTHER

## 2023-10-13 RX ORDER — SCOLOPAMINE TRANSDERMAL SYSTEM 1 MG/1
1 PATCH, EXTENDED RELEASE TRANSDERMAL
Status: DISCONTINUED | OUTPATIENT
Start: 2023-10-13 | End: 2023-10-13 | Stop reason: HOSPADM

## 2023-10-13 RX ORDER — GLYCOPYRROLATE 0.2 MG/ML
INJECTION INTRAMUSCULAR; INTRAVENOUS PRN
Status: DISCONTINUED | OUTPATIENT
Start: 2023-10-13 | End: 2023-10-13 | Stop reason: SDUPTHER

## 2023-10-13 RX ORDER — MIDAZOLAM HYDROCHLORIDE 1 MG/ML
2 INJECTION INTRAMUSCULAR; INTRAVENOUS
Status: COMPLETED | OUTPATIENT
Start: 2023-10-13 | End: 2023-10-13

## 2023-10-13 RX ORDER — SODIUM CHLORIDE 0.9 % (FLUSH) 0.9 %
5-40 SYRINGE (ML) INJECTION EVERY 12 HOURS SCHEDULED
Status: DISCONTINUED | OUTPATIENT
Start: 2023-10-13 | End: 2023-10-13 | Stop reason: HOSPADM

## 2023-10-13 RX ORDER — ROCURONIUM BROMIDE 10 MG/ML
INJECTION, SOLUTION INTRAVENOUS PRN
Status: DISCONTINUED | OUTPATIENT
Start: 2023-10-13 | End: 2023-10-13 | Stop reason: SDUPTHER

## 2023-10-13 RX ORDER — HYDROMORPHONE HYDROCHLORIDE 2 MG/ML
INJECTION, SOLUTION INTRAMUSCULAR; INTRAVENOUS; SUBCUTANEOUS PRN
Status: DISCONTINUED | OUTPATIENT
Start: 2023-10-13 | End: 2023-10-13 | Stop reason: SDUPTHER

## 2023-10-13 RX ORDER — ONDANSETRON 2 MG/ML
4 INJECTION INTRAMUSCULAR; INTRAVENOUS
Status: DISCONTINUED | OUTPATIENT
Start: 2023-10-13 | End: 2023-10-13 | Stop reason: HOSPADM

## 2023-10-13 RX ORDER — MEPERIDINE HYDROCHLORIDE 50 MG/ML
12.5 INJECTION INTRAMUSCULAR; INTRAVENOUS; SUBCUTANEOUS EVERY 5 MIN PRN
Status: DISCONTINUED | OUTPATIENT
Start: 2023-10-13 | End: 2023-10-13 | Stop reason: HOSPADM

## 2023-10-13 RX ORDER — OXYCODONE HYDROCHLORIDE 5 MG/1
5 TABLET ORAL EVERY 6 HOURS PRN
Qty: 12 TABLET | Refills: 0 | Status: SHIPPED | OUTPATIENT
Start: 2023-10-13 | End: 2023-10-16

## 2023-10-13 RX ORDER — LIDOCAINE HYDROCHLORIDE 10 MG/ML
1 INJECTION, SOLUTION EPIDURAL; INFILTRATION; INTRACAUDAL; PERINEURAL
Status: DISCONTINUED | OUTPATIENT
Start: 2023-10-13 | End: 2023-10-13 | Stop reason: HOSPADM

## 2023-10-13 RX ORDER — ONDANSETRON 2 MG/ML
INJECTION INTRAMUSCULAR; INTRAVENOUS PRN
Status: DISCONTINUED | OUTPATIENT
Start: 2023-10-13 | End: 2023-10-13 | Stop reason: SDUPTHER

## 2023-10-13 RX ORDER — BUPIVACAINE HYDROCHLORIDE AND EPINEPHRINE 5; 5 MG/ML; UG/ML
INJECTION, SOLUTION PERINEURAL PRN
Status: DISCONTINUED | OUTPATIENT
Start: 2023-10-13 | End: 2023-10-13 | Stop reason: ALTCHOICE

## 2023-10-13 RX ORDER — LIDOCAINE HYDROCHLORIDE 20 MG/ML
INJECTION, SOLUTION INFILTRATION; PERINEURAL PRN
Status: DISCONTINUED | OUTPATIENT
Start: 2023-10-13 | End: 2023-10-13 | Stop reason: SDUPTHER

## 2023-10-13 RX ADMIN — ROCURONIUM BROMIDE 50 MG: 50 INJECTION, SOLUTION INTRAVENOUS at 09:31

## 2023-10-13 RX ADMIN — PROPOFOL 200 MG: 10 INJECTION, EMULSION INTRAVENOUS at 09:31

## 2023-10-13 RX ADMIN — ROCURONIUM BROMIDE 20 MG: 50 INJECTION, SOLUTION INTRAVENOUS at 10:04

## 2023-10-13 RX ADMIN — HYDROMORPHONE HYDROCHLORIDE 1 MG: 2 INJECTION, SOLUTION INTRAMUSCULAR; INTRAVENOUS; SUBCUTANEOUS at 10:35

## 2023-10-13 RX ADMIN — APREPITANT 40 MG: 40 CAPSULE ORAL at 08:51

## 2023-10-13 RX ADMIN — DEXMEDETOMIDINE HYDROCHLORIDE 8 MCG: 100 INJECTION, SOLUTION INTRAVENOUS at 10:22

## 2023-10-13 RX ADMIN — HYDROMORPHONE HYDROCHLORIDE 0.5 MG: 1 INJECTION, SOLUTION INTRAMUSCULAR; INTRAVENOUS; SUBCUTANEOUS at 11:55

## 2023-10-13 RX ADMIN — PROPOFOL 25 MG: 10 INJECTION, EMULSION INTRAVENOUS at 10:44

## 2023-10-13 RX ADMIN — OXYCODONE HYDROCHLORIDE 10 MG: 5 TABLET ORAL at 12:30

## 2023-10-13 RX ADMIN — SUGAMMADEX 200 MG: 100 INJECTION, SOLUTION INTRAVENOUS at 10:47

## 2023-10-13 RX ADMIN — HYDROMORPHONE HYDROCHLORIDE 0.5 MG: 1 INJECTION, SOLUTION INTRAMUSCULAR; INTRAVENOUS; SUBCUTANEOUS at 11:36

## 2023-10-13 RX ADMIN — ROCURONIUM BROMIDE 10 MG: 50 INJECTION, SOLUTION INTRAVENOUS at 10:25

## 2023-10-13 RX ADMIN — HYDROMORPHONE HYDROCHLORIDE 1 MG: 2 INJECTION, SOLUTION INTRAMUSCULAR; INTRAVENOUS; SUBCUTANEOUS at 09:28

## 2023-10-13 RX ADMIN — SODIUM CHLORIDE, SODIUM LACTATE, POTASSIUM CHLORIDE, AND CALCIUM CHLORIDE: .6; .31; .03; .02 INJECTION, SOLUTION INTRAVENOUS at 09:27

## 2023-10-13 RX ADMIN — DEXMEDETOMIDINE HYDROCHLORIDE 8 MCG: 100 INJECTION, SOLUTION INTRAVENOUS at 09:55

## 2023-10-13 RX ADMIN — HYDROMORPHONE HYDROCHLORIDE 0.5 MG: 1 INJECTION, SOLUTION INTRAMUSCULAR; INTRAVENOUS; SUBCUTANEOUS at 12:36

## 2023-10-13 RX ADMIN — PROPOFOL 25 MG: 10 INJECTION, EMULSION INTRAVENOUS at 10:39

## 2023-10-13 RX ADMIN — DEXMEDETOMIDINE HYDROCHLORIDE 12 MCG: 100 INJECTION, SOLUTION INTRAVENOUS at 09:35

## 2023-10-13 RX ADMIN — DEXMEDETOMIDINE HYDROCHLORIDE 4 MCG: 100 INJECTION, SOLUTION INTRAVENOUS at 10:33

## 2023-10-13 RX ADMIN — Medication 2000 MG: at 09:27

## 2023-10-13 RX ADMIN — HYDROMORPHONE HYDROCHLORIDE 0.5 MG: 1 INJECTION, SOLUTION INTRAMUSCULAR; INTRAVENOUS; SUBCUTANEOUS at 12:49

## 2023-10-13 RX ADMIN — LIDOCAINE HYDROCHLORIDE 100 MG: 20 INJECTION, SOLUTION INFILTRATION; PERINEURAL at 09:30

## 2023-10-13 RX ADMIN — DEXMEDETOMIDINE HYDROCHLORIDE 8 MCG: 100 INJECTION, SOLUTION INTRAVENOUS at 09:39

## 2023-10-13 RX ADMIN — GLYCOPYRROLATE 0.2 MG: 0.2 INJECTION, SOLUTION INTRAMUSCULAR; INTRAVENOUS at 10:05

## 2023-10-13 RX ADMIN — MIDAZOLAM HYDROCHLORIDE 2 MG: 1 INJECTION, SOLUTION INTRAMUSCULAR; INTRAVENOUS at 09:06

## 2023-10-13 RX ADMIN — KETOROLAC TROMETHAMINE 30 MG: 30 INJECTION, SOLUTION INTRAMUSCULAR; INTRAVENOUS at 10:36

## 2023-10-13 RX ADMIN — ONDANSETRON 4 MG: 2 INJECTION INTRAMUSCULAR; INTRAVENOUS at 09:41

## 2023-10-13 RX ADMIN — DEXAMETHASONE SODIUM PHOSPHATE 10 MG: 4 INJECTION, SOLUTION INTRAMUSCULAR; INTRAVENOUS at 09:41

## 2023-10-13 ASSESSMENT — PAIN DESCRIPTION - LOCATION
LOCATION: BACK
LOCATION: ABDOMEN

## 2023-10-13 ASSESSMENT — PAIN SCALES - GENERAL
PAINLEVEL_OUTOF10: 7

## 2023-10-13 ASSESSMENT — PAIN - FUNCTIONAL ASSESSMENT: PAIN_FUNCTIONAL_ASSESSMENT: 0-10

## 2023-10-13 NOTE — PROGRESS NOTES
Pt up to bathroom to void, voided large amount of urine without difficulty, moved patient to day surgery for discharge. Spouse brought to bedside in day surgery.

## 2023-10-13 NOTE — PROGRESS NOTES
Pt admitted to [facility] room 6 to prep for surgery. Consents reviewed. IV started and labs sent as ordered.  Family member at bedside

## 2023-10-13 NOTE — ANESTHESIA POSTPROCEDURE EVALUATION
Department of Anesthesiology  Postprocedure Note    Patient: Salina Osgood  MRN: [id number]  YOB: 1980  Date of evaluation: 10/13/2023      Procedure Summary     Date: 10/13/23 Room / Location: [facility] OR [address] [place] [address]    Anesthesia Start: 7637 Anesthesia Stop: 281    Procedure: ROBOTIC ASSISTED TOTAL LAPAROSCOPIC HYSTERECTOMY, BILATERAL SALPINGECTOMY, LEFT OOPHORECTOMY (Abdomen) Diagnosis:       Pelvic pain in female      Dysmenorrhea      Left ovarian cyst      (Pelvic pain in female [R10.2])      (Dysmenorrhea [N94.6])      (Left ovarian cyst [A19.543])    Surgeons: James Flores MD Responsible Provider: Jmai Simons MD    Anesthesia Type: general ASA Status: 3          Anesthesia Type: No value filed.     Aly Phase I: Aly Score: 10    Aly Phase II: Aly Score: 10      Anesthesia Post Evaluation    Patient location during evaluation: PACU  Patient participation: complete - patient participated  Level of consciousness: awake and alert  Airway patency: patent  Nausea & Vomiting: no nausea and no vomiting  Complications: no  Cardiovascular status: hemodynamically stable  Respiratory status: acceptable  Hydration status: euvolemic  Pain management: adequate

## 2023-10-13 NOTE — OP NOTE
[address] [place] [address]                                OPERATIVE REPORT    PATIENT NAME: Magda Gibbons                  :        1980  MED REC NO:   [id number]                          ROOM:  ACCOUNT NO:   [de-identified]                           ADMIT DATE: 10/13/2023  PROVIDER:     Hesham Ríos MD    DATE OF PROCEDURE:  10/13/2023    PREOPERATIVE DIAGNOSES:  1. Dysmenorrhea. 2.  Menorrhagia. 3.  Pelvic pain. POSTOPERATIVE DIAGNOSES:  1. Dysmenorrhea. 2.  Menorrhagia. 3.  Pelvic pain. OPERATION PERFORMED:  1.  Robotic assisted total laparoscopic hysterectomy with bilateral  salpingectomy. 2.  Left oophorectomy. SURGEON:  Hesham Ríos MD    ANESTHESIA:  General endotracheal anesthesia. ESTIMATED BLOOD LOSS:  150 mL. FINDINGS:  1.  10 weeks sized uterus. 2.  Normal right ovary. COMPLICATIONS:  None. HISTORY:  The patient is a 79-year-old female who presents for surgical  management of menorrhagia, dysmenorrhea, and pelvic pain. She has been  experiencing those worsening over the past 2 years. She has tried oral  contraceptive pills without resolution. She also notices increasing  pain in her left lower quadrant specifically she has noted some ovarian  cyst on the left and desires removal of her left ovary. OPERATIVE PROCEDURE:  After appropriate consent was obtained, the  patient was taken to the operating room where general endotracheal  anesthesia was found to be adequate. The patient was placed in [facility]. Exam under anesthesia revealed 10 weeks sized uterus,  anteverted, and mobile. The patient was prepped and draped in normal  sterile fashion. Cervix was grasped using a single-tooth tenaculum and  the cervix was dilated to fit the BIANKA uterine manipulator with a [employer] surrounding the cervix. Next, attention was turned towards the  abdomen.   An 8 mm

## 2023-10-13 NOTE — BRIEF OP NOTE
Department of Obstetrics and Gynecology   Brief Operative Report        Pre-operative Diagnosis:  dysmenorrhea, menorrhagia, pelvic pain    Post-operative Diagnosis:  Same    Procedure: robotic assisted tlh with bilateral salpingectomy and left oopherectomy    Surgeon:  Poppy Rouse MD     Assistant(s): none    Anesthesia:  GETA    Findings: 10 week size uterus, normal ovary on right    Estimated blood loss:  150ml's    Specimens: uterus, fallopian tubes, left ovary to pathology    Complications:  none    Condition:  stable        See dictated operative report for full details.

## 2023-10-13 NOTE — H&P
Pt met and chart reviewed    H and P unchanged from prior    Will proceed with robotic assisted tlh bilateral salpingectomy, left oopherectomy

## 2023-10-13 NOTE — PROGRESS NOTES
Pt arrived from OR to PACU, report received from CRNA and OR RN, see flowsheet for frequent VS and assessment.

## 2023-10-13 NOTE — ANESTHESIA PRE PROCEDURE
of 55%. No regional wall motion abnormalities are seen. Normal left ventricular diastolic filling pressure. Mild mitral regurgitation. 07/2020 Stress test:   Normal LV function. There is uniform isotope uptake at stress and rest. There is no evidence of myocardial ischemia or scar. Neuro/Psych:   (+) : migraine headaches, depression/anxiety  (Panic attacks)            GI/Hepatic/Renal:   (+) GERD:, morbid obesity         ROS comment: S/P laparoscopic sleeve gastrectomy. Endo/Other:    (+) DiabetesType II DM, , .                  ROS comment: PCOS Abdominal:             Vascular: negative vascular ROS. Other Findings:           Anesthesia Plan      general     ASA 3       Induction: intravenous. Anesthetic plan and risks discussed with patient. Use of blood products discussed with patient whom. Plan discussed with CRNA.                     Antonio Gregory MD   10/13/2023

## 2023-10-18 ENCOUNTER — OFFICE VISIT (OUTPATIENT)
Dept: ENT CLINIC | Age: 43
End: 2023-10-18
Payer: COMMERCIAL

## 2023-10-18 VITALS — BODY MASS INDEX: 36.64 KG/M2 | OXYGEN SATURATION: 97 % | WEIGHT: 228 LBS | HEIGHT: 66 IN | TEMPERATURE: 97.8 F

## 2023-10-18 DIAGNOSIS — M26.623 BILATERAL TEMPOROMANDIBULAR JOINT PAIN: ICD-10-CM

## 2023-10-18 DIAGNOSIS — H93.8X3 SENSATION OF FULLNESS IN BOTH EARS: Primary | ICD-10-CM

## 2023-10-18 PROCEDURE — G8417 CALC BMI ABV UP PARAM F/U: HCPCS | Performed by: OTOLARYNGOLOGY

## 2023-10-18 PROCEDURE — G8427 DOCREV CUR MEDS BY ELIG CLIN: HCPCS | Performed by: OTOLARYNGOLOGY

## 2023-10-18 PROCEDURE — 99213 OFFICE O/P EST LOW 20 MIN: CPT | Performed by: OTOLARYNGOLOGY

## 2023-10-18 PROCEDURE — 1036F TOBACCO NON-USER: CPT | Performed by: OTOLARYNGOLOGY

## 2023-10-18 PROCEDURE — G8484 FLU IMMUNIZE NO ADMIN: HCPCS | Performed by: OTOLARYNGOLOGY

## 2023-10-18 ASSESSMENT — ENCOUNTER SYMPTOMS
SHORTNESS OF BREATH: 0
TROUBLE SWALLOWING: 0
SORE THROAT: 0
COUGH: 0
FACIAL SWELLING: 0
APNEA: 0
SINUS PRESSURE: 0
EYE ITCHING: 0
VOICE CHANGE: 0

## 2023-10-18 NOTE — PROGRESS NOTES
MCG/ACT AERS inhaler Inhale 1 puff into the lungs every 6 hours as needed for Wheezing or Shortness of Breath 1 each 0    cetirizine (ZYRTEC) 10 MG tablet Take 1 tablet by mouth daily. 90 tablet 1    Calcium Citrate-Vitamin D 250-5 MG-MCG TABS Take 1 tablet by mouth in the morning and at bedtime 90 tablet 1    Biotin 5000 MCG CAPS Take 2 capsules by mouth daily 180 capsule 3    Multiple Vitamins-Minerals (THEREMS-M) TABS Take 1 tablet by mouth daily. 90 tablet 3    hydroCHLOROthiazide (HYDRODIURIL) 25 MG tablet Take 1 tablet by mouth every morning. 90 tablet 3    spironolactone (ALDACTONE) 50 MG tablet Take 1 tablet by mouth daily. 90 tablet 3    DULoxetine (CYMBALTA) 60 MG extended release capsule Take 1 capsule by mouth daily. (Patient taking differently: Take 1 capsule by mouth daily Take 1 capsule by mouth daily.) 90 capsule 1    verapamil (CALAN SR) 240 MG extended release tablet Take 1 tablet by mouth daily. 90 tablet 1    CALCIUM-VITAMIN D PO Take 1 tablet by mouth daily      fluticasone (FLONASE) 50 MCG/ACT nasal spray Instill 1 spray into each nostril daily. 3 each 1    cimetidine (TAGAMET) 400 MG tablet Take 1 tablet by mouth twice daily. (Patient taking differently: Take 1 tablet by mouth daily Take 1 tablet by mouth twice daily. ) 180 tablet 3    losartan-hydroCHLOROthiazide (HYZAAR) 100-25 MG per tablet Take 1 tablet by mouth daily for high blood pressure. 90 tablet 3    sennosides-docusate sodium (SENOKOT-S) 8.6-50 MG tablet Take 2 tablets by mouth daily 180 tablet 1    blood glucose monitor kit and supplies Dispense sufficient amount for indicated testing frequency plus additional to accommodate PRN testing needs. Dispense all needed supplies to include: monitor, strips, lancing device, lancets, control solutions, alcohol swabs.  1 kit 0    metFORMIN (GLUCOPHAGE-XR) 750 MG extended release tablet TAKE 2 TABLETS BY MOUTH EVERY MORNING (Patient taking differently: Indications: takes for PCOS TAKE 2

## 2023-10-23 DIAGNOSIS — Z98.84 S/P LAPAROSCOPIC SLEEVE GASTRECTOMY: ICD-10-CM

## 2023-10-30 ENCOUNTER — OFFICE VISIT (OUTPATIENT)
Dept: FAMILY MEDICINE CLINIC | Age: 43
End: 2023-10-30
Payer: COMMERCIAL

## 2023-10-30 VITALS
HEART RATE: 98 BPM | BODY MASS INDEX: 33.5 KG/M2 | DIASTOLIC BLOOD PRESSURE: 88 MMHG | WEIGHT: 234 LBS | SYSTOLIC BLOOD PRESSURE: 128 MMHG | TEMPERATURE: 97.3 F | HEIGHT: 70 IN | OXYGEN SATURATION: 98 %

## 2023-10-30 DIAGNOSIS — R39.89 SUSPECTED UTI: ICD-10-CM

## 2023-10-30 DIAGNOSIS — R39.15 URINARY URGENCY: ICD-10-CM

## 2023-10-30 DIAGNOSIS — N61.0 BREAST INFECTION: Primary | ICD-10-CM

## 2023-10-30 LAB
BILIRUBIN, POC: NEGATIVE
BLOOD URINE, POC: NORMAL
CLARITY, POC: NORMAL
COLOR, POC: NORMAL
GLUCOSE URINE, POC: NEGATIVE
KETONES, POC: NEGATIVE
LEUKOCYTE EST, POC: NEGATIVE
NITRITE, POC: NEGATIVE
PH, POC: 8.5
PROTEIN, POC: NEGATIVE
SPECIFIC GRAVITY, POC: 1.01
UROBILINOGEN, POC: NORMAL

## 2023-10-30 PROCEDURE — 81002 URINALYSIS NONAUTO W/O SCOPE: CPT | Performed by: NURSE PRACTITIONER

## 2023-10-30 PROCEDURE — G8427 DOCREV CUR MEDS BY ELIG CLIN: HCPCS | Performed by: NURSE PRACTITIONER

## 2023-10-30 PROCEDURE — 3074F SYST BP LT 130 MM HG: CPT | Performed by: NURSE PRACTITIONER

## 2023-10-30 PROCEDURE — G8484 FLU IMMUNIZE NO ADMIN: HCPCS | Performed by: NURSE PRACTITIONER

## 2023-10-30 PROCEDURE — 3079F DIAST BP 80-89 MM HG: CPT | Performed by: NURSE PRACTITIONER

## 2023-10-30 PROCEDURE — 1036F TOBACCO NON-USER: CPT | Performed by: NURSE PRACTITIONER

## 2023-10-30 PROCEDURE — 99213 OFFICE O/P EST LOW 20 MIN: CPT | Performed by: NURSE PRACTITIONER

## 2023-10-30 PROCEDURE — G8417 CALC BMI ABV UP PARAM F/U: HCPCS | Performed by: NURSE PRACTITIONER

## 2023-10-30 RX ORDER — SULFAMETHOXAZOLE AND TRIMETHOPRIM 800; 160 MG/1; MG/1
1 TABLET ORAL 2 TIMES DAILY
Qty: 10 TABLET | Refills: 0 | Status: SHIPPED | OUTPATIENT
Start: 2023-10-30 | End: 2023-11-04

## 2023-10-30 RX ORDER — DOXYCYCLINE HYCLATE 100 MG
100 TABLET ORAL 2 TIMES DAILY
Qty: 14 TABLET | Refills: 0 | Status: CANCELLED | OUTPATIENT
Start: 2023-10-30 | End: 2023-11-06

## 2023-10-30 RX ORDER — KETOCONAZOLE 20 MG/G
CREAM TOPICAL
Qty: 30 G | Refills: 1 | Status: CANCELLED | OUTPATIENT
Start: 2023-10-30

## 2023-10-30 ASSESSMENT — ENCOUNTER SYMPTOMS
DIARRHEA: 1
VOMITING: 0
NAUSEA: 0
ABDOMINAL PAIN: 0

## 2023-10-30 NOTE — PROGRESS NOTES
Pavel David (:  1980) is a 44 y.o. female,Established patient, here for evaluation of the following chief complaint(s):  Breast Mass (Sxs 2-3 days, Left ) and Urinary Frequency         ASSESSMENT/PLAN:  1. Breast infection  -     sulfamethoxazole-trimethoprim (BACTRIM DS;SEPTRA DS) 800-160 MG per tablet; Take 1 tablet by mouth 2 times daily for 5 days, Disp-10 tablet, R-0Normal  2. Urinary urgency  -     Culture, Urine  -     POCT Urinalysis no Micro  3. Suspected UTI  -     Culture, Urine  -     POCT Urinalysis no Micro    -Urine dip negative, culture pending   -Will start Bactrim to treat potential UTI as well as skin infection   -If no improvement or worsening of breast site, notify office, would consider referral to breast surgeon at that time with patient's history  -Discussed signs of infection, redness/swelling/drainage/warmth/fever/nausea,   Notify office if develop, patient verbalized understanding     No follow-ups on file. Subjective   SUBJECTIVE/OBJECTIVE:  Reports rash to her left breast for three days . Reports she feels a ball under skin   Reports she has had these types of rashes before and has been treated with antibiotics before, has been hospitalized for breast cellulitis in the past   Has been using Mupirocin ointment on the site which has not helped   Denies fever/drainage from site,nausea/vomiting  Reports last mammogram was negative     Also reports some urinary frequency and urgency  for last day one episode yesterday where \"bladder was in one big ball and I couldn't hold it\" reports that no further urinary symptoms aside from urgency   Recently had hysterectomy         Urinary Frequency   Associated symptoms include frequency and urgency. Pertinent negatives include no chills, flank pain, hematuria, nausea or vomiting. Review of Systems   Constitutional:  Negative for chills and fever. Gastrointestinal:  Positive for diarrhea.  Negative for abdominal pain,

## 2023-11-01 LAB — BACTERIA UR CULT: NORMAL

## 2023-11-07 DIAGNOSIS — Z98.84 S/P LAPAROSCOPIC SLEEVE GASTRECTOMY: ICD-10-CM

## 2023-11-20 NOTE — TELEPHONE ENCOUNTER
Pt called in regard to refill:    Pharmacy is giving her a final notice on refilling prescription.  Please advice if refill is ok.    Pt was last seen on 9/5/23 VV with Dr. Sandhu    11/16/2023 appt. Was cancelled with Dr. Ricardo.      Calcium Citrate-Vitamin D 250-5 MG-MCG TABS [MD]     Preferred pharmacy: [facility] [address] - P [phone] - F [phone]

## 2023-11-21 DIAGNOSIS — K59.01 SLOW TRANSIT CONSTIPATION: ICD-10-CM

## 2023-11-21 RX ORDER — DOCUSATE SODIUM, SENNOSIDES 50; 8.6 MG/1; MG/1
2 TABLET ORAL DAILY
Qty: 180 TABLET | Refills: 3 | Status: SHIPPED | OUTPATIENT
Start: 2023-11-21

## 2023-11-21 NOTE — TELEPHONE ENCOUNTER
Refill Request     CONFIRM preferred pharmacy with the patient.    If Mail Order Rx - Pend for 90 day refill.      Last Seen: Last Seen Department: 10/30/2023  Last Seen by PCP: 6/12/2023    Last Written: 5/25/23 180 tabs 1 refill     If no future appointment scheduled:  Review the last OV with PCP and review information for follow-up visit,  Route STAFF MESSAGE with patient name to the  Pool for scheduling with the following information:            -  Timing of next visit           -  Visit type ie Physical, OV, etc           -  Diagnoses/Reason ie. COPD, HTN - Do not use MEDICATION, Follow-up or CHECK UP - Give reason for visit      Next Appointment:   No future appointments.    Message sent to  to schedule appt with patient?  NO      Requested Prescriptions     Pending Prescriptions Disp Refills    SENNA-PLUS 8.6-50 MG per tablet [Pharmacy Med Name: [employer] Senna-Plus 50mg-8.6mg Tablet] 180 tablet 0     Sig: Take 2 tablets by mouth daily.

## 2023-11-22 ENCOUNTER — TELEPHONE (OUTPATIENT)
Dept: BARIATRICS/WEIGHT MGMT | Age: 43
End: 2023-11-22

## 2023-11-22 DIAGNOSIS — Z98.84 S/P LAPAROSCOPIC SLEEVE GASTRECTOMY: ICD-10-CM

## 2023-11-22 NOTE — TELEPHONE ENCOUNTER
Patient had requested this on 11/7/2023 and the request was only sent to Dr. Saúl Ponce. Does not look like it was sent in. I have sent a refill for calcium-vit D sent to pharmacy.    Thank you,  RIVER VallesC

## 2023-11-22 NOTE — TELEPHONE ENCOUNTER
Pt called said need Vitamin D ordered for her. Said she had requested it through my chart and called Monday and talked to someone but it was never called in to her pharmacy. Would like a call back when done so she knows it is there.

## 2023-11-30 DIAGNOSIS — N61.0 BREAST INFECTION IN FEMALE: Primary | ICD-10-CM

## 2023-12-04 ENCOUNTER — HOSPITAL ENCOUNTER (OUTPATIENT)
Dept: WOMENS IMAGING | Age: 43
Discharge: HOME | End: 2023-12-04
Payer: COMMERCIAL

## 2023-12-04 DIAGNOSIS — N63.20 MASS OF LEFT BREAST, UNSPECIFIED QUADRANT: ICD-10-CM

## 2023-12-04 PROCEDURE — 76642 ULTRASOUND BREAST LIMITED: CPT

## 2023-12-07 ENCOUNTER — TELEPHONE (OUTPATIENT)
Dept: BARIATRICS/WEIGHT MGMT | Age: 43
End: 2023-12-07

## 2023-12-07 DIAGNOSIS — Z98.84 S/P LAPAROSCOPIC SLEEVE GASTRECTOMY: Primary | ICD-10-CM

## 2023-12-07 NOTE — TELEPHONE ENCOUNTER
Pt called said vitamin ordered was 250 mg instead of 200 mg they can not order 250 mg to her pharmacy needs to know if we can call in the 200 mg for her.

## 2023-12-07 NOTE — TELEPHONE ENCOUNTER
Not sure why the pharmacy does not have that dose as it is a common dosage, but I did send a prescription for the Calcium 200 mg twice a day.   Thank you,  RIVER NunnC

## 2024-01-20 DIAGNOSIS — F33.1 MODERATE EPISODE OF RECURRENT MAJOR DEPRESSIVE DISORDER (HCC): Primary | ICD-10-CM

## 2024-01-20 NOTE — TELEPHONE ENCOUNTER
Refill Request     CONFIRM preferred pharmacy with the patient.    If Mail Order Rx - Pend for 90 day refill.      Last Seen: Last Seen Department: 10/30/2023  Last Seen by PCP: 6/12/2023    Last Written: 7/24/2023    If no future appointment scheduled:  Review the last OV with PCP and review information for follow-up visit,  Route STAFF MESSAGE with patient name to the  Pool for scheduling with the following information:            -  Timing of next visit           -  Visit type ie Physical, OV, etc           -  Diagnoses/Reason ie. COPD, HTN - Do not use MEDICATION, Follow-up or CHECK UP - Give reason for visit      Next Appointment:   No future appointments.    Message sent to  to schedule appt with patient?  YES -   Return in about 6 months (around 12/12/2023) for DM, HTN.      Requested Prescriptions     Pending Prescriptions Disp Refills    DULoxetine (CYMBALTA) 60 MG extended release capsule [Pharmacy Med Name: Duloxetine 60mg Delayed-Release Capsule] 90 capsule 1     Sig: Take 1 capsule by mouth daily.

## 2024-01-22 RX ORDER — DULOXETIN HYDROCHLORIDE 60 MG/1
CAPSULE, DELAYED RELEASE ORAL
Qty: 90 CAPSULE | Refills: 1 | Status: SHIPPED | OUTPATIENT
Start: 2024-01-22

## 2024-02-19 DIAGNOSIS — H69.93 ACUTE DYSFUNCTION OF EUSTACHIAN TUBE, BILATERAL: ICD-10-CM

## 2024-02-19 RX ORDER — CETIRIZINE HYDROCHLORIDE 10 MG/1
10 TABLET ORAL DAILY
Qty: 90 TABLET | Refills: 1 | Status: SHIPPED | OUTPATIENT
Start: 2024-02-19

## 2024-02-19 NOTE — TELEPHONE ENCOUNTER
Refill Request     CONFIRM preferred pharmacy with the patient.    If Mail Order Rx - Pend for 90 day refill.      Last Seen: Last Seen Department: 10/30/2023  Last Seen by PCP: 6/12/2023    Last Written: 8/21/2023, #90, 1 refill    If no future appointment scheduled:  Review the last OV with PCP and review information for follow-up visit,  Route STAFF MESSAGE with patient name to the  Pool for scheduling with the following information:            -  Timing of next visit           -  Visit type ie Physical, OV, etc           -  Diagnoses/Reason ie. COPD, HTN - Do not use MEDICATION, Follow-up or CHECK UP - Give reason for visit      Next Appointment:   Future Appointments   Date Time Provider Department Center   4/22/2024  4:00 PM Sorin Santillan, DO [facility]       Message sent to  to schedule appt with patient?  N/A      Requested Prescriptions     Pending Prescriptions Disp Refills    cetirizine (ZYRTEC) 10 MG tablet [Pharmacy Med Name: Cetirizine Hydrochloride 10mg 24 Hour Allergy Relief Tablet] 90 tablet 1     Sig: Take 1 tablet by mouth daily.

## 2024-03-04 NOTE — PATIENT INSTRUCTIONS
\"The entire autonomic nervous system (and through it, our internal organs and glands) is largely driven by our breathing patterns. By changing our breathing we can influence millions of biochemical reactions in our body, producing more relaxing substances such as endorphins and fewer anxiety-producing ones like adrenaline and higher blood acidity. Mindfulness of the breath is so effective that it is common to all meditative and prayer traditions. \" Anxiety Fear [employer] Breathing - Breathing. com    \"When overcoming high levels of anxiety, it is important to learn the techniques of correct breathing. Many people who live with high levels of anxiety are known to breathe through their chest. Shallow breathing through the chest means you are disrupting the balance of oxygen and carbon dioxide necessary to be in a relaxed state. This type of breathing will perpetuate the symptoms of anxiety. \" [employer]. com      Diaphragmatic Breathing  ( You can download the free jennifer,  [id number], to practice)           _____________________________________________________________________________  1. Sit in a comfortable position    2. Place one hand on your stomach and the other on your chest    3. Try to breathe so that only your stomach rises and falls    As you inhale, concentrate on your chest remaining relatively still while your stomach rises. It may be helpful for you to imagine that your pants are too big and you need to push your stomach out to hold them up. When exhaling, allow your stomach to fall in and the air to fully escape. Inhale slowly. You may choose to hold the air in for about a second. Exhale slowly. Dont push the air out, but just let the natural pressure of your body slowly move it out.     It is normal for this healthy method of breathing to feel a little awkward at first.  With practice, it will feel more natural.    4. Get your mind on your side    One other important factor in getting relaxed is your
Statement Selected

## 2024-04-19 DIAGNOSIS — H69.93 ACUTE DYSFUNCTION OF EUSTACHIAN TUBE, BILATERAL: ICD-10-CM

## 2024-04-19 RX ORDER — FLUTICASONE PROPIONATE 50 MCG
SPRAY, SUSPENSION (ML) NASAL
Qty: 3 EACH | Refills: 1 | Status: SHIPPED | OUTPATIENT
Start: 2024-04-19

## 2024-04-19 NOTE — TELEPHONE ENCOUNTER
Refill Request     CONFIRM preferred pharmacy with the patient.    If Mail Order Rx - Pend for 90 day refill.      Last Seen: Last Seen Department: 10/30/2023  Last Seen by PCP: 6/12/2023    Last Written: 6/25/2023 3 each 1 refills    If no future appointment scheduled:  Review the last OV with PCP and review information for follow-up visit,  Route STAFF MESSAGE with patient name to the  Pool for scheduling with the following information:            -  Timing of next visit           -  Visit type ie Physical, OV, etc           -  Diagnoses/Reason ie. COPD, HTN - Do not use MEDICATION, Follow-up or CHECK UP - Give reason for visit      Next Appointment:   Future Appointments   Date Time Provider Department Center   4/22/2024  4:00 PM Sorin Santillan, DO [facility]       Message sent to  to schedule appt with patient?  NO      Requested Prescriptions     Pending Prescriptions Disp Refills    fluticasone (FLONASE) 50 MCG/ACT nasal spray [Pharmacy Med Name: Fluticasone Propionate 50mcg/actuation Nasal Spray]       Sig: Instill 1 spray into each nostril daily.

## 2024-04-21 ASSESSMENT — PATIENT HEALTH QUESTIONNAIRE - PHQ9
10. IF YOU CHECKED OFF ANY PROBLEMS, HOW DIFFICULT HAVE THESE PROBLEMS MADE IT FOR YOU TO DO YOUR WORK, TAKE CARE OF THINGS AT HOME, OR GET ALONG WITH OTHER PEOPLE: SOMEWHAT DIFFICULT
9. THOUGHTS THAT YOU WOULD BE BETTER OFF DEAD, OR OF HURTING YOURSELF: SEVERAL DAYS
SUM OF ALL RESPONSES TO PHQ QUESTIONS 1-9: 9
8. MOVING OR SPEAKING SO SLOWLY THAT OTHER PEOPLE COULD HAVE NOTICED. OR THE OPPOSITE, BEING SO FIGETY OR RESTLESS THAT YOU HAVE BEEN MOVING AROUND A LOT MORE THAN USUAL: NOT AT ALL
2. FEELING DOWN, DEPRESSED OR HOPELESS: MORE THAN HALF THE DAYS
4. FEELING TIRED OR HAVING LITTLE ENERGY: SEVERAL DAYS
3. TROUBLE FALLING OR STAYING ASLEEP: NOT AT ALL
9. THOUGHTS THAT YOU WOULD BE BETTER OFF DEAD, OR OF HURTING YOURSELF: SEVERAL DAYS
SUM OF ALL RESPONSES TO PHQ QUESTIONS 1-9: 10
7. TROUBLE CONCENTRATING ON THINGS, SUCH AS READING THE NEWSPAPER OR WATCHING TELEVISION: SEVERAL DAYS
5. POOR APPETITE OR OVEREATING: MORE THAN HALF THE DAYS
1. LITTLE INTEREST OR PLEASURE IN DOING THINGS: MORE THAN HALF THE DAYS
1. LITTLE INTEREST OR PLEASURE IN DOING THINGS: MORE THAN HALF THE DAYS
SUM OF ALL RESPONSES TO PHQ9 QUESTIONS 1 & 2: 4
8. MOVING OR SPEAKING SO SLOWLY THAT OTHER PEOPLE COULD HAVE NOTICED. OR THE OPPOSITE - BEING SO FIDGETY OR RESTLESS THAT YOU HAVE BEEN MOVING AROUND A LOT MORE THAN USUAL: NOT AT ALL
10. IF YOU CHECKED OFF ANY PROBLEMS, HOW DIFFICULT HAVE THESE PROBLEMS MADE IT FOR YOU TO DO YOUR WORK, TAKE CARE OF THINGS AT HOME, OR GET ALONG WITH OTHER PEOPLE: SOMEWHAT DIFFICULT
SUM OF ALL RESPONSES TO PHQ QUESTIONS 1-9: 10
SUM OF ALL RESPONSES TO PHQ QUESTIONS 1-9: 10
6. FEELING BAD ABOUT YOURSELF - OR THAT YOU ARE A FAILURE OR HAVE LET YOURSELF OR YOUR FAMILY DOWN: SEVERAL DAYS
5. POOR APPETITE OR OVEREATING: MORE THAN HALF THE DAYS
7. TROUBLE CONCENTRATING ON THINGS, SUCH AS READING THE NEWSPAPER OR WATCHING TELEVISION: SEVERAL DAYS
2. FEELING DOWN, DEPRESSED OR HOPELESS: MORE THAN HALF THE DAYS
6. FEELING BAD ABOUT YOURSELF - OR THAT YOU ARE A FAILURE OR HAVE LET YOURSELF OR YOUR FAMILY DOWN: SEVERAL DAYS
3. TROUBLE FALLING OR STAYING ASLEEP: NOT AT ALL
SUM OF ALL RESPONSES TO PHQ QUESTIONS 1-9: 10

## 2024-04-21 ASSESSMENT — COLUMBIA-SUICIDE SEVERITY RATING SCALE - C-SSRS
2. IN THE PAST MONTH, HAVE YOU ACTUALLY HAD ANY THOUGHTS OF KILLING YOURSELF?: NO
1. IN THE PAST MONTH, HAVE YOU WISHED YOU WERE DEAD OR WISHED YOU COULD GO TO SLEEP AND NOT WAKE UP?: YES
6. IN YOUR LIFETIME, HAVE YOU EVER DONE ANYTHING, STARTED TO DO ANYTHING, OR PREPARED TO DO ANYTHING TO END YOUR LIFE?: NO

## 2024-04-22 ENCOUNTER — OFFICE VISIT (OUTPATIENT)
Dept: FAMILY MEDICINE CLINIC | Age: 44
End: 2024-04-22
Payer: COMMERCIAL

## 2024-04-22 VITALS
OXYGEN SATURATION: 96 % | WEIGHT: 240 LBS | DIASTOLIC BLOOD PRESSURE: 80 MMHG | HEART RATE: 89 BPM | BODY MASS INDEX: 38.57 KG/M2 | HEIGHT: 66 IN | SYSTOLIC BLOOD PRESSURE: 130 MMHG

## 2024-04-22 DIAGNOSIS — E11.9 TYPE 2 DIABETES MELLITUS WITHOUT COMPLICATION, WITHOUT LONG-TERM CURRENT USE OF INSULIN (HCC): Primary | ICD-10-CM

## 2024-04-22 DIAGNOSIS — I10 ESSENTIAL HYPERTENSION: ICD-10-CM

## 2024-04-22 DIAGNOSIS — F33.2 SEVERE RECURRENT MAJOR DEPRESSION WITHOUT PSYCHOTIC FEATURES (HCC): ICD-10-CM

## 2024-04-22 PROCEDURE — 1036F TOBACCO NON-USER: CPT | Performed by: FAMILY MEDICINE

## 2024-04-22 PROCEDURE — 3046F HEMOGLOBIN A1C LEVEL >9.0%: CPT | Performed by: FAMILY MEDICINE

## 2024-04-22 PROCEDURE — G8427 DOCREV CUR MEDS BY ELIG CLIN: HCPCS | Performed by: FAMILY MEDICINE

## 2024-04-22 PROCEDURE — G8417 CALC BMI ABV UP PARAM F/U: HCPCS | Performed by: FAMILY MEDICINE

## 2024-04-22 PROCEDURE — 2022F DILAT RTA XM EVC RTNOPTHY: CPT | Performed by: FAMILY MEDICINE

## 2024-04-22 PROCEDURE — 3074F SYST BP LT 130 MM HG: CPT | Performed by: FAMILY MEDICINE

## 2024-04-22 PROCEDURE — 3079F DIAST BP 80-89 MM HG: CPT | Performed by: FAMILY MEDICINE

## 2024-04-22 PROCEDURE — 99214 OFFICE O/P EST MOD 30 MIN: CPT | Performed by: FAMILY MEDICINE

## 2024-04-22 RX ORDER — DULOXETIN HYDROCHLORIDE 30 MG/1
30 CAPSULE, DELAYED RELEASE ORAL DAILY
Qty: 90 CAPSULE | Refills: 1 | Status: SHIPPED | OUTPATIENT
Start: 2024-04-22

## 2024-04-22 RX ORDER — DULOXETIN HYDROCHLORIDE 30 MG/1
30 CAPSULE, DELAYED RELEASE ORAL DAILY
Qty: 30 CAPSULE | Refills: 0 | Status: SHIPPED | OUTPATIENT
Start: 2024-04-22

## 2024-04-22 ASSESSMENT — ENCOUNTER SYMPTOMS
ABDOMINAL DISTENTION: 0
VOMITING: 0
BACK PAIN: 0
SINUS PRESSURE: 0
NAUSEA: 0
EYE REDNESS: 0
DIARRHEA: 0
RHINORRHEA: 0
VOICE CHANGE: 0
BLOOD IN STOOL: 0
SORE THROAT: 0
CHEST TIGHTNESS: 0
EYE DISCHARGE: 0
WHEEZING: 0
ABDOMINAL PAIN: 0
CONSTIPATION: 0
ANAL BLEEDING: 0
EYE PAIN: 0
SHORTNESS OF BREATH: 0
EYE ITCHING: 0
COUGH: 0
TROUBLE SWALLOWING: 0

## 2024-04-22 ASSESSMENT — ANXIETY QUESTIONNAIRES
6. BECOMING EASILY ANNOYED OR IRRITABLE: SEVERAL DAYS
7. FEELING AFRAID AS IF SOMETHING AWFUL MIGHT HAPPEN: SEVERAL DAYS
3. WORRYING TOO MUCH ABOUT DIFFERENT THINGS: SEVERAL DAYS
2. NOT BEING ABLE TO STOP OR CONTROL WORRYING: SEVERAL DAYS
IF YOU CHECKED OFF ANY PROBLEMS ON THIS QUESTIONNAIRE, HOW DIFFICULT HAVE THESE PROBLEMS MADE IT FOR YOU TO DO YOUR WORK, TAKE CARE OF THINGS AT HOME, OR GET ALONG WITH OTHER PEOPLE: SOMEWHAT DIFFICULT
GAD7 TOTAL SCORE: 6
5. BEING SO RESTLESS THAT IT IS HARD TO SIT STILL: NOT AT ALL
1. FEELING NERVOUS, ANXIOUS, OR ON EDGE: SEVERAL DAYS

## 2024-04-22 ASSESSMENT — PATIENT HEALTH QUESTIONNAIRE - PHQ9
DEPRESSION UNABLE TO ASSESS: FUNCTIONAL CAPACITY MOTIVATION LIMITS ACCURACY
7. TROUBLE CONCENTRATING ON THINGS, SUCH AS READING THE NEWSPAPER OR WATCHING TELEVISION: SEVERAL DAYS
SUM OF ALL RESPONSES TO PHQ QUESTIONS 1-9: 14
4. FEELING TIRED OR HAVING LITTLE ENERGY: NEARLY EVERY DAY
SUM OF ALL RESPONSES TO PHQ9 QUESTIONS 1 & 2: 3
8. MOVING OR SPEAKING SO SLOWLY THAT OTHER PEOPLE COULD HAVE NOTICED. OR THE OPPOSITE, BEING SO FIGETY OR RESTLESS THAT YOU HAVE BEEN MOVING AROUND A LOT MORE THAN USUAL: NOT AT ALL
9. THOUGHTS THAT YOU WOULD BE BETTER OFF DEAD, OR OF HURTING YOURSELF: MORE THAN HALF THE DAYS
10. IF YOU CHECKED OFF ANY PROBLEMS, HOW DIFFICULT HAVE THESE PROBLEMS MADE IT FOR YOU TO DO YOUR WORK, TAKE CARE OF THINGS AT HOME, OR GET ALONG WITH OTHER PEOPLE: SOMEWHAT DIFFICULT
SUM OF ALL RESPONSES TO PHQ QUESTIONS 1-9: 14
3. TROUBLE FALLING OR STAYING ASLEEP: MORE THAN HALF THE DAYS
2. FEELING DOWN, DEPRESSED OR HOPELESS: MORE THAN HALF THE DAYS
SUM OF ALL RESPONSES TO PHQ QUESTIONS 1-9: 12
SUM OF ALL RESPONSES TO PHQ QUESTIONS 1-9: 14
5. POOR APPETITE OR OVEREATING: MORE THAN HALF THE DAYS
1. LITTLE INTEREST OR PLEASURE IN DOING THINGS: SEVERAL DAYS

## 2024-04-22 NOTE — PROGRESS NOTES
Subjective:      Patient ID: Rachel San is a 43 y.o. female.    HPI  Patient in for checkup on several medical issues.  Diabetes-does take metformin but is is for PCOS-A1c's are always in the normal limits.  Hypertension-on medication and condition is controlled.  Severe depression-she states that she just seems overwhelmed due to the fact that she has 3 teenagers and her  who is often unemployed.  She states he has his own mental issues but never wants to see anyone to see if he can improve his situation.  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  We started her on Cymbalta 90 mg yesterday.        Review of Systems    Review of Systems   Constitutional:  Positive for fatigue. Negative for unexpected weight change.        Intestinal bypass in the past year-lost 80 pounds but has put now on a 25.   HENT:  Negative for congestion, ear pain, hearing loss, nosebleeds, postnasal drip, rhinorrhea, sinus pressure, sneezing, sore throat, tinnitus, trouble swallowing and voice change.    Eyes:  Negative for pain, discharge, redness, itching and visual disturbance.   Respiratory:  Negative for cough, chest tightness, shortness of breath and wheezing.    Cardiovascular:  Negative for chest pain, palpitations and leg swelling.   Gastrointestinal:  Negative for abdominal distention, abdominal pain, anal bleeding, blood in stool, constipation, diarrhea, nausea and vomiting.   Genitourinary:  Negative for dysuria, flank pain, frequency, hematuria, menstrual problem, pelvic pain, urgency and vaginal discharge.   Musculoskeletal:  Negative for arthralgias, back pain, gait problem, joint swelling, myalgias and neck pain.   Skin:  Negative for pallor and rash.   Neurological:  Negative for dizziness, tremors, seizures, weakness, light-headedness, numbness and headaches.   Hematological:

## 2024-05-09 RX ORDER — DULOXETIN HYDROCHLORIDE 30 MG/1
30 CAPSULE, DELAYED RELEASE ORAL DAILY
Qty: 90 CAPSULE | Refills: 1 | OUTPATIENT
Start: 2024-05-09

## 2024-05-09 NOTE — TELEPHONE ENCOUNTER
Patient states that she didn't request this. That it must have been auto generated. Dr AMADNA had sent to [facility] [employer] 4/22/2024. This pharmacy was questing if the 30MG was to replace the 60MG. Patient is supped to be taking 90MG all together now. Patient is wanting to know if a 90MG can be sent in, so she doesn't have the 30MG and 60MG. If so can send to [facility] [employer].

## 2024-05-09 NOTE — TELEPHONE ENCOUNTER
.Refill Request     CONFIRM preferred pharmacy with the patient.    If Mail Order Rx - Pend for 90 day refill.      Last Seen: Last Seen Department: 4/22/2024  Last Seen by PCP: 4/22/2024    Last Written: 4-22-24 90 with `1     If no future appointment scheduled:  Review the last OV with PCP and review information for follow-up visit,  Route STAFF MESSAGE with patient name to the  Pool for scheduling with the following information:            -  Timing of next visit           -  Visit type ie Physical, OV, etc           -  Diagnoses/Reason ie. COPD, HTN - Do not use MEDICATION, Follow-up or CHECK UP - Give reason for visit      Next Appointment:   Future Appointments   Date Time Provider Department Center   8/7/2024  1:00 PM Sorin Santillan, DO [facility] [facility]       Message sent to  to schedule appt with patient?  NO      Requested Prescriptions     Pending Prescriptions Disp Refills    DULoxetine (CYMBALTA) 30 MG extended release capsule [Pharmacy Med Name: Duloxetine 30mg Delayed-Release Capsule] 90 capsule 1     Sig: Take 1 capsule by mouth daily.

## 2024-05-14 RX ORDER — LOSARTAN POTASSIUM AND HYDROCHLOROTHIAZIDE 25; 100 MG/1; MG/1
TABLET ORAL
Qty: 90 TABLET | Refills: 3 | Status: SHIPPED | OUTPATIENT
Start: 2024-05-14

## 2024-05-14 RX ORDER — CIMETIDINE 400 MG/1
TABLET, FILM COATED ORAL
Qty: 180 TABLET | Refills: 3 | Status: SHIPPED | OUTPATIENT
Start: 2024-05-14

## 2024-05-14 NOTE — TELEPHONE ENCOUNTER
Refill Request     CONFIRM preferred pharmacy with the patient.    If Mail Order Rx - Pend for 90 day refill.      Last Seen: Last Seen Department: 4/22/2024  Last Seen by PCP: 4/22/2024    Last Written: tagamet 6/25/23 180 with 3 refills     Hyzaar 6/25/23 90 with 3 refills     If no future appointment scheduled:  Review the last OV with PCP and review information for follow-up visit,  Route STAFF MESSAGE with patient name to the  Pool for scheduling with the following information:            -  Timing of next visit           -  Visit type ie Physical, OV, etc           -  Diagnoses/Reason ie. COPD, HTN - Do not use MEDICATION, Follow-up or CHECK UP - Give reason for visit      Next Appointment:   Future Appointments   Date Time Provider Department Center   8/7/2024  1:00 PM Sorin Santillan DO [facility]       Message sent to  to schedule appt with patient?  NO      Requested Prescriptions     Pending Prescriptions Disp Refills    cimetidine (TAGAMET) 400 MG tablet [Pharmacy Med Name: Cimetidine 400mg Tablet] 180 tablet 3     Sig: Take 1 tablet by mouth twice daily.    losartan-hydroCHLOROthiazide (HYZAAR) 100-25 MG per tablet [Pharmacy Med Name: Losartan Potassium/Hydrochlorothiazide 100mg-25mg Tablet] 90 tablet 3     Sig: Take 1 tablet by mouth daily for high blood pressure.

## 2024-05-24 RX ORDER — DULOXETIN HYDROCHLORIDE 30 MG/1
30 CAPSULE, DELAYED RELEASE ORAL DAILY
Qty: 90 CAPSULE | Refills: 1 | Status: SHIPPED | OUTPATIENT
Start: 2024-05-24

## 2024-05-24 NOTE — TELEPHONE ENCOUNTER
Refill Request     CONFIRM preferred pharmacy with the patient.    If Mail Order Rx - Pend for 90 day refill.      Last Seen: Last Seen Department: 4/22/2024  Last Seen by PCP: 4/22/2024    Last Written: 4/22/24 30 cap 0 refills    If no future appointment scheduled:  Review the last OV with PCP and review information for follow-up visit,  Route STAFF MESSAGE with patient name to the  Pool for scheduling with the following information:            -  Timing of next visit           -  Visit type ie Physical, OV, etc           -  Diagnoses/Reason ie. COPD, HTN - Do not use MEDICATION, Follow-up or CHECK UP - Give reason for visit      Next Appointment:   Future Appointments   Date Time Provider Department Center   8/7/2024  1:00 PM Sorin Santillan, DO [facility]       Message sent to  to schedule appt with patient?  NO      Requested Prescriptions     Pending Prescriptions Disp Refills    DULoxetine (CYMBALTA) 30 MG extended release capsule [Pharmacy Med Name: DULoxetine HCL DR 30 MG CAPSULE] 30 capsule 0     Sig: TAKE 1 CAPSULE BY MOUTH DAILY

## 2024-06-03 RX ORDER — CIMETIDINE 400 MG/1
400 TABLET, FILM COATED ORAL 2 TIMES DAILY
Qty: 180 TABLET | Refills: 3 | OUTPATIENT
Start: 2024-06-03

## 2024-06-03 RX ORDER — LOSARTAN POTASSIUM AND HYDROCHLOROTHIAZIDE 25; 100 MG/1; MG/1
1 TABLET ORAL DAILY
Qty: 90 TABLET | Refills: 3 | OUTPATIENT
Start: 2024-06-03

## 2024-06-18 DIAGNOSIS — I10 PRIMARY HYPERTENSION: ICD-10-CM

## 2024-06-18 DIAGNOSIS — I10 ESSENTIAL HYPERTENSION: ICD-10-CM

## 2024-06-18 DIAGNOSIS — L65.9 HAIR LOSS: ICD-10-CM

## 2024-06-18 RX ORDER — MULTIVIT,CALC,MINS/IRON/FOLIC 9MG-400MCG
TABLET ORAL
Qty: 90 TABLET | Refills: 3 | Status: SHIPPED | OUTPATIENT
Start: 2024-06-18

## 2024-06-18 RX ORDER — HYDROCHLOROTHIAZIDE 25 MG/1
25 TABLET ORAL EVERY MORNING
Qty: 90 TABLET | Refills: 3 | Status: SHIPPED | OUTPATIENT
Start: 2024-06-18

## 2024-06-18 RX ORDER — VERAPAMIL HYDROCHLORIDE 240 MG/1
240 TABLET, FILM COATED, EXTENDED RELEASE ORAL DAILY
Qty: 90 TABLET | Refills: 1 | Status: SHIPPED | OUTPATIENT
Start: 2024-06-18

## 2024-06-18 RX ORDER — SPIRONOLACTONE 50 MG/1
50 TABLET, FILM COATED ORAL DAILY
Qty: 90 TABLET | Refills: 3 | Status: SHIPPED | OUTPATIENT
Start: 2024-06-18

## 2024-06-18 NOTE — TELEPHONE ENCOUNTER
Refill Request     CONFIRM preferred pharmacy with the patient.    If Mail Order Rx - Pend for 90 day refill.      Last Seen: Last Seen Department: 4/22/2024  Last Seen by PCP: 10/30/2023    Last Written: 12/22/23 90 with 1 refill     If no future appointment scheduled:  Review the last OV with PCP and review information for follow-up visit,  Route STAFF MESSAGE with patient name to the  Pool for scheduling with the following information:            -  Timing of next visit           -  Visit type ie Physical, OV, etc           -  Diagnoses/Reason ie. COPD, HTN - Do not use MEDICATION, Follow-up or CHECK UP - Give reason for visit      Next Appointment:   Future Appointments   Date Time Provider Department Center   8/7/2024  1:00 PM Sorin Santillan, DO [facility]       Message sent to  to schedule appt with patient?  NO      Requested Prescriptions     Pending Prescriptions Disp Refills    verapamil (CALAN SR) 240 MG extended release tablet [Pharmacy Med Name: Verapamil Hydrochloride 240mg Extended-Release Tablet] 90 tablet 1     Sig: Take 1 tablet by mouth daily.

## 2024-06-18 NOTE — TELEPHONE ENCOUNTER
Refill Request     CONFIRM preferred pharmacy with the patient.    If Mail Order Rx - Pend for 90 day refill.      Last Seen: Last Seen Department: 4/22/2024  Last Seen by PCP: 4/22/2024    Last Written: 7/24/2023 Spironolactone  7/24/2023 HCTZ  7/24/2023 Multivitamin  7/25/2023 Biotin    If no future appointment scheduled:  Review the last OV with PCP and review information for follow-up visit,  Route STAFF MESSAGE with patient name to the  Pool for scheduling with the following information:            -  Timing of next visit           -  Visit type ie Physical, OV, etc           -  Diagnoses/Reason ie. COPD, HTN - Do not use MEDICATION, Follow-up or CHECK UP - Give reason for visit      Next Appointment:   Future Appointments   Date Time Provider Department Center   8/7/2024  1:00 PM Sorin Santillan DO [facility]  [place] [facility]       Message sent to  to schedule appt with patient?  NO      Requested Prescriptions     Pending Prescriptions Disp Refills    spironolactone (ALDACTONE) 50 MG tablet [Pharmacy Med Name: Spironolactone 50mg Tablet] 90 tablet 3     Sig: Take 1 tablet by mouth daily.    hydroCHLOROthiazide (HYDRODIURIL) 25 MG tablet [Pharmacy Med Name: Hydrochlorothiazide 25mg Tablet] 90 tablet 3     Sig: Take 1 tablet by mouth every morning.    Multiple Vitamins-Minerals (THEREMS-M) TABS [Pharmacy Med Name: Therems-M Tablet] 90 tablet 3     Sig: Take 1 tablet by mouth daily.    Biotin 5000 MCG CAPS [Pharmacy Med Name: BIOTIN 5000 MCG CAP] 180 capsule 3     Sig: Take 2 capsules by mouth daily

## 2024-08-04 SDOH — ECONOMIC STABILITY: FOOD INSECURITY: WITHIN THE PAST 12 MONTHS, THE FOOD YOU BOUGHT JUST DIDN'T LAST AND YOU DIDN'T HAVE MONEY TO GET MORE.: SOMETIMES TRUE

## 2024-08-04 SDOH — ECONOMIC STABILITY: TRANSPORTATION INSECURITY
IN THE PAST 12 MONTHS, HAS LACK OF TRANSPORTATION KEPT YOU FROM MEETINGS, WORK, OR FROM GETTING THINGS NEEDED FOR DAILY LIVING?: YES

## 2024-08-04 SDOH — ECONOMIC STABILITY: INCOME INSECURITY: HOW HARD IS IT FOR YOU TO PAY FOR THE VERY BASICS LIKE FOOD, HOUSING, MEDICAL CARE, AND HEATING?: HARD

## 2024-08-04 SDOH — ECONOMIC STABILITY: FOOD INSECURITY: WITHIN THE PAST 12 MONTHS, YOU WORRIED THAT YOUR FOOD WOULD RUN OUT BEFORE YOU GOT MONEY TO BUY MORE.: SOMETIMES TRUE

## 2024-08-07 ENCOUNTER — OFFICE VISIT (OUTPATIENT)
Dept: FAMILY MEDICINE CLINIC | Age: 44
End: 2024-08-07
Payer: COMMERCIAL

## 2024-08-07 VITALS
OXYGEN SATURATION: 98 % | HEART RATE: 88 BPM | WEIGHT: 230.4 LBS | HEIGHT: 66 IN | BODY MASS INDEX: 37.03 KG/M2 | DIASTOLIC BLOOD PRESSURE: 76 MMHG | TEMPERATURE: 96.9 F | SYSTOLIC BLOOD PRESSURE: 116 MMHG

## 2024-08-07 DIAGNOSIS — E11.69 TYPE 2 DIABETES MELLITUS WITH OBESITY (HCC): Primary | ICD-10-CM

## 2024-08-07 DIAGNOSIS — I10 ESSENTIAL HYPERTENSION: ICD-10-CM

## 2024-08-07 DIAGNOSIS — F33.2 SEVERE RECURRENT MAJOR DEPRESSION WITHOUT PSYCHOTIC FEATURES (HCC): ICD-10-CM

## 2024-08-07 DIAGNOSIS — E66.9 TYPE 2 DIABETES MELLITUS WITH OBESITY (HCC): Primary | ICD-10-CM

## 2024-08-07 LAB
CREATININE URINE POCT: 200
HBA1C MFR BLD: 5.7 %
MICROALBUMIN/CREAT 24H UR: 30 MG/DL
MICROALBUMIN/CREAT UR-RTO: <30 MG/G

## 2024-08-07 PROCEDURE — 3078F DIAST BP <80 MM HG: CPT | Performed by: FAMILY MEDICINE

## 2024-08-07 PROCEDURE — 1036F TOBACCO NON-USER: CPT | Performed by: FAMILY MEDICINE

## 2024-08-07 PROCEDURE — 82044 UR ALBUMIN SEMIQUANTITATIVE: CPT | Performed by: FAMILY MEDICINE

## 2024-08-07 PROCEDURE — 2022F DILAT RTA XM EVC RTNOPTHY: CPT | Performed by: FAMILY MEDICINE

## 2024-08-07 PROCEDURE — 99214 OFFICE O/P EST MOD 30 MIN: CPT | Performed by: FAMILY MEDICINE

## 2024-08-07 PROCEDURE — G8427 DOCREV CUR MEDS BY ELIG CLIN: HCPCS | Performed by: FAMILY MEDICINE

## 2024-08-07 PROCEDURE — G8417 CALC BMI ABV UP PARAM F/U: HCPCS | Performed by: FAMILY MEDICINE

## 2024-08-07 PROCEDURE — 83036 HEMOGLOBIN GLYCOSYLATED A1C: CPT | Performed by: FAMILY MEDICINE

## 2024-08-07 PROCEDURE — 3074F SYST BP LT 130 MM HG: CPT | Performed by: FAMILY MEDICINE

## 2024-08-07 PROCEDURE — 3044F HG A1C LEVEL LT 7.0%: CPT | Performed by: FAMILY MEDICINE

## 2024-08-07 RX ORDER — DULOXETIN HYDROCHLORIDE 30 MG/1
CAPSULE, DELAYED RELEASE ORAL
Qty: 90 CAPSULE | Refills: 3 | Status: SHIPPED | OUTPATIENT
Start: 2024-08-07

## 2024-08-07 SDOH — ECONOMIC STABILITY: FOOD INSECURITY: WITHIN THE PAST 12 MONTHS, YOU WORRIED THAT YOUR FOOD WOULD RUN OUT BEFORE YOU GOT MONEY TO BUY MORE.: NEVER TRUE

## 2024-08-07 SDOH — ECONOMIC STABILITY: FOOD INSECURITY: WITHIN THE PAST 12 MONTHS, THE FOOD YOU BOUGHT JUST DIDN'T LAST AND YOU DIDN'T HAVE MONEY TO GET MORE.: NEVER TRUE

## 2024-08-07 SDOH — ECONOMIC STABILITY: INCOME INSECURITY: HOW HARD IS IT FOR YOU TO PAY FOR THE VERY BASICS LIKE FOOD, HOUSING, MEDICAL CARE, AND HEATING?: NOT HARD AT ALL

## 2024-08-07 ASSESSMENT — ENCOUNTER SYMPTOMS
SORE THROAT: 0
NAUSEA: 0
EYE ITCHING: 0
WHEEZING: 0
VOMITING: 0
RHINORRHEA: 0
CHEST TIGHTNESS: 0
COUGH: 0
DIARRHEA: 0
SINUS PRESSURE: 0
SHORTNESS OF BREATH: 0
ABDOMINAL PAIN: 0
EYE PAIN: 0
TROUBLE SWALLOWING: 0
ABDOMINAL DISTENTION: 0
VOICE CHANGE: 0
EYE DISCHARGE: 0
BLOOD IN STOOL: 0
BACK PAIN: 0
ANAL BLEEDING: 0
EYE REDNESS: 0
CONSTIPATION: 0

## 2024-08-07 NOTE — PROGRESS NOTES
Subjective:      Patient ID: Rachel San is a 43 y.o. female.    HPI  Patient in for checkup on several medical issues.  Diabetes-on medication and an A1c is typically within normal range-today 5.7.  Hypertension-on medication and well-controlled.  Depression taking 90 mg of Cymbalta and doing well.  Recent abdominal surgery-torsion of right ovary.        Review of Systems    Review of Systems   Constitutional:  Negative for fatigue and unexpected weight change.   HENT:  Negative for congestion, ear pain, hearing loss, nosebleeds, postnasal drip, rhinorrhea, sinus pressure, sneezing, sore throat, tinnitus, trouble swallowing and voice change.    Eyes:  Negative for pain, discharge, redness, itching and visual disturbance.   Respiratory:  Negative for cough, chest tightness, shortness of breath and wheezing.    Cardiovascular:  Negative for chest pain, palpitations and leg swelling.   Gastrointestinal:  Negative for abdominal distention, abdominal pain, anal bleeding, blood in stool, constipation, diarrhea, nausea and vomiting.   Genitourinary:  Negative for dysuria, flank pain, frequency, hematuria, menstrual problem, pelvic pain, urgency and vaginal discharge.   Musculoskeletal:  Positive for arthralgias. Negative for back pain, gait problem, joint swelling, myalgias and neck pain.   Skin:  Negative for pallor and rash.   Neurological:  Negative for dizziness, tremors, seizures, weakness, light-headedness, numbness and headaches.   Hematological:  Negative for adenopathy. Does not bruise/bleed easily.   Psychiatric/Behavioral:  Negative for agitation, confusion, decreased concentration and sleep disturbance. The patient is not nervous/anxious and is not hyperactive.        Objective:   Physical Exam      Physical Exam  Constitutional:       General: She is not in acute distress.     Appearance: Normal appearance. She is well-developed. She is obese. She is not ill-appearing.   HENT:      Head: Normocephalic.

## 2024-08-17 DIAGNOSIS — H69.93 ACUTE DYSFUNCTION OF EUSTACHIAN TUBE, BILATERAL: ICD-10-CM

## 2024-08-18 RX ORDER — CETIRIZINE HYDROCHLORIDE 10 MG/1
10 TABLET ORAL DAILY
Qty: 90 TABLET | Refills: 1 | Status: SHIPPED | OUTPATIENT
Start: 2024-08-18

## 2024-10-11 DIAGNOSIS — K59.01 SLOW TRANSIT CONSTIPATION: ICD-10-CM

## 2024-10-11 RX ORDER — AMOXICILLIN 250 MG
2 CAPSULE ORAL DAILY
Qty: 180 TABLET | Refills: 3 | Status: SHIPPED | OUTPATIENT
Start: 2024-10-11

## 2024-10-11 RX ORDER — SENNOSIDES AND DOCUSATE SODIUM 8.6; 5 MG/1; MG/1
2 TABLET ORAL DAILY
Qty: 180 TABLET | Refills: 3 | OUTPATIENT
Start: 2024-10-11

## 2024-10-11 NOTE — TELEPHONE ENCOUNTER
Refill Request     CONFIRM preferred pharmacy with the patient.    If Mail Order Rx - Pend for 90 day refill.      Last Seen: Last Seen Department: 8/7/2024  Last Seen by PCP: 8/7/2024    Last Written: 11/21/23 #180 - 3 refills     If no future appointment scheduled:  Review the last OV with PCP and review information for follow-up visit,  Route STAFF MESSAGE with patient name to the  Pool for scheduling with the following information:            -  Timing of next visit           -  Visit type ie Physical, OV, etc           -  Diagnoses/Reason ie. COPD, HTN - Do not use MEDICATION, Follow-up or CHECK UP - Give reason for visit      Next Appointment:   Future Appointments   Date Time Provider Department Center   2/10/2025  1:00 PM Sorin Santillan, DO [facility]       Message sent to  to schedule appt with patient?  NO      Requested Prescriptions     Pending Prescriptions Disp Refills    senna-docusate (SENNA-PLUS) 8.6-50 MG per tablet 180 tablet 3     Sig: Take 2 tablets by mouth daily

## 2024-10-11 NOTE — TELEPHONE ENCOUNTER
Refill Request     CONFIRM preferred pharmacy with the patient.    If Mail Order Rx - Pend for 90 day refill.      Last Seen: Last Seen Department: 8/7/2024  Last Seen by PCP: 8/7/2024    Last Written: 11/21/2023    If no future appointment scheduled:  Review the last OV with PCP and review information for follow-up visit,  Route STAFF MESSAGE with patient name to the  Pool for scheduling with the following information:            -  Timing of next visit           -  Visit type ie Physical, OV, etc           -  Diagnoses/Reason ie. COPD, HTN - Do not use MEDICATION, Follow-up or CHECK UP - Give reason for visit      Next Appointment:   Future Appointments   Date Time Provider Department Center   2/10/2025  1:00 PM Sorin Santillan, DO [facility]       Message sent to  to schedule appt with patient?  NO      Requested Prescriptions     Pending Prescriptions Disp Refills    SENNA-PLUS 8.6-50 MG per tablet [Pharmacy Med Name: [facility] SENNA-PLUS 50mg-8.6mg Tablet] 180 tablet 3     Sig: Take 2 tablets by mouth daily.

## 2024-10-23 LAB
T4 FREE: 0.87
TSH SERPL DL<=0.05 MIU/L-ACNC: 0.01 UIU/ML

## 2024-10-28 DIAGNOSIS — R79.89 LOW TSH LEVEL: Primary | ICD-10-CM

## 2024-10-29 DIAGNOSIS — K59.01 SLOW TRANSIT CONSTIPATION: ICD-10-CM

## 2024-10-29 RX ORDER — AMOXICILLIN 250 MG
2 CAPSULE ORAL DAILY
Qty: 180 TABLET | Refills: 3 | Status: CANCELLED | OUTPATIENT
Start: 2024-10-29

## 2024-11-04 ENCOUNTER — OFFICE VISIT (OUTPATIENT)
Dept: FAMILY MEDICINE CLINIC | Age: 44
End: 2024-11-04

## 2024-11-04 VITALS
WEIGHT: 242 LBS | HEART RATE: 86 BPM | BODY MASS INDEX: 39.08 KG/M2 | DIASTOLIC BLOOD PRESSURE: 64 MMHG | OXYGEN SATURATION: 98 % | SYSTOLIC BLOOD PRESSURE: 120 MMHG

## 2024-11-04 DIAGNOSIS — R10.9 ACUTE LEFT FLANK PAIN: Primary | ICD-10-CM

## 2024-11-04 LAB
BILIRUBIN, POC: NORMAL
BLOOD URINE, POC: NORMAL
CLARITY, POC: CLEAR
COLOR, POC: YELLOW
GLUCOSE URINE, POC: NORMAL MG/DL
KETONES, POC: NORMAL MG/DL
LEUKOCYTE EST, POC: NORMAL
NITRITE, POC: NORMAL
PH, POC: 6.5
PROTEIN, POC: NORMAL MG/DL
SPECIFIC GRAVITY, POC: 1.01
UROBILINOGEN, POC: 0.2 MG/DL

## 2024-11-04 RX ORDER — HYDROCODONE BITARTRATE AND ACETAMINOPHEN 5; 325 MG/1; MG/1
1 TABLET ORAL EVERY 6 HOURS PRN
Qty: 12 TABLET | Refills: 0 | Status: SHIPPED | OUTPATIENT
Start: 2024-11-04 | End: 2024-11-07

## 2024-11-04 ASSESSMENT — ENCOUNTER SYMPTOMS
BACK PAIN: 1
ABDOMINAL PAIN: 1

## 2024-11-04 NOTE — PROGRESS NOTES
Subjective:      Patient ID: Rachel San is a 44 y.o. female.    HPI  Patient in for checkup on possible kidney stone.  She does have a history of stone.  She has a 3-day history of in the left flank.  She did try some Excedrin which did not do much for the pain.  Denies any other urinary symptoms.  She states it feels like the stone pain she has had in the past.        Review of Systems    Review of Systems   Constitutional:  Negative for fever.   Gastrointestinal:  Positive for abdominal pain.        See HPI   Genitourinary:  Positive for flank pain. Negative for dysuria, hematuria and urgency.   Musculoskeletal:  Positive for back pain.       Objective:   Physical Exam      Physical Exam  Constitutional:       General: She is not in acute distress.     Appearance: Normal appearance. She is obese. She is not ill-appearing.   Abdominal:      Palpations: Abdomen is soft.      Comments: Tender left flank and lower left quadrant of the abdomen.   Musculoskeletal:      Comments: Tender left lumbosacral area   Neurological:      Mental Status: She is alert and oriented to person, place, and time.   Psychiatric:         Mood and Affect: Mood normal.         Behavior: Behavior normal.         Thought Content: Thought content normal.         Judgment: Judgment normal.         Assessment:       Diagnosis Orders   1. Acute left flank pain  POCT Urinalysis no Micro    HYDROcodone-acetaminophen (NORCO) 5-325 MG per tablet            Plan:      Rachel was seen today for flank pain.    Diagnoses and all orders for this visit:    Acute left flank pain  -     POCT Urinalysis no Micro  -     HYDROcodone-acetaminophen (NORCO) 5-325 MG per tablet; Take 1 tablet by mouth every 6 hours as needed for Pain for up to 3 days. Intended supply: 3 days. Take lowest dose possible to manage pain Max Daily Amount: 4 tablets  Continue increase water intake-prescription sent for Vicodin as needed-call me or send me a note on Tuesday with

## 2024-11-05 ENCOUNTER — HOSPITAL ENCOUNTER (OUTPATIENT)
Dept: ULTRASOUND IMAGING | Age: 44
Discharge: HOME | End: 2024-11-05
Payer: COMMERCIAL

## 2024-11-05 DIAGNOSIS — R79.89 LOW TSH LEVEL: ICD-10-CM

## 2024-11-05 PROCEDURE — 76536 US EXAM OF HEAD AND NECK: CPT

## 2024-11-06 DIAGNOSIS — R10.9 ACUTE LEFT FLANK PAIN: Primary | ICD-10-CM

## 2024-11-11 DIAGNOSIS — E66.9 TYPE 2 DIABETES MELLITUS WITH OBESITY (HCC): ICD-10-CM

## 2024-11-11 DIAGNOSIS — E11.69 TYPE 2 DIABETES MELLITUS WITH OBESITY (HCC): ICD-10-CM

## 2024-11-11 RX ORDER — DULOXETIN HYDROCHLORIDE 30 MG/1
CAPSULE, DELAYED RELEASE ORAL
Qty: 90 CAPSULE | Refills: 3 | Status: SHIPPED | OUTPATIENT
Start: 2024-11-11

## 2024-11-11 NOTE — TELEPHONE ENCOUNTER
Refill Request     CONFIRM preferred pharmacy with the patient.    If Mail Order Rx - Pend for 90 day refill.      Last Seen: Last Seen Department: 11/4/2024  Last Seen by PCP: 11/4/2024    Last Written: 08/07/2024 90 cap 3 refills     If no future appointment scheduled:  Review the last OV with PCP and review information for follow-up visit,  Route STAFF MESSAGE with patient name to the  Pool for scheduling with the following information:            -  Timing of next visit           -  Visit type ie Physical, OV, etc           -  Diagnoses/Reason ie. COPD, HTN - Do not use MEDICATION, Follow-up or CHECK UP - Give reason for visit      Next Appointment:   Future Appointments   Date Time Provider Department Center   2/10/2025  1:00 PM Sorin Santillan, DO [facility]       Message sent to  to schedule appt with patient?  NO      Requested Prescriptions     Pending Prescriptions Disp Refills    DULoxetine (CYMBALTA) 30 MG extended release capsule [Pharmacy Med Name: Duloxetine 30mg Delayed-Release Capsule] 90 capsule 2     Sig: Take 3 capsules by mouth daily.

## 2024-11-15 ENCOUNTER — TELEPHONE (OUTPATIENT)
Dept: FAMILY MEDICINE CLINIC | Age: 44
End: 2024-11-15

## 2024-11-15 ENCOUNTER — OFFICE VISIT (OUTPATIENT)
Dept: FAMILY MEDICINE CLINIC | Age: 44
End: 2024-11-15

## 2024-11-15 VITALS
HEART RATE: 96 BPM | SYSTOLIC BLOOD PRESSURE: 130 MMHG | OXYGEN SATURATION: 98 % | BODY MASS INDEX: 39.69 KG/M2 | TEMPERATURE: 97.8 F | WEIGHT: 245.8 LBS | DIASTOLIC BLOOD PRESSURE: 82 MMHG

## 2024-11-15 DIAGNOSIS — M79.7 FIBROMYALGIA: ICD-10-CM

## 2024-11-15 DIAGNOSIS — R39.15 URINARY URGENCY: Primary | ICD-10-CM

## 2024-11-15 DIAGNOSIS — M79.10 GENERALIZED MUSCLE ACHE: ICD-10-CM

## 2024-11-15 LAB
BILIRUBIN, POC: NORMAL
BLOOD URINE, POC: NORMAL
CLARITY, POC: CLEAR
COLOR, POC: YELLOW
GLUCOSE URINE, POC: NORMAL MG/DL
INFLUENZA A ANTIBODY: NORMAL
INFLUENZA B ANTIBODY: NORMAL
KETONES, POC: NORMAL MG/DL
LEUKOCYTE EST, POC: NORMAL
NITRITE, POC: NORMAL
PH, POC: 6.5
PROTEIN, POC: NORMAL MG/DL
SPECIFIC GRAVITY, POC: 1.02
UROBILINOGEN, POC: 0.2 MG/DL

## 2024-11-15 RX ORDER — BUPROPION HYDROCHLORIDE 150 MG/1
150 TABLET ORAL EVERY MORNING
Qty: 30 TABLET | Refills: 3 | Status: SHIPPED | OUTPATIENT
Start: 2024-11-15

## 2024-11-15 ASSESSMENT — ENCOUNTER SYMPTOMS
NAUSEA: 0
SHORTNESS OF BREATH: 0
COUGH: 0
VOMITING: 0
ABDOMINAL PAIN: 0

## 2024-11-15 NOTE — PROGRESS NOTES
[facility]  11/15/2024    Rachel San (:  1980) is a 44 y.o. female, here for evaluation of the following medical concerns:     Chief Complaint   Patient presents with    Dysuria     Urinary urgency x had this issue for years, used to be on and off but more \"on\" recently.     Generalized Body Aches     Asking for referral for fibromyalgia     Fatigue        ASSESSMENT/ PLAN  Assessment & Plan  Urinary urgency    Chronic urinary urgency; recently worsened after passing kidney stone. UA neg, no dysuria. Less concerned about UTI at this time. Discussed stress/urge/overflow incontinence and treatment including kegals, bladder training, consider medication management - deferred at this time.     Orders:    POCT Urinalysis no Micro    Generalized muscle ache    Reports generalized muscle aches associated with congestion. Flu neg, will follow-up COVID. Concerned myalgias are associated with fibromyalgia, see below.     Orders:    POCT Influenza A/B    COVID-19    Fibromyalgia    Reports chronic myalgias, fatigue, jaw pain, HAs, digestive issues, sleep issues, urinary issues c/f fibromyalgia ongoing for > 3mos with tender point exam.  On cymbalta, requesting referral to Rheum. Mood down with increased cravings - discussed trial of Wellbutrin and balancing sleep, mood, stress, and exercise.      Orders:    buPROPion (WELLBUTRIN XL) 150 MG extended release tablet; Take 1 tablet by mouth every morning    Non [facility] - External Referral To Rheumatology       Follow-up scheduled in February.     HPI  Here for acute visit.     Had kidney stone last week then passed it and then developed urinary frequency and urgency with worsening body aches this week; slowly improving. Son did have flu last week. Reports sinus congestion. Has symptoms of stress, urge, and overflow incontinence ongoing since birth of kids; discussed lifestyle changes, would like to hold off on medications at this time.

## 2024-11-15 NOTE — TELEPHONE ENCOUNTER
Patient called the office stating that Dr. Broussard does not treat fibromyalgia any longer. Patient is asking for another referral, thanks.

## 2024-11-17 LAB — SARS-COV-2 N GENE RESP QL NAA+PROBE: NOT DETECTED

## 2024-12-10 DIAGNOSIS — I10 ESSENTIAL HYPERTENSION: ICD-10-CM

## 2024-12-10 RX ORDER — VERAPAMIL HYDROCHLORIDE 240 MG/1
240 TABLET, FILM COATED, EXTENDED RELEASE ORAL DAILY
Qty: 90 TABLET | Refills: 3 | Status: SHIPPED | OUTPATIENT
Start: 2024-12-10

## 2024-12-10 NOTE — TELEPHONE ENCOUNTER
Refill Request     CONFIRM preferred pharmacy with the patient.    If Mail Order Rx - Pend for 90 day refill.      Last Seen: Last Seen Department: 11/15/2024  Last Seen by PCP: 11/4/2024    Last Written: 6/18/24 90 with 1 refill     If no future appointment scheduled:  Review the last OV with PCP and review information for follow-up visit,  Route STAFF MESSAGE with patient name to the  Pool for scheduling with the following information:            -  Timing of next visit           -  Visit type ie Physical, OV, etc           -  Diagnoses/Reason ie. COPD, HTN - Do not use MEDICATION, Follow-up or CHECK UP - Give reason for visit      Next Appointment:   Future Appointments   Date Time Provider Department Center   2/10/2025  1:00 PM Sorin Santillan DO [facility]       Message sent to  to schedule appt with patient?  NO      Requested Prescriptions     Pending Prescriptions Disp Refills    verapamil (CALAN SR) 240 MG extended release tablet [Pharmacy Med Name: Verapamil Hydrochloride 240mg Extended-Release Tablet] 90 tablet 0     Sig: Take 1 tablet by mouth daily.

## 2025-02-08 DIAGNOSIS — H69.93 ACUTE DYSFUNCTION OF EUSTACHIAN TUBE, BILATERAL: ICD-10-CM

## 2025-02-08 RX ORDER — CETIRIZINE HYDROCHLORIDE 10 MG/1
10 TABLET ORAL DAILY
Qty: 90 TABLET | Refills: 1 | Status: SHIPPED | OUTPATIENT
Start: 2025-02-08

## 2025-02-08 NOTE — TELEPHONE ENCOUNTER
Refill Request     CONFIRM preferred pharmacy with the patient.    If Mail Order Rx - Pend for 90 day refill.      Last Seen: Last Seen Department: 11/15/2024  Last Seen by PCP: 11/4/2024    Last Written: 8/18/2024 Cetrizine #90 with 1 refill     If no future appointment scheduled:  Review the last OV with PCP and review information for follow-up visit,  Route STAFF MESSAGE with patient name to the  Pool for scheduling with the following information:            -  Timing of next visit           -  Visit type ie Physical, OV, etc           -  Diagnoses/Reason ie. COPD, HTN - Do not use MEDICATION, Follow-up or CHECK UP - Give reason for visit      Next Appointment:   No future appointments.    Message sent to  to schedule appt with patient?  NO      Requested Prescriptions     Pending Prescriptions Disp Refills    cetirizine (ZYRTEC) 10 MG tablet [Pharmacy Med Name: Cetirizine Hydrochloride 10mg 24 Hour Allergy Relief Tablet] 90 tablet 1     Sig: Take 1 tablet by mouth daily.

## 2025-02-14 ENCOUNTER — PATIENT MESSAGE (OUTPATIENT)
Dept: BARIATRICS/WEIGHT MGMT | Age: 45
End: 2025-02-14

## 2025-02-14 NOTE — TELEPHONE ENCOUNTER
Attempt to contact for Bariatric Follow Up. [employer] message sent and letter mailed to address on file in [facility]

## 2025-03-10 DIAGNOSIS — H69.93 ACUTE DYSFUNCTION OF EUSTACHIAN TUBE, BILATERAL: ICD-10-CM

## 2025-03-10 DIAGNOSIS — E66.9 TYPE 2 DIABETES MELLITUS WITH OBESITY (HCC): ICD-10-CM

## 2025-03-10 DIAGNOSIS — E11.69 TYPE 2 DIABETES MELLITUS WITH OBESITY (HCC): ICD-10-CM

## 2025-03-10 RX ORDER — DULOXETIN HYDROCHLORIDE 30 MG/1
90 CAPSULE, DELAYED RELEASE ORAL DAILY
Qty: 90 CAPSULE | Refills: 2 | Status: SHIPPED | OUTPATIENT
Start: 2025-03-10

## 2025-03-10 RX ORDER — FLUTICASONE PROPIONATE 50 MCG
SPRAY, SUSPENSION (ML) NASAL
Qty: 3 EACH | Refills: 1 | Status: SHIPPED | OUTPATIENT
Start: 2025-03-10

## 2025-03-10 NOTE — TELEPHONE ENCOUNTER
Refill Request     CONFIRM preferred pharmacy with the patient.    If Mail Order Rx - Pend for 90 day refill.      Last Seen: Last Seen Department: 11/15/2024  Last Seen by PCP: 11/4/2024    Last Written: fluticasone 4/19/24     Duloxetine 11/11/24 #90 - 3 refills     If no future appointment scheduled:  Review the last OV with PCP and review information for follow-up visit,  Route STAFF MESSAGE with patient name to the  Pool for scheduling with the following information:            -  Timing of next visit           -  Visit type ie Physical, OV, etc           -  Diagnoses/Reason ie. COPD, HTN - Do not use MEDICATION, Follow-up or CHECK UP - Give reason for visit      Next Appointment:   Future Appointments   Date Time Provider Department Center   4/23/2025  3:00 PM Carisa Willard MD [facility]       Message sent to  to schedule appt with patient?  NO      Requested Prescriptions     Pending Prescriptions Disp Refills    fluticasone (FLONASE) 50 MCG/ACT nasal spray [Pharmacy Med Name: Fluticasone Propionate 50mcg/actuation Nasal Spray]  0     Sig: Instill 1 spray into each nostril daily.    DULoxetine (CYMBALTA) 30 MG extended release capsule [Pharmacy Med Name: Duloxetine 30mg Delayed-Release Capsule] 90 capsule 2     Sig: Take 3 capsules by mouth daily.

## 2025-03-30 DIAGNOSIS — M79.7 FIBROMYALGIA: ICD-10-CM

## 2025-03-31 RX ORDER — BUPROPION HYDROCHLORIDE 150 MG/1
150 TABLET ORAL EVERY MORNING
Qty: 90 TABLET | Refills: 1 | Status: SHIPPED | OUTPATIENT
Start: 2025-03-31

## 2025-03-31 NOTE — TELEPHONE ENCOUNTER
Refill Request     CONFIRM preferred pharmacy with the patient.    If Mail Order Rx - Pend for 90 day refill.      Last Seen: Last Seen Department: 11/15/2024  Last Seen by PCP: 11/15/2024    Last Written: 11/15/2024 30 tab 3 refills    If no future appointment scheduled:  Review the last OV with PCP and review information for follow-up visit,  Route STAFF MESSAGE with patient name to the  Pool for scheduling with the following information:            -  Timing of next visit           -  Visit type ie Physical, OV, etc           -  Diagnoses/Reason ie. COPD, HTN - Do not use MEDICATION, Follow-up or CHECK UP - Give reason for visit      Next Appointment:   Future Appointments   Date Time Provider Department Center   4/23/2025  3:00 PM Carisa Willard MD [facility] DE[facility]       Message sent to  to schedule appt with patient?  NO      Requested Prescriptions     Pending Prescriptions Disp Refills    buPROPion (WELLBUTRIN XL) 150 MG extended release tablet 30 tablet 3     Sig: Take 1 tablet by mouth every morning

## 2025-04-09 NOTE — TELEPHONE ENCOUNTER
Refill Request     CONFIRM preferred pharmacy with the patient.    If Mail Order Rx - Pend for 90 day refill.      Last Seen: Last Seen Department: 11/15/2024  Last Seen by PCP: 11/15/2024    Last Written: cimetidine 5/14/24 180 with 3 refills     Losartan-HCTZ 5/14/24 90 with 3 refills     If no future appointment scheduled:  Review the last OV with PCP and review information for follow-up visit,  Route STAFF MESSAGE with patient name to the  Pool for scheduling with the following information:            -  Timing of next visit           -  Visit type ie Physical, OV, etc           -  Diagnoses/Reason ie. COPD, HTN - Do not use MEDICATION, Follow-up or CHECK UP - Give reason for visit      Next Appointment:   Future Appointments   Date Time Provider Department Center   4/23/2025  3:00 PM Carisa Willard MD [facility]       Message sent to  to schedule appt with patient?  NO      Requested Prescriptions      No prescriptions requested or ordered in this encounter

## 2025-04-10 RX ORDER — CIMETIDINE 400 MG
TABLET ORAL
Qty: 180 TABLET | Refills: 3 | Status: SHIPPED | OUTPATIENT
Start: 2025-04-10

## 2025-04-10 RX ORDER — LOSARTAN POTASSIUM AND HYDROCHLOROTHIAZIDE 25; 100 MG/1; MG/1
TABLET ORAL
Qty: 90 TABLET | Refills: 1 | Status: SHIPPED | OUTPATIENT
Start: 2025-04-10

## 2025-04-23 ENCOUNTER — OFFICE VISIT (OUTPATIENT)
Dept: FAMILY MEDICINE CLINIC | Age: 45
End: 2025-04-23
Payer: COMMERCIAL

## 2025-04-23 VITALS
DIASTOLIC BLOOD PRESSURE: 82 MMHG | SYSTOLIC BLOOD PRESSURE: 114 MMHG | HEART RATE: 109 BPM | WEIGHT: 249.2 LBS | OXYGEN SATURATION: 96 % | BODY MASS INDEX: 39.11 KG/M2 | HEIGHT: 67 IN

## 2025-04-23 DIAGNOSIS — E66.9 TYPE 2 DIABETES MELLITUS WITH OBESITY (HCC): ICD-10-CM

## 2025-04-23 DIAGNOSIS — I10 ESSENTIAL HYPERTENSION: ICD-10-CM

## 2025-04-23 DIAGNOSIS — K59.01 SLOW TRANSIT CONSTIPATION: ICD-10-CM

## 2025-04-23 DIAGNOSIS — E11.69 TYPE 2 DIABETES MELLITUS WITH OBESITY (HCC): ICD-10-CM

## 2025-04-23 DIAGNOSIS — F33.2 SEVERE RECURRENT MAJOR DEPRESSION WITHOUT PSYCHOTIC FEATURES (HCC): Primary | ICD-10-CM

## 2025-04-23 DIAGNOSIS — E66.9 OBESITY (BMI 35.0-39.9 WITHOUT COMORBIDITY): ICD-10-CM

## 2025-04-23 PROCEDURE — 1036F TOBACCO NON-USER: CPT | Performed by: STUDENT IN AN ORGANIZED HEALTH CARE EDUCATION/TRAINING PROGRAM

## 2025-04-23 PROCEDURE — 99214 OFFICE O/P EST MOD 30 MIN: CPT | Performed by: STUDENT IN AN ORGANIZED HEALTH CARE EDUCATION/TRAINING PROGRAM

## 2025-04-23 PROCEDURE — 2022F DILAT RTA XM EVC RTNOPTHY: CPT | Performed by: STUDENT IN AN ORGANIZED HEALTH CARE EDUCATION/TRAINING PROGRAM

## 2025-04-23 PROCEDURE — G8427 DOCREV CUR MEDS BY ELIG CLIN: HCPCS | Performed by: STUDENT IN AN ORGANIZED HEALTH CARE EDUCATION/TRAINING PROGRAM

## 2025-04-23 PROCEDURE — 3079F DIAST BP 80-89 MM HG: CPT | Performed by: STUDENT IN AN ORGANIZED HEALTH CARE EDUCATION/TRAINING PROGRAM

## 2025-04-23 PROCEDURE — 3046F HEMOGLOBIN A1C LEVEL >9.0%: CPT | Performed by: STUDENT IN AN ORGANIZED HEALTH CARE EDUCATION/TRAINING PROGRAM

## 2025-04-23 PROCEDURE — 3074F SYST BP LT 130 MM HG: CPT | Performed by: STUDENT IN AN ORGANIZED HEALTH CARE EDUCATION/TRAINING PROGRAM

## 2025-04-23 PROCEDURE — G8417 CALC BMI ABV UP PARAM F/U: HCPCS | Performed by: STUDENT IN AN ORGANIZED HEALTH CARE EDUCATION/TRAINING PROGRAM

## 2025-04-23 RX ORDER — BUPROPION HYDROCHLORIDE 300 MG/1
300 TABLET ORAL EVERY MORNING
Qty: 90 TABLET | Refills: 1 | Status: SHIPPED | OUTPATIENT
Start: 2025-04-23

## 2025-04-23 RX ORDER — ESTRADIOL 1 MG/1
1.5 TABLET ORAL DAILY
COMMUNITY
Start: 2025-04-06

## 2025-04-23 ASSESSMENT — PATIENT HEALTH QUESTIONNAIRE - PHQ9
5. POOR APPETITE OR OVEREATING: SEVERAL DAYS
SUM OF ALL RESPONSES TO PHQ QUESTIONS 1-9: 9
3. TROUBLE FALLING OR STAYING ASLEEP: SEVERAL DAYS
6. FEELING BAD ABOUT YOURSELF - OR THAT YOU ARE A FAILURE OR HAVE LET YOURSELF OR YOUR FAMILY DOWN: SEVERAL DAYS
7. TROUBLE CONCENTRATING ON THINGS, SUCH AS READING THE NEWSPAPER OR WATCHING TELEVISION: SEVERAL DAYS
9. THOUGHTS THAT YOU WOULD BE BETTER OFF DEAD, OR OF HURTING YOURSELF: SEVERAL DAYS
4. FEELING TIRED OR HAVING LITTLE ENERGY: SEVERAL DAYS
10. IF YOU CHECKED OFF ANY PROBLEMS, HOW DIFFICULT HAVE THESE PROBLEMS MADE IT FOR YOU TO DO YOUR WORK, TAKE CARE OF THINGS AT HOME, OR GET ALONG WITH OTHER PEOPLE: SOMEWHAT DIFFICULT
6. FEELING BAD ABOUT YOURSELF - OR THAT YOU ARE A FAILURE OR HAVE LET YOURSELF OR YOUR FAMILY DOWN: SEVERAL DAYS
SUM OF ALL RESPONSES TO PHQ QUESTIONS 1-9: 9
7. TROUBLE CONCENTRATING ON THINGS, SUCH AS READING THE NEWSPAPER OR WATCHING TELEVISION: SEVERAL DAYS
SUM OF ALL RESPONSES TO PHQ QUESTIONS 1-9: 9
2. FEELING DOWN, DEPRESSED OR HOPELESS: SEVERAL DAYS
10. IF YOU CHECKED OFF ANY PROBLEMS, HOW DIFFICULT HAVE THESE PROBLEMS MADE IT FOR YOU TO DO YOUR WORK, TAKE CARE OF THINGS AT HOME, OR GET ALONG WITH OTHER PEOPLE: SOMEWHAT DIFFICULT
9. THOUGHTS THAT YOU WOULD BE BETTER OFF DEAD, OR OF HURTING YOURSELF: SEVERAL DAYS
4. FEELING TIRED OR HAVING LITTLE ENERGY: SEVERAL DAYS
1. LITTLE INTEREST OR PLEASURE IN DOING THINGS: SEVERAL DAYS
SUM OF ALL RESPONSES TO PHQ QUESTIONS 1-9: 8
5. POOR APPETITE OR OVEREATING: SEVERAL DAYS
SUM OF ALL RESPONSES TO PHQ QUESTIONS 1-9: 9
1. LITTLE INTEREST OR PLEASURE IN DOING THINGS: SEVERAL DAYS
3. TROUBLE FALLING OR STAYING ASLEEP: SEVERAL DAYS
2. FEELING DOWN, DEPRESSED OR HOPELESS: SEVERAL DAYS
8. MOVING OR SPEAKING SO SLOWLY THAT OTHER PEOPLE COULD HAVE NOTICED. OR THE OPPOSITE - BEING SO FIDGETY OR RESTLESS THAT YOU HAVE BEEN MOVING AROUND A LOT MORE THAN USUAL: SEVERAL DAYS
8. MOVING OR SPEAKING SO SLOWLY THAT OTHER PEOPLE COULD HAVE NOTICED. OR THE OPPOSITE, BEING SO FIGETY OR RESTLESS THAT YOU HAVE BEEN MOVING AROUND A LOT MORE THAN USUAL: SEVERAL DAYS

## 2025-04-23 ASSESSMENT — ANXIETY QUESTIONNAIRES
2. NOT BEING ABLE TO STOP OR CONTROL WORRYING: SEVERAL DAYS
3. WORRYING TOO MUCH ABOUT DIFFERENT THINGS: SEVERAL DAYS
6. BECOMING EASILY ANNOYED OR IRRITABLE: SEVERAL DAYS
5. BEING SO RESTLESS THAT IT IS HARD TO SIT STILL: NOT AT ALL
1. FEELING NERVOUS, ANXIOUS, OR ON EDGE: SEVERAL DAYS
GAD7 TOTAL SCORE: 8
IF YOU CHECKED OFF ANY PROBLEMS ON THIS QUESTIONNAIRE, HOW DIFFICULT HAVE THESE PROBLEMS MADE IT FOR YOU TO DO YOUR WORK, TAKE CARE OF THINGS AT HOME, OR GET ALONG WITH OTHER PEOPLE: SOMEWHAT DIFFICULT
4. TROUBLE RELAXING: SEVERAL DAYS
7. FEELING AFRAID AS IF SOMETHING AWFUL MIGHT HAPPEN: NEARLY EVERY DAY

## 2025-04-23 ASSESSMENT — COLUMBIA-SUICIDE SEVERITY RATING SCALE - C-SSRS
1. IN THE PAST MONTH, HAVE YOU WISHED YOU WERE DEAD OR WISHED YOU COULD GO TO SLEEP AND NOT WAKE UP?: YES
6. IN YOUR LIFETIME, HAVE YOU EVER DONE ANYTHING, STARTED TO DO ANYTHING, OR PREPARED TO DO ANYTHING TO END YOUR LIFE?: NO
2. IN THE PAST MONTH, HAVE YOU ACTUALLY HAD ANY THOUGHTS OF KILLING YOURSELF?: NO

## 2025-04-23 NOTE — PROGRESS NOTES
General: Normal range of motion.   Skin:     General: Skin is warm and dry.   Neurological:      Mental Status: She is alert.   Psychiatric:         Mood and Affect: Mood normal.         Carisa Willard MD    This dictation was generated by voice recognition computer software.  Although all attempts are made to edit the dictation for accuracy, there may be errors in the transcription that are not intended.

## 2025-04-23 NOTE — ASSESSMENT & PLAN NOTE
Chronic, not at goal. Ok with increasing Wellbutrin to 300mg daily and continuing cymbalta. No current SI/HI. Encouraged both medicinal support along with concurrent therapy/counseling and improved self care (healthy nutrition, daily exercise, healthy social interaction, etc.). Consider referral (Including visits with [facility], Dr. Mo).     Orders:    buPROPion (WELLBUTRIN XL) 300 MG extended release tablet; Take 1 tablet by mouth every morning

## 2025-04-24 ASSESSMENT — ENCOUNTER SYMPTOMS
COUGH: 0
VOMITING: 0
ABDOMINAL PAIN: 1
NAUSEA: 1
CONSTIPATION: 1
SHORTNESS OF BREATH: 0

## 2025-04-25 NOTE — ASSESSMENT & PLAN NOTE
BMI 39. Recommend decreasing intake of carbohydrates and sugar sweetened beverages. Recommend following a heart-healthy diet that emphasizes fruits, vegetables, whole grains, poultry, fish, and nuts and limits red and processed meats, salt and sugar sweetened foods/beverages. Recommend heart healthy exercise. Recommend avoiding tobacco and limiting alcohol.

## 2025-04-25 NOTE — ASSESSMENT & PLAN NOTE
On losartan/hydrochlorothiazide 100/25 for blood pressure, with additional hydrochlorothiazide 25 for unclear reasons with spironolactone 50mg and verapamil 240mg. Will stop extra HCTZ as mildly tachy with mild dehydration on exam - will recheck labs next week off of medication. Encouraged hydration.

## 2025-04-30 ENCOUNTER — TELEPHONE (OUTPATIENT)
Dept: FAMILY MEDICINE CLINIC | Age: 45
End: 2025-04-30

## 2025-04-30 NOTE — TELEPHONE ENCOUNTER
Left  for [facility] to call me back. I'm trying to get patient's pap and mammo report from March of this year with them. Thanks!

## 2025-05-01 DIAGNOSIS — E66.9 TYPE 2 DIABETES MELLITUS WITH OBESITY (HCC): ICD-10-CM

## 2025-05-01 DIAGNOSIS — E11.69 TYPE 2 DIABETES MELLITUS WITH OBESITY (HCC): ICD-10-CM

## 2025-05-01 LAB
ALBUMIN SERPL-MCNC: 4.1 G/DL (ref 3.4–5)
ALBUMIN/GLOB SERPL: 2.1 {RATIO} (ref 1.1–2.2)
ALP SERPL-CCNC: 82 U/L (ref 40–129)
ALT SERPL-CCNC: 17 U/L (ref 10–40)
ANION GAP SERPL CALCULATED.3IONS-SCNC: 9 MMOL/L (ref 3–16)
AST SERPL-CCNC: 19 U/L (ref 15–37)
BASOPHILS # BLD: 0 K/UL (ref 0–0.2)
BASOPHILS NFR BLD: 0.6 %
BILIRUB SERPL-MCNC: <0.2 MG/DL (ref 0–1)
BUN SERPL-MCNC: 17 MG/DL (ref 7–20)
CALCIUM SERPL-MCNC: 9.5 MG/DL (ref 8.3–10.6)
CHLORIDE SERPL-SCNC: 102 MMOL/L (ref 99–110)
CHOLEST SERPL-MCNC: 169 MG/DL (ref 0–199)
CO2 SERPL-SCNC: 29 MMOL/L (ref 21–32)
CREAT SERPL-MCNC: 0.6 MG/DL (ref 0.6–1.1)
DEPRECATED RDW RBC AUTO: 13.7 % (ref 12.4–15.4)
EOSINOPHIL # BLD: 0.2 K/UL (ref 0–0.6)
EOSINOPHIL NFR BLD: 4.8 %
EST. AVERAGE GLUCOSE BLD GHB EST-MCNC: 119.8 MG/DL
GFR SERPLBLD CREATININE-BSD FMLA CKD-EPI: >90 ML/MIN/{1.73_M2}
GLUCOSE SERPL-MCNC: 108 MG/DL (ref 70–99)
HBA1C MFR BLD: 5.8 %
HCT VFR BLD AUTO: 38.9 % (ref 36–48)
HDLC SERPL-MCNC: 54 MG/DL (ref 40–60)
HGB BLD-MCNC: 13.4 G/DL (ref 12–16)
LDLC SERPL CALC-MCNC: 88 MG/DL
LYMPHOCYTES # BLD: 2 K/UL (ref 1–5.1)
LYMPHOCYTES NFR BLD: 39.3 %
MCH RBC QN AUTO: 28.8 PG (ref 26–34)
MCHC RBC AUTO-ENTMCNC: 34.3 G/DL (ref 31–36)
MCV RBC AUTO: 84.1 FL (ref 80–100)
MONOCYTES # BLD: 0.5 K/UL (ref 0–1.3)
MONOCYTES NFR BLD: 10.5 %
NEUTROPHILS # BLD: 2.3 K/UL (ref 1.7–7.7)
NEUTROPHILS NFR BLD: 44.8 %
PLATELET # BLD AUTO: 344 K/UL (ref 135–450)
PMV BLD AUTO: 7.5 FL (ref 5–10.5)
POTASSIUM SERPL-SCNC: 4.4 MMOL/L (ref 3.5–5.1)
PROT SERPL-MCNC: 6.1 G/DL (ref 6.4–8.2)
RBC # BLD AUTO: 4.63 M/UL (ref 4–5.2)
SODIUM SERPL-SCNC: 140 MMOL/L (ref 136–145)
TRIGL SERPL-MCNC: 136 MG/DL (ref 0–150)
TSH SERPL DL<=0.005 MIU/L-ACNC: 1.76 UIU/ML (ref 0.27–4.2)
VLDLC SERPL CALC-MCNC: 27 MG/DL
WBC # BLD AUTO: 5.2 K/UL (ref 4–11)

## 2025-05-02 ENCOUNTER — RESULTS FOLLOW-UP (OUTPATIENT)
Dept: FAMILY MEDICINE CLINIC | Age: 45
End: 2025-05-02

## 2025-05-09 DIAGNOSIS — L65.9 HAIR LOSS: ICD-10-CM

## 2025-05-09 DIAGNOSIS — I10 PRIMARY HYPERTENSION: ICD-10-CM

## 2025-05-09 RX ORDER — HYDROCHLOROTHIAZIDE 25 MG/1
25 TABLET ORAL EVERY MORNING
Qty: 90 TABLET | Refills: 3 | OUTPATIENT
Start: 2025-05-09

## 2025-05-09 RX ORDER — SPIRONOLACTONE 50 MG/1
50 TABLET, FILM COATED ORAL DAILY
Qty: 90 TABLET | Refills: 3 | Status: SHIPPED | OUTPATIENT
Start: 2025-05-09

## 2025-05-09 NOTE — TELEPHONE ENCOUNTER
Refill Request     CONFIRM preferred pharmacy with the patient.    If Mail Order Rx - Pend for 90 day refill.      Last Seen: Last Seen Department: 4/23/2025  Last Seen by PCP: 4/23/2025    Last Written:     If no future appointment scheduled:  Review the last OV with PCP and review information for follow-up visit,  Route STAFF MESSAGE with patient name to the  Pool for scheduling with the following information:            -  Timing of next visit           -  Visit type ie Physical, OV, etc           -  Diagnoses/Reason ie. COPD, HTN - Do not use MEDICATION, Follow-up or CHECK UP - Give reason for visit      Next Appointment:   Future Appointments   Date Time Provider Department Center   5/29/2025 11:00 AM rBennan Ricardo MD [facility]       Message sent to  to schedule appt with patient?  NO      Requested Prescriptions     Pending Prescriptions Disp Refills    Biotin 5000 MCG CAPS 180 capsule 3     Sig: Take 2 capsules by mouth daily    spironolactone (ALDACTONE) 50 MG tablet 90 tablet 3     Sig: Take 1 tablet by mouth daily    hydroCHLOROthiazide (HYDRODIURIL) 25 MG tablet 90 tablet 3     Sig: Take 1 tablet by mouth every morning

## 2025-05-21 DIAGNOSIS — E66.9 OBESITY (BMI 35.0-39.9 WITHOUT COMORBIDITY): Primary | ICD-10-CM

## 2025-06-20 ENCOUNTER — PATIENT MESSAGE (OUTPATIENT)
Dept: FAMILY MEDICINE CLINIC | Age: 45
End: 2025-06-20

## 2025-06-20 ENCOUNTER — OFFICE VISIT (OUTPATIENT)
Dept: SURGERY | Age: 45
End: 2025-06-20

## 2025-06-20 VITALS
BODY MASS INDEX: 39.24 KG/M2 | WEIGHT: 250 LBS | TEMPERATURE: 97.5 F | HEIGHT: 67 IN | HEART RATE: 82 BPM | OXYGEN SATURATION: 98 % | DIASTOLIC BLOOD PRESSURE: 97 MMHG | SYSTOLIC BLOOD PRESSURE: 157 MMHG

## 2025-06-20 DIAGNOSIS — N62 MACROMASTIA: Primary | ICD-10-CM

## 2025-06-20 NOTE — PROGRESS NOTES
[facility]    Consultation requested by: Carisa Willard MD.     CC: Body contouring    HPI: 44 y.o. female with a PMHx as delineated below who presents in consultation for body contouring. She wishes to discuss breast reduction and panniculectomy to improve function and skin to skin contact. She states she gets rashes under her pannus and breast that she has tried OTC creams without improvement.       Bariatric surgery: Yes / date: 2022 (Type: sleeve gastrectomy) [facility]    Max weight (lbs): 283  Lowest weight (lbs): 198  Current (lbs): 250  Weight change in the past 3 months: No, stable   Max BMI: 45.7  Current BMI: Body mass index is 39.75 kg/m².    History of DVT/PE: No  Excess skin cover genitals: Yes      Previous body contouring procedures: No   Smoking: none      Last Mammogram: [place] 5/25    Current bra size: 44 DD   Desired bra size: \"C\"   Pregnancies/miscarriages: 4/1  Breast feeding: no future plans    Breast Symptoms:    Macromastia Symptoms:  Upper back pain: Yes      Bra strap grooves: Yes      Wears supportive bras (>1 yr): Yes      Tried conservative measures (PT, MDs,etc): Yes      Intertrigo: Yes      Head/neck pain: Yes      Headaches: Yes      Paresthesias of hands/fingers: Yes    Past Medical History:   Diagnosis Date    Anxiety     Arthritis     Asthma     Cancer (HCC)     cervical cancerous cells    Cervical pain     Chronic back pain     Depression     Diabetes mellitus (HCC)     lost 83 pounds-no longer a problem    GERD (gastroesophageal reflux disease)     Hyperlipidemia     Hypertension     Infertility     took fertility medication    Kidney stone 03/2012    Menorrhagia     Migraine     Miscarriage     Pelvic pain     Polycystic ovarian syndrome 1999    PONV (postoperative nausea and vomiting)     Psoriasis     Stomach ulcer     Unspecified sleep apnea     didn't tolerate CPAP     Past Surgical History:   Procedure Laterality Date    BREAST SURGERY Right

## 2025-06-23 NOTE — TELEPHONE ENCOUNTER
Notified patient Dr. Willard is not ordering compounded medications any longer.  Advised of Zepbound and Wegovy self pay options.  Patient stated she would prefer Wegovy due to initital cost being lower.  Advised patient she does need to be seen in the office prior to Dr. Willard ordering the medications.  Appointment scheduled.  Patient stated understanding.

## 2025-06-24 DIAGNOSIS — E66.9 TYPE 2 DIABETES MELLITUS WITH OBESITY (HCC): ICD-10-CM

## 2025-06-24 DIAGNOSIS — E11.69 TYPE 2 DIABETES MELLITUS WITH OBESITY (HCC): ICD-10-CM

## 2025-06-25 RX ORDER — DULOXETIN HYDROCHLORIDE 30 MG/1
90 CAPSULE, DELAYED RELEASE ORAL DAILY
Qty: 90 CAPSULE | Refills: 2 | Status: SHIPPED | OUTPATIENT
Start: 2025-06-25

## 2025-06-25 NOTE — TELEPHONE ENCOUNTER
Refill Request     CONFIRM preferred pharmacy with the patient.    If Mail Order Rx - Pend for 90 day refill.      Last Seen: Last Seen Department: 4/23/2025  Last Seen by PCP: 4/23/2025    Last Written: 3/10/25 90 with 2 refills     If no future appointment scheduled:  Review the last OV with PCP and review information for follow-up visit,  Route STAFF MESSAGE with patient name to the  Pool for scheduling with the following information:            -  Timing of next visit           -  Visit type ie Physical, OV, etc           -  Diagnoses/Reason ie. COPD, HTN - Do not use MEDICATION, Follow-up or CHECK UP - Give reason for visit      Next Appointment:   Future Appointments   Date Time Provider Department Center   7/8/2025  2:30 PM Carisa Willard MD [facility]   7/17/2025  1:45 PM Brennan Ricardo MD [facility]   9/19/2025 12:00 PM Samantha Calvo APRN - CNP [facility]       Message sent to  to schedule appt with patient?  NO      Requested Prescriptions     Pending Prescriptions Disp Refills    DULoxetine (CYMBALTA) 30 MG extended release capsule 90 capsule 2     Sig: Take 3 capsules by mouth daily

## 2025-07-15 ENCOUNTER — CLINICAL DOCUMENTATION (OUTPATIENT)
Dept: BARIATRICS/WEIGHT MGMT | Age: 45
End: 2025-07-15

## 2025-07-15 NOTE — PROGRESS NOTES
This eval was conducted via phone on 7/15/25 in preparation or their post-surgical visit on 7/17/25 with Dr. Ricardo.     Dietary Assessment Note      Vitals: Self-reported wt: 250 lbs Patient gained 20 lbs over 1 year.    Total Weight Loss: 33 lbs    Labs reviewed: labs are reviewed, up to date and normal 5/1/25     Protein intake: 60-80 grams/day - per recall     Fluid intake: 48-64 oz/day    Multivitamin/mineral intake: OTC MVI     Calcium intake: citrate 1000 mg      Other: vitamin D & biotin    Exercise: horrible back/lumbar/buttock pain. Reports nonsedentary lifestyle     Nutrition Assessment: 4yr 11 mos post-op visit. Food choice/frequency  varies during the school year.   B: protein shake  S: none   L: none  D: sloppy joes + coleslaw + chips OR turkey hamburgers sometimes bun/pizza (with autistic client) OR salad + protein   S: whatever is in the house (cravings hunger)    Amount able to eat per sitting: unsure - can eat an entire sandwich + side    Following 30/30/30 rule: no    Food Intolerances/issues: milk    Client Concerns: back on track    Handouts: 1200 kcal PSMP    Goals:   - Focus on protein and produce at all meals and snacks  - Avoid high fat foods like chips/pizza  - Aim to have lunch/ snack throughout day, set timer   - Exercise/seated exercises as able    Plan: f/u per provider     BOLIVAR HEALY, RD

## 2025-07-17 ENCOUNTER — OFFICE VISIT (OUTPATIENT)
Dept: BARIATRICS/WEIGHT MGMT | Age: 45
End: 2025-07-17

## 2025-07-17 VITALS
HEIGHT: 66 IN | HEART RATE: 92 BPM | DIASTOLIC BLOOD PRESSURE: 68 MMHG | BODY MASS INDEX: 40.35 KG/M2 | RESPIRATION RATE: 18 BRPM | OXYGEN SATURATION: 98 % | SYSTOLIC BLOOD PRESSURE: 124 MMHG

## 2025-07-17 DIAGNOSIS — E66.01 MORBID OBESITY WITH BMI OF 40.0-44.9, ADULT (HCC): Primary | ICD-10-CM

## 2025-07-17 DIAGNOSIS — I10 ESSENTIAL HYPERTENSION: ICD-10-CM

## 2025-07-17 DIAGNOSIS — E11.69 TYPE 2 DIABETES MELLITUS WITH OBESITY (HCC): ICD-10-CM

## 2025-07-17 DIAGNOSIS — E78.1 HYPERTRIGLYCERIDEMIA: ICD-10-CM

## 2025-07-17 DIAGNOSIS — G47.33 OBSTRUCTIVE SLEEP APNEA: ICD-10-CM

## 2025-07-17 DIAGNOSIS — K21.9 CHRONIC GERD: ICD-10-CM

## 2025-07-17 DIAGNOSIS — Z98.84 S/P LAPAROSCOPIC SLEEVE GASTRECTOMY: ICD-10-CM

## 2025-07-17 DIAGNOSIS — E66.9 TYPE 2 DIABETES MELLITUS WITH OBESITY (HCC): ICD-10-CM

## 2025-07-17 NOTE — PROGRESS NOTES
[facility]   [facility]  Brennan Ricardo MD, FACS, [address], [address]    [facility] [address] .  Phone: [phone]  Fax: [phone]            Chief Complaint   Patient presents with    Bariatrics Post Op Follow Up     4yr 6mo s/p sleeve 12/23/20           HPI:    Rachel San is a very pleasant 44 y.o.  female , Body mass index is 40.35 kg/m².. And multiple medical problems who is presenting for bariatric follow up care.   Rachel is s/p laparoscopic sleeve gastrectomy by me 12/2020. Initial Weight: 283 lbs, Weight Loss: 34 lbs.   Comes today to the clinic without any complaints. Patient denies any nausea, vomiting, fevers, chills, shortness of breath, chest pain, constipation or urinary symptoms. Denies any heartburn nor dysphagia.  Patient informed us today that they are taking the multivitamins as instructed.  Patient denies any tingling, weakness,  numbness nor any neurological symptoms.  Rachel is feeling very well, and is very active. Patient is very pleased with the weight loss and resolution of co-morbid conditions.      Pain Assessment   Denies any abdominal pain     Past Medical History:   Diagnosis Date    Anxiety     Arthritis     Asthma     Cancer (HCC)     cervical cancerous cells    Cervical pain     Chronic back pain     Depression     Diabetes mellitus (HCC)     lost 83 pounds-no longer a problem    GERD (gastroesophageal reflux disease)     Hyperlipidemia     Hypertension     Infertility     took fertility medication    Kidney stone 03/2012    Menorrhagia     Migraine     Miscarriage     Pelvic pain     Polycystic ovarian syndrome 1999    PONV (postoperative nausea and vomiting)     Psoriasis     Stomach ulcer     Unspecified sleep apnea     didn't tolerate CPAP     Past Surgical History:   Procedure Laterality Date    BREAST SURGERY Right     for cellulitis     CHOLECYSTECTOMY  2011    COLONOSCOPY  10/07/2015    [place]

## 2025-07-28 SDOH — ECONOMIC STABILITY: INCOME INSECURITY: IN THE LAST 12 MONTHS, WAS THERE A TIME WHEN YOU WERE NOT ABLE TO PAY THE MORTGAGE OR RENT ON TIME?: YES

## 2025-07-28 SDOH — ECONOMIC STABILITY: FOOD INSECURITY: WITHIN THE PAST 12 MONTHS, THE FOOD YOU BOUGHT JUST DIDN'T LAST AND YOU DIDN'T HAVE MONEY TO GET MORE.: SOMETIMES TRUE

## 2025-07-28 SDOH — ECONOMIC STABILITY: FOOD INSECURITY: WITHIN THE PAST 12 MONTHS, YOU WORRIED THAT YOUR FOOD WOULD RUN OUT BEFORE YOU GOT MONEY TO BUY MORE.: SOMETIMES TRUE

## 2025-07-31 ENCOUNTER — OFFICE VISIT (OUTPATIENT)
Dept: FAMILY MEDICINE CLINIC | Age: 45
End: 2025-07-31
Payer: COMMERCIAL

## 2025-07-31 VITALS
DIASTOLIC BLOOD PRESSURE: 84 MMHG | HEART RATE: 84 BPM | OXYGEN SATURATION: 96 % | WEIGHT: 246.2 LBS | BODY MASS INDEX: 39.74 KG/M2 | SYSTOLIC BLOOD PRESSURE: 136 MMHG

## 2025-07-31 DIAGNOSIS — E66.9 OBESITY (BMI 35.0-39.9 WITHOUT COMORBIDITY): Primary | ICD-10-CM

## 2025-07-31 PROCEDURE — 1036F TOBACCO NON-USER: CPT | Performed by: STUDENT IN AN ORGANIZED HEALTH CARE EDUCATION/TRAINING PROGRAM

## 2025-07-31 PROCEDURE — 99213 OFFICE O/P EST LOW 20 MIN: CPT | Performed by: STUDENT IN AN ORGANIZED HEALTH CARE EDUCATION/TRAINING PROGRAM

## 2025-07-31 PROCEDURE — G8427 DOCREV CUR MEDS BY ELIG CLIN: HCPCS | Performed by: STUDENT IN AN ORGANIZED HEALTH CARE EDUCATION/TRAINING PROGRAM

## 2025-07-31 PROCEDURE — G8417 CALC BMI ABV UP PARAM F/U: HCPCS | Performed by: STUDENT IN AN ORGANIZED HEALTH CARE EDUCATION/TRAINING PROGRAM

## 2025-07-31 PROCEDURE — 3075F SYST BP GE 130 - 139MM HG: CPT | Performed by: STUDENT IN AN ORGANIZED HEALTH CARE EDUCATION/TRAINING PROGRAM

## 2025-07-31 PROCEDURE — 3079F DIAST BP 80-89 MM HG: CPT | Performed by: STUDENT IN AN ORGANIZED HEALTH CARE EDUCATION/TRAINING PROGRAM

## 2025-07-31 RX ORDER — ONDANSETRON 4 MG/1
4 TABLET, ORALLY DISINTEGRATING ORAL 3 TIMES DAILY PRN
Qty: 21 TABLET | Refills: 0 | Status: SHIPPED | OUTPATIENT
Start: 2025-07-31

## 2025-07-31 RX ORDER — MUPIROCIN 2 %
OINTMENT (GRAM) TOPICAL
Qty: 30 G | Refills: 0 | Status: SHIPPED | OUTPATIENT
Start: 2025-07-31 | End: 2025-08-07

## 2025-07-31 SDOH — ECONOMIC STABILITY: FOOD INSECURITY: WITHIN THE PAST 12 MONTHS, THE FOOD YOU BOUGHT JUST DIDN'T LAST AND YOU DIDN'T HAVE MONEY TO GET MORE.: SOMETIMES TRUE

## 2025-07-31 SDOH — ECONOMIC STABILITY: FOOD INSECURITY

## 2025-07-31 SDOH — ECONOMIC STABILITY: FOOD INSECURITY: WITHIN THE PAST 12 MONTHS, YOU WORRIED THAT YOUR FOOD WOULD RUN OUT BEFORE YOU GOT MONEY TO BUY MORE.: SOMETIMES TRUE

## 2025-07-31 NOTE — ASSESSMENT & PLAN NOTE
BMI 39. Patient would benefit from Zepbound due to BMI >= 35 with comorbidities. Failed to achieve weight loss with a reduced calorie diet and exercise of at least 150 minutes per week for at least 6 months. Patient plans to continue lifestyle changes with a reduced calorie diet and exercise of at least 150 minutes per week while on the medication.   --Counseled on zepbound side effects, dosing; patient planning to obtain direct from [facility]     Orders:    tirzepatide-weight management (ZEPBOUND) 2.5 MG/0.5ML SOLN subCUTAneous injection (VIAL); Inject 0.5 mLs into the skin once a week

## 2025-07-31 NOTE — PATIENT INSTRUCTIONS
[employer]   The [employer] Helpline is your gateway to essential community services--available 24 hours a day, 7 days a week. Our trained team listens, asks questions, and connects you to local resources like food, housing, transportation, utilities, childcare, and more. For the most complete and up-to-date information, call [employer] or search our resource database online.  www.[place] [address].org for resources in [place], [facility], [place], [place], [place], [place], and [facility] in [place]; [place], [place], [place], and [facility] in [place].  www.[employer].org/resources for resources in [place], [place], [place], [place], [place], [place], [facility], [place], and [facility] in [place].  Residents outside these regions can visit AboutMyStar to find their local service.     [place] Hunger Hotline  What they offer: The hotline is a resource for individuals and families seeking information on how and where to obtain food.   Website:  https://www.hungerfreeamerica.org/en-us/[place]-national-hunger-hotline    Hunger Hotline Phone Number: 0-480-9-HUNGRY (1-672.460.1950)  Hours of Operation: Monday - Friday 7am to 10pm   The Hunger Hotline also operates a texting service. Our number is 866-325-5410. Please note that these are automated texts and we do not reply or monitor texts.   [employer] [place]  What they offer: $1 for $1 matching for families receiving SNAP/food stamps when spent on “healthy” food.  The match money can be used to purchase fruits and vegetables so adds more healthy food choices for SNAP beneficiaries.   Contact: https://J[employer]/locations/       SNAP (formerly Food [employer])   What they offer: SNAP is used like cash to buy eligible food items from authorized retailers.  Apply for benefits online: https://jfs.[place].gov/ofam/foodstamps.stm     Apply for benefits by phone or in-person by visiting your local Job and Family Services:    [facility] [employer]   Phone:

## 2025-07-31 NOTE — PROGRESS NOTES
[facility]  2025    Rachel San (:  1980) is a 44 y.o. female, here for evaluation of the following medical concerns:    Chief Complaint   Patient presents with    Discuss Medications     Discuss starting WEGOVY        ASSESSMENT/ PLAN  Assessment & Plan  Obesity (BMI 35.0-39.9 without comorbidity)   BMI 39. Patient would benefit from Zepbound due to BMI >= 35 with comorbidities. Failed to achieve weight loss with a reduced calorie diet and exercise of at least 150 minutes per week for at least 6 months. Patient plans to continue lifestyle changes with a reduced calorie diet and exercise of at least 150 minutes per week while on the medication.   --Counseled on zepbound side effects, dosing; patient planning to obtain direct from [facility]     Orders:    tirzepatide-weight management (ZEPBOUND) 2.5 MG/0.5ML SOLN subCUTAneous injection (VIAL); Inject 0.5 mLs into the skin once a week       Return in about 2 months (around 2025) for F/u wt .    HPI  Here for weight loss discussion.     BMI 39 - s/p laparoscopic sleeve gastrectomy 2020, seen by plastics for body contouring. She wishes to discuss breast reduction and panniculectomy to improve function but needs to achieve BMI 35 for surgery. Interested in GLP-1.     Her daily diet includes a protein shake with 30 mg of protein for breakfast, and she consumes a variety of vegetables from her garden. She reports overeating at night. Has constipation; she is currently taking Senna Plus as part of her daily medication regimen and has not tried MiraLAX before.        The 10-year ASCVD risk score (Bryson DK, et al., 2019) is: 1.6%     ROS  Review of Systems   Constitutional:  Negative for chills and fever.   Respiratory:  Negative for cough and shortness of breath.    Cardiovascular:  Negative for chest pain.   Gastrointestinal:  Negative for abdominal pain, nausea and vomiting.   All other systems reviewed and are

## 2025-08-01 ASSESSMENT — ENCOUNTER SYMPTOMS
SHORTNESS OF BREATH: 0
ABDOMINAL PAIN: 0
VOMITING: 0
COUGH: 0
NAUSEA: 0

## 2025-08-06 ENCOUNTER — PATIENT MESSAGE (OUTPATIENT)
Dept: FAMILY MEDICINE CLINIC | Age: 45
End: 2025-08-06

## 2025-08-06 RX ORDER — BUPROPION HYDROCHLORIDE 300 MG/1
300 TABLET ORAL EVERY MORNING
Qty: 90 TABLET | Refills: 1 | Status: SHIPPED | OUTPATIENT
Start: 2025-08-06

## 2025-08-07 DIAGNOSIS — H69.93 ACUTE DYSFUNCTION OF EUSTACHIAN TUBE, BILATERAL: ICD-10-CM

## 2025-08-07 RX ORDER — CETIRIZINE HYDROCHLORIDE 10 MG/1
10 TABLET ORAL DAILY
Qty: 90 TABLET | Refills: 1 | Status: SHIPPED | OUTPATIENT
Start: 2025-08-07

## 2025-08-20 ENCOUNTER — TELEMEDICINE (OUTPATIENT)
Dept: BARIATRICS/WEIGHT MGMT | Age: 45
End: 2025-08-20

## 2025-08-20 ENCOUNTER — TELEPHONE (OUTPATIENT)
Dept: BARIATRICS/WEIGHT MGMT | Age: 45
End: 2025-08-20

## 2025-08-20 DIAGNOSIS — E66.812 CLASS 2 OBESITY: Primary | ICD-10-CM

## 2025-08-20 DIAGNOSIS — Z71.3 DIETARY COUNSELING AND SURVEILLANCE: ICD-10-CM

## 2025-08-20 DIAGNOSIS — Z98.84 S/P LAPAROSCOPIC SLEEVE GASTRECTOMY: ICD-10-CM

## 2025-08-20 ASSESSMENT — ENCOUNTER SYMPTOMS
CHEST TIGHTNESS: 0
EYE PAIN: 0
NAUSEA: 0
SHORTNESS OF BREATH: 0
COUGH: 0
APNEA: 0
CONSTIPATION: 0
BLOOD IN STOOL: 0
PHOTOPHOBIA: 0
ABDOMINAL PAIN: 0
CHOKING: 0
WHEEZING: 0
DIARRHEA: 0
ABDOMINAL DISTENTION: 0
VOMITING: 0

## 2025-08-22 ENCOUNTER — TELEPHONE (OUTPATIENT)
Dept: ADMINISTRATIVE | Age: 45
End: 2025-08-22

## 2025-08-28 DIAGNOSIS — E66.812 CLASS 2 OBESITY: Primary | ICD-10-CM

## 2025-08-28 DIAGNOSIS — Z71.3 DIETARY COUNSELING AND SURVEILLANCE: ICD-10-CM

## 2025-08-28 RX ORDER — TIRZEPATIDE 5 MG/.5ML
5 INJECTION, SOLUTION SUBCUTANEOUS
Qty: 2 ML | Refills: 0 | Status: SHIPPED | OUTPATIENT
Start: 2025-08-28

## (undated) DEVICE — TRAP SPEC POLYPR SGL CHMBR FN MESH SCRN

## (undated) DEVICE — PMI DISPOSABLE PUNCTURE CLOSURE DEVICE / SUTURE GRASPER: Brand: PMI

## (undated) DEVICE — TRI-LUMEN FILTERED TUBE SET WITH ACTIVATED CHARCOAL FILTER: Brand: AIRSEAL

## (undated) DEVICE — SUTURE VCRL SZ 3-0 L18IN ABSRB UD L26MM SH 1/2 CIR J864D

## (undated) DEVICE — Device

## (undated) DEVICE — SUTURE VCRL SZ 4-0 L18IN ABSRB UD L19MM PS-2 3/8 CIR PRIM J496H

## (undated) DEVICE — GOWN,SIRUS,POLYRNF,SETINSLV,L,20/CS: Brand: MEDLINE

## (undated) DEVICE — GLOVE SURG SZ 65 L12IN FNGR THK94MIL STD WHT LTX FREE

## (undated) DEVICE — RELOAD STPL H1.8-3.8MM REG THCK TISS G 6 ROW GRIPPING SURF

## (undated) DEVICE — TROCAR: Brand: KII SLEEVE

## (undated) DEVICE — CANNULA SEAL

## (undated) DEVICE — MOUTHPIECE ENDOSCP L CTRL OPN AND SIDE PORTS DISP

## (undated) DEVICE — ELECTRODE PT RET AD L9FT HI MOIST COND ADH HYDRGEL CORDED

## (undated) DEVICE — 1200CC HIFLOW SUCTION CANISTER WITH AEROSTAT FILTER, FLOAT VALVE SHUTOFF WITH GREEN LID: Brand: BEMIS

## (undated) DEVICE — [HIGH FLOW INSUFFLATOR,  DO NOT USE IF PACKAGE IS DAMAGED,  KEEP DRY,  KEEP AWAY FROM SUNLIGHT,  PROTECT FROM HEAT AND RADIOACTIVE SOURCES.]: Brand: PNEUMOSURE

## (undated) DEVICE — MINOR SET UP PK

## (undated) DEVICE — BLANKET WRM W29.9XL79.1IN UP BODY FORC AIR MISTRAL-AIR

## (undated) DEVICE — NEEDLE INSUF L150MM DIA2MM DISP FOR PNEUMOPERI ENDOPATH

## (undated) DEVICE — AIR SHEET,LAT,COMFORT GLIDE, BLEND 40X80: Brand: MEDLINE

## (undated) DEVICE — AGENT HEMSTAT W2XL4IN OXIDIZED REGENERATED CELOS ABSRB

## (undated) DEVICE — LOTION PREP REMV 5OZ IODO CLR TINC OF BENZ DURAPREP

## (undated) DEVICE — ELECTRODE ECG MONITR FOAM TEAR DROP ADLT RED

## (undated) DEVICE — CANNULA NSL AD TBNG L7FT PVC STR NONFLARED PRNG O2 DEL W STD

## (undated) DEVICE — SUTURE VCRL + SZ 3-0 L18IN ABSRB UD SH 1/2 CIR TAPERCUT NDL VCP864D

## (undated) DEVICE — GLOVE SURG SZ 65 L12IN FNGR THK79MIL GRN LTX FREE

## (undated) DEVICE — ELECTRODE,ECG,STRESS,FOAM,3PK: Brand: MEDLINE

## (undated) DEVICE — SNARE ENDOSCP L240CM OD2.4MM LOOP W15MM RND INSUL STIFF

## (undated) DEVICE — LAPAROSCOPIC SCISSORS: Brand: EPIX LAPAROSCOPIC SCISSORS

## (undated) DEVICE — 30977 SEE SHARP - ENHANCED INTRAOPERATIVE LAPAROSCOPE CLEANING & DEFOGGING: Brand: 30977 SEE SHARP - ENHANCED INTRAOPERATIVE LAPAROSCOPE CLEANING & DEFOGGING

## (undated) DEVICE — SUTURE VCRL SZ 0 L54IN ABSRB UD POLYGLACTIN 910 COAT BRAID J608H

## (undated) DEVICE — SHEET,DRAPE,40X58,STERILE: Brand: MEDLINE

## (undated) DEVICE — TROCARS: Brand: KII® OPTICAL ACCESS SYSTEM

## (undated) DEVICE — SOLUTION IV IRRIG POUR BRL 0.9% SODIUM CHL 2F7124

## (undated) DEVICE — DRAPE,LAP,CHOLE,W/TROUGHS,STERILE: Brand: MEDLINE

## (undated) DEVICE — CONVERTED USE 358040 BNDG STRTCH 4INX4YD STRL

## (undated) DEVICE — BLADELESS OBTURATOR: Brand: WECK VISTA

## (undated) DEVICE — SURGICAL PROCEDURE PACK IV U-BAR

## (undated) DEVICE — SOLUTION IV 1000ML LAC RINGERS PH 6.5 INJ USP VIAFLX PLAS

## (undated) DEVICE — SURGICAL SET UP - SURE SET: Brand: MEDLINE INDUSTRIES, INC.

## (undated) DEVICE — ADHESIVE SKIN CLSR 0.7ML TOP DERMBND ADV

## (undated) DEVICE — SET VLV 3 PC AWS DISPOSABLE GRDIAN SCOPEVALET

## (undated) DEVICE — BW-412T DISP COMBO CLEANING BRUSH: Brand: SINGLE USE COMBINATION CLEANING BRUSH

## (undated) DEVICE — RELOAD STPL H4.1X2MM DIA60MM THCK TISS GRN 6 ROW PWR GST B

## (undated) DEVICE — KIT INF CTRL 2OZ LUB TBNG L12FT DBL END BRSH SYR OP4

## (undated) DEVICE — ARM DRAPE

## (undated) DEVICE — TIP IU L8CM DIA6.7MM BLU SIL FLX DISP RUMI II

## (undated) DEVICE — PUMP SUC IRR TBNG L10FT W/ HNDPC ASSEMB STRYKEFLOW 2

## (undated) DEVICE — PLATE ES AD W 9FT CRD 2

## (undated) DEVICE — MEDI-VAC NON-CONDUCTIVE SUCTION TUBING: Brand: CARDINAL HEALTH

## (undated) DEVICE — Device: Brand: DISPOSABLE ELECTROSURGICAL SNARE

## (undated) DEVICE — SOLUTION IRRIG 1000ML STRL H2O USP PLAS POUR BTL

## (undated) DEVICE — BINDER ABD H12IN FOR 62-74IN WAIST UNIV 4 PNL PREM DSGN E

## (undated) DEVICE — 3M™ TEGADERM™ TRANSPARENT FILM DRESSING FRAME STYLE, 1624W, 2-3/8 IN X 2-3/4 IN (6 CM X 7 CM), 100/CT 4CT/CASE: Brand: 3M™ TEGADERM™

## (undated) DEVICE — CANNULA NSL CO2 O2 AD

## (undated) DEVICE — GLOVE SURG SZ 85 L12IN FNGR ORTHO 126MIL CRM LTX FREE

## (undated) DEVICE — COVER LT HNDL BLU PLAS

## (undated) DEVICE — TIP COVER ACCESSORY

## (undated) DEVICE — STAPLER SKIN L440MM 32MM LNG 12 FIRING B FRM PWR + GRIPPING

## (undated) DEVICE — FORCEPS BX L240CM JAW DIA2.8MM L CAP W/ NDL MIC MESH TOOTH

## (undated) DEVICE — SUTURE MCRYL SZ 4-0 L18IN ABSRB UD L19MM PS-2 3/8 CIR PRIM Y496G

## (undated) DEVICE — SNARE ENDOSCP L240CM SHTH DIA24MM LOOP W10MM POLYP RND REINF

## (undated) DEVICE — CATHETER IV 20GA L1.25IN PNK FEP SFTY STR HUB RADPQ DISP

## (undated) DEVICE — CHLORAPREP 26ML ORANGE

## (undated) DEVICE — GLOVE,SURG,SENSICARE SLT,LF,PF,6.5: Brand: MEDLINE

## (undated) DEVICE — DEVICE SUT SHFT L34CM DIA 10MM 2 JAW LD UNIT ENDOSTCH

## (undated) DEVICE — SYRINGE MED 10ML LUERLOCK TIP W/O SFTY DISP

## (undated) DEVICE — ENDOSCOPIC KIT 2 12 FT OP4 DE2 GWN SYR

## (undated) DEVICE — RELOAD STPL L60MM H1.5-3.6MM REG TISS BLU GRIPPING SURF B

## (undated) DEVICE — SOLUTION IV IRRIG WATER 500ML POUR BRL ST 2F7113

## (undated) DEVICE — TROCAR: Brand: KII OPTICAL ACCESS SYSTEM

## (undated) DEVICE — PROCEDURE KIT ENDOSCP CUST

## (undated) DEVICE — NEEDLE HYPO 18GA L1.5IN PNK POLYPR HUB S STL REG BVL STR

## (undated) DEVICE — SYSTEM ES CUP DIA3CM PNEUMO OCCL BLLN DISP FOR CLIN POS

## (undated) DEVICE — AIRLIFE™ NASAL OXYGEN CANNULA CURVED, FLARED TIP, WITH 7 FEET (2.1 M) CRUSH RESISTANT TUBING, OVER-THE-EAR STYLE: Brand: AIRLIFE™

## (undated) DEVICE — Z DISCONTINUED USE 2276105 GOWN PROTCT UNIV CHST W28IN L49IN SL 24IN BLU SPUNBOND FLM

## (undated) DEVICE — SKIN MARKER,REGULAR TIP WITH RULER AND LABELS: Brand: DEVON

## (undated) DEVICE — SMARTGOWN BREATHABLE SURGICAL GOWN: Brand: CONVERTORS

## (undated) DEVICE — SUTURE VCRL SZ 0 L54IN ABSRB VLT W/O NDL POLYGLACTIN 910 J616H

## (undated) DEVICE — TRUE CONTENT TO BE POPULATED AS PART OF REBRANDING: Brand: ARGYLE

## (undated) DEVICE — DEVICE SUT W/ SZ 0 L48IN VLT POLYSRB SUT DISP ES-9 ENDO

## (undated) DEVICE — LIQUIBAND RAPID ADHESIVE 36/CS 0.8ML: Brand: MEDLINE

## (undated) DEVICE — CONMED SCOPE SAVER BITE BLOCK, 20X27 MM: Brand: SCOPE SAVER

## (undated) DEVICE — BOWL MED L 32OZ PLAS W/ MOLD GRAD EZ OPN PEEL PCH

## (undated) DEVICE — SPONGE,LAP,4"X18",XR,ST,5/PK,40PK/CS: Brand: MEDLINE INDUSTRIES, INC.

## (undated) DEVICE — GOWN,AURORA,NONREINF,RAGLAN,XXL,STERILE: Brand: MEDLINE

## (undated) DEVICE — GLOVE SURG SZ 75 L12IN FNGR THK94MIL STD WHT LTX FREE

## (undated) DEVICE — SOLUTION IV IRRIG 500ML 0.9% SODIUM CHL 2F7123

## (undated) DEVICE — FORCEPS BX L240CM WRK CHN 2.8MM STD CAP W/ NDL MIC MESH

## (undated) DEVICE — SET GRAV VENT NVENT CK VLV 3 NDL FREE PRT 10 GTT

## (undated) DEVICE — AIRSEAL 8 MM ACCESS PORT AND LOW PROFILE OBTURATOR WITH BLADELESS OPTICAL TIP, 120 MM LENGTH: Brand: AIRSEAL

## (undated) DEVICE — SET ADMIN PRIMING 7ML L30IN 7.35LB 20 GTT 2ND RLER CLMP

## (undated) DEVICE — 10FR FRAZIER SUCTION HANDLE: Brand: CARDINAL HEALTH

## (undated) DEVICE — VESSEL SEALER: Brand: ENDOWRIST;DAVINCI SI

## (undated) DEVICE — SHEARS ENDOSCP L36CM DIA5MM ULTRASONIC CRV TIP HARM

## (undated) DEVICE — NEEDLE HYPO 22GA L1.5IN BLK POLYPR HUB S STL REG BVL STR

## (undated) DEVICE — APPLICATOR PREP 26ML 0.7% IOD POVACRYLEX 74% ISO ALC ST

## (undated) DEVICE — COLUMN DRAPE

## (undated) DEVICE — GAUZE,SPONGE,4"X4",16PLY,STRL,LF,10/TRAY: Brand: MEDLINE

## (undated) DEVICE — TROCAR: Brand: KII FIOS FIRST ENTRY

## (undated) DEVICE — Device: Brand: PRIME MEDICAL LLC